# Patient Record
Sex: FEMALE | Race: WHITE | NOT HISPANIC OR LATINO | Employment: OTHER | URBAN - METROPOLITAN AREA
[De-identification: names, ages, dates, MRNs, and addresses within clinical notes are randomized per-mention and may not be internally consistent; named-entity substitution may affect disease eponyms.]

---

## 2017-02-24 ENCOUNTER — CONVERSION ENCOUNTER (OUTPATIENT)
Dept: CARDIOLOGY | Facility: CLINIC | Age: 67
End: 2017-02-24

## 2017-02-24 LAB
ALBUMIN SERPL-MCNC: 4.2 G/DL (ref 3.6–5.1)
ALBUMIN/GLOB SERPL: ABNORMAL {RATIO} (ref 1–2.5)
ALP SERPL-CCNC: 127 UNITS/L (ref 33–130)
ALT SERPL-CCNC: 18 UNITS/L (ref 6–29)
AST SERPL-CCNC: 34 UNITS/L (ref 10–35)
BASOPHILS # BLD AUTO: ABNORMAL 10*3/MM3 (ref 0–200)
BASOPHILS NFR BLD AUTO: 0.2 %
BILIRUB SERPL-MCNC: 0.5 MG/DL (ref 0.2–1.2)
BUN SERPL-MCNC: 12 MG/DL (ref 7–25)
BUN/CREAT SERPL: ABNORMAL (ref 6–22)
CALCIUM SERPL-MCNC: 9.7 MG/DL (ref 8.6–10.4)
CHLORIDE SERPL-SCNC: 103 MMOL/L (ref 98–110)
CO2 CONTENT VENOUS: 27 MMOL/L (ref 20–31)
CONV NEUTROPHILS/100 LEUKOCYTES IN BODY FLUID BY MANUAL COUNT: 83.1 %
CONV TOTAL PROTEIN: 7 G/DL (ref 6.1–8.1)
CREAT UR-MCNC: 1 MG/DL (ref 0.5–0.99)
CRP SERPL-MCNC: 1.17 MG/DL
EOSINOPHIL # BLD AUTO: 3 %
EOSINOPHIL # BLD AUTO: ABNORMAL 10*3/MM3 (ref 15–500)
ERYTHROCYTE [DISTWIDTH] IN BLOOD BY AUTOMATED COUNT: 16.2 % (ref 11–15)
GLOBULIN UR ELPH-MCNC: ABNORMAL G/DL (ref 1.9–3.7)
GLUCOSE SERPL-MCNC: 128 MG/DL (ref 65–99)
HCT VFR BLD AUTO: 45.6 % (ref 35–45)
HGB BLD-MCNC: 14.7 G/DL (ref 11.7–15.5)
LYMPHOCYTES # BLD AUTO: ABNORMAL 10*3/MM3 (ref 850–3900)
LYMPHOCYTES NFR BLD AUTO: 11 %
MCH RBC QN AUTO: 30.7 PG (ref 27–33)
MCHC RBC AUTO-ENTMCNC: ABNORMAL % (ref 32–36)
MCV RBC AUTO: 95.3 FL (ref 80–100)
MONOCYTES # BLD AUTO: ABNORMAL 10*3/MICROLITER (ref 200–950)
MONOCYTES NFR BLD AUTO: 2.7 %
NEUTROPHILS # BLD AUTO: ABNORMAL 10*3/MM3 (ref 1500–7800)
PLATELET # BLD AUTO: ABNORMAL 10*3/MM3 (ref 140–400)
PMV BLD AUTO: 8.6 FL (ref 7.5–12.5)
POTASSIUM SERPL-SCNC: 4.6 MMOL/L (ref 3.5–5.3)
RBC # BLD AUTO: ABNORMAL 10*6/MM3 (ref 3.8–5.1)
SODIUM SERPL-SCNC: 140 MMOL/L (ref 135–146)
WBC # BLD AUTO: ABNORMAL K/UL (ref 3.8–10.8)

## 2017-05-27 LAB
ALBUMIN SERPL-MCNC: 4 G/DL (ref 3.6–5.1)
ALBUMIN/GLOB SERPL: ABNORMAL {RATIO} (ref 1–2.5)
ALP SERPL-CCNC: 88 UNITS/L (ref 33–130)
ALT SERPL-CCNC: 24 UNITS/L (ref 6–29)
AST SERPL-CCNC: 31 UNITS/L (ref 10–35)
BILIRUB SERPL-MCNC: 0.5 MG/DL (ref 0.2–1.2)
BUN SERPL-MCNC: 21 MG/DL (ref 7–25)
BUN/CREAT SERPL: ABNORMAL (ref 6–22)
CALCIUM SERPL-MCNC: 9.5 MG/DL (ref 8.6–10.4)
CHLORIDE SERPL-SCNC: 101 MMOL/L (ref 98–110)
CO2 CONTENT VENOUS: 26 MMOL/L (ref 20–31)
CONV TOTAL PROTEIN: 6.4 G/DL (ref 6.1–8.1)
CREAT UR-MCNC: 0.94 MG/DL (ref 0.5–0.99)
CYCLIC CITRULLINATED PEPTIDE ANTIBODY: ABNORMAL
ERYTHROCYTE [DISTWIDTH] IN BLOOD BY AUTOMATED COUNT: 16.3 % (ref 11–15)
GLOBULIN UR ELPH-MCNC: ABNORMAL G/DL (ref 1.9–3.7)
GLUCOSE SERPL-MCNC: 189 MG/DL (ref 65–99)
HCT VFR BLD AUTO: 41.5 % (ref 35–45)
HGB BLD-MCNC: 13.7 G/DL (ref 11.7–15.5)
MCH RBC QN AUTO: 29.7 PG (ref 27–33)
MCHC RBC AUTO-ENTMCNC: ABNORMAL % (ref 32–36)
MCV RBC AUTO: 90 FL (ref 80–100)
PLATELET # BLD AUTO: ABNORMAL 10*3/MM3 (ref 140–400)
PMV BLD AUTO: 10 FL (ref 7.5–12.5)
POTASSIUM SERPL-SCNC: 4.2 MMOL/L (ref 3.5–5.3)
RBC # BLD AUTO: ABNORMAL 10*6/MM3 (ref 3.8–5.1)
SODIUM SERPL-SCNC: 141 MMOL/L (ref 135–146)
WBC # BLD AUTO: ABNORMAL K/UL (ref 3.8–10.8)

## 2017-08-02 ENCOUNTER — HOSPITAL ENCOUNTER (OUTPATIENT)
Dept: LAB | Facility: HOSPITAL | Age: 67
Discharge: HOME | End: 2017-08-02
Attending: NURSE PRACTITIONER | Admitting: NURSE PRACTITIONER

## 2017-08-02 LAB
ALBUMIN SERPL-MCNC: 3.7 G/DL (ref 3.5–4.8)
ALBUMIN/GLOB SERPL: 1.2 {RATIO} (ref 1–1.7)
ALP SERPL-CCNC: 82 IU/L (ref 32–91)
ALT SERPL-CCNC: 22 IU/L (ref 14–54)
ANION GAP SERPL CALC-SCNC: 17.9 MMOL/L (ref 10–20)
AST SERPL-CCNC: 54 IU/L (ref 15–41)
BASOPHILS # BLD AUTO: 0 10*3/UL (ref 0–0.2)
BASOPHILS NFR BLD AUTO: 1 % (ref 0–2)
BILIRUB SERPL-MCNC: 0.7 MG/DL (ref 0.3–1.2)
BUN SERPL-MCNC: 14 MG/DL (ref 8–20)
BUN/CREAT SERPL: 14 (ref 5.4–26.2)
CALCIUM SERPL-MCNC: 9.9 MG/DL (ref 8.9–10.3)
CHLORIDE SERPL-SCNC: 103 MMOL/L (ref 101–111)
CONV CO2: 24 MMOL/L (ref 22–32)
CONV TOTAL PROTEIN: 6.8 G/DL (ref 6.1–7.9)
CREAT UR-MCNC: 1 MG/DL (ref 0.4–1)
CRP SERPL-MCNC: 0.87 MG/DL (ref 0–0.7)
DIFFERENTIAL METHOD BLD: (no result)
EOSINOPHIL # BLD AUTO: 0.3 10*3/UL (ref 0–0.3)
EOSINOPHIL # BLD AUTO: 5 % (ref 0–3)
ERYTHROCYTE [DISTWIDTH] IN BLOOD BY AUTOMATED COUNT: 16.6 % (ref 11.5–14.5)
ERYTHROCYTE [SEDIMENTATION RATE] IN BLOOD BY WESTERGREN METHOD: 51 MM/HR (ref 0–30)
GLOBULIN UR ELPH-MCNC: 3.1 G/DL (ref 2.5–3.8)
GLUCOSE SERPL-MCNC: 98 MG/DL (ref 65–99)
HCT VFR BLD AUTO: 40.7 % (ref 35–49)
HGB BLD-MCNC: 13.4 G/DL (ref 12–15)
LYMPHOCYTES # BLD AUTO: 1.1 10*3/UL (ref 0.8–4.8)
LYMPHOCYTES NFR BLD AUTO: 19 % (ref 18–42)
MCH RBC QN AUTO: 31.3 PG (ref 26–32)
MCHC RBC AUTO-ENTMCNC: 32.9 G/DL (ref 32–36)
MCV RBC AUTO: 95.1 FL (ref 80–94)
MONOCYTES # BLD AUTO: 0.5 10*3/UL (ref 0.1–1.3)
MONOCYTES NFR BLD AUTO: 9 % (ref 2–11)
NEUTROPHILS # BLD AUTO: 3.7 10*3/UL (ref 2.3–8.6)
NEUTROPHILS NFR BLD AUTO: 66 % (ref 50–75)
NRBC BLD AUTO-RTO: 0 /100{WBCS}
NRBC/RBC NFR BLD MANUAL: 0 10*3/UL
PLATELET # BLD AUTO: 297 10*3/UL (ref 150–450)
PMV BLD AUTO: 8.9 FL (ref 7.4–10.4)
POTASSIUM SERPL-SCNC: 3.9 MMOL/L (ref 3.6–5.1)
RBC # BLD AUTO: 4.27 10*6/UL (ref 4–5.4)
SODIUM SERPL-SCNC: 141 MMOL/L (ref 136–144)
WBC # BLD AUTO: 5.7 10*3/UL (ref 4.5–11.5)

## 2017-11-30 ENCOUNTER — HOSPITAL ENCOUNTER (OUTPATIENT)
Dept: RHEUMATOLOGY | Facility: CLINIC | Age: 67
Discharge: HOME | End: 2017-11-30
Attending: INTERNAL MEDICINE | Admitting: INTERNAL MEDICINE

## 2018-05-02 ENCOUNTER — HOSPITAL ENCOUNTER (OUTPATIENT)
Dept: LAB | Facility: HOSPITAL | Age: 68
Discharge: HOME | End: 2018-05-02
Attending: INTERNAL MEDICINE | Admitting: INTERNAL MEDICINE

## 2018-05-02 LAB
ALBUMIN SERPL-MCNC: 3.7 G/DL (ref 3.5–4.8)
ALBUMIN/GLOB SERPL: 1.2 {RATIO} (ref 1–1.7)
ALP SERPL-CCNC: 83 IU/L (ref 32–91)
ALT SERPL-CCNC: 24 IU/L (ref 14–54)
ANION GAP SERPL CALC-SCNC: 13.6 MMOL/L (ref 10–20)
AST SERPL-CCNC: 55 IU/L (ref 15–41)
BASOPHILS # BLD AUTO: 0.1 10*3/UL (ref 0–0.2)
BASOPHILS NFR BLD AUTO: 1 % (ref 0–2)
BILIRUB SERPL-MCNC: 0.6 MG/DL (ref 0.3–1.2)
BUN SERPL-MCNC: 19 MG/DL (ref 8–20)
BUN/CREAT SERPL: 17.3 (ref 5.4–26.2)
CALCIUM SERPL-MCNC: 10 MG/DL (ref 8.9–10.3)
CHLORIDE SERPL-SCNC: 104 MMOL/L (ref 101–111)
CONV CO2: 27 MMOL/L (ref 22–32)
CONV TOTAL PROTEIN: 6.8 G/DL (ref 6.1–7.9)
CREAT UR-MCNC: 1.1 MG/DL (ref 0.4–1)
CRP SERPL-MCNC: 0.7 MG/DL (ref 0–0.7)
DIFFERENTIAL METHOD BLD: (no result)
EOSINOPHIL # BLD AUTO: 0.4 10*3/UL (ref 0–0.3)
EOSINOPHIL # BLD AUTO: 6 % (ref 0–3)
ERYTHROCYTE [DISTWIDTH] IN BLOOD BY AUTOMATED COUNT: 15.3 % (ref 11.5–14.5)
ERYTHROCYTE [SEDIMENTATION RATE] IN BLOOD BY WESTERGREN METHOD: 28 MM/HR (ref 0–30)
GLOBULIN UR ELPH-MCNC: 3.1 G/DL (ref 2.5–3.8)
GLUCOSE SERPL-MCNC: 138 MG/DL (ref 65–99)
HCT VFR BLD AUTO: 40.5 % (ref 35–49)
HGB BLD-MCNC: 13.1 G/DL (ref 12–15)
LYMPHOCYTES # BLD AUTO: 0.9 10*3/UL (ref 0.8–4.8)
LYMPHOCYTES NFR BLD AUTO: 13 % (ref 18–42)
MCH RBC QN AUTO: 31.1 PG (ref 26–32)
MCHC RBC AUTO-ENTMCNC: 32.4 G/DL (ref 32–36)
MCV RBC AUTO: 96 FL (ref 80–94)
MONOCYTES # BLD AUTO: 0.5 10*3/UL (ref 0.1–1.3)
MONOCYTES NFR BLD AUTO: 8 % (ref 2–11)
NEUTROPHILS # BLD AUTO: 4.8 10*3/UL (ref 2.3–8.6)
NEUTROPHILS NFR BLD AUTO: 72 % (ref 50–75)
NRBC BLD AUTO-RTO: 0 /100{WBCS}
NRBC/RBC NFR BLD MANUAL: 0 10*3/UL
PLATELET # BLD AUTO: 244 10*3/UL (ref 150–450)
PMV BLD AUTO: 9 FL (ref 7.4–10.4)
POTASSIUM SERPL-SCNC: 5.6 MMOL/L (ref 3.6–5.1)
RBC # BLD AUTO: 4.21 10*6/UL (ref 4–5.4)
SODIUM SERPL-SCNC: 139 MMOL/L (ref 136–144)
WBC # BLD AUTO: 6.7 10*3/UL (ref 4.5–11.5)

## 2018-08-20 ENCOUNTER — HOSPITAL ENCOUNTER (OUTPATIENT)
Dept: LAB | Facility: HOSPITAL | Age: 68
Discharge: HOME | End: 2018-08-20
Attending: INTERNAL MEDICINE | Admitting: INTERNAL MEDICINE

## 2018-08-20 LAB
ALBUMIN SERPL-MCNC: 3.8 G/DL (ref 3.5–4.8)
ALBUMIN/GLOB SERPL: 1.1 {RATIO} (ref 1–1.7)
ALP SERPL-CCNC: 95 IU/L (ref 32–91)
ALT SERPL-CCNC: 19 IU/L (ref 14–54)
ANION GAP SERPL CALC-SCNC: 15.6 MMOL/L (ref 10–20)
AST SERPL-CCNC: 36 IU/L (ref 15–41)
BASOPHILS # BLD AUTO: 0.1 10*3/UL (ref 0–0.2)
BASOPHILS NFR BLD AUTO: 1 % (ref 0–2)
BILIRUB SERPL-MCNC: 0.5 MG/DL (ref 0.3–1.2)
BUN SERPL-MCNC: 24 MG/DL (ref 8–20)
BUN/CREAT SERPL: 21.8 (ref 5.4–26.2)
CALCIUM SERPL-MCNC: 10.1 MG/DL (ref 8.9–10.3)
CCP IGG ANTIBODIES: <0.4 U/ML
CHLORIDE SERPL-SCNC: 104 MMOL/L (ref 101–111)
CONV CO2: 25 MMOL/L (ref 22–32)
CONV TOTAL PROTEIN: 7.3 G/DL (ref 6.1–7.9)
CREAT UR-MCNC: 1.1 MG/DL (ref 0.4–1)
CRP SERPL-MCNC: 0.61 MG/DL (ref 0–0.7)
DIFFERENTIAL METHOD BLD: (no result)
EOSINOPHIL # BLD AUTO: 0.4 10*3/UL (ref 0–0.3)
EOSINOPHIL # BLD AUTO: 5 % (ref 0–3)
ERYTHROCYTE [DISTWIDTH] IN BLOOD BY AUTOMATED COUNT: 15 % (ref 11.5–14.5)
GLOBULIN UR ELPH-MCNC: 3.5 G/DL (ref 2.5–3.8)
GLUCOSE SERPL-MCNC: 143 MG/DL (ref 65–99)
HCT VFR BLD AUTO: 44 % (ref 35–49)
HGB BLD-MCNC: 14.7 G/DL (ref 12–15)
IRON SATN MFR SERPL: 17 % (ref 15–50)
IRON SERPL-MCNC: 76 UG/DL (ref 28–170)
LYMPHOCYTES # BLD AUTO: 1 10*3/UL (ref 0.8–4.8)
LYMPHOCYTES NFR BLD AUTO: 13 % (ref 18–42)
MCH RBC QN AUTO: 32.1 PG (ref 26–32)
MCHC RBC AUTO-ENTMCNC: 33.5 G/DL (ref 32–36)
MCV RBC AUTO: 95.8 FL (ref 80–94)
MONOCYTES # BLD AUTO: 0.5 10*3/UL (ref 0.1–1.3)
MONOCYTES NFR BLD AUTO: 7 % (ref 2–11)
NEUTROPHILS # BLD AUTO: 5.6 10*3/UL (ref 2.3–8.6)
NEUTROPHILS NFR BLD AUTO: 74 % (ref 50–75)
NRBC BLD AUTO-RTO: 0 /100{WBCS}
NRBC/RBC NFR BLD MANUAL: 0 10*3/UL
PLATELET # BLD AUTO: 277 10*3/UL (ref 150–450)
PMV BLD AUTO: 9.1 FL (ref 7.4–10.4)
POTASSIUM SERPL-SCNC: 4.6 MMOL/L (ref 3.6–5.1)
RBC # BLD AUTO: 4.59 10*6/UL (ref 4–5.4)
SODIUM SERPL-SCNC: 140 MMOL/L (ref 136–144)
TIBC SERPL-MCNC: 449 UG/DL (ref 228–428)
WBC # BLD AUTO: 7.5 10*3/UL (ref 4.5–11.5)

## 2018-11-21 ENCOUNTER — HOSPITAL ENCOUNTER (OUTPATIENT)
Dept: LAB | Facility: HOSPITAL | Age: 68
Discharge: HOME | End: 2018-11-21
Attending: INTERNAL MEDICINE | Admitting: INTERNAL MEDICINE

## 2018-11-21 LAB
ALBUMIN SERPL-MCNC: 3.7 G/DL (ref 3.5–4.8)
ALBUMIN/GLOB SERPL: 1.2 {RATIO} (ref 1–1.7)
ALP SERPL-CCNC: 98 IU/L (ref 32–91)
ALT SERPL-CCNC: 22 IU/L (ref 14–54)
ANION GAP SERPL CALC-SCNC: 15 MMOL/L (ref 10–20)
AST SERPL-CCNC: 54 IU/L (ref 15–41)
BILIRUB SERPL-MCNC: 0.7 MG/DL (ref 0.3–1.2)
BUN SERPL-MCNC: 16 MG/DL (ref 8–20)
BUN/CREAT SERPL: 14.5 (ref 5.4–26.2)
CALCIUM SERPL-MCNC: 9.8 MG/DL (ref 8.9–10.3)
CHLORIDE SERPL-SCNC: 98 MMOL/L (ref 101–111)
CONV CO2: 29 MMOL/L (ref 22–32)
CONV TOTAL PROTEIN: 6.8 G/DL (ref 6.1–7.9)
CREAT UR-MCNC: 1.1 MG/DL (ref 0.4–1)
CRP SERPL-MCNC: 0.94 MG/DL (ref 0–0.7)
ERYTHROCYTE [DISTWIDTH] IN BLOOD BY AUTOMATED COUNT: 15.7 % (ref 11.5–14.5)
ERYTHROCYTE [SEDIMENTATION RATE] IN BLOOD BY WESTERGREN METHOD: 31 MM/HR (ref 0–30)
GLOBULIN UR ELPH-MCNC: 3.1 G/DL (ref 2.5–3.8)
GLUCOSE SERPL-MCNC: 158 MG/DL (ref 65–99)
HCT VFR BLD AUTO: 46.1 % (ref 35–49)
HGB BLD-MCNC: 15.1 G/DL (ref 12–15)
MCH RBC QN AUTO: 31.4 PG (ref 26–32)
MCHC RBC AUTO-ENTMCNC: 32.8 G/DL (ref 32–36)
MCV RBC AUTO: 95.8 FL (ref 80–94)
PLATELET # BLD AUTO: 268 10*3/UL (ref 150–450)
PMV BLD AUTO: 9 FL (ref 7.4–10.4)
POTASSIUM SERPL-SCNC: 4 MMOL/L (ref 3.6–5.1)
RBC # BLD AUTO: 4.81 10*6/UL (ref 4–5.4)
SODIUM SERPL-SCNC: 138 MMOL/L (ref 136–144)
WBC # BLD AUTO: 8.4 10*3/UL (ref 4.5–11.5)

## 2018-11-23 LAB
ANA SER QL IA: NORMAL

## 2019-02-27 ENCOUNTER — HOSPITAL ENCOUNTER (OUTPATIENT)
Dept: LAB | Facility: HOSPITAL | Age: 69
Discharge: HOME | End: 2019-02-27
Attending: NURSE PRACTITIONER | Admitting: NURSE PRACTITIONER

## 2019-02-27 LAB
ALBUMIN SERPL-MCNC: 3.7 G/DL (ref 3.5–4.8)
ALBUMIN SERPL-MCNC: 3.9 G/DL (ref 3.5–4.8)
ALBUMIN/GLOB SERPL: 1.1 {RATIO} (ref 1–1.7)
ALP SERPL-CCNC: 94 IU/L (ref 32–91)
ALPHA1 GLOB FLD ELPH-MCNC: 0.2 GM/DL (ref 0.1–0.4)
ALPHA2 GLOB SERPL ELPH-MCNC: 1.2 GM/DL (ref 0.5–1)
ALT SERPL-CCNC: 22 IU/L (ref 14–54)
ANION GAP SERPL CALC-SCNC: 17.3 MMOL/L (ref 10–20)
AST SERPL-CCNC: 49 IU/L (ref 15–41)
B-GLOBULIN SERPL ELPH-MCNC: 1.3 GM/DL (ref 0.7–1.4)
BASOPHILS # BLD AUTO: 0.1 10*3/UL (ref 0–0.2)
BASOPHILS NFR BLD AUTO: 1 % (ref 0–2)
BILIRUB SERPL-MCNC: 0.8 MG/DL (ref 0.3–1.2)
BILIRUB UR QL STRIP: NEGATIVE MG/DL
BUN SERPL-MCNC: 28 MG/DL (ref 8–20)
BUN/CREAT SERPL: 21.5 (ref 5.4–26.2)
CALCIUM SERPL-MCNC: 9.8 MG/DL (ref 8.9–10.3)
CASTS URNS QL MICRO: NORMAL /[LPF]
CHLORIDE SERPL-SCNC: 102 MMOL/L (ref 101–111)
COLOR UR: YELLOW
CONV BACTERIA IN URINE MICRO: NEGATIVE
CONV CLARITY OF URINE: CLEAR
CONV CO2: 23 MMOL/L (ref 22–32)
CONV HYALINE CASTS IN URINE MICRO: 1 /[LPF] (ref 0–5)
CONV PROTEIN IN URINE BY AUTOMATED TEST STRIP: NEGATIVE MG/DL
CONV SMALL ROUND CELLS: NORMAL /[HPF]
CONV TOTAL PROTEIN: 7 G/DL (ref 6.1–7.9)
CONV TOTAL PROTEIN: 7.3 G/DL (ref 6.1–7.9)
CONV UROBILINOGEN IN URINE BY AUTOMATED TEST STRIP: 0.2 MG/DL
CREAT UR-MCNC: 1.3 MG/DL (ref 0.4–1)
CRP SERPL-MCNC: 0.48 MG/DL (ref 0–0.7)
CULTURE INDICATED?: NORMAL
DIFFERENTIAL METHOD BLD: (no result)
EOSINOPHIL # BLD AUTO: 0.3 10*3/UL (ref 0–0.3)
EOSINOPHIL # BLD AUTO: 6 % (ref 0–3)
ERYTHROCYTE [DISTWIDTH] IN BLOOD BY AUTOMATED COUNT: 15.9 % (ref 11.5–14.5)
ERYTHROCYTE [SEDIMENTATION RATE] IN BLOOD BY WESTERGREN METHOD: 33 MM/HR (ref 0–30)
GAMMA GLOB SERPL ELPH-MCNC: 0.6 GM/DL (ref 0.6–1.6)
GLOBULIN UR ELPH-MCNC: 3.4 G/DL (ref 2.5–3.8)
GLUCOSE SERPL-MCNC: 245 MG/DL (ref 65–99)
GLUCOSE UR QL: NEGATIVE MG/DL
HCT VFR BLD AUTO: 46 % (ref 35–49)
HGB BLD-MCNC: 14.9 G/DL (ref 12–15)
HGB UR QL STRIP: NEGATIVE
INSULIN SERPL-ACNC: ABNORMAL U[IU]/ML
INSULIN SERPL-ACNC: ABNORMAL U[IU]/ML
KETONES UR QL STRIP: NEGATIVE MG/DL
LEUKOCYTE ESTERASE UR QL STRIP: NEGATIVE
LYMPHOCYTES # BLD AUTO: 0.9 10*3/UL (ref 0.8–4.8)
LYMPHOCYTES NFR BLD AUTO: 16 % (ref 18–42)
MCH RBC QN AUTO: 31.6 PG (ref 26–32)
MCHC RBC AUTO-ENTMCNC: 32.5 G/DL (ref 32–36)
MCV RBC AUTO: 97.5 FL (ref 80–94)
MONOCYTES # BLD AUTO: 0.5 10*3/UL (ref 0.1–1.3)
MONOCYTES NFR BLD AUTO: 9 % (ref 2–11)
NEUTROPHILS # BLD AUTO: 3.9 10*3/UL (ref 2.3–8.6)
NEUTROPHILS NFR BLD AUTO: 68 % (ref 50–75)
NITRITE UR QL STRIP: NEGATIVE
NRBC BLD AUTO-RTO: 0 /100{WBCS}
NRBC/RBC NFR BLD MANUAL: 0 10*3/UL
PH UR STRIP.AUTO: 6.5 [PH] (ref 4.5–8)
PLATELET # BLD AUTO: 256 10*3/UL (ref 150–450)
PMV BLD AUTO: 9.2 FL (ref 7.4–10.4)
POTASSIUM SERPL-SCNC: ABNORMAL MMOL/L
POTASSIUM SERPL-SCNC: ABNORMAL MMOL/L (ref 3.6–5.1)
RBC # BLD AUTO: 4.72 10*6/UL (ref 4–5.4)
RBC #/AREA URNS HPF: 1 /[HPF] (ref 0–3)
SODIUM SERPL-SCNC: 136 MMOL/L (ref 136–144)
SP GR UR: 1.01 (ref 1–1.03)
SPERM URNS QL MICRO: NORMAL /[HPF]
SQUAMOUS SPT QL MICRO: 1 /[HPF] (ref 0–5)
UNIDENT CRYS URNS QL MICRO: NORMAL /[HPF]
WBC # BLD AUTO: 5.7 10*3/UL (ref 4.5–11.5)
WBC #/AREA URNS HPF: 0 /[HPF] (ref 0–5)
YEAST SPEC QL WET PREP: NORMAL /[HPF]

## 2019-06-17 RX ORDER — AZATHIOPRINE 50 MG/1
TABLET ORAL
Qty: 180 TABLET | Refills: 0 | OUTPATIENT
Start: 2019-06-17

## 2019-06-19 ENCOUNTER — TELEPHONE (OUTPATIENT)
Dept: RHEUMATOLOGY | Facility: CLINIC | Age: 69
End: 2019-06-19

## 2019-06-19 DIAGNOSIS — M06.4 POLYARTHRITIS, INFLAMMATORY (HCC): Primary | ICD-10-CM

## 2019-06-19 PROBLEM — M79.643 PAIN OF HAND: Status: ACTIVE | Noted: 2017-11-30

## 2019-06-19 PROBLEM — J44.9 CHRONIC OBSTRUCTIVE PULMONARY DISEASE (HCC): Status: ACTIVE | Noted: 2017-02-23

## 2019-06-19 PROBLEM — R79.89 ELEVATED LFTS: Status: ACTIVE | Noted: 2017-08-09

## 2019-06-19 PROBLEM — M81.0 OSTEOPOROSIS: Status: ACTIVE | Noted: 2018-08-20

## 2019-06-19 PROBLEM — M06.9 RHEUMATOID ARTHRITIS (HCC): Status: ACTIVE | Noted: 2018-05-02

## 2019-06-19 PROBLEM — R00.0 SINUS TACHYCARDIA: Status: ACTIVE | Noted: 2017-05-15

## 2019-06-19 RX ORDER — AMLODIPINE BESYLATE 10 MG/1
10 TABLET ORAL DAILY
COMMUNITY
Start: 2015-11-06 | End: 2020-12-20 | Stop reason: HOSPADM

## 2019-06-19 RX ORDER — ATORVASTATIN CALCIUM 40 MG/1
40 TABLET, FILM COATED ORAL DAILY
COMMUNITY
Start: 2019-05-13 | End: 2021-07-10

## 2019-06-19 RX ORDER — ASPIRIN 81 MG/1
81 TABLET ORAL DAILY
COMMUNITY
Start: 2015-11-06

## 2019-06-19 RX ORDER — AZATHIOPRINE 50 MG/1
TABLET ORAL
COMMUNITY
Start: 2018-09-03 | End: 2019-06-19 | Stop reason: SDUPTHER

## 2019-06-19 RX ORDER — METFORMIN HYDROCHLORIDE 500 MG/1
TABLET, EXTENDED RELEASE ORAL
COMMUNITY
Start: 2015-11-06 | End: 2020-10-08

## 2019-06-19 RX ORDER — GLIPIZIDE 10 MG/1
10 TABLET ORAL DAILY
COMMUNITY
End: 2020-11-06 | Stop reason: HOSPADM

## 2019-06-19 RX ORDER — AZATHIOPRINE 50 MG/1
50 TABLET ORAL 2 TIMES DAILY
Qty: 60 TABLET | Refills: 0 | Status: SHIPPED | OUTPATIENT
Start: 2019-06-19 | End: 2019-06-26 | Stop reason: SDUPTHER

## 2019-06-19 RX ORDER — ALENDRONATE SODIUM 70 MG/1
TABLET ORAL
COMMUNITY
Start: 2017-05-26 | End: 2019-06-26 | Stop reason: SDUPTHER

## 2019-06-19 RX ORDER — TORSEMIDE 5 MG/1
TABLET ORAL
COMMUNITY
Start: 2017-02-23 | End: 2020-06-22 | Stop reason: DRUGHIGH

## 2019-06-19 RX ORDER — METOPROLOL TARTRATE 50 MG/1
50 TABLET, FILM COATED ORAL 2 TIMES DAILY
COMMUNITY
Start: 2015-11-06 | End: 2020-11-06 | Stop reason: HOSPADM

## 2019-06-19 RX ORDER — GEMFIBROZIL 600 MG/1
600 TABLET, FILM COATED ORAL 2 TIMES DAILY
COMMUNITY
Start: 2017-02-23 | End: 2020-11-06 | Stop reason: HOSPADM

## 2019-06-19 RX ORDER — AZATHIOPRINE 50 MG/1
50 TABLET ORAL 2 TIMES DAILY
Qty: 180 TABLET | Refills: 0 | OUTPATIENT
Start: 2019-06-19

## 2019-06-19 RX ORDER — ALBUTEROL SULFATE 90 UG/1
1 AEROSOL, METERED RESPIRATORY (INHALATION) EVERY 6 HOURS PRN
COMMUNITY
Start: 2016-05-16 | End: 2020-11-06 | Stop reason: HOSPADM

## 2019-06-19 RX ORDER — THEOPHYLLINE 300 MG/1
300 TABLET, EXTENDED RELEASE ORAL DAILY
COMMUNITY
Start: 2017-08-07 | End: 2020-11-06 | Stop reason: HOSPADM

## 2019-06-20 NOTE — TELEPHONE ENCOUNTER
Pt is coming in to the office this month. Please send in only enough until her apt because she is due for labs. Thank you

## 2019-06-26 ENCOUNTER — OFFICE VISIT (OUTPATIENT)
Dept: RHEUMATOLOGY | Facility: CLINIC | Age: 69
End: 2019-06-26

## 2019-06-26 ENCOUNTER — LAB (OUTPATIENT)
Dept: LAB | Facility: HOSPITAL | Age: 69
End: 2019-06-26

## 2019-06-26 VITALS
HEART RATE: 62 BPM | BODY MASS INDEX: 37.73 KG/M2 | HEIGHT: 62 IN | WEIGHT: 205 LBS | SYSTOLIC BLOOD PRESSURE: 124 MMHG | DIASTOLIC BLOOD PRESSURE: 72 MMHG

## 2019-06-26 DIAGNOSIS — M31.0 LEUKOCYTOCLASTIC VASCULITIS (HCC): ICD-10-CM

## 2019-06-26 DIAGNOSIS — M81.0 OSTEOPOROSIS, UNSPECIFIED OSTEOPOROSIS TYPE, UNSPECIFIED PATHOLOGICAL FRACTURE PRESENCE: ICD-10-CM

## 2019-06-26 DIAGNOSIS — M06.9 RHEUMATOID ARTHRITIS, INVOLVING UNSPECIFIED SITE, UNSPECIFIED RHEUMATOID FACTOR PRESENCE: Primary | ICD-10-CM

## 2019-06-26 DIAGNOSIS — N18.9 CHRONIC KIDNEY DISEASE, UNSPECIFIED CKD STAGE: ICD-10-CM

## 2019-06-26 DIAGNOSIS — M06.9 RHEUMATOID ARTHRITIS, INVOLVING UNSPECIFIED SITE, UNSPECIFIED RHEUMATOID FACTOR PRESENCE: ICD-10-CM

## 2019-06-26 DIAGNOSIS — M06.4 POLYARTHRITIS, INFLAMMATORY (HCC): ICD-10-CM

## 2019-06-26 LAB
ALBUMIN SERPL-MCNC: 3.8 G/DL (ref 3.5–4.8)
ALBUMIN/GLOB SERPL: 1.4 G/DL (ref 1–1.7)
ALP SERPL-CCNC: 111 U/L (ref 32–91)
ALT SERPL W P-5'-P-CCNC: 16 U/L (ref 14–54)
ANION GAP SERPL CALCULATED.3IONS-SCNC: 16.6 MMOL/L (ref 10–20)
AST SERPL-CCNC: 29 U/L (ref 15–41)
BASOPHILS # BLD AUTO: 0.1 10*3/MM3 (ref 0–0.2)
BASOPHILS NFR BLD AUTO: 0.9 % (ref 0–1.5)
BILIRUB SERPL-MCNC: 0.9 MG/DL (ref 0.3–1.2)
BUN BLD-MCNC: 9 MG/DL (ref 8–20)
BUN/CREAT SERPL: 10 (ref 5.4–26.2)
CALCIUM SPEC-SCNC: 9.4 MG/DL (ref 8.9–10.3)
CHLORIDE SERPL-SCNC: 102 MMOL/L (ref 101–111)
CO2 SERPL-SCNC: 28 MMOL/L (ref 22–32)
CREAT BLD-MCNC: 0.9 MG/DL (ref 0.4–1)
CRP SERPL-MCNC: 0.45 MG/DL (ref 0–0.7)
DEPRECATED RDW RBC AUTO: 55.1 FL (ref 37–54)
EOSINOPHIL # BLD AUTO: 0.4 10*3/MM3 (ref 0–0.4)
EOSINOPHIL NFR BLD AUTO: 5.3 % (ref 0.3–6.2)
ERYTHROCYTE [DISTWIDTH] IN BLOOD BY AUTOMATED COUNT: 16.2 % (ref 12.3–15.4)
ERYTHROCYTE [SEDIMENTATION RATE] IN BLOOD: 25 MM/HR (ref 0–30)
GFR SERPL CREATININE-BSD FRML MDRD: 62 ML/MIN/1.73
GLOBULIN UR ELPH-MCNC: 2.8 GM/DL (ref 2.5–3.8)
GLUCOSE BLD-MCNC: 124 MG/DL (ref 65–99)
HCT VFR BLD AUTO: 47.9 % (ref 34–46.6)
HGB BLD-MCNC: 15.6 G/DL (ref 12–15.9)
LYMPHOCYTES # BLD AUTO: 1.1 10*3/MM3 (ref 0.7–3.1)
LYMPHOCYTES NFR BLD AUTO: 16.2 % (ref 19.6–45.3)
MCH RBC QN AUTO: 31.1 PG (ref 26.6–33)
MCHC RBC AUTO-ENTMCNC: 32.6 G/DL (ref 31.5–35.7)
MCV RBC AUTO: 95.3 FL (ref 79–97)
MONOCYTES # BLD AUTO: 0.6 10*3/MM3 (ref 0.1–0.9)
MONOCYTES NFR BLD AUTO: 8.8 % (ref 5–12)
NEUTROPHILS # BLD AUTO: 4.6 10*3/MM3 (ref 1.7–7)
NEUTROPHILS NFR BLD AUTO: 68.8 % (ref 42.7–76)
PLATELET # BLD AUTO: 263 10*3/MM3 (ref 140–450)
PMV BLD AUTO: 8.8 FL (ref 6–12)
POTASSIUM BLD-SCNC: 4.6 MMOL/L (ref 3.6–5.1)
PROT SERPL-MCNC: 6.6 G/DL (ref 6.1–7.9)
RBC # BLD AUTO: 5.03 10*6/MM3 (ref 3.77–5.28)
SODIUM BLD-SCNC: 142 MMOL/L (ref 136–144)
WBC NRBC COR # BLD: 6.7 10*3/MM3 (ref 3.4–10.8)

## 2019-06-26 PROCEDURE — 80053 COMPREHEN METABOLIC PANEL: CPT | Performed by: NURSE PRACTITIONER

## 2019-06-26 PROCEDURE — 85652 RBC SED RATE AUTOMATED: CPT | Performed by: NURSE PRACTITIONER

## 2019-06-26 PROCEDURE — 36415 COLL VENOUS BLD VENIPUNCTURE: CPT

## 2019-06-26 PROCEDURE — 99214 OFFICE O/P EST MOD 30 MIN: CPT | Performed by: NURSE PRACTITIONER

## 2019-06-26 PROCEDURE — 86140 C-REACTIVE PROTEIN: CPT | Performed by: NURSE PRACTITIONER

## 2019-06-26 PROCEDURE — 82306 VITAMIN D 25 HYDROXY: CPT | Performed by: NURSE PRACTITIONER

## 2019-06-26 PROCEDURE — 85027 COMPLETE CBC AUTOMATED: CPT | Performed by: NURSE PRACTITIONER

## 2019-06-26 RX ORDER — ALENDRONATE SODIUM 70 MG/1
70 TABLET ORAL
Qty: 12 TABLET | Refills: 0 | Status: SHIPPED | OUTPATIENT
Start: 2019-06-26 | End: 2020-10-08

## 2019-06-26 RX ORDER — AZATHIOPRINE 50 MG/1
50 TABLET ORAL 2 TIMES DAILY
Qty: 60 TABLET | Refills: 2 | Status: SHIPPED | OUTPATIENT
Start: 2019-06-26 | End: 2019-07-02 | Stop reason: SDUPTHER

## 2019-06-26 NOTE — PATIENT INSTRUCTIONS
Labs today + in 6 weeks  Call pt w/ results  Continue medication: if lab ok will increase imuran to 50mg  1 tab TID  RTO 3 mos w/ Dr. Magana  Pt given order for dexa scan

## 2019-06-26 NOTE — PROGRESS NOTES
Subjective   Lesa Julio is a 68 y.o. female.     History of Present Illness    Pt is a 68 y.o. female pt with history of rheumatoid arthritis with history of leukocytoclastic vasculitits, diabetes, osteoporosis, and vitamin D deficiency. She was last seen on 19    . Labs in 2019 K+ 6.3 (nephro following), elevated glucose at 245, creatinine 1.3, AST 49 (15-41) vitamin D low at 18, normal CRP, ESR mildly elevated at 33.    . Pt is taking imuran 50m tabs once daily, fosamax 70mg/week. She takes tylenol as needed for pain.  Claims that she feels good. Denies joint stiffness, pain or swelling. Has new scabs on her legs that seem to be worsening. She has been off prednisone she claims for nearly a year.     ROS: Denies fever/chills. NO sores in the  mouh or nose. Denies dry eyes. Denies N/V/D. No CP. SOA a times. She wears oxygen 3L at HS; has sleep apnea. She has diabetes & is managed per her PCP.  Denies any inflammatory bowel disease, no scleritits. She denies any falls or fractures. The remainder of the review of systems reviewed and are (-)    Dexa scam 2017: osteoporosis  Takes calcium, nephro took her off vitamin D              The following portions of the patient's history were reviewed and updated as appropriate: allergies, current medications, past family history, past medical history, past social history, past surgical history and problem list.    Review of Systems   Constitutional: Negative for chills and fever.   HENT: Negative for mouth sores.    Eyes: Negative for pain and redness.        Denies dryness of the eyes   Respiratory: Negative for shortness of breath.    Cardiovascular: Negative for chest pain.   Musculoskeletal: Negative for joint swelling.   Skin: Positive for rash and skin lesions.         Pt has new scabs & lesions on the bilateral lower extremities.       Objective   Physical Exam   Constitutional: She is oriented to person, place, and time. She appears  well-developed and well-nourished.   HENT:   Head: Normocephalic and atraumatic.   Nose: Nose normal.   Eyes: EOM are normal. Pupils are equal, round, and reactive to light.   Neck: Normal range of motion.   Cardiovascular: Normal rate and regular rhythm.   Pulmonary/Chest: Effort normal.   Lung sounds are diminished to the anterior & posterior lung sound   Neurological: She is alert and oriented to person, place, and time.   Skin: Skin is warm. Capillary refill takes 2 to 3 seconds. Rash noted.   Pt has scattered black scabs noted to the BLEs, pt has multiple scars noted to the bilateral lower extremities.   Psychiatric: She has a normal mood and affect. Her behavior is normal.         Assessment/Plan   Lesa was seen today for rash.    Diagnoses and all orders for this visit:    Rheumatoid arthritis, involving unspecified site, unspecified rheumatoid factor presence (CMS/[facility])  Comments:  Continue imuran; no active synovitis is noted on examination  Labs today  Orders:  -     CBC With Manual Differential; Future  -     Comprehensive Metabolic Panel; Future  -     C-reactive Protein; Future  -     Sedimentation Rate; Future  -     Vitamin D 25 Hydroxy; Future  -     Cancel: CBC With Manual Differential; Future  -     Cancel: Comprehensive Metabolic Panel; Future  -     Cancel: C-reactive Protein; Future  -     Cancel: Sedimentation Rate; Future  -     Protein Elec + Interp, Serum; Future  -     Cancel: DEXA Bone Density Appendicular; Future  -     CBC With Manual Differential; Future  -     Comprehensive Metabolic Panel; Future  -     C-reactive Protein; Future  -     Sedimentation Rate; Future  -     DEXA Bone Density Appendicular; Future    Leukocytoclastic vasculitis (CMS/[facility])  Comments:  Pt is taking imuran 50mg po BID; pt has new scabs and lesions to the BLEs.  Will do labs today & if labs stable will increase imuran to 50mg 1 tab po TID  Orders:  -     CBC With Manual Differential; Future  -     Comprehensive  Metabolic Panel; Future  -     C-reactive Protein; Future  -     Sedimentation Rate; Future  -     Vitamin D 25 Hydroxy; Future  -     Cancel: CBC With Manual Differential; Future  -     Cancel: Comprehensive Metabolic Panel; Future  -     Cancel: C-reactive Protein; Future  -     Cancel: Sedimentation Rate; Future  -     Protein Elec + Interp, Serum; Future  -     CBC With Manual Differential; Future  -     Comprehensive Metabolic Panel; Future  -     C-reactive Protein; Future  -     Sedimentation Rate; Future    Osteoporosis, unspecified osteoporosis type, unspecified pathological fracture presence  Comments:  order given to pt to have dexa scan  Discussed prolia  Orders:  -     CBC With Manual Differential; Future  -     Comprehensive Metabolic Panel; Future  -     C-reactive Protein; Future  -     Sedimentation Rate; Future  -     Vitamin D 25 Hydroxy; Future  -     Cancel: DEXA Bone Density Appendicular; Future  -     alendronate (FOSAMAX) 70 MG tablet; Take 1 tablet by mouth Every 7 (Seven) Days.  -     DEXA Bone Density Appendicular; Future    Chronic kidney disease, unspecified CKD stage  Comments:  She is under the care of nephrologist.  Orders:  -     CBC With Manual Differential; Future  -     Comprehensive Metabolic Panel; Future  -     C-reactive Protein; Future  -     Sedimentation Rate; Future  -     Vitamin D 25 Hydroxy; Future    Polyarthritis, inflammatory (CMS/HCC)  -     azaTHIOprine (IMURAN) 50 MG tablet; Take 1 tablet by mouth 2 (Two) Times a Day.

## 2019-06-27 LAB — 25(OH)D3 SERPL-MCNC: 20.4 NG/ML (ref 30–100)

## 2019-06-30 DIAGNOSIS — M31.0 LEUKOCYTOCLASTIC VASCULITIS (HCC): Primary | ICD-10-CM

## 2019-07-02 DIAGNOSIS — M06.4 POLYARTHRITIS, INFLAMMATORY (HCC): ICD-10-CM

## 2019-07-02 RX ORDER — AZATHIOPRINE 50 MG/1
50 TABLET ORAL 3 TIMES DAILY
Qty: 90 TABLET | Refills: 1 | Status: SHIPPED | OUTPATIENT
Start: 2019-07-02 | End: 2019-09-15 | Stop reason: SDUPTHER

## 2019-07-10 ENCOUNTER — TELEPHONE (OUTPATIENT)
Dept: RHEUMATOLOGY | Facility: CLINIC | Age: 69
End: 2019-07-10

## 2019-09-15 DIAGNOSIS — M06.4 POLYARTHRITIS, INFLAMMATORY (HCC): ICD-10-CM

## 2019-09-17 RX ORDER — AZATHIOPRINE 50 MG/1
TABLET ORAL
Qty: 90 TABLET | Refills: 0 | Status: SHIPPED | OUTPATIENT
Start: 2019-09-17 | End: 2020-11-06 | Stop reason: HOSPADM

## 2019-11-01 DIAGNOSIS — M06.4 POLYARTHRITIS, INFLAMMATORY (HCC): ICD-10-CM

## 2019-11-01 RX ORDER — AZATHIOPRINE 50 MG/1
TABLET ORAL
Qty: 90 TABLET | Refills: 0 | OUTPATIENT
Start: 2019-11-01

## 2019-11-19 NOTE — TELEPHONE ENCOUNTER
RX last submitted at end of Feb for a 3 month supply. Pt is due to follow up next week. I added the order for the medication in case you want to go ahead and send in the refill for her.    How Severe Is Your Skin Lesion?: moderate Have Your Skin Lesions Been Treated?: not been treated Is This A New Presentation, Or A Follow-Up?: Skin Lesion

## 2020-06-22 ENCOUNTER — OFFICE VISIT (OUTPATIENT)
Dept: CARDIOLOGY | Facility: CLINIC | Age: 70
End: 2020-06-22

## 2020-06-22 VITALS
HEIGHT: 62 IN | BODY MASS INDEX: 36.25 KG/M2 | DIASTOLIC BLOOD PRESSURE: 64 MMHG | SYSTOLIC BLOOD PRESSURE: 120 MMHG | WEIGHT: 197 LBS | HEART RATE: 56 BPM

## 2020-06-22 DIAGNOSIS — R01.1 GRADE 4 OUT OF 6 INTENSITY MURMUR: ICD-10-CM

## 2020-06-22 DIAGNOSIS — R07.89 CHEST DISCOMFORT: ICD-10-CM

## 2020-06-22 DIAGNOSIS — R01.1 CARDIAC MURMUR: Primary | ICD-10-CM

## 2020-06-22 DIAGNOSIS — R06.02 SHORTNESS OF BREATH: ICD-10-CM

## 2020-06-22 DIAGNOSIS — M31.0 HYPERSENSITIVITY ANGIITIS (HCC): ICD-10-CM

## 2020-06-22 DIAGNOSIS — R00.0 SINUS TACHYCARDIA: ICD-10-CM

## 2020-06-22 DIAGNOSIS — E78.2 MIXED HYPERLIPIDEMIA: ICD-10-CM

## 2020-06-22 DIAGNOSIS — I10 ESSENTIAL HYPERTENSION: ICD-10-CM

## 2020-06-22 PROBLEM — Z00.00 WELL ADULT HEALTH CHECK: Status: ACTIVE | Noted: 2017-08-15

## 2020-06-22 PROBLEM — N25.81 SECONDARY HYPERPARATHYROIDISM OF RENAL ORIGIN (HCC): Status: ACTIVE | Noted: 2017-02-01

## 2020-06-22 PROBLEM — R80.8 OTHER PROTEINURIA: Status: ACTIVE | Noted: 2017-02-01

## 2020-06-22 PROBLEM — D12.6 TUBULAR ADENOMA OF COLON: Status: ACTIVE | Noted: 2018-10-30

## 2020-06-22 PROBLEM — IMO0002 UNCONTROLLED TYPE 2 DIABETES MELLITUS: Status: ACTIVE | Noted: 2020-06-22

## 2020-06-22 PROBLEM — R60.0 LOCALIZED EDEMA: Status: ACTIVE | Noted: 2017-02-01

## 2020-06-22 PROBLEM — J30.9 ALLERGIC RHINITIS: Status: ACTIVE | Noted: 2020-06-22

## 2020-06-22 PROBLEM — E55.9 VITAMIN D DEFICIENCY: Status: ACTIVE | Noted: 2017-08-15

## 2020-06-22 PROBLEM — N83.201 CYST OF RIGHT OVARY: Status: ACTIVE | Noted: 2017-02-15

## 2020-06-22 PROBLEM — E66.9 OBESITY: Status: ACTIVE | Noted: 2017-08-15

## 2020-06-22 PROBLEM — L28.1 PRURIGO NODULARIS: Status: ACTIVE | Noted: 2020-06-22

## 2020-06-22 PROBLEM — D75.1 SECONDARY POLYCYTHEMIA: Status: ACTIVE | Noted: 2020-06-22

## 2020-06-22 PROBLEM — N83.8 MASS OF OVARY: Status: ACTIVE | Noted: 2020-01-30

## 2020-06-22 PROBLEM — I99.8 VASCULAR INSUFFICIENCY: Status: ACTIVE | Noted: 2020-06-22

## 2020-06-22 PROBLEM — K76.0 NONALCOHOLIC FATTY LIVER DISEASE: Status: ACTIVE | Noted: 2020-06-22

## 2020-06-22 PROBLEM — F17.200 TOBACCO DEPENDENCE SYNDROME: Status: ACTIVE | Noted: 2020-06-22

## 2020-06-22 PROBLEM — R53.83 FATIGUE: Status: ACTIVE | Noted: 2020-06-22

## 2020-06-22 PROBLEM — G47.33 OBSTRUCTIVE SLEEP APNEA SYNDROME: Status: ACTIVE | Noted: 2017-04-28

## 2020-06-22 PROBLEM — E65 CENTRAL OBESITY: Status: ACTIVE | Noted: 2020-06-22

## 2020-06-22 PROCEDURE — 99214 OFFICE O/P EST MOD 30 MIN: CPT | Performed by: INTERNAL MEDICINE

## 2020-06-22 RX ORDER — OLMESARTAN MEDOXOMIL 20 MG/1
20 TABLET ORAL DAILY
COMMUNITY
Start: 2020-04-23 | End: 2020-11-06 | Stop reason: HOSPADM

## 2020-06-22 RX ORDER — ERGOCALCIFEROL 1.25 MG/1
50000 CAPSULE ORAL 2 TIMES WEEKLY
COMMUNITY
Start: 2020-05-28 | End: 2020-11-06 | Stop reason: HOSPADM

## 2020-06-22 RX ORDER — HYDRALAZINE HYDROCHLORIDE 50 MG/1
50 TABLET, FILM COATED ORAL 3 TIMES DAILY
COMMUNITY
Start: 2020-05-05 | End: 2020-11-06 | Stop reason: HOSPADM

## 2020-06-22 RX ORDER — TORSEMIDE 10 MG/1
10 TABLET ORAL DAILY
COMMUNITY
Start: 2020-04-23 | End: 2020-11-06 | Stop reason: HOSPADM

## 2020-06-22 RX ORDER — HYDROCODONE BITARTRATE AND ACETAMINOPHEN 5; 325 MG/1; MG/1
TABLET ORAL
COMMUNITY
End: 2020-10-08

## 2020-06-22 RX ORDER — ZOLEDRONIC ACID 5 MG/100ML
5 INJECTION, SOLUTION INTRAVENOUS
COMMUNITY
End: 2020-10-08

## 2020-06-22 NOTE — PROGRESS NOTES
Date of Office Visit: 2020  Encounter Provider: Dr. Marvin Gold    Place of Service: [facility] CARDIOLOGY [place]  Patient Name: Lesa Julio  :1950  Manuel Sanders MD    Chief Complaint   Patient presents with   • Heart Murmur     1 year follow up   • Hypertension   • Hyperlipidemia   • Shortness of Breath     History of Present Illness    I'm pleased to see Mrs. Julio in my office today as a follow-up    As you know, patient is 69 years old white female whose past medical history is significant for hypertension, hyperlipidemia, diabetes mellitus, leukocytoclastic vasculitis who came today for follow-up.    In , patient was diagnosed with vasculitis and was treated with prednisone and azathioprine.  Patient also underwent echocardiogram which showed EF of 60% and no significant valvular heart disease noted.  Patient also had stress test which was negative for ischemia or myocardial infarction.  Patient was started on Lopressor for control of blood pressure.  At present patient takes amlodipine, hydralazine, metoprolol and Benicar.    Since the previous visit, patient denies any episode of chest pain or tightness or heaviness.  Patient denies any symptom of angina pectoris or congestive heart failure.  Patient does have shortness of breath.  She is not very active.  Patient denies any orthopnea, PND, syncope or presyncope.  No leg edema noted.    At this stage I am overall pleased with the patient progress.  Patient is advised to monitor the blood pressure at home.  Increase exercise recommended.  I will see the patient as needed or in 1 year.  I would proceed with echocardiogram on next visit    Past Medical History:   Diagnosis Date   • Arthritis    • Diabetes mellitus, type 2 (CMS/HCC)    • Essential hypertension 2015   • Hearing loss     hearing aid right side   • Hyperlipidemia    • Hypertension    • Leukocytoclastic vasculitis (CMS/HCC)    • Obesity    • Osteoarthritis     • Peripheral arterial disease (CMS/HCC)    • Sleep apnea          Past Surgical History:   Procedure Laterality Date   • CHOLECYSTECTOMY     • LIVER BIOPSY     • TUBAL ABDOMINAL LIGATION             Current Outpatient Medications:   •  albuterol sulfate HFA (VENTOLIN HFA) 108 (90 Base) MCG/ACT inhaler, VENTOLIN  (90 Base) MCG/ACT AERS, Disp: , Rfl:   •  alendronate (FOSAMAX) 70 MG tablet, Take 1 tablet by mouth Every 7 (Seven) Days., Disp: 12 tablet, Rfl: 0  •  amLODIPine (NORVASC) 10 MG tablet, AMLODIPINE BESYLATE 10 MG TABS, Disp: , Rfl:   •  aspirin (ADULT ASPIRIN EC LOW STRENGTH) 81 MG EC tablet, ADULT ASPIRIN EC LOW STRENGTH 81 MG ORAL TABLET DELAYED RELEASE, Disp: , Rfl:   •  atorvastatin (LIPITOR) 40 MG tablet, Daily., Disp: , Rfl:   •  azaTHIOprine (IMURAN) 50 MG tablet, take 1 tablet by mouth three times a day, Disp: 90 tablet, Rfl: 0  •  Fluticasone Furoate-Vilanterol (Breo Ellipta) 100-25 MCG/INH inhaler, Inhale 1 puff Daily., Disp: , Rfl:   •  gemfibrozil (LOPID) 600 MG tablet, LOPID 600 MG TABS, Disp: , Rfl:   •  glipiZIDE (GLUCOTROL) 10 MG tablet, Daily., Disp: , Rfl:   •  hydrALAZINE (APRESOLINE) 50 MG tablet, TK 1 T PO TID, Disp: , Rfl:   •  HYDROcodone-acetaminophen (NORCO) 5-325 MG per tablet, hydrocodone 5 mg-acetaminophen 325 mg tablet  take 1 to 2 tablets by mouth every 4 hours if needed for pain DO NOT EXCEED 10 PILLS IN 24 HOURS, Disp: , Rfl:   •  metFORMIN ER (GLUCOPHAGE-XR) 500 MG 24 hr tablet, METFORMIN HCL  MG XR24H-TAB, Disp: , Rfl:   •  metoprolol tartrate (LOPRESSOR) 50 MG tablet, Every 12 (Twelve) Hours., Disp: , Rfl:   •  O2 (OXYGEN), oxygen  3 L at bedtime, Disp: , Rfl:   •  olmesartan (BENICAR) 20 MG tablet, TK 1 T PO ONCE D, Disp: , Rfl:   •  theophylline (THEODUR) 300 MG 12 hr tablet, THEOPHYLLINE  MG XR12H-TAB, Disp: , Rfl:   •  torsemide (DEMADEX) 10 MG tablet, TK 1 T PO ONCE D, Disp: , Rfl:   •  vitamin D (ERGOCALCIFEROL) 1.25 MG (96045 UT) capsule  "capsule, Take 50,000 Units by mouth 2 (Two) Times a Week., Disp: , Rfl:   •  zoledronic acid (Reclast) 5 MG/100ML solution infusion, 5 mg., Disp: , Rfl:       Social History     Socioeconomic History   • Marital status:      Spouse name: Not on file   • Number of children: Not on file   • Years of education: Not on file   • Highest education level: Not on file   Tobacco Use   • Smoking status: Current Every Day Smoker     Types: Cigarettes   • Smokeless tobacco: Never Used   Substance and Sexual Activity   • Alcohol use: No     Frequency: Never   • Drug use: No   • Sexual activity: Defer         Review of Systems   Constitution: Negative for chills and fever.   HENT: Negative for ear discharge and nosebleeds.    Eyes: Negative for discharge and redness.   Cardiovascular: Negative for chest pain, orthopnea, palpitations, paroxysmal nocturnal dyspnea and syncope.   Respiratory: Positive for shortness of breath. Negative for cough and wheezing.    Endocrine: Negative for heat intolerance.   Skin: Negative for rash.   Musculoskeletal: Positive for arthritis and joint pain. Negative for myalgias.   Gastrointestinal: Negative for abdominal pain, melena, nausea and vomiting.   Genitourinary: Negative for dysuria and hematuria.   Neurological: Negative for dizziness, light-headedness, numbness and tremors.   Psychiatric/Behavioral: Negative for depression. The patient is not nervous/anxious.        Procedures    Procedures    No orders to display           Objective:    /64   Pulse 56   Ht 157.5 cm (62.01\")   Wt 89.4 kg (197 lb)   BMI 36.02 kg/m²         Physical Exam   Constitutional: She is oriented to person, place, and time. She appears well-developed and well-nourished.   HENT:   Head: Normocephalic and atraumatic.   Eyes: No scleral icterus.   Neck: No thyromegaly present.   Cardiovascular: Normal rate, regular rhythm and normal heart sounds. Exam reveals no gallop and no friction rub.   No murmur " heard.  Pulmonary/Chest: Effort normal and breath sounds normal. No respiratory distress. She has no wheezes. She has no rales.   Abdominal: There is no tenderness.   Musculoskeletal: She exhibits no edema.   Lymphadenopathy:     She has no cervical adenopathy.   Neurological: She is alert and oriented to person, place, and time.   Skin: No rash noted. No erythema.   Psychiatric: She has a normal mood and affect.           Assessment:       Diagnosis Plan   1. Cardiac murmur     2. Essential hypertension     3. Shortness of breath     4. Sinus tachycardia     5. Grade 4 out of 6 intensity murmur     6. Mixed hyperlipidemia     7. Hypersensitivity angiitis (CMS/HCC)     8. Chest discomfort              Plan:       At this stage, I will continue current treatment.  Blood pressure monitoring is recommended.  Patient is advised to increase aerobic activity.  I would repeat echocardiogram on next visit

## 2020-10-08 ENCOUNTER — APPOINTMENT (OUTPATIENT)
Dept: CT IMAGING | Facility: HOSPITAL | Age: 70
End: 2020-10-08

## 2020-10-08 ENCOUNTER — APPOINTMENT (OUTPATIENT)
Dept: GENERAL RADIOLOGY | Facility: HOSPITAL | Age: 70
End: 2020-10-08

## 2020-10-08 ENCOUNTER — HOSPITAL ENCOUNTER (INPATIENT)
Facility: HOSPITAL | Age: 70
LOS: 29 days | Discharge: NURSING, REHAB OR LONG-TERM CARE | End: 2020-11-06
Attending: INTERNAL MEDICINE | Admitting: HOSPITALIST

## 2020-10-08 DIAGNOSIS — R50.9 FEVER, UNSPECIFIED FEVER CAUSE: ICD-10-CM

## 2020-10-08 DIAGNOSIS — N28.9 ACUTE RENAL INSUFFICIENCY: ICD-10-CM

## 2020-10-08 DIAGNOSIS — I95.9 HYPOTENSION, UNSPECIFIED HYPOTENSION TYPE: ICD-10-CM

## 2020-10-08 DIAGNOSIS — R19.7 DIARRHEA, UNSPECIFIED TYPE: ICD-10-CM

## 2020-10-08 DIAGNOSIS — R06.00 DYSPNEA, UNSPECIFIED TYPE: ICD-10-CM

## 2020-10-08 DIAGNOSIS — A41.9 SEPSIS, DUE TO UNSPECIFIED ORGANISM, UNSPECIFIED WHETHER ACUTE ORGAN DYSFUNCTION PRESENT (HCC): ICD-10-CM

## 2020-10-08 DIAGNOSIS — J18.9 PNEUMONIA OF RIGHT UPPER LOBE DUE TO INFECTIOUS ORGANISM: Primary | ICD-10-CM

## 2020-10-08 DIAGNOSIS — D64.9 ANEMIA, UNSPECIFIED TYPE: ICD-10-CM

## 2020-10-08 LAB
ALBUMIN SERPL-MCNC: 2.6 G/DL (ref 3.5–5.2)
ALBUMIN SERPL-MCNC: 2.7 G/DL (ref 3.5–5.2)
ALBUMIN/GLOB SERPL: 0.8 G/DL
ALBUMIN/GLOB SERPL: 0.8 G/DL
ALP SERPL-CCNC: 100 U/L (ref 39–117)
ALP SERPL-CCNC: 102 U/L (ref 39–117)
ALT SERPL W P-5'-P-CCNC: 7 U/L (ref 1–33)
ALT SERPL W P-5'-P-CCNC: 8 U/L (ref 1–33)
AMORPH URATE CRY URNS QL MICRO: ABNORMAL /HPF
ANION GAP SERPL CALCULATED.3IONS-SCNC: 17 MMOL/L (ref 5–15)
ANION GAP SERPL CALCULATED.3IONS-SCNC: 18 MMOL/L (ref 5–15)
ANISOCYTOSIS BLD QL: ABNORMAL
ARTERIAL PATENCY WRIST A: ABNORMAL
AST SERPL-CCNC: 10 U/L (ref 1–32)
AST SERPL-CCNC: 10 U/L (ref 1–32)
ATMOSPHERIC PRESS: ABNORMAL MM[HG]
ATMOSPHERIC PRESS: ABNORMAL MM[HG]
B PARAPERT DNA SPEC QL NAA+PROBE: NOT DETECTED
B PERT DNA SPEC QL NAA+PROBE: NOT DETECTED
BACTERIA UR QL AUTO: ABNORMAL /HPF
BASE EXCESS BLDA CALC-SCNC: -11.5 MMOL/L (ref 0–3)
BASE EXCESS BLDV CALC-SCNC: -11.3 MMOL/L
BDY SITE: ABNORMAL
BDY SITE: ABNORMAL
BILIRUB SERPL-MCNC: 0.4 MG/DL (ref 0–1.2)
BILIRUB SERPL-MCNC: 0.5 MG/DL (ref 0–1.2)
BILIRUB UR QL STRIP: NEGATIVE
BUN SERPL-MCNC: 51 MG/DL (ref 8–23)
BUN SERPL-MCNC: 52 MG/DL (ref 8–23)
BUN SERPL-MCNC: ABNORMAL MG/DL
BUN SERPL-MCNC: ABNORMAL MG/DL
BUN/CREAT SERPL: ABNORMAL
BUN/CREAT SERPL: ABNORMAL
BURR CELLS BLD QL SMEAR: ABNORMAL
C PNEUM DNA NPH QL NAA+NON-PROBE: NOT DETECTED
CA-I SERPL ISE-MCNC: 1 MMOL/L (ref 1.2–1.3)
CALCIUM SPEC-SCNC: 7.4 MG/DL (ref 8.6–10.5)
CALCIUM SPEC-SCNC: 7.9 MG/DL (ref 8.6–10.5)
CHLORIDE SERPL-SCNC: 105 MMOL/L (ref 98–107)
CHLORIDE SERPL-SCNC: 107 MMOL/L (ref 98–107)
CK SERPL-CCNC: 49 U/L (ref 20–180)
CLARITY UR: ABNORMAL
CO2 BLDA-SCNC: 16.8 MMOL/L (ref 22–29)
CO2 BLDA-SCNC: 17.1 MMOL/L (ref 22–29)
CO2 SERPL-SCNC: 15 MMOL/L (ref 22–29)
CO2 SERPL-SCNC: 16 MMOL/L (ref 22–29)
COLOR UR: YELLOW
CREAT SERPL-MCNC: 2.23 MG/DL (ref 0.57–1)
CREAT SERPL-MCNC: 2.35 MG/DL (ref 0.57–1)
CRP SERPL-MCNC: 52.05 MG/DL (ref 0–0.5)
D DIMER PPP FEU-MCNC: 1.84 MG/L (FEU) (ref 0–0.59)
D-LACTATE SERPL-SCNC: 1.2 MMOL/L (ref 0.5–2)
D-LACTATE SERPL-SCNC: 2.2 MMOL/L (ref 0.5–2)
DEPRECATED RDW RBC AUTO: 68.7 FL (ref 37–54)
DEPRECATED RDW RBC AUTO: 72.2 FL (ref 37–54)
EOSINOPHIL # BLD MANUAL: 0.12 10*3/MM3 (ref 0–0.4)
EOSINOPHIL NFR BLD MANUAL: 1 % (ref 0.3–6.2)
ERYTHROCYTE [DISTWIDTH] IN BLOOD BY AUTOMATED COUNT: 20.1 % (ref 12.3–15.4)
ERYTHROCYTE [DISTWIDTH] IN BLOOD BY AUTOMATED COUNT: 20.5 % (ref 12.3–15.4)
FLUAV H1 2009 PAND RNA NPH QL NAA+PROBE: NOT DETECTED
FLUAV H1 HA GENE NPH QL NAA+PROBE: NOT DETECTED
FLUAV H3 RNA NPH QL NAA+PROBE: NOT DETECTED
FLUAV SUBTYP SPEC NAA+PROBE: NOT DETECTED
FLUBV RNA ISLT QL NAA+PROBE: NOT DETECTED
GFR SERPL CREATININE-BSD FRML MDRD: 20 ML/MIN/1.73
GFR SERPL CREATININE-BSD FRML MDRD: 22 ML/MIN/1.73
GIANT PLATELETS: ABNORMAL
GIANT PLATELETS: ABNORMAL
GLOBULIN UR ELPH-MCNC: 3.3 GM/DL
GLOBULIN UR ELPH-MCNC: 3.4 GM/DL
GLUCOSE SERPL-MCNC: 221 MG/DL (ref 65–99)
GLUCOSE SERPL-MCNC: 223 MG/DL (ref 65–99)
GLUCOSE UR STRIP-MCNC: NEGATIVE MG/DL
GRAN CASTS URNS QL MICRO: ABNORMAL /LPF
HADV DNA SPEC NAA+PROBE: NOT DETECTED
HCO3 BLDA-SCNC: 15.6 MMOL/L (ref 21–28)
HCO3 BLDV-SCNC: 15.9 MMOL/L
HCOV 229E RNA SPEC QL NAA+PROBE: NOT DETECTED
HCOV HKU1 RNA SPEC QL NAA+PROBE: NOT DETECTED
HCOV NL63 RNA SPEC QL NAA+PROBE: NOT DETECTED
HCOV OC43 RNA SPEC QL NAA+PROBE: NOT DETECTED
HCT VFR BLD AUTO: 33.4 % (ref 34–46.6)
HCT VFR BLD AUTO: 33.9 % (ref 34–46.6)
HEMODILUTION: NO
HGB BLD-MCNC: 10.6 G/DL (ref 12–15.9)
HGB BLD-MCNC: 10.8 G/DL (ref 12–15.9)
HGB UR QL STRIP.AUTO: NEGATIVE
HMPV RNA NPH QL NAA+NON-PROBE: NOT DETECTED
HOLD SPECIMEN: NORMAL
HPIV1 RNA SPEC QL NAA+PROBE: NOT DETECTED
HPIV2 RNA SPEC QL NAA+PROBE: NOT DETECTED
HPIV3 RNA NPH QL NAA+PROBE: NOT DETECTED
HPIV4 P GENE NPH QL NAA+PROBE: NOT DETECTED
HYALINE CASTS UR QL AUTO: ABNORMAL /LPF
INHALED O2 CONCENTRATION: 28 %
INHALED O2 CONCENTRATION: 60 %
INR PPP: 1.08 (ref 0.93–1.1)
IRON 24H UR-MRATE: 19 MCG/DL (ref 37–145)
KETONES UR QL STRIP: NEGATIVE
LACTATE HOLD SPECIMEN: NORMAL
LARGE PLATELETS: ABNORMAL
LARGE PLATELETS: ABNORMAL
LEUKOCYTE ESTERASE UR QL STRIP.AUTO: NEGATIVE
LYMPHOCYTES # BLD MANUAL: 0.22 10*3/MM3 (ref 0.7–3.1)
LYMPHOCYTES # BLD MANUAL: 0.37 10*3/MM3 (ref 0.7–3.1)
LYMPHOCYTES NFR BLD MANUAL: 1 % (ref 5–12)
LYMPHOCYTES NFR BLD MANUAL: 2 % (ref 19.6–45.3)
LYMPHOCYTES NFR BLD MANUAL: 2 % (ref 5–12)
LYMPHOCYTES NFR BLD MANUAL: 3 % (ref 19.6–45.3)
M PNEUMO IGG SER IA-ACNC: NOT DETECTED
MAGNESIUM SERPL-MCNC: 1.3 MG/DL (ref 1.6–2.4)
MAGNESIUM SERPL-MCNC: 1.3 MG/DL (ref 1.6–2.4)
MCH RBC QN AUTO: 31.2 PG (ref 26.6–33)
MCH RBC QN AUTO: 31.7 PG (ref 26.6–33)
MCHC RBC AUTO-ENTMCNC: 31.3 G/DL (ref 31.5–35.7)
MCHC RBC AUTO-ENTMCNC: 32.3 G/DL (ref 31.5–35.7)
MCV RBC AUTO: 98.3 FL (ref 79–97)
MCV RBC AUTO: 99.8 FL (ref 79–97)
MODALITY: ABNORMAL
MODALITY: ABNORMAL
MONOCYTES # BLD AUTO: 0.12 10*3/MM3 (ref 0.1–0.9)
MONOCYTES # BLD AUTO: 0.22 10*3/MM3 (ref 0.1–0.9)
NEUTROPHILS # BLD AUTO: 10.66 10*3/MM3 (ref 1.7–7)
NEUTROPHILS # BLD AUTO: 11.47 10*3/MM3 (ref 1.7–7)
NEUTROPHILS NFR BLD MANUAL: 68 % (ref 42.7–76)
NEUTROPHILS NFR BLD MANUAL: 74 % (ref 42.7–76)
NEUTS BAND NFR BLD MANUAL: 20 % (ref 0–5)
NEUTS BAND NFR BLD MANUAL: 28 % (ref 0–5)
NITRITE UR QL STRIP: NEGATIVE
NT-PROBNP SERPL-MCNC: 698.8 PG/ML (ref 0–900)
NT-PROBNP SERPL-MCNC: 969.1 PG/ML (ref 0–900)
PCO2 BLDA: 38.5 MM HG (ref 35–48)
PCO2 BLDV: 39.2 MM HG (ref 42–51)
PEEP RESPIRATORY: 10 CM[H2O]
PH BLDA: 7.22 PH UNITS (ref 7.35–7.45)
PH BLDV: 7.21 PH UNITS (ref 7.32–7.43)
PH UR STRIP.AUTO: <=5 [PH] (ref 5–8)
PHOSPHATE SERPL-MCNC: 4.2 MG/DL (ref 2.5–4.5)
PLATELET # BLD AUTO: 230 10*3/MM3 (ref 140–450)
PLATELET # BLD AUTO: 287 10*3/MM3 (ref 140–450)
PMV BLD AUTO: 8 FL (ref 6–12)
PMV BLD AUTO: 8.4 FL (ref 6–12)
PO2 BLDA: 154 MM HG (ref 83–108)
PO2 BLDV: 61.2 MM HG
POIKILOCYTOSIS BLD QL SMEAR: ABNORMAL
POIKILOCYTOSIS BLD QL SMEAR: ABNORMAL
POLYCHROMASIA BLD QL SMEAR: ABNORMAL
POTASSIUM SERPL-SCNC: 4.8 MMOL/L (ref 3.5–5.2)
POTASSIUM SERPL-SCNC: 4.9 MMOL/L (ref 3.5–5.2)
PROT SERPL-MCNC: 5.9 G/DL (ref 6–8.5)
PROT SERPL-MCNC: 6.1 G/DL (ref 6–8.5)
PROT UR QL STRIP: ABNORMAL
PROTHROMBIN TIME: 11.8 SECONDS (ref 9.6–11.7)
RBC # BLD AUTO: 3.4 10*6/MM3 (ref 3.77–5.28)
RBC # BLD AUTO: 3.4 10*6/MM3 (ref 3.77–5.28)
RBC # UR: ABNORMAL /HPF
REF LAB TEST METHOD: ABNORMAL
RESPIRATORY RATE: 2035
RHINOVIRUS RNA SPEC NAA+PROBE: NOT DETECTED
RSV RNA NPH QL NAA+NON-PROBE: NOT DETECTED
SAO2 % BLDCOA: 98.9 % (ref 94–98)
SAO2 % BLDCOV: 85.9 %
SARS-COV-2 RNA NPH QL NAA+NON-PROBE: NOT DETECTED
SCAN SLIDE: NORMAL
SCAN SLIDE: NORMAL
SMALL PLATELETS BLD QL SMEAR: ADEQUATE
SODIUM SERPL-SCNC: 137 MMOL/L (ref 136–145)
SODIUM SERPL-SCNC: 141 MMOL/L (ref 136–145)
SP GR UR STRIP: 1.02 (ref 1–1.03)
SQUAMOUS #/AREA URNS HPF: ABNORMAL /HPF
THEOPHYLLINE SERPL-MCNC: 9.3 MCG/ML (ref 10–20)
TOTAL RATE: 35 BREATHS/MINUTE
TRANS CELLS #/AREA URNS HPF: ABNORMAL /HPF
TROPONIN T SERPL-MCNC: <0.01 NG/ML (ref 0–0.03)
TROPONIN T SERPL-MCNC: <0.01 NG/ML (ref 0–0.03)
TSH SERPL DL<=0.05 MIU/L-ACNC: 0.34 UIU/ML (ref 0.27–4.2)
UROBILINOGEN UR QL STRIP: ABNORMAL
VARIANT LYMPHS NFR BLD MANUAL: 1 % (ref 0–5)
VT ON VENT VENT: 500 ML
WBC # BLD AUTO: 11.1 10*3/MM3 (ref 3.4–10.8)
WBC # BLD AUTO: 12.2 10*3/MM3 (ref 3.4–10.8)
WBC MORPH BLD: NORMAL
WBC MORPH BLD: NORMAL
WBC UR QL AUTO: ABNORMAL /HPF
WHOLE BLOOD HOLD SPECIMEN: NORMAL
WHOLE BLOOD HOLD SPECIMEN: NORMAL

## 2020-10-08 PROCEDURE — 82803 BLOOD GASES ANY COMBINATION: CPT

## 2020-10-08 PROCEDURE — 82550 ASSAY OF CK (CPK): CPT | Performed by: INTERNAL MEDICINE

## 2020-10-08 PROCEDURE — C1751 CATH, INF, PER/CENT/MIDLINE: HCPCS

## 2020-10-08 PROCEDURE — 85379 FIBRIN DEGRADATION QUANT: CPT | Performed by: PHYSICIAN ASSISTANT

## 2020-10-08 PROCEDURE — 82330 ASSAY OF CALCIUM: CPT | Performed by: INTERNAL MEDICINE

## 2020-10-08 PROCEDURE — 85610 PROTHROMBIN TIME: CPT | Performed by: PHYSICIAN ASSISTANT

## 2020-10-08 PROCEDURE — 83540 ASSAY OF IRON: CPT | Performed by: INTERNAL MEDICINE

## 2020-10-08 PROCEDURE — 80198 ASSAY OF THEOPHYLLINE: CPT | Performed by: PHYSICIAN ASSISTANT

## 2020-10-08 PROCEDURE — 36600 WITHDRAWAL OF ARTERIAL BLOOD: CPT

## 2020-10-08 PROCEDURE — 86140 C-REACTIVE PROTEIN: CPT | Performed by: PHYSICIAN ASSISTANT

## 2020-10-08 PROCEDURE — 99285 EMERGENCY DEPT VISIT HI MDM: CPT

## 2020-10-08 PROCEDURE — 80053 COMPREHEN METABOLIC PANEL: CPT | Performed by: INTERNAL MEDICINE

## 2020-10-08 PROCEDURE — 05HY33Z INSERTION OF INFUSION DEVICE INTO UPPER VEIN, PERCUTANEOUS APPROACH: ICD-10-PCS | Performed by: EMERGENCY MEDICINE

## 2020-10-08 PROCEDURE — 83605 ASSAY OF LACTIC ACID: CPT

## 2020-10-08 PROCEDURE — 84484 ASSAY OF TROPONIN QUANT: CPT | Performed by: PHYSICIAN ASSISTANT

## 2020-10-08 PROCEDURE — 87040 BLOOD CULTURE FOR BACTERIA: CPT | Performed by: PHYSICIAN ASSISTANT

## 2020-10-08 PROCEDURE — 71045 X-RAY EXAM CHEST 1 VIEW: CPT

## 2020-10-08 PROCEDURE — 84443 ASSAY THYROID STIM HORMONE: CPT | Performed by: INTERNAL MEDICINE

## 2020-10-08 PROCEDURE — P9612 CATHETERIZE FOR URINE SPEC: HCPCS

## 2020-10-08 PROCEDURE — 94799 UNLISTED PULMONARY SVC/PX: CPT

## 2020-10-08 PROCEDURE — 83880 ASSAY OF NATRIURETIC PEPTIDE: CPT | Performed by: INTERNAL MEDICINE

## 2020-10-08 PROCEDURE — 93005 ELECTROCARDIOGRAM TRACING: CPT | Performed by: PHYSICIAN ASSISTANT

## 2020-10-08 PROCEDURE — 83735 ASSAY OF MAGNESIUM: CPT | Performed by: PHYSICIAN ASSISTANT

## 2020-10-08 PROCEDURE — 85007 BL SMEAR W/DIFF WBC COUNT: CPT | Performed by: INTERNAL MEDICINE

## 2020-10-08 PROCEDURE — 84484 ASSAY OF TROPONIN QUANT: CPT | Performed by: INTERNAL MEDICINE

## 2020-10-08 PROCEDURE — 87086 URINE CULTURE/COLONY COUNT: CPT | Performed by: PHYSICIAN ASSISTANT

## 2020-10-08 PROCEDURE — 84100 ASSAY OF PHOSPHORUS: CPT | Performed by: INTERNAL MEDICINE

## 2020-10-08 PROCEDURE — 25010000002 CEFEPIME IN SWFI 2 GM/10ML IV PUSH SYRINGE (SIMPLE): Performed by: PHYSICIAN ASSISTANT

## 2020-10-08 PROCEDURE — 85007 BL SMEAR W/DIFF WBC COUNT: CPT | Performed by: PHYSICIAN ASSISTANT

## 2020-10-08 PROCEDURE — 25010000002 VANCOMYCIN: Performed by: PHYSICIAN ASSISTANT

## 2020-10-08 PROCEDURE — 85025 COMPLETE CBC W/AUTO DIFF WBC: CPT | Performed by: INTERNAL MEDICINE

## 2020-10-08 PROCEDURE — 80053 COMPREHEN METABOLIC PANEL: CPT | Performed by: PHYSICIAN ASSISTANT

## 2020-10-08 PROCEDURE — 0202U NFCT DS 22 TRGT SARS-COV-2: CPT | Performed by: PHYSICIAN ASSISTANT

## 2020-10-08 PROCEDURE — 85025 COMPLETE CBC W/AUTO DIFF WBC: CPT | Performed by: PHYSICIAN ASSISTANT

## 2020-10-08 PROCEDURE — 81001 URINALYSIS AUTO W/SCOPE: CPT | Performed by: PHYSICIAN ASSISTANT

## 2020-10-08 PROCEDURE — 94660 CPAP INITIATION&MGMT: CPT

## 2020-10-08 PROCEDURE — 74176 CT ABD & PELVIS W/O CONTRAST: CPT

## 2020-10-08 PROCEDURE — 83880 ASSAY OF NATRIURETIC PEPTIDE: CPT | Performed by: PHYSICIAN ASSISTANT

## 2020-10-08 PROCEDURE — 83735 ASSAY OF MAGNESIUM: CPT | Performed by: INTERNAL MEDICINE

## 2020-10-08 RX ORDER — MAGNESIUM SULFATE HEPTAHYDRATE 40 MG/ML
4 INJECTION, SOLUTION INTRAVENOUS AS NEEDED
Status: DISCONTINUED | OUTPATIENT
Start: 2020-10-08 | End: 2020-11-06 | Stop reason: HOSPADM

## 2020-10-08 RX ORDER — PREDNISONE 20 MG/1
20 TABLET ORAL DAILY
COMMUNITY
Start: 2020-10-04 | End: 2020-11-06 | Stop reason: HOSPADM

## 2020-10-08 RX ORDER — ATORVASTATIN CALCIUM 40 MG/1
40 TABLET, FILM COATED ORAL DAILY
Status: DISCONTINUED | OUTPATIENT
Start: 2020-10-09 | End: 2020-11-06 | Stop reason: HOSPADM

## 2020-10-08 RX ORDER — NICOTINE POLACRILEX 4 MG
15 LOZENGE BUCCAL
Status: DISCONTINUED | OUTPATIENT
Start: 2020-10-08 | End: 2020-10-22

## 2020-10-08 RX ORDER — SODIUM CHLORIDE 0.9 % (FLUSH) 0.9 %
10 SYRINGE (ML) INJECTION EVERY 12 HOURS SCHEDULED
Status: DISCONTINUED | OUTPATIENT
Start: 2020-10-08 | End: 2020-11-06 | Stop reason: HOSPADM

## 2020-10-08 RX ORDER — SODIUM CHLORIDE 0.9 % (FLUSH) 0.9 %
10 SYRINGE (ML) INJECTION AS NEEDED
Status: DISCONTINUED | OUTPATIENT
Start: 2020-10-08 | End: 2020-11-06 | Stop reason: HOSPADM

## 2020-10-08 RX ORDER — INSULIN LISPRO 100 [IU]/ML
0-7 INJECTION, SOLUTION INTRAVENOUS; SUBCUTANEOUS AS NEEDED
Status: DISCONTINUED | OUTPATIENT
Start: 2020-10-08 | End: 2020-10-10

## 2020-10-08 RX ORDER — MAGNESIUM SULFATE HEPTAHYDRATE 40 MG/ML
2 INJECTION, SOLUTION INTRAVENOUS AS NEEDED
Status: DISCONTINUED | OUTPATIENT
Start: 2020-10-08 | End: 2020-11-06 | Stop reason: HOSPADM

## 2020-10-08 RX ORDER — INSULIN LISPRO 100 [IU]/ML
0-7 INJECTION, SOLUTION INTRAVENOUS; SUBCUTANEOUS EVERY 6 HOURS SCHEDULED
Status: DISCONTINUED | OUTPATIENT
Start: 2020-10-09 | End: 2020-10-10

## 2020-10-08 RX ORDER — LEVOTHYROXINE SODIUM 20 UG/ML
150 INJECTION, SOLUTION INTRAVENOUS
Status: DISCONTINUED | OUTPATIENT
Start: 2020-10-09 | End: 2020-10-09

## 2020-10-08 RX ORDER — ASPIRIN 81 MG/1
81 TABLET ORAL DAILY
Status: DISCONTINUED | OUTPATIENT
Start: 2020-10-09 | End: 2020-10-22

## 2020-10-08 RX ORDER — NOREPINEPHRINE BIT/0.9 % NACL 8 MG/250ML
.02-.3 INFUSION BOTTLE (ML) INTRAVENOUS
Status: DISCONTINUED | OUTPATIENT
Start: 2020-10-08 | End: 2020-10-10

## 2020-10-08 RX ORDER — CALCIUM GLUCONATE 20 MG/ML
2 INJECTION, SOLUTION INTRAVENOUS AS NEEDED
Status: DISCONTINUED | OUTPATIENT
Start: 2020-10-08 | End: 2020-11-06 | Stop reason: HOSPADM

## 2020-10-08 RX ORDER — ACETAMINOPHEN 500 MG
1000 TABLET ORAL ONCE
Status: COMPLETED | OUTPATIENT
Start: 2020-10-08 | End: 2020-10-08

## 2020-10-08 RX ORDER — CALCIUM GLUCONATE 20 MG/ML
1 INJECTION, SOLUTION INTRAVENOUS AS NEEDED
Status: DISCONTINUED | OUTPATIENT
Start: 2020-10-08 | End: 2020-11-06 | Stop reason: HOSPADM

## 2020-10-08 RX ORDER — DEXTROSE MONOHYDRATE 25 G/50ML
25 INJECTION, SOLUTION INTRAVENOUS
Status: DISCONTINUED | OUTPATIENT
Start: 2020-10-08 | End: 2020-10-17

## 2020-10-08 RX ADMIN — VANCOMYCIN HYDROCHLORIDE 1250 MG: 10 INJECTION, POWDER, LYOPHILIZED, FOR SOLUTION INTRAVENOUS at 15:08

## 2020-10-08 RX ADMIN — CEFEPIME HYDROCHLORIDE 2 G: 2 INJECTION, POWDER, FOR SOLUTION INTRAVENOUS at 15:06

## 2020-10-08 RX ADMIN — SODIUM BICARBONATE 50 MEQ: 84 INJECTION, SOLUTION INTRAVENOUS at 18:13

## 2020-10-08 RX ADMIN — SODIUM CHLORIDE 0.02 UNITS/MIN: 9 INJECTION, SOLUTION INTRAVENOUS at 18:34

## 2020-10-08 RX ADMIN — Medication 0.25 MCG/KG/MIN: at 23:13

## 2020-10-08 RX ADMIN — SODIUM BICARBONATE 50 MEQ: 84 INJECTION, SOLUTION INTRAVENOUS at 18:12

## 2020-10-08 RX ADMIN — SODIUM CHLORIDE 1365 ML: 0.9 INJECTION, SOLUTION INTRAVENOUS at 15:04

## 2020-10-08 RX ADMIN — ACETAMINOPHEN 1000 MG: 500 TABLET, FILM COATED ORAL at 15:02

## 2020-10-08 RX ADMIN — Medication 0.05 MCG/KG/MIN: at 16:11

## 2020-10-09 LAB
ALBUMIN SERPL-MCNC: 2.5 G/DL (ref 3.5–5.2)
ALBUMIN/GLOB SERPL: 0.7 G/DL
ALP SERPL-CCNC: 104 U/L (ref 39–117)
ALT SERPL W P-5'-P-CCNC: 8 U/L (ref 1–33)
ANION GAP SERPL CALCULATED.3IONS-SCNC: 15 MMOL/L (ref 5–15)
ANION GAP SERPL CALCULATED.3IONS-SCNC: 17 MMOL/L (ref 5–15)
ARTERIAL PATENCY WRIST A: NEGATIVE
AST SERPL-CCNC: 11 U/L (ref 1–32)
ATMOSPHERIC PRESS: ABNORMAL MM[HG]
BACTERIA SPEC AEROBE CULT: NO GROWTH
BACTERIA UR QL AUTO: ABNORMAL /HPF
BASE EXCESS BLDA CALC-SCNC: -4.4 MMOL/L (ref 0–3)
BDY SITE: ABNORMAL
BILIRUB SERPL-MCNC: 0.4 MG/DL (ref 0–1.2)
BILIRUB UR QL STRIP: NEGATIVE
BUN SERPL-MCNC: 52 MG/DL (ref 8–23)
BUN SERPL-MCNC: 56 MG/DL (ref 8–23)
BUN SERPL-MCNC: ABNORMAL MG/DL
BUN SERPL-MCNC: ABNORMAL MG/DL
BUN/CREAT SERPL: ABNORMAL
BUN/CREAT SERPL: ABNORMAL
C DIFF GDH STL QL: NEGATIVE
C3 SERPL-MCNC: 190 MG/DL (ref 82–167)
C4 SERPL-MCNC: 33 MG/DL (ref 14–44)
CALCIUM SPEC-SCNC: 7.1 MG/DL (ref 8.6–10.5)
CALCIUM SPEC-SCNC: 7.2 MG/DL (ref 8.6–10.5)
CHLORIDE SERPL-SCNC: 102 MMOL/L (ref 98–107)
CHLORIDE SERPL-SCNC: 112 MMOL/L (ref 98–107)
CLARITY UR: CLEAR
CO2 BLDA-SCNC: 21.1 MMOL/L (ref 22–29)
CO2 SERPL-SCNC: 17 MMOL/L (ref 22–29)
CO2 SERPL-SCNC: 22 MMOL/L (ref 22–29)
COLOR UR: YELLOW
CREAT SERPL-MCNC: 2.23 MG/DL (ref 0.57–1)
CREAT SERPL-MCNC: 2.33 MG/DL (ref 0.57–1)
GFR SERPL CREATININE-BSD FRML MDRD: 21 ML/MIN/1.73
GFR SERPL CREATININE-BSD FRML MDRD: 22 ML/MIN/1.73
GLOBULIN UR ELPH-MCNC: 3.4 GM/DL
GLUCOSE BLDC GLUCOMTR-MCNC: 225 MG/DL (ref 70–105)
GLUCOSE BLDC GLUCOMTR-MCNC: 256 MG/DL (ref 70–105)
GLUCOSE BLDC GLUCOMTR-MCNC: 283 MG/DL (ref 70–105)
GLUCOSE BLDC GLUCOMTR-MCNC: 286 MG/DL (ref 70–105)
GLUCOSE BLDC GLUCOMTR-MCNC: 378 MG/DL (ref 70–105)
GLUCOSE SERPL-MCNC: 212 MG/DL (ref 65–99)
GLUCOSE SERPL-MCNC: 339 MG/DL (ref 65–99)
GLUCOSE UR STRIP-MCNC: ABNORMAL MG/DL
HCO3 BLDA-SCNC: 20 MMOL/L (ref 21–28)
HEMOCCULT STL QL IA: NEGATIVE
HEMODILUTION: NO
HGB UR QL STRIP.AUTO: ABNORMAL
HYALINE CASTS UR QL AUTO: ABNORMAL /LPF
INHALED O2 CONCENTRATION: 40 %
KETONES UR QL STRIP: NEGATIVE
LDH SERPL-CCNC: 377 U/L (ref 135–214)
LEUKOCYTE ESTERASE UR QL STRIP.AUTO: NEGATIVE
MAGNESIUM SERPL-MCNC: 1.2 MG/DL (ref 1.6–2.4)
MAGNESIUM SERPL-MCNC: 2.7 MG/DL (ref 1.6–2.4)
MODALITY: ABNORMAL
MRSA DNA SPEC QL NAA+PROBE: NORMAL
NITRITE UR QL STRIP: NEGATIVE
NT-PROBNP SERPL-MCNC: 2447 PG/ML (ref 0–900)
OSMOLALITY UR: 342 MOSM/KG (ref 300–800)
PCO2 BLDA: 33.6 MM HG (ref 35–48)
PH BLDA: 7.38 PH UNITS (ref 7.35–7.45)
PH UR STRIP.AUTO: 5.5 [PH] (ref 5–8)
PHOSPHATE SERPL-MCNC: 3.6 MG/DL (ref 2.5–4.5)
PO2 BLDA: 54.5 MM HG (ref 83–108)
POTASSIUM SERPL-SCNC: 3.9 MMOL/L (ref 3.5–5.2)
POTASSIUM SERPL-SCNC: 4.7 MMOL/L (ref 3.5–5.2)
PROT SERPL-MCNC: 5.9 G/DL (ref 6–8.5)
PROT UR QL STRIP: ABNORMAL
PROT UR-MCNC: 60.2 MG/DL
RBC # UR: ABNORMAL /HPF
REF LAB TEST METHOD: ABNORMAL
RESPIRATORY RATE: 24
SAO2 % BLDCOA: 87.8 % (ref 94–98)
SODIUM SERPL-SCNC: 141 MMOL/L (ref 136–145)
SODIUM SERPL-SCNC: 144 MMOL/L (ref 136–145)
SODIUM UR-SCNC: 68 MMOL/L
SP GR UR STRIP: 1.02 (ref 1–1.03)
SQUAMOUS #/AREA URNS HPF: ABNORMAL /HPF
UROBILINOGEN UR QL STRIP: ABNORMAL
VT ON VENT VENT: 550 ML
WBC UR QL AUTO: ABNORMAL /HPF

## 2020-10-09 PROCEDURE — 81001 URINALYSIS AUTO W/SCOPE: CPT | Performed by: INTERNAL MEDICINE

## 2020-10-09 PROCEDURE — 83520 IMMUNOASSAY QUANT NOS NONAB: CPT | Performed by: INTERNAL MEDICINE

## 2020-10-09 PROCEDURE — 25010000002 PIPERACILLIN SOD-TAZOBACTAM PER 1 G: Performed by: INTERNAL MEDICINE

## 2020-10-09 PROCEDURE — 86160 COMPLEMENT ANTIGEN: CPT | Performed by: INTERNAL MEDICINE

## 2020-10-09 PROCEDURE — 80053 COMPREHEN METABOLIC PANEL: CPT | Performed by: INTERNAL MEDICINE

## 2020-10-09 PROCEDURE — 94660 CPAP INITIATION&MGMT: CPT

## 2020-10-09 PROCEDURE — 84520 ASSAY OF UREA NITROGEN: CPT | Performed by: INTERNAL MEDICINE

## 2020-10-09 PROCEDURE — 25010000002 MAGNESIUM SULFATE 2 GM/50ML SOLUTION: Performed by: INTERNAL MEDICINE

## 2020-10-09 PROCEDURE — 94799 UNLISTED PULMONARY SVC/PX: CPT

## 2020-10-09 PROCEDURE — 82274 ASSAY TEST FOR BLOOD FECAL: CPT | Performed by: INTERNAL MEDICINE

## 2020-10-09 PROCEDURE — 82803 BLOOD GASES ANY COMBINATION: CPT

## 2020-10-09 PROCEDURE — 87641 MR-STAPH DNA AMP PROBE: CPT | Performed by: INTERNAL MEDICINE

## 2020-10-09 PROCEDURE — 83935 ASSAY OF URINE OSMOLALITY: CPT | Performed by: INTERNAL MEDICINE

## 2020-10-09 PROCEDURE — 36600 WITHDRAWAL OF ARTERIAL BLOOD: CPT

## 2020-10-09 PROCEDURE — 82570 ASSAY OF URINE CREATININE: CPT | Performed by: INTERNAL MEDICINE

## 2020-10-09 PROCEDURE — 86256 FLUORESCENT ANTIBODY TITER: CPT | Performed by: INTERNAL MEDICINE

## 2020-10-09 PROCEDURE — 25010000002 HYDROCORTISONE SODIUM SUCCINATE 100 MG RECONSTITUTED SOLUTION: Performed by: INTERNAL MEDICINE

## 2020-10-09 PROCEDURE — 83735 ASSAY OF MAGNESIUM: CPT | Performed by: INTERNAL MEDICINE

## 2020-10-09 PROCEDURE — 84300 ASSAY OF URINE SODIUM: CPT | Performed by: INTERNAL MEDICINE

## 2020-10-09 PROCEDURE — 87324 CLOSTRIDIUM AG IA: CPT | Performed by: INTERNAL MEDICINE

## 2020-10-09 PROCEDURE — 84156 ASSAY OF PROTEIN URINE: CPT | Performed by: INTERNAL MEDICINE

## 2020-10-09 PROCEDURE — 84100 ASSAY OF PHOSPHORUS: CPT | Performed by: INTERNAL MEDICINE

## 2020-10-09 PROCEDURE — 83615 LACTATE (LD) (LDH) ENZYME: CPT | Performed by: INTERNAL MEDICINE

## 2020-10-09 PROCEDURE — 25010000002 AZITHROMYCIN PER 500 MG: Performed by: INTERNAL MEDICINE

## 2020-10-09 PROCEDURE — 87449 NOS EACH ORGANISM AG IA: CPT | Performed by: INTERNAL MEDICINE

## 2020-10-09 PROCEDURE — 82962 GLUCOSE BLOOD TEST: CPT

## 2020-10-09 PROCEDURE — 63710000001 INSULIN LISPRO (HUMAN) PER 5 UNITS: Performed by: INTERNAL MEDICINE

## 2020-10-09 PROCEDURE — 83880 ASSAY OF NATRIURETIC PEPTIDE: CPT | Performed by: INTERNAL MEDICINE

## 2020-10-09 RX ADMIN — HYDROCORTISONE SODIUM SUCCINATE 100 MG: 100 INJECTION, POWDER, FOR SOLUTION INTRAMUSCULAR; INTRAVENOUS at 00:08

## 2020-10-09 RX ADMIN — SODIUM BICARBONATE 150 MEQ: 84 INJECTION, SOLUTION INTRAVENOUS at 00:08

## 2020-10-09 RX ADMIN — ASPIRIN 81 MG: 81 TABLET, COATED ORAL at 08:20

## 2020-10-09 RX ADMIN — INSULIN LISPRO 6 UNITS: 100 INJECTION, SOLUTION INTRAVENOUS; SUBCUTANEOUS at 18:19

## 2020-10-09 RX ADMIN — Medication 0.1 MCG/KG/MIN: at 21:30

## 2020-10-09 RX ADMIN — SODIUM CHLORIDE 0.02 UNITS/MIN: 9 INJECTION, SOLUTION INTRAVENOUS at 05:59

## 2020-10-09 RX ADMIN — SODIUM CHLORIDE 500 MG: 9 INJECTION, SOLUTION INTRAVENOUS at 00:50

## 2020-10-09 RX ADMIN — INSULIN LISPRO 4 UNITS: 100 INJECTION, SOLUTION INTRAVENOUS; SUBCUTANEOUS at 06:08

## 2020-10-09 RX ADMIN — Medication 10 ML: at 20:40

## 2020-10-09 RX ADMIN — ATORVASTATIN CALCIUM 40 MG: 40 TABLET, FILM COATED ORAL at 08:20

## 2020-10-09 RX ADMIN — Medication 10 ML: at 09:12

## 2020-10-09 RX ADMIN — HYDROCORTISONE SODIUM SUCCINATE 100 MG: 100 INJECTION, POWDER, FOR SOLUTION INTRAMUSCULAR; INTRAVENOUS at 08:16

## 2020-10-09 RX ADMIN — INSULIN LISPRO 4 UNITS: 100 INJECTION, SOLUTION INTRAVENOUS; SUBCUTANEOUS at 12:35

## 2020-10-09 RX ADMIN — MAGNESIUM SULFATE 2 G: 2 INJECTION INTRAVENOUS at 11:11

## 2020-10-09 RX ADMIN — SODIUM BICARBONATE 150 MEQ: 84 INJECTION, SOLUTION INTRAVENOUS at 11:11

## 2020-10-09 RX ADMIN — LEVOTHYROXINE SODIUM 150 MCG: 20 INJECTION, SOLUTION INTRAVENOUS at 12:41

## 2020-10-09 RX ADMIN — PIPERACILLIN AND TAZOBACTAM 3.38 G: 3; .375 INJECTION, POWDER, LYOPHILIZED, FOR SOLUTION INTRAVENOUS at 08:16

## 2020-10-09 RX ADMIN — INSULIN LISPRO 3 UNITS: 100 INJECTION, SOLUTION INTRAVENOUS; SUBCUTANEOUS at 00:49

## 2020-10-09 RX ADMIN — MAGNESIUM SULFATE 2 G: 2 INJECTION INTRAVENOUS at 05:06

## 2020-10-09 RX ADMIN — PIPERACILLIN AND TAZOBACTAM 3.38 G: 3; .375 INJECTION, POWDER, LYOPHILIZED, FOR SOLUTION INTRAVENOUS at 22:42

## 2020-10-09 RX ADMIN — PIPERACILLIN AND TAZOBACTAM 3.38 G: 3; .375 INJECTION, POWDER, LYOPHILIZED, FOR SOLUTION INTRAVENOUS at 00:08

## 2020-10-09 RX ADMIN — MAGNESIUM SULFATE 2 G: 2 INJECTION INTRAVENOUS at 08:21

## 2020-10-09 RX ADMIN — HYDROCORTISONE SODIUM SUCCINATE 100 MG: 100 INJECTION, POWDER, FOR SOLUTION INTRAMUSCULAR; INTRAVENOUS at 15:34

## 2020-10-09 RX ADMIN — SODIUM BICARBONATE 150 MEQ: 84 INJECTION, SOLUTION INTRAVENOUS at 10:32

## 2020-10-09 RX ADMIN — Medication 0.15 MCG/KG/MIN: at 07:13

## 2020-10-09 RX ADMIN — PIPERACILLIN AND TAZOBACTAM 3.38 G: 3; .375 INJECTION, POWDER, LYOPHILIZED, FOR SOLUTION INTRAVENOUS at 15:34

## 2020-10-10 ENCOUNTER — APPOINTMENT (OUTPATIENT)
Dept: GENERAL RADIOLOGY | Facility: HOSPITAL | Age: 70
End: 2020-10-10

## 2020-10-10 LAB
ALBUMIN SERPL-MCNC: 2.3 G/DL (ref 3.5–5.2)
ALBUMIN/GLOB SERPL: 0.7 G/DL
ALP SERPL-CCNC: 177 U/L (ref 39–117)
ALT SERPL W P-5'-P-CCNC: 8 U/L (ref 1–33)
ANION GAP SERPL CALCULATED.3IONS-SCNC: 16 MMOL/L (ref 5–15)
ARTERIAL PATENCY WRIST A: POSITIVE
AST SERPL-CCNC: 12 U/L (ref 1–32)
ATMOSPHERIC PRESS: ABNORMAL MM[HG]
BASE EXCESS BLDA CALC-SCNC: 0.4 MMOL/L (ref 0–3)
BDY SITE: ABNORMAL
BILIRUB SERPL-MCNC: 0.3 MG/DL (ref 0–1.2)
BUN SERPL-MCNC: 53 MG/DL (ref 8–23)
BUN SERPL-MCNC: ABNORMAL MG/DL
BUN/CREAT SERPL: ABNORMAL
CALCIUM SPEC-SCNC: 6.9 MG/DL (ref 8.6–10.5)
CHLORIDE SERPL-SCNC: 100 MMOL/L (ref 98–107)
CO2 BLDA-SCNC: 27.9 MMOL/L (ref 22–29)
CO2 SERPL-SCNC: 24 MMOL/L (ref 22–29)
CREAT SERPL-MCNC: 2.25 MG/DL (ref 0.57–1)
CREAT UR-MCNC: 40.3 MG/DL
GFR SERPL CREATININE-BSD FRML MDRD: 21 ML/MIN/1.73
GLOBULIN UR ELPH-MCNC: 3.5 GM/DL
GLUCOSE BLDC GLUCOMTR-MCNC: 181 MG/DL (ref 70–105)
GLUCOSE BLDC GLUCOMTR-MCNC: 229 MG/DL (ref 70–105)
GLUCOSE BLDC GLUCOMTR-MCNC: 238 MG/DL (ref 70–105)
GLUCOSE BLDC GLUCOMTR-MCNC: 249 MG/DL (ref 70–105)
GLUCOSE BLDC GLUCOMTR-MCNC: 263 MG/DL (ref 70–105)
GLUCOSE BLDC GLUCOMTR-MCNC: 271 MG/DL (ref 70–105)
GLUCOSE BLDC GLUCOMTR-MCNC: 481 MG/DL (ref 70–105)
GLUCOSE BLDC GLUCOMTR-MCNC: 493 MG/DL (ref 70–105)
GLUCOSE BLDC GLUCOMTR-MCNC: >500 MG/DL (ref 70–105)
GLUCOSE SERPL-MCNC: 526 MG/DL (ref 65–99)
HCO3 BLDA-SCNC: 26.4 MMOL/L (ref 21–28)
HEMODILUTION: NO
INHALED O2 CONCENTRATION: 40 %
MAGNESIUM SERPL-MCNC: 2.6 MG/DL (ref 1.6–2.4)
MODALITY: ABNORMAL
PCO2 BLDA: 48.3 MM HG (ref 35–48)
PH BLDA: 7.35 PH UNITS (ref 7.35–7.45)
PHOSPHATE SERPL-MCNC: 2.8 MG/DL (ref 2.5–4.5)
PO2 BLDA: 86.3 MM HG (ref 83–108)
POTASSIUM SERPL-SCNC: 3.3 MMOL/L (ref 3.5–5.2)
POTASSIUM SERPL-SCNC: 3.3 MMOL/L (ref 3.5–5.2)
PROT SERPL-MCNC: 5.8 G/DL (ref 6–8.5)
RESPIRATORY RATE: 22
SAO2 % BLDCOA: 95.9 % (ref 94–98)
SODIUM SERPL-SCNC: 140 MMOL/L (ref 136–145)
VENTILATOR MODE: ABNORMAL
VT ON VENT VENT: 550 ML

## 2020-10-10 PROCEDURE — 86038 ANTINUCLEAR ANTIBODIES: CPT | Performed by: INTERNAL MEDICINE

## 2020-10-10 PROCEDURE — 25010000003 POTASSIUM CHLORIDE 10 MEQ/100ML SOLUTION: Performed by: INTERNAL MEDICINE

## 2020-10-10 PROCEDURE — 83735 ASSAY OF MAGNESIUM: CPT | Performed by: INTERNAL MEDICINE

## 2020-10-10 PROCEDURE — 93005 ELECTROCARDIOGRAM TRACING: CPT | Performed by: INTERNAL MEDICINE

## 2020-10-10 PROCEDURE — 25010000002 HYDROCORTISONE SODIUM SUCCINATE 100 MG RECONSTITUTED SOLUTION: Performed by: INTERNAL MEDICINE

## 2020-10-10 PROCEDURE — 63710000001 INSULIN LISPRO (HUMAN) PER 5 UNITS: Performed by: INTERNAL MEDICINE

## 2020-10-10 PROCEDURE — 25010000002 CALCIUM GLUCONATE-NACL 1-0.675 GM/50ML-% SOLUTION: Performed by: INTERNAL MEDICINE

## 2020-10-10 PROCEDURE — 25010000002 PIPERACILLIN SOD-TAZOBACTAM PER 1 G: Performed by: INTERNAL MEDICINE

## 2020-10-10 PROCEDURE — 94799 UNLISTED PULMONARY SVC/PX: CPT

## 2020-10-10 PROCEDURE — 99222 1ST HOSP IP/OBS MODERATE 55: CPT | Performed by: INTERNAL MEDICINE

## 2020-10-10 PROCEDURE — 84100 ASSAY OF PHOSPHORUS: CPT | Performed by: INTERNAL MEDICINE

## 2020-10-10 PROCEDURE — 25010000002 AZITHROMYCIN PER 500 MG: Performed by: INTERNAL MEDICINE

## 2020-10-10 PROCEDURE — 25010000003 AMPICILLIN-SULBACTAM PER 1.5 G: Performed by: INTERNAL MEDICINE

## 2020-10-10 PROCEDURE — 93010 ELECTROCARDIOGRAM REPORT: CPT | Performed by: INTERNAL MEDICINE

## 2020-10-10 PROCEDURE — 82962 GLUCOSE BLOOD TEST: CPT

## 2020-10-10 PROCEDURE — 94660 CPAP INITIATION&MGMT: CPT

## 2020-10-10 PROCEDURE — 80053 COMPREHEN METABOLIC PANEL: CPT | Performed by: INTERNAL MEDICINE

## 2020-10-10 PROCEDURE — 25010000002 ENOXAPARIN PER 10 MG: Performed by: INTERNAL MEDICINE

## 2020-10-10 PROCEDURE — 82803 BLOOD GASES ANY COMBINATION: CPT

## 2020-10-10 PROCEDURE — 71045 X-RAY EXAM CHEST 1 VIEW: CPT

## 2020-10-10 PROCEDURE — 84132 ASSAY OF SERUM POTASSIUM: CPT | Performed by: INTERNAL MEDICINE

## 2020-10-10 PROCEDURE — 84520 ASSAY OF UREA NITROGEN: CPT | Performed by: INTERNAL MEDICINE

## 2020-10-10 PROCEDURE — 36600 WITHDRAWAL OF ARTERIAL BLOOD: CPT

## 2020-10-10 RX ORDER — CALCIUM GLUCONATE 20 MG/ML
1 INJECTION, SOLUTION INTRAVENOUS ONCE
Status: COMPLETED | OUTPATIENT
Start: 2020-10-10 | End: 2020-10-10

## 2020-10-10 RX ORDER — HYDROCODONE BITARTRATE AND ACETAMINOPHEN 5; 325 MG/1; MG/1
1 TABLET ORAL EVERY 6 HOURS PRN
Status: DISPENSED | OUTPATIENT
Start: 2020-10-10 | End: 2020-10-17

## 2020-10-10 RX ORDER — POTASSIUM CHLORIDE 7.45 MG/ML
10 INJECTION INTRAVENOUS
Status: DISCONTINUED | OUTPATIENT
Start: 2020-10-10 | End: 2020-11-06 | Stop reason: HOSPADM

## 2020-10-10 RX ORDER — INSULIN LISPRO 100 [IU]/ML
14 INJECTION, SOLUTION INTRAVENOUS; SUBCUTANEOUS ONCE
Status: COMPLETED | OUTPATIENT
Start: 2020-10-10 | End: 2020-10-10

## 2020-10-10 RX ORDER — METOPROLOL TARTRATE 5 MG/5ML
5 INJECTION INTRAVENOUS ONCE
Status: COMPLETED | OUTPATIENT
Start: 2020-10-10 | End: 2020-10-10

## 2020-10-10 RX ORDER — INSULIN LISPRO 100 [IU]/ML
0-24 INJECTION, SOLUTION INTRAVENOUS; SUBCUTANEOUS AS NEEDED
Status: DISCONTINUED | OUTPATIENT
Start: 2020-10-10 | End: 2020-10-14

## 2020-10-10 RX ORDER — DILTIAZEM HYDROCHLORIDE 5 MG/ML
10 INJECTION INTRAVENOUS ONCE
Status: COMPLETED | OUTPATIENT
Start: 2020-10-10 | End: 2020-10-10

## 2020-10-10 RX ORDER — GABAPENTIN 100 MG/1
100 CAPSULE ORAL EVERY 8 HOURS SCHEDULED
Status: DISCONTINUED | OUTPATIENT
Start: 2020-10-11 | End: 2020-11-06 | Stop reason: HOSPADM

## 2020-10-10 RX ORDER — INSULIN LISPRO 100 [IU]/ML
0-14 INJECTION, SOLUTION INTRAVENOUS; SUBCUTANEOUS AS NEEDED
Status: DISCONTINUED | OUTPATIENT
Start: 2020-10-10 | End: 2020-10-10

## 2020-10-10 RX ORDER — INSULIN LISPRO 100 [IU]/ML
0-14 INJECTION, SOLUTION INTRAVENOUS; SUBCUTANEOUS
Status: DISCONTINUED | OUTPATIENT
Start: 2020-10-10 | End: 2020-10-10

## 2020-10-10 RX ORDER — INSULIN LISPRO 100 [IU]/ML
0-14 INJECTION, SOLUTION INTRAVENOUS; SUBCUTANEOUS EVERY 6 HOURS SCHEDULED
Status: DISCONTINUED | OUTPATIENT
Start: 2020-10-10 | End: 2020-10-10

## 2020-10-10 RX ORDER — INSULIN LISPRO 100 [IU]/ML
0-24 INJECTION, SOLUTION INTRAVENOUS; SUBCUTANEOUS
Status: DISCONTINUED | OUTPATIENT
Start: 2020-10-10 | End: 2020-10-14

## 2020-10-10 RX ADMIN — INSULIN LISPRO 5 UNITS: 100 INJECTION, SOLUTION INTRAVENOUS; SUBCUTANEOUS at 11:47

## 2020-10-10 RX ADMIN — CALCIUM GLUCONATE 1 G: 20 INJECTION, SOLUTION INTRAVENOUS at 14:10

## 2020-10-10 RX ADMIN — HYDROCORTISONE SODIUM SUCCINATE 50 MG: 100 INJECTION, POWDER, FOR SOLUTION INTRAMUSCULAR; INTRAVENOUS at 20:08

## 2020-10-10 RX ADMIN — POTASSIUM CHLORIDE 10 MEQ: 7.46 INJECTION, SOLUTION INTRAVENOUS at 22:12

## 2020-10-10 RX ADMIN — SODIUM CHLORIDE 500 MG: 9 INJECTION, SOLUTION INTRAVENOUS at 00:04

## 2020-10-10 RX ADMIN — POTASSIUM CHLORIDE 10 MEQ: 7.46 INJECTION, SOLUTION INTRAVENOUS at 08:54

## 2020-10-10 RX ADMIN — HYDROCORTISONE SODIUM SUCCINATE 100 MG: 100 INJECTION, POWDER, FOR SOLUTION INTRAMUSCULAR; INTRAVENOUS at 00:03

## 2020-10-10 RX ADMIN — INSULIN LISPRO 14 UNITS: 100 INJECTION, SOLUTION INTRAVENOUS; SUBCUTANEOUS at 04:44

## 2020-10-10 RX ADMIN — POTASSIUM CHLORIDE 10 MEQ: 7.46 INJECTION, SOLUTION INTRAVENOUS at 10:34

## 2020-10-10 RX ADMIN — ENOXAPARIN SODIUM 90 MG: 100 INJECTION SUBCUTANEOUS at 18:36

## 2020-10-10 RX ADMIN — INSULIN LISPRO 5 UNITS: 100 INJECTION, SOLUTION INTRAVENOUS; SUBCUTANEOUS at 08:54

## 2020-10-10 RX ADMIN — INSULIN LISPRO 8 UNITS: 100 INJECTION, SOLUTION INTRAVENOUS; SUBCUTANEOUS at 20:07

## 2020-10-10 RX ADMIN — AMPICILLIN SODIUM AND SULBACTAM SODIUM 1.5 G: 1; .5 INJECTION, POWDER, FOR SOLUTION INTRAMUSCULAR; INTRAVENOUS at 18:36

## 2020-10-10 RX ADMIN — POTASSIUM CHLORIDE 10 MEQ: 7.46 INJECTION, SOLUTION INTRAVENOUS at 23:41

## 2020-10-10 RX ADMIN — ATORVASTATIN CALCIUM 40 MG: 40 TABLET, FILM COATED ORAL at 08:54

## 2020-10-10 RX ADMIN — Medication 10 ML: at 20:09

## 2020-10-10 RX ADMIN — INSULIN LISPRO 7 UNITS: 100 INJECTION, SOLUTION INTRAVENOUS; SUBCUTANEOUS at 00:42

## 2020-10-10 RX ADMIN — ASPIRIN 81 MG: 81 TABLET, COATED ORAL at 08:54

## 2020-10-10 RX ADMIN — SODIUM BICARBONATE 150 MEQ: 84 INJECTION, SOLUTION INTRAVENOUS at 04:15

## 2020-10-10 RX ADMIN — HYDROCORTISONE SODIUM SUCCINATE 100 MG: 100 INJECTION, POWDER, FOR SOLUTION INTRAMUSCULAR; INTRAVENOUS at 08:54

## 2020-10-10 RX ADMIN — SODIUM CHLORIDE 0.02 UNITS/MIN: 9 INJECTION, SOLUTION INTRAVENOUS at 02:07

## 2020-10-10 RX ADMIN — Medication 10 ML: at 08:57

## 2020-10-10 RX ADMIN — INSULIN LISPRO 7 UNITS: 100 INJECTION, SOLUTION INTRAVENOUS; SUBCUTANEOUS at 02:47

## 2020-10-10 RX ADMIN — INSULIN LISPRO 12 UNITS: 100 INJECTION, SOLUTION INTRAVENOUS; SUBCUTANEOUS at 18:35

## 2020-10-10 RX ADMIN — SODIUM CHLORIDE 10 MG/HR: 900 INJECTION, SOLUTION INTRAVENOUS at 14:38

## 2020-10-10 RX ADMIN — POTASSIUM CHLORIDE 10 MEQ: 7.46 INJECTION, SOLUTION INTRAVENOUS at 04:44

## 2020-10-10 RX ADMIN — DILTIAZEM HYDROCHLORIDE 10 MG: 5 INJECTION INTRAVENOUS at 14:03

## 2020-10-10 RX ADMIN — POTASSIUM CHLORIDE 10 MEQ: 7.46 INJECTION, SOLUTION INTRAVENOUS at 11:43

## 2020-10-10 RX ADMIN — METOPROLOL TARTRATE 5 MG: 1 INJECTION, SOLUTION INTRAVENOUS at 12:10

## 2020-10-10 RX ADMIN — POTASSIUM CHLORIDE 10 MEQ: 7.46 INJECTION, SOLUTION INTRAVENOUS at 14:11

## 2020-10-10 RX ADMIN — INSULIN LISPRO 4 UNITS: 100 INJECTION, SOLUTION INTRAVENOUS; SUBCUTANEOUS at 14:18

## 2020-10-10 RX ADMIN — PIPERACILLIN AND TAZOBACTAM 3.38 G: 3; .375 INJECTION, POWDER, LYOPHILIZED, FOR SOLUTION INTRAVENOUS at 08:53

## 2020-10-11 ENCOUNTER — APPOINTMENT (OUTPATIENT)
Dept: GENERAL RADIOLOGY | Facility: HOSPITAL | Age: 70
End: 2020-10-11

## 2020-10-11 ENCOUNTER — APPOINTMENT (OUTPATIENT)
Dept: CARDIOLOGY | Facility: HOSPITAL | Age: 70
End: 2020-10-11

## 2020-10-11 LAB
AMMONIA BLD-SCNC: 33 UMOL/L (ref 11–51)
ANION GAP SERPL CALCULATED.3IONS-SCNC: 13 MMOL/L (ref 5–15)
ARTERIAL PATENCY WRIST A: POSITIVE
ATMOSPHERIC PRESS: ABNORMAL MM[HG]
BASE EXCESS BLDA CALC-SCNC: 2.7 MMOL/L (ref 0–3)
BDY SITE: ABNORMAL
BH CV ECHO MEAS - ACS: 1.6 CM
BH CV ECHO MEAS - AO MAX PG (FULL): 8.4 MMHG
BH CV ECHO MEAS - AO MAX PG: 14.4 MMHG
BH CV ECHO MEAS - AO MEAN PG (FULL): 5.5 MMHG
BH CV ECHO MEAS - AO MEAN PG: 7.9 MMHG
BH CV ECHO MEAS - AO ROOT AREA (BSA CORRECTED): 1.4
BH CV ECHO MEAS - AO ROOT AREA: 5.1 CM^2
BH CV ECHO MEAS - AO ROOT DIAM: 2.6 CM
BH CV ECHO MEAS - AO V2 MAX: 189.7 CM/SEC
BH CV ECHO MEAS - AO V2 MEAN: 133.6 CM/SEC
BH CV ECHO MEAS - AO V2 VTI: 38.6 CM
BH CV ECHO MEAS - AORTIC HR: 81.7 BPM
BH CV ECHO MEAS - AORTIC R-R: 0.73 SEC
BH CV ECHO MEAS - AVA(I,A): 1.3 CM^2
BH CV ECHO MEAS - AVA(I,D): 1.3 CM^2
BH CV ECHO MEAS - AVA(V,A): 1.4 CM^2
BH CV ECHO MEAS - AVA(V,D): 1.4 CM^2
BH CV ECHO MEAS - BSA(HAYCOCK): 2 M^2
BH CV ECHO MEAS - BSA: 1.8 M^2
BH CV ECHO MEAS - BZI_BMI: 37.9 KILOGRAMS/M^2
BH CV ECHO MEAS - BZI_METRIC_HEIGHT: 152.4 CM
BH CV ECHO MEAS - BZI_METRIC_WEIGHT: 88 KG
BH CV ECHO MEAS - CI(AO): 8.8 L/MIN/M^2
BH CV ECHO MEAS - CI(LVOT): 2.3 L/MIN/M^2
BH CV ECHO MEAS - CO(AO): 16.2 L/MIN
BH CV ECHO MEAS - CO(LVOT): 4.2 L/MIN
BH CV ECHO MEAS - EDV(CUBED): 111.4 ML
BH CV ECHO MEAS - EDV(MOD-SP4): 123 ML
BH CV ECHO MEAS - EDV(TEICH): 108.1 ML
BH CV ECHO MEAS - EF(CUBED): 67.7 %
BH CV ECHO MEAS - EF(MOD-BP): 65 %
BH CV ECHO MEAS - EF(MOD-SP4): 64.6 %
BH CV ECHO MEAS - EF(TEICH): 59.1 %
BH CV ECHO MEAS - ESV(CUBED): 36 ML
BH CV ECHO MEAS - ESV(MOD-SP4): 43.5 ML
BH CV ECHO MEAS - ESV(TEICH): 44.2 ML
BH CV ECHO MEAS - FS: 31.4 %
BH CV ECHO MEAS - IVS/LVPW: 0.98
BH CV ECHO MEAS - IVSD: 1.1 CM
BH CV ECHO MEAS - LA DIMENSION(2D): 3.4 CM
BH CV ECHO MEAS - LV DIASTOLIC VOL/BSA (35-75): 66.7 ML/M^2
BH CV ECHO MEAS - LV MASS(C)D: 185.6 GRAMS
BH CV ECHO MEAS - LV MASS(C)DI: 100.7 GRAMS/M^2
BH CV ECHO MEAS - LV MAX PG: 6 MMHG
BH CV ECHO MEAS - LV MEAN PG: 2.4 MMHG
BH CV ECHO MEAS - LV SYSTOLIC VOL/BSA (12-30): 23.6 ML/M^2
BH CV ECHO MEAS - LV V1 MAX: 122.2 CM/SEC
BH CV ECHO MEAS - LV V1 MEAN: 69.1 CM/SEC
BH CV ECHO MEAS - LV V1 VTI: 23.7 CM
BH CV ECHO MEAS - LVIDD: 4.8 CM
BH CV ECHO MEAS - LVIDS: 3.3 CM
BH CV ECHO MEAS - LVOT AREA: 2.2 CM^2
BH CV ECHO MEAS - LVOT DIAM: 1.7 CM
BH CV ECHO MEAS - LVPWD: 1.1 CM
BH CV ECHO MEAS - MV A MAX VEL: 136.8 CM/SEC
BH CV ECHO MEAS - MV DEC SLOPE: 529.4 CM/SEC^2
BH CV ECHO MEAS - MV DEC TIME: 0.26 SEC
BH CV ECHO MEAS - MV E MAX VEL: 137.5 CM/SEC
BH CV ECHO MEAS - MV E/A: 1
BH CV ECHO MEAS - MV MAX PG: 6.3 MMHG
BH CV ECHO MEAS - MV MEAN PG: 3.6 MMHG
BH CV ECHO MEAS - MV V2 MAX: 125.7 CM/SEC
BH CV ECHO MEAS - MV V2 MEAN: 90.4 CM/SEC
BH CV ECHO MEAS - MV V2 VTI: 25.8 CM
BH CV ECHO MEAS - MVA(VTI): 2 CM^2
BH CV ECHO MEAS - PA MAX PG (FULL): 9.7 MMHG
BH CV ECHO MEAS - PA MAX PG: 20.5 MMHG
BH CV ECHO MEAS - PA V2 MAX: 226.2 CM/SEC
BH CV ECHO MEAS - RV MAX PG: 10.8 MMHG
BH CV ECHO MEAS - RV MEAN PG: 5.7 MMHG
BH CV ECHO MEAS - RV V1 MAX: 164.1 CM/SEC
BH CV ECHO MEAS - RV V1 MEAN: 109 CM/SEC
BH CV ECHO MEAS - RV V1 VTI: 27.8 CM
BH CV ECHO MEAS - SI(AO): 107.9 ML/M^2
BH CV ECHO MEAS - SI(CUBED): 40.9 ML/M^2
BH CV ECHO MEAS - SI(LVOT): 28.1 ML/M^2
BH CV ECHO MEAS - SI(MOD-SP4): 43.1 ML/M^2
BH CV ECHO MEAS - SI(TEICH): 34.7 ML/M^2
BH CV ECHO MEAS - SV(AO): 198.8 ML
BH CV ECHO MEAS - SV(CUBED): 75.4 ML
BH CV ECHO MEAS - SV(LVOT): 51.7 ML
BH CV ECHO MEAS - SV(MOD-SP4): 79.4 ML
BH CV ECHO MEAS - SV(TEICH): 63.9 ML
BUN SERPL-MCNC: 25 MG/DL (ref 8–23)
BUN SERPL-MCNC: ABNORMAL MG/DL
BUN/CREAT SERPL: ABNORMAL
CALCIUM SPEC-SCNC: 7.3 MG/DL (ref 8.6–10.5)
CHLORIDE SERPL-SCNC: 104 MMOL/L (ref 98–107)
CO2 BLDA-SCNC: 31.6 MMOL/L (ref 22–29)
CO2 SERPL-SCNC: 26 MMOL/L (ref 22–29)
CREAT SERPL-MCNC: 2.47 MG/DL (ref 0.57–1)
GASTROCULT GAST QL: POSITIVE
GFR SERPL CREATININE-BSD FRML MDRD: 19 ML/MIN/1.73
GLUCOSE BLDC GLUCOMTR-MCNC: 107 MG/DL (ref 70–105)
GLUCOSE BLDC GLUCOMTR-MCNC: 123 MG/DL (ref 70–105)
GLUCOSE BLDC GLUCOMTR-MCNC: 132 MG/DL (ref 70–105)
GLUCOSE BLDC GLUCOMTR-MCNC: 166 MG/DL (ref 70–105)
GLUCOSE SERPL-MCNC: 118 MG/DL (ref 65–99)
HCO3 BLDA-SCNC: 29.8 MMOL/L (ref 21–28)
HEMODILUTION: NO
INHALED O2 CONCENTRATION: 60 %
L PNEUMO1 AG UR QL IA: NEGATIVE
LV EF 2D ECHO EST: 65 %
MAGNESIUM SERPL-MCNC: 2 MG/DL (ref 1.6–2.4)
MODALITY: ABNORMAL
PCO2 BLDA: 58.3 MM HG (ref 35–48)
PEEP RESPIRATORY: 5 CM[H2O]
PH BLDA: 7.32 PH UNITS (ref 7.35–7.45)
PHOSPHATE SERPL-MCNC: 3 MG/DL (ref 2.5–4.5)
PO2 BLDA: 85.8 MM HG (ref 83–108)
POTASSIUM SERPL-SCNC: 3.8 MMOL/L (ref 3.5–5.2)
PSV: 15 CMH2O
RESPIRATORY RATE: 22
S PNEUM AG SPEC QL LA: NEGATIVE
SAO2 % BLDCOA: 95.3 % (ref 94–98)
SODIUM SERPL-SCNC: 143 MMOL/L (ref 136–145)
VT ON VENT VENT: 550 ML

## 2020-10-11 PROCEDURE — 86317 IMMUNOASSAY INFECTIOUS AGENT: CPT | Performed by: INTERNAL MEDICINE

## 2020-10-11 PROCEDURE — 82962 GLUCOSE BLOOD TEST: CPT

## 2020-10-11 PROCEDURE — 74018 RADEX ABDOMEN 1 VIEW: CPT

## 2020-10-11 PROCEDURE — 94660 CPAP INITIATION&MGMT: CPT

## 2020-10-11 PROCEDURE — 93306 TTE W/DOPPLER COMPLETE: CPT

## 2020-10-11 PROCEDURE — 87899 AGENT NOS ASSAY W/OPTIC: CPT | Performed by: INTERNAL MEDICINE

## 2020-10-11 PROCEDURE — 25010000002 ENOXAPARIN PER 10 MG: Performed by: INTERNAL MEDICINE

## 2020-10-11 PROCEDURE — 80048 BASIC METABOLIC PNL TOTAL CA: CPT | Performed by: INTERNAL MEDICINE

## 2020-10-11 PROCEDURE — 82803 BLOOD GASES ANY COMBINATION: CPT

## 2020-10-11 PROCEDURE — 84100 ASSAY OF PHOSPHORUS: CPT | Performed by: INTERNAL MEDICINE

## 2020-10-11 PROCEDURE — 71045 X-RAY EXAM CHEST 1 VIEW: CPT

## 2020-10-11 PROCEDURE — 94799 UNLISTED PULMONARY SVC/PX: CPT

## 2020-10-11 PROCEDURE — 93306 TTE W/DOPPLER COMPLETE: CPT | Performed by: INTERNAL MEDICINE

## 2020-10-11 PROCEDURE — 36600 WITHDRAWAL OF ARTERIAL BLOOD: CPT

## 2020-10-11 PROCEDURE — 82140 ASSAY OF AMMONIA: CPT | Performed by: INTERNAL MEDICINE

## 2020-10-11 PROCEDURE — 99232 SBSQ HOSP IP/OBS MODERATE 35: CPT | Performed by: INTERNAL MEDICINE

## 2020-10-11 PROCEDURE — 25010000002 HYDROCORTISONE SODIUM SUCCINATE 100 MG RECONSTITUTED SOLUTION: Performed by: INTERNAL MEDICINE

## 2020-10-11 PROCEDURE — 25010000002 FUROSEMIDE PER 20 MG: Performed by: INTERNAL MEDICINE

## 2020-10-11 PROCEDURE — 83036 HEMOGLOBIN GLYCOSYLATED A1C: CPT | Performed by: INTERNAL MEDICINE

## 2020-10-11 PROCEDURE — 63710000001 INSULIN LISPRO (HUMAN) PER 5 UNITS: Performed by: INTERNAL MEDICINE

## 2020-10-11 PROCEDURE — 83735 ASSAY OF MAGNESIUM: CPT | Performed by: INTERNAL MEDICINE

## 2020-10-11 PROCEDURE — 25010000003 POTASSIUM CHLORIDE 10 MEQ/100ML SOLUTION: Performed by: INTERNAL MEDICINE

## 2020-10-11 PROCEDURE — 82271 OCCULT BLOOD OTHER SOURCES: CPT | Performed by: INTERNAL MEDICINE

## 2020-10-11 PROCEDURE — 25010000002 AZITHROMYCIN PER 500 MG: Performed by: INTERNAL MEDICINE

## 2020-10-11 PROCEDURE — 25010000003 AMPICILLIN-SULBACTAM PER 1.5 G: Performed by: INTERNAL MEDICINE

## 2020-10-11 RX ORDER — FUROSEMIDE 10 MG/ML
20 INJECTION INTRAMUSCULAR; INTRAVENOUS EVERY 12 HOURS
Status: DISCONTINUED | OUTPATIENT
Start: 2020-10-11 | End: 2020-10-11

## 2020-10-11 RX ORDER — FUROSEMIDE 10 MG/ML
40 INJECTION INTRAMUSCULAR; INTRAVENOUS EVERY 12 HOURS
Status: DISCONTINUED | OUTPATIENT
Start: 2020-10-11 | End: 2020-10-12

## 2020-10-11 RX ADMIN — GABAPENTIN 100 MG: 100 CAPSULE ORAL at 06:28

## 2020-10-11 RX ADMIN — SODIUM CHLORIDE 10 MG/HR: 900 INJECTION, SOLUTION INTRAVENOUS at 00:10

## 2020-10-11 RX ADMIN — HYDROCORTISONE SODIUM SUCCINATE 50 MG: 100 INJECTION, POWDER, FOR SOLUTION INTRAMUSCULAR; INTRAVENOUS at 20:50

## 2020-10-11 RX ADMIN — ENOXAPARIN SODIUM 90 MG: 100 INJECTION SUBCUTANEOUS at 16:44

## 2020-10-11 RX ADMIN — POTASSIUM CHLORIDE 10 MEQ: 7.46 INJECTION, SOLUTION INTRAVENOUS at 02:48

## 2020-10-11 RX ADMIN — ASPIRIN 81 MG: 81 TABLET, COATED ORAL at 08:45

## 2020-10-11 RX ADMIN — HYDROCODONE BITARTRATE AND ACETAMINOPHEN 1 TABLET: 5; 325 TABLET ORAL at 06:28

## 2020-10-11 RX ADMIN — Medication 10 ML: at 08:48

## 2020-10-11 RX ADMIN — SODIUM CHLORIDE 10 MG/HR: 900 INJECTION, SOLUTION INTRAVENOUS at 20:45

## 2020-10-11 RX ADMIN — GABAPENTIN 100 MG: 100 CAPSULE ORAL at 16:44

## 2020-10-11 RX ADMIN — INSULIN LISPRO 4 UNITS: 100 INJECTION, SOLUTION INTRAVENOUS; SUBCUTANEOUS at 18:16

## 2020-10-11 RX ADMIN — SODIUM CHLORIDE 10 MG/HR: 900 INJECTION, SOLUTION INTRAVENOUS at 12:20

## 2020-10-11 RX ADMIN — AMPICILLIN SODIUM AND SULBACTAM SODIUM 1.5 G: 1; .5 INJECTION, POWDER, FOR SOLUTION INTRAMUSCULAR; INTRAVENOUS at 16:49

## 2020-10-11 RX ADMIN — POTASSIUM CHLORIDE 10 MEQ: 7.46 INJECTION, SOLUTION INTRAVENOUS at 05:02

## 2020-10-11 RX ADMIN — SODIUM CHLORIDE 500 MG: 9 INJECTION, SOLUTION INTRAVENOUS at 00:14

## 2020-10-11 RX ADMIN — GABAPENTIN 100 MG: 100 CAPSULE ORAL at 00:15

## 2020-10-11 RX ADMIN — FUROSEMIDE 40 MG: 10 INJECTION, SOLUTION INTRAMUSCULAR; INTRAVENOUS at 18:16

## 2020-10-11 RX ADMIN — Medication 10 ML: at 20:50

## 2020-10-11 RX ADMIN — AMPICILLIN SODIUM AND SULBACTAM SODIUM 1.5 G: 1; .5 INJECTION, POWDER, FOR SOLUTION INTRAMUSCULAR; INTRAVENOUS at 05:00

## 2020-10-11 RX ADMIN — ATORVASTATIN CALCIUM 40 MG: 40 TABLET, FILM COATED ORAL at 08:45

## 2020-10-11 RX ADMIN — HYDROCORTISONE SODIUM SUCCINATE 50 MG: 100 INJECTION, POWDER, FOR SOLUTION INTRAMUSCULAR; INTRAVENOUS at 08:45

## 2020-10-11 RX ADMIN — HYDROCODONE BITARTRATE AND ACETAMINOPHEN 1 TABLET: 5; 325 TABLET ORAL at 00:14

## 2020-10-11 RX ADMIN — FUROSEMIDE 20 MG: 20 INJECTION, SOLUTION INTRAMUSCULAR; INTRAVENOUS at 12:20

## 2020-10-12 ENCOUNTER — APPOINTMENT (OUTPATIENT)
Dept: GENERAL RADIOLOGY | Facility: HOSPITAL | Age: 70
End: 2020-10-12

## 2020-10-12 ENCOUNTER — APPOINTMENT (OUTPATIENT)
Dept: CT IMAGING | Facility: HOSPITAL | Age: 70
End: 2020-10-12

## 2020-10-12 ENCOUNTER — INPATIENT HOSPITAL (OUTPATIENT)
Dept: URBAN - METROPOLITAN AREA HOSPITAL 84 | Facility: HOSPITAL | Age: 70
End: 2020-10-12

## 2020-10-12 DIAGNOSIS — N17.9 ACUTE KIDNEY FAILURE, UNSPECIFIED: ICD-10-CM

## 2020-10-12 DIAGNOSIS — K59.00 CONSTIPATION, UNSPECIFIED: ICD-10-CM

## 2020-10-12 DIAGNOSIS — R14.0 ABDOMINAL DISTENSION (GASEOUS): ICD-10-CM

## 2020-10-12 DIAGNOSIS — E11.9 TYPE 2 DIABETES MELLITUS WITHOUT COMPLICATIONS: ICD-10-CM

## 2020-10-12 DIAGNOSIS — J18.9 PNEUMONIA, UNSPECIFIED ORGANISM: ICD-10-CM

## 2020-10-12 DIAGNOSIS — D64.89 OTHER SPECIFIED ANEMIAS: ICD-10-CM

## 2020-10-12 PROBLEM — I48.0 PAROXYSMAL ATRIAL FIBRILLATION (HCC): Status: ACTIVE | Noted: 2020-10-12

## 2020-10-12 LAB
ANA TITR SER IF: NEGATIVE {TITER}
ANION GAP SERPL CALCULATED.3IONS-SCNC: 14 MMOL/L (ref 5–15)
ANION GAP SERPL CALCULATED.3IONS-SCNC: 17 MMOL/L (ref 5–15)
ANISOCYTOSIS BLD QL: ABNORMAL
BUN SERPL-MCNC: 51 MG/DL (ref 8–23)
BUN SERPL-MCNC: 57 MG/DL (ref 8–23)
BUN SERPL-MCNC: ABNORMAL MG/DL
BUN SERPL-MCNC: ABNORMAL MG/DL
BUN/CREAT SERPL: ABNORMAL
BUN/CREAT SERPL: ABNORMAL
C-ANCA TITR SER IF: NORMAL TITER
CALCIUM SPEC-SCNC: 8.9 MG/DL (ref 8.6–10.5)
CALCIUM SPEC-SCNC: 9.3 MG/DL (ref 8.6–10.5)
CHLORIDE SERPL-SCNC: 103 MMOL/L (ref 98–107)
CHLORIDE SERPL-SCNC: 104 MMOL/L (ref 98–107)
CO2 SERPL-SCNC: 28 MMOL/L (ref 22–29)
CO2 SERPL-SCNC: 33 MMOL/L (ref 22–29)
CREAT SERPL-MCNC: 2.94 MG/DL (ref 0.57–1)
CREAT SERPL-MCNC: 3.47 MG/DL (ref 0.57–1)
DEPRECATED RDW RBC AUTO: 65.6 FL (ref 37–54)
ERYTHROCYTE [DISTWIDTH] IN BLOOD BY AUTOMATED COUNT: 20 % (ref 12.3–15.4)
GFR SERPL CREATININE-BSD FRML MDRD: 13 ML/MIN/1.73
GFR SERPL CREATININE-BSD FRML MDRD: 16 ML/MIN/1.73
GFR SERPL CREATININE-BSD FRML MDRD: ABNORMAL ML/MIN/{1.73_M2}
GLUCOSE BLDC GLUCOMTR-MCNC: 163 MG/DL (ref 70–105)
GLUCOSE BLDC GLUCOMTR-MCNC: 173 MG/DL (ref 70–105)
GLUCOSE BLDC GLUCOMTR-MCNC: 207 MG/DL (ref 70–105)
GLUCOSE BLDC GLUCOMTR-MCNC: 223 MG/DL (ref 70–105)
GLUCOSE SERPL-MCNC: 148 MG/DL (ref 65–99)
GLUCOSE SERPL-MCNC: 245 MG/DL (ref 65–99)
HBA1C MFR BLD: 9.6 % (ref 3.5–5.6)
HCT VFR BLD AUTO: 28 % (ref 34–46.6)
HGB BLD-MCNC: 9.1 G/DL (ref 12–15.9)
LABORATORY COMMENT REPORT: NORMAL
LYMPHOCYTES # BLD MANUAL: 0.3 10*3/MM3 (ref 0.7–3.1)
LYMPHOCYTES NFR BLD MANUAL: 1 % (ref 5–12)
LYMPHOCYTES NFR BLD MANUAL: 4 % (ref 19.6–45.3)
MAGNESIUM SERPL-MCNC: 1.9 MG/DL (ref 1.6–2.4)
MCH RBC QN AUTO: 30.8 PG (ref 26.6–33)
MCHC RBC AUTO-ENTMCNC: 32.3 G/DL (ref 31.5–35.7)
MCV RBC AUTO: 95.1 FL (ref 79–97)
METAMYELOCYTES NFR BLD MANUAL: 1 % (ref 0–0)
MONOCYTES # BLD AUTO: 0.07 10*3/MM3 (ref 0.1–0.9)
MYELOPEROXIDASE AB SER IA-ACNC: <9 U/ML (ref 0–9)
NEUTROPHILS # BLD AUTO: 6.96 10*3/MM3 (ref 1.7–7)
NEUTROPHILS NFR BLD MANUAL: 84 % (ref 42.7–76)
NEUTS BAND NFR BLD MANUAL: 10 % (ref 0–5)
P-ANCA ATYPICAL TITR SER IF: NORMAL TITER
P-ANCA TITR SER IF: NORMAL TITER
PHOSPHATE SERPL-MCNC: 5.3 MG/DL (ref 2.5–4.5)
PLAT MORPH BLD: NORMAL
PLATELET # BLD AUTO: 295 10*3/MM3 (ref 140–450)
PMV BLD AUTO: 8 FL (ref 6–12)
POTASSIUM SERPL-SCNC: 3.2 MMOL/L (ref 3.5–5.2)
POTASSIUM SERPL-SCNC: 3.7 MMOL/L (ref 3.5–5.2)
PROTEINASE3 AB SER IA-ACNC: <3.5 U/ML (ref 0–3.5)
RBC # BLD AUTO: 2.94 10*6/MM3 (ref 3.77–5.28)
SCAN SLIDE: NORMAL
SODIUM SERPL-SCNC: 148 MMOL/L (ref 136–145)
SODIUM SERPL-SCNC: 151 MMOL/L (ref 136–145)
WBC # BLD AUTO: 7.4 10*3/MM3 (ref 3.4–10.8)
WBC MORPH BLD: NORMAL

## 2020-10-12 PROCEDURE — 99223 1ST HOSP IP/OBS HIGH 75: CPT | Performed by: NURSE PRACTITIONER

## 2020-10-12 PROCEDURE — 82962 GLUCOSE BLOOD TEST: CPT

## 2020-10-12 PROCEDURE — 25010000002 ENOXAPARIN PER 10 MG: Performed by: INTERNAL MEDICINE

## 2020-10-12 PROCEDURE — 25010000002 FUROSEMIDE PER 20 MG: Performed by: INTERNAL MEDICINE

## 2020-10-12 PROCEDURE — 74176 CT ABD & PELVIS W/O CONTRAST: CPT

## 2020-10-12 PROCEDURE — 25010000003 AMPICILLIN-SULBACTAM PER 1.5 G: Performed by: INTERNAL MEDICINE

## 2020-10-12 PROCEDURE — 99232 SBSQ HOSP IP/OBS MODERATE 35: CPT | Performed by: INTERNAL MEDICINE

## 2020-10-12 PROCEDURE — 80048 BASIC METABOLIC PNL TOTAL CA: CPT | Performed by: INTERNAL MEDICINE

## 2020-10-12 PROCEDURE — 84100 ASSAY OF PHOSPHORUS: CPT | Performed by: INTERNAL MEDICINE

## 2020-10-12 PROCEDURE — 94660 CPAP INITIATION&MGMT: CPT

## 2020-10-12 PROCEDURE — 71045 X-RAY EXAM CHEST 1 VIEW: CPT

## 2020-10-12 PROCEDURE — 83735 ASSAY OF MAGNESIUM: CPT | Performed by: INTERNAL MEDICINE

## 2020-10-12 PROCEDURE — 63710000001 INSULIN LISPRO (HUMAN) PER 5 UNITS: Performed by: INTERNAL MEDICINE

## 2020-10-12 PROCEDURE — 25010000002 ACETAZOLAMIDE PER 500 MG: Performed by: INTERNAL MEDICINE

## 2020-10-12 PROCEDURE — 25010000002 HYDROCORTISONE SODIUM SUCCINATE 100 MG RECONSTITUTED SOLUTION: Performed by: INTERNAL MEDICINE

## 2020-10-12 PROCEDURE — 85007 BL SMEAR W/DIFF WBC COUNT: CPT | Performed by: INTERNAL MEDICINE

## 2020-10-12 PROCEDURE — 25010000002 CHLOROTHIAZIDE PER 500 MG: Performed by: INTERNAL MEDICINE

## 2020-10-12 PROCEDURE — 85025 COMPLETE CBC W/AUTO DIFF WBC: CPT | Performed by: INTERNAL MEDICINE

## 2020-10-12 PROCEDURE — 94799 UNLISTED PULMONARY SVC/PX: CPT

## 2020-10-12 RX ORDER — PANTOPRAZOLE SODIUM 40 MG/10ML
40 INJECTION, POWDER, LYOPHILIZED, FOR SOLUTION INTRAVENOUS
Status: DISCONTINUED | OUTPATIENT
Start: 2020-10-12 | End: 2020-10-23

## 2020-10-12 RX ORDER — LANCETS
1 EACH MISCELLANEOUS DAILY
Qty: 102 EACH | Refills: 2 | Status: CANCELLED | OUTPATIENT
Start: 2020-10-12

## 2020-10-12 RX ORDER — DEXTROSE AND SODIUM CHLORIDE 5; .9 G/100ML; G/100ML
50 INJECTION, SOLUTION INTRAVENOUS CONTINUOUS
Status: DISCONTINUED | OUTPATIENT
Start: 2020-10-12 | End: 2020-10-12

## 2020-10-12 RX ORDER — BLOOD SUGAR DIAGNOSTIC
1 STRIP MISCELLANEOUS DAILY
Qty: 50 EACH | Refills: 2 | Status: CANCELLED | OUTPATIENT
Start: 2020-10-12

## 2020-10-12 RX ORDER — DILTIAZEM HYDROCHLORIDE 60 MG/1
60 TABLET, FILM COATED ORAL EVERY 8 HOURS SCHEDULED
Status: DISCONTINUED | OUTPATIENT
Start: 2020-10-12 | End: 2020-10-12

## 2020-10-12 RX ORDER — DEXTROSE MONOHYDRATE 50 MG/ML
50 INJECTION, SOLUTION INTRAVENOUS CONTINUOUS
Status: DISCONTINUED | OUTPATIENT
Start: 2020-10-12 | End: 2020-10-14

## 2020-10-12 RX ADMIN — ENOXAPARIN SODIUM 30 MG: 30 INJECTION SUBCUTANEOUS at 16:55

## 2020-10-12 RX ADMIN — HYDROCORTISONE SODIUM SUCCINATE 50 MG: 100 INJECTION, POWDER, FOR SOLUTION INTRAMUSCULAR; INTRAVENOUS at 09:37

## 2020-10-12 RX ADMIN — INSULIN LISPRO 4 UNITS: 100 INJECTION, SOLUTION INTRAVENOUS; SUBCUTANEOUS at 13:16

## 2020-10-12 RX ADMIN — INSULIN LISPRO 8 UNITS: 100 INJECTION, SOLUTION INTRAVENOUS; SUBCUTANEOUS at 21:29

## 2020-10-12 RX ADMIN — AMPICILLIN SODIUM AND SULBACTAM SODIUM 1.5 G: 1; .5 INJECTION, POWDER, FOR SOLUTION INTRAMUSCULAR; INTRAVENOUS at 17:12

## 2020-10-12 RX ADMIN — Medication 10 ML: at 09:38

## 2020-10-12 RX ADMIN — INSULIN LISPRO 8 UNITS: 100 INJECTION, SOLUTION INTRAVENOUS; SUBCUTANEOUS at 17:32

## 2020-10-12 RX ADMIN — SODIUM CHLORIDE 10 MG/HR: 900 INJECTION, SOLUTION INTRAVENOUS at 14:18

## 2020-10-12 RX ADMIN — Medication 10 ML: at 23:06

## 2020-10-12 RX ADMIN — DEXTROSE MONOHYDRATE 50 ML/HR: 5 INJECTION, SOLUTION INTRAVENOUS at 16:56

## 2020-10-12 RX ADMIN — DEXTROSE MONOHYDRATE AND SODIUM CHLORIDE 50 ML/HR: 5; .9 INJECTION, SOLUTION INTRAVENOUS at 10:04

## 2020-10-12 RX ADMIN — SODIUM CHLORIDE 15 MG/HR: 900 INJECTION, SOLUTION INTRAVENOUS at 20:51

## 2020-10-12 RX ADMIN — INSULIN LISPRO 4 UNITS: 100 INJECTION, SOLUTION INTRAVENOUS; SUBCUTANEOUS at 09:37

## 2020-10-12 RX ADMIN — HYDROCORTISONE SODIUM SUCCINATE 50 MG: 100 INJECTION, POWDER, FOR SOLUTION INTRAMUSCULAR; INTRAVENOUS at 21:30

## 2020-10-12 RX ADMIN — SODIUM CHLORIDE 10 MG/HR: 900 INJECTION, SOLUTION INTRAVENOUS at 07:24

## 2020-10-12 RX ADMIN — AMPICILLIN SODIUM AND SULBACTAM SODIUM 1.5 G: 1; .5 INJECTION, POWDER, FOR SOLUTION INTRAMUSCULAR; INTRAVENOUS at 04:15

## 2020-10-12 RX ADMIN — PANTOPRAZOLE SODIUM 40 MG: 40 INJECTION, POWDER, FOR SOLUTION INTRAVENOUS at 16:55

## 2020-10-12 RX ADMIN — ACETAZOLAMIDE 250 MG: 500 INJECTION, POWDER, LYOPHILIZED, FOR SOLUTION INTRAVENOUS at 16:55

## 2020-10-12 RX ADMIN — FUROSEMIDE 40 MG: 10 INJECTION, SOLUTION INTRAMUSCULAR; INTRAVENOUS at 06:15

## 2020-10-12 RX ADMIN — CHLOROTHIAZIDE SODIUM 250 MG: 500 INJECTION, POWDER, LYOPHILIZED, FOR SOLUTION INTRAVENOUS at 10:04

## 2020-10-13 ENCOUNTER — ANESTHESIA (OUTPATIENT)
Dept: GASTROENTEROLOGY | Facility: HOSPITAL | Age: 70
End: 2020-10-13

## 2020-10-13 ENCOUNTER — INPATIENT HOSPITAL (OUTPATIENT)
Dept: URBAN - METROPOLITAN AREA HOSPITAL 84 | Facility: HOSPITAL | Age: 70
End: 2020-10-13

## 2020-10-13 ENCOUNTER — ANESTHESIA EVENT (OUTPATIENT)
Dept: GASTROENTEROLOGY | Facility: HOSPITAL | Age: 70
End: 2020-10-13

## 2020-10-13 ENCOUNTER — APPOINTMENT (OUTPATIENT)
Dept: GENERAL RADIOLOGY | Facility: HOSPITAL | Age: 70
End: 2020-10-13

## 2020-10-13 VITALS — DIASTOLIC BLOOD PRESSURE: 36 MMHG | OXYGEN SATURATION: 99 % | SYSTOLIC BLOOD PRESSURE: 90 MMHG

## 2020-10-13 VITALS
DIASTOLIC BLOOD PRESSURE: 38 MMHG | RESPIRATION RATE: 22 BRPM | OXYGEN SATURATION: 100 % | SYSTOLIC BLOOD PRESSURE: 146 MMHG

## 2020-10-13 DIAGNOSIS — D64.89 OTHER SPECIFIED ANEMIAS: ICD-10-CM

## 2020-10-13 DIAGNOSIS — K92.0 HEMATEMESIS: ICD-10-CM

## 2020-10-13 DIAGNOSIS — T85.598A OTHER MECHANICAL COMPLICATION OF OTHER GASTROINTESTINAL PROS: ICD-10-CM

## 2020-10-13 PROBLEM — D64.9 ANEMIA: Status: ACTIVE | Noted: 2020-10-08

## 2020-10-13 LAB
ALBUMIN SERPL-MCNC: 2.4 G/DL (ref 3.5–5.2)
ALBUMIN/GLOB SERPL: 0.7 G/DL
ALP SERPL-CCNC: 138 U/L (ref 39–117)
ALT SERPL W P-5'-P-CCNC: 9 U/L (ref 1–33)
ANION GAP SERPL CALCULATED.3IONS-SCNC: 14 MMOL/L (ref 5–15)
AST SERPL-CCNC: 14 U/L (ref 1–32)
B PARAPERT DNA SPEC QL NAA+PROBE: NOT DETECTED
B PERT DNA SPEC QL NAA+PROBE: NOT DETECTED
BACTERIA SPEC AEROBE CULT: NORMAL
BACTERIA SPEC AEROBE CULT: NORMAL
BILIRUB SERPL-MCNC: 0.3 MG/DL (ref 0–1.2)
BUN SERPL-MCNC: 58 MG/DL (ref 8–23)
BUN SERPL-MCNC: ABNORMAL MG/DL
BUN/CREAT SERPL: ABNORMAL
C PNEUM DNA NPH QL NAA+NON-PROBE: NOT DETECTED
CALCIUM SPEC-SCNC: 9.7 MG/DL (ref 8.6–10.5)
CHLORIDE SERPL-SCNC: 102 MMOL/L (ref 98–107)
CO2 SERPL-SCNC: 35 MMOL/L (ref 22–29)
CREAT SERPL-MCNC: 3.25 MG/DL (ref 0.57–1)
DEPRECATED RDW RBC AUTO: 66.9 FL (ref 37–54)
EOSINOPHIL # BLD MANUAL: 0.07 10*3/MM3 (ref 0–0.4)
EOSINOPHIL NFR BLD MANUAL: 1 % (ref 0.3–6.2)
ERYTHROCYTE [DISTWIDTH] IN BLOOD BY AUTOMATED COUNT: 20.2 % (ref 12.3–15.4)
FLUAV H1 2009 PAND RNA NPH QL NAA+PROBE: NOT DETECTED
FLUAV H1 HA GENE NPH QL NAA+PROBE: NOT DETECTED
FLUAV H3 RNA NPH QL NAA+PROBE: NOT DETECTED
FLUAV SUBTYP SPEC NAA+PROBE: NOT DETECTED
FLUBV RNA ISLT QL NAA+PROBE: NOT DETECTED
GFR SERPL CREATININE-BSD FRML MDRD: 14 ML/MIN/1.73
GFR SERPL CREATININE-BSD FRML MDRD: ABNORMAL ML/MIN/{1.73_M2}
GLOBULIN UR ELPH-MCNC: 3.3 GM/DL
GLUCOSE BLDC GLUCOMTR-MCNC: 222 MG/DL (ref 70–105)
GLUCOSE BLDC GLUCOMTR-MCNC: 223 MG/DL (ref 70–105)
GLUCOSE BLDC GLUCOMTR-MCNC: 249 MG/DL (ref 70–105)
GLUCOSE BLDC GLUCOMTR-MCNC: 270 MG/DL (ref 70–105)
GLUCOSE SERPL-MCNC: 258 MG/DL (ref 65–99)
HADV DNA SPEC NAA+PROBE: NOT DETECTED
HCOV 229E RNA SPEC QL NAA+PROBE: NOT DETECTED
HCOV HKU1 RNA SPEC QL NAA+PROBE: NOT DETECTED
HCOV NL63 RNA SPEC QL NAA+PROBE: NOT DETECTED
HCOV OC43 RNA SPEC QL NAA+PROBE: NOT DETECTED
HCT VFR BLD AUTO: 24.5 % (ref 34–46.6)
HGB BLD-MCNC: 8.1 G/DL (ref 12–15.9)
HMPV RNA NPH QL NAA+NON-PROBE: NOT DETECTED
HPIV1 RNA SPEC QL NAA+PROBE: NOT DETECTED
HPIV2 RNA SPEC QL NAA+PROBE: NOT DETECTED
HPIV3 RNA NPH QL NAA+PROBE: NOT DETECTED
HPIV4 P GENE NPH QL NAA+PROBE: NOT DETECTED
INR PPP: 1.19 (ref 0.93–1.1)
LYMPHOCYTES # BLD MANUAL: 0.07 10*3/MM3 (ref 0.7–3.1)
LYMPHOCYTES NFR BLD MANUAL: 1 % (ref 19.6–45.3)
LYMPHOCYTES NFR BLD MANUAL: 1 % (ref 5–12)
M PNEUMO IGG SER IA-ACNC: NOT DETECTED
MAGNESIUM SERPL-MCNC: 1.7 MG/DL (ref 1.6–2.4)
MAGNESIUM SERPL-MCNC: 2 MG/DL (ref 1.6–2.4)
MCH RBC QN AUTO: 31.3 PG (ref 26.6–33)
MCHC RBC AUTO-ENTMCNC: 33 G/DL (ref 31.5–35.7)
MCV RBC AUTO: 95 FL (ref 79–97)
METAMYELOCYTES NFR BLD MANUAL: 2 % (ref 0–0)
MONOCYTES # BLD AUTO: 0.07 10*3/MM3 (ref 0.1–0.9)
MYELOCYTES NFR BLD MANUAL: 1 % (ref 0–0)
NEUTROPHILS # BLD AUTO: 6.11 10*3/MM3 (ref 1.7–7)
NEUTROPHILS NFR BLD MANUAL: 84 % (ref 42.7–76)
NEUTS BAND NFR BLD MANUAL: 10 % (ref 0–5)
PHOSPHATE SERPL-MCNC: 5.2 MG/DL (ref 2.5–4.5)
PLAT MORPH BLD: NORMAL
PLATELET # BLD AUTO: 255 10*3/MM3 (ref 140–450)
PMV BLD AUTO: 8.4 FL (ref 6–12)
POTASSIUM SERPL-SCNC: 2.8 MMOL/L (ref 3.5–5.2)
POTASSIUM SERPL-SCNC: 3.1 MMOL/L (ref 3.5–5.2)
PROT SERPL-MCNC: 5.7 G/DL (ref 6–8.5)
PROTHROMBIN TIME: 13 SECONDS (ref 9.6–11.7)
RBC # BLD AUTO: 2.57 10*6/MM3 (ref 3.77–5.28)
RBC MORPH BLD: NORMAL
RHINOVIRUS RNA SPEC NAA+PROBE: DETECTED
RSV RNA NPH QL NAA+NON-PROBE: NOT DETECTED
SCAN SLIDE: NORMAL
SODIUM SERPL-SCNC: 151 MMOL/L (ref 136–145)
WBC # BLD AUTO: 6.5 10*3/MM3 (ref 3.4–10.8)
WBC MORPH BLD: NORMAL

## 2020-10-13 PROCEDURE — 94799 UNLISTED PULMONARY SVC/PX: CPT

## 2020-10-13 PROCEDURE — 0DJ08ZZ INSPECTION OF UPPER INTESTINAL TRACT, VIA NATURAL OR ARTIFICIAL OPENING ENDOSCOPIC: ICD-10-PCS | Performed by: INTERNAL MEDICINE

## 2020-10-13 PROCEDURE — 87496 CYTOMEG DNA AMP PROBE: CPT | Performed by: INTERNAL MEDICINE

## 2020-10-13 PROCEDURE — 0BH17EZ INSERTION OF ENDOTRACHEAL AIRWAY INTO TRACHEA, VIA NATURAL OR ARTIFICIAL OPENING: ICD-10-PCS | Performed by: INTERNAL MEDICINE

## 2020-10-13 PROCEDURE — 43235 EGD DIAGNOSTIC BRUSH WASH: CPT | Performed by: INTERNAL MEDICINE

## 2020-10-13 PROCEDURE — 87798 DETECT AGENT NOS DNA AMP: CPT | Performed by: INTERNAL MEDICINE

## 2020-10-13 PROCEDURE — 87070 CULTURE OTHR SPECIMN AEROBIC: CPT | Performed by: INTERNAL MEDICINE

## 2020-10-13 PROCEDURE — 63710000001 INSULIN LISPRO (HUMAN) PER 5 UNITS: Performed by: INTERNAL MEDICINE

## 2020-10-13 PROCEDURE — 85007 BL SMEAR W/DIFF WBC COUNT: CPT | Performed by: INTERNAL MEDICINE

## 2020-10-13 PROCEDURE — 94002 VENT MGMT INPAT INIT DAY: CPT

## 2020-10-13 PROCEDURE — 99232 SBSQ HOSP IP/OBS MODERATE 35: CPT | Performed by: INTERNAL MEDICINE

## 2020-10-13 PROCEDURE — 0B9F8ZX DRAINAGE OF RIGHT LOWER LUNG LOBE, VIA NATURAL OR ARTIFICIAL OPENING ENDOSCOPIC, DIAGNOSTIC: ICD-10-PCS | Performed by: INTERNAL MEDICINE

## 2020-10-13 PROCEDURE — 88108 CYTOPATH CONCENTRATE TECH: CPT | Performed by: INTERNAL MEDICINE

## 2020-10-13 PROCEDURE — 85025 COMPLETE CBC W/AUTO DIFF WBC: CPT | Performed by: INTERNAL MEDICINE

## 2020-10-13 PROCEDURE — 25010000003 POTASSIUM CHLORIDE 10 MEQ/100ML SOLUTION: Performed by: INTERNAL MEDICINE

## 2020-10-13 PROCEDURE — 84100 ASSAY OF PHOSPHORUS: CPT | Performed by: INTERNAL MEDICINE

## 2020-10-13 PROCEDURE — 0100U HC BIOFIRE FILMARRAY RESP PANEL 2: CPT | Performed by: INTERNAL MEDICINE

## 2020-10-13 PROCEDURE — 85610 PROTHROMBIN TIME: CPT | Performed by: NURSE PRACTITIONER

## 2020-10-13 PROCEDURE — 94660 CPAP INITIATION&MGMT: CPT

## 2020-10-13 PROCEDURE — 74018 RADEX ABDOMEN 1 VIEW: CPT

## 2020-10-13 PROCEDURE — 82962 GLUCOSE BLOOD TEST: CPT

## 2020-10-13 PROCEDURE — 0B9M7ZX DRAINAGE OF BILATERAL LUNGS, VIA NATURAL OR ARTIFICIAL OPENING, DIAGNOSTIC: ICD-10-PCS | Performed by: INTERNAL MEDICINE

## 2020-10-13 PROCEDURE — 99232 SBSQ HOSP IP/OBS MODERATE 35: CPT | Mod: 25 | Performed by: NURSE PRACTITIONER

## 2020-10-13 PROCEDURE — 25010000002 PROPOFOL 500 MG/50ML EMULSION: Performed by: NURSE ANESTHETIST, CERTIFIED REGISTERED

## 2020-10-13 PROCEDURE — 25010000003 MAGNESIUM SULFATE 4 GM/100ML SOLUTION: Performed by: INTERNAL MEDICINE

## 2020-10-13 PROCEDURE — 25010000002 HYDROCORTISONE SODIUM SUCCINATE 100 MG RECONSTITUTED SOLUTION: Performed by: INTERNAL MEDICINE

## 2020-10-13 PROCEDURE — 25010000002 PROPOFOL 10 MG/ML EMULSION: Performed by: INTERNAL MEDICINE

## 2020-10-13 PROCEDURE — 25010000003 AMPICILLIN-SULBACTAM PER 1.5 G: Performed by: INTERNAL MEDICINE

## 2020-10-13 PROCEDURE — 5A1955Z RESPIRATORY VENTILATION, GREATER THAN 96 CONSECUTIVE HOURS: ICD-10-PCS | Performed by: INTERNAL MEDICINE

## 2020-10-13 PROCEDURE — 87205 SMEAR GRAM STAIN: CPT | Performed by: INTERNAL MEDICINE

## 2020-10-13 PROCEDURE — 83735 ASSAY OF MAGNESIUM: CPT | Performed by: INTERNAL MEDICINE

## 2020-10-13 PROCEDURE — 25010000002 PROPOFOL 10 MG/ML EMULSION: Performed by: NURSE ANESTHETIST, CERTIFIED REGISTERED

## 2020-10-13 PROCEDURE — 80053 COMPREHEN METABOLIC PANEL: CPT | Performed by: NURSE PRACTITIONER

## 2020-10-13 PROCEDURE — 84132 ASSAY OF SERUM POTASSIUM: CPT | Performed by: INTERNAL MEDICINE

## 2020-10-13 PROCEDURE — 87116 MYCOBACTERIA CULTURE: CPT | Performed by: INTERNAL MEDICINE

## 2020-10-13 PROCEDURE — 87206 SMEAR FLUORESCENT/ACID STAI: CPT | Performed by: INTERNAL MEDICINE

## 2020-10-13 PROCEDURE — 87102 FUNGUS ISOLATION CULTURE: CPT | Performed by: INTERNAL MEDICINE

## 2020-10-13 RX ORDER — LIDOCAINE HYDROCHLORIDE 20 MG/ML
JELLY TOPICAL
Status: DISPENSED
Start: 2020-10-13 | End: 2020-10-13

## 2020-10-13 RX ORDER — SODIUM CHLORIDE 9 MG/ML
INJECTION, SOLUTION INTRAVENOUS CONTINUOUS PRN
Status: DISCONTINUED | OUTPATIENT
Start: 2020-10-13 | End: 2020-10-13 | Stop reason: SURG

## 2020-10-13 RX ORDER — PROPOFOL 10 MG/ML
INJECTION, EMULSION INTRAVENOUS AS NEEDED
Status: DISCONTINUED | OUTPATIENT
Start: 2020-10-13 | End: 2020-10-13 | Stop reason: SURG

## 2020-10-13 RX ORDER — LIDOCAINE HYDROCHLORIDE 10 MG/ML
INJECTION, SOLUTION EPIDURAL; INFILTRATION; INTRACAUDAL; PERINEURAL
Status: DISCONTINUED
Start: 2020-10-13 | End: 2020-10-13 | Stop reason: WASHOUT

## 2020-10-13 RX ORDER — NOREPINEPHRINE BIT/0.9 % NACL 8 MG/250ML
.02-.3 INFUSION BOTTLE (ML) INTRAVENOUS
Status: DISCONTINUED | OUTPATIENT
Start: 2020-10-13 | End: 2020-10-24

## 2020-10-13 RX ORDER — LIDOCAINE HYDROCHLORIDE 10 MG/ML
INJECTION, SOLUTION EPIDURAL; INFILTRATION; INTRACAUDAL; PERINEURAL AS NEEDED
Status: DISCONTINUED | OUTPATIENT
Start: 2020-10-13 | End: 2020-10-13 | Stop reason: SURG

## 2020-10-13 RX ORDER — SODIUM CHLORIDE 0.9 % (FLUSH) 0.9 %
10 SYRINGE (ML) INJECTION AS NEEDED
Status: DISCONTINUED | OUTPATIENT
Start: 2020-10-13 | End: 2020-10-20

## 2020-10-13 RX ORDER — LIDOCAINE 50 MG/G
OINTMENT TOPICAL
Status: DISCONTINUED
Start: 2020-10-13 | End: 2020-10-13 | Stop reason: WASHOUT

## 2020-10-13 RX ORDER — EPINEPHRINE 0.1 MG/ML
SYRINGE (ML) INJECTION
Status: DISCONTINUED
Start: 2020-10-13 | End: 2020-10-13 | Stop reason: WASHOUT

## 2020-10-13 RX ORDER — BISACODYL 10 MG
10 SUPPOSITORY, RECTAL RECTAL ONCE
Status: DISCONTINUED | OUTPATIENT
Start: 2020-10-13 | End: 2020-11-06 | Stop reason: HOSPADM

## 2020-10-13 RX ORDER — LIDOCAINE HYDROCHLORIDE 20 MG/ML
INJECTION, SOLUTION INFILTRATION; PERINEURAL AS NEEDED
Status: DISCONTINUED | OUTPATIENT
Start: 2020-10-13 | End: 2020-11-06 | Stop reason: HOSPADM

## 2020-10-13 RX ORDER — PROPOFOL 10 MG/ML
VIAL (ML) INTRAVENOUS AS NEEDED
Status: DISCONTINUED | OUTPATIENT
Start: 2020-10-13 | End: 2020-10-13 | Stop reason: SURG

## 2020-10-13 RX ORDER — LIDOCAINE HYDROCHLORIDE 20 MG/ML
INJECTION, SOLUTION INTRAVENOUS
Status: DISPENSED
Start: 2020-10-13 | End: 2020-10-13

## 2020-10-13 RX ORDER — LIDOCAINE 50 MG/G
OINTMENT TOPICAL AS NEEDED
Status: DISCONTINUED | OUTPATIENT
Start: 2020-10-13 | End: 2020-11-06 | Stop reason: HOSPADM

## 2020-10-13 RX ADMIN — SODIUM CHLORIDE: 0.9 INJECTION, SOLUTION INTRAVENOUS at 08:47

## 2020-10-13 RX ADMIN — AMPICILLIN SODIUM AND SULBACTAM SODIUM 1.5 G: 1; .5 INJECTION, POWDER, FOR SOLUTION INTRAMUSCULAR; INTRAVENOUS at 15:53

## 2020-10-13 RX ADMIN — INSULIN LISPRO 8 UNITS: 100 INJECTION, SOLUTION INTRAVENOUS; SUBCUTANEOUS at 20:26

## 2020-10-13 RX ADMIN — Medication 10 ML: at 20:27

## 2020-10-13 RX ADMIN — POTASSIUM CHLORIDE 10 MEQ: 7.46 INJECTION, SOLUTION INTRAVENOUS at 08:15

## 2020-10-13 RX ADMIN — INSULIN LISPRO 8 UNITS: 100 INJECTION, SOLUTION INTRAVENOUS; SUBCUTANEOUS at 08:15

## 2020-10-13 RX ADMIN — POTASSIUM CHLORIDE 10 MEQ: 7.46 INJECTION, SOLUTION INTRAVENOUS at 21:41

## 2020-10-13 RX ADMIN — DEXTROSE MONOHYDRATE 50 ML/HR: 5 INJECTION, SOLUTION INTRAVENOUS at 12:31

## 2020-10-13 RX ADMIN — PROPOFOL 30 MCG/KG/MIN: 10 INJECTION, EMULSION INTRAVENOUS at 21:57

## 2020-10-13 RX ADMIN — INSULIN LISPRO 8 UNITS: 100 INJECTION, SOLUTION INTRAVENOUS; SUBCUTANEOUS at 17:58

## 2020-10-13 RX ADMIN — POTASSIUM CHLORIDE 10 MEQ: 7.46 INJECTION, SOLUTION INTRAVENOUS at 22:43

## 2020-10-13 RX ADMIN — PANTOPRAZOLE SODIUM 40 MG: 40 INJECTION, POWDER, FOR SOLUTION INTRAVENOUS at 06:32

## 2020-10-13 RX ADMIN — PROPOFOL 150 MG: 10 INJECTION, EMULSION INTRAVENOUS at 11:23

## 2020-10-13 RX ADMIN — HYDROCORTISONE SODIUM SUCCINATE 50 MG: 100 INJECTION, POWDER, FOR SOLUTION INTRAMUSCULAR; INTRAVENOUS at 08:15

## 2020-10-13 RX ADMIN — HYDROCORTISONE SODIUM SUCCINATE 50 MG: 100 INJECTION, POWDER, FOR SOLUTION INTRAMUSCULAR; INTRAVENOUS at 20:26

## 2020-10-13 RX ADMIN — PROPOFOL 30 MG: 10 INJECTION, EMULSION INTRAVENOUS at 08:56

## 2020-10-13 RX ADMIN — PROPOFOL 30 MCG/KG/MIN: 10 INJECTION, EMULSION INTRAVENOUS at 14:25

## 2020-10-13 RX ADMIN — SODIUM CHLORIDE 15 MG/HR: 900 INJECTION, SOLUTION INTRAVENOUS at 02:35

## 2020-10-13 RX ADMIN — SODIUM CHLORIDE 15 MG/HR: 900 INJECTION, SOLUTION INTRAVENOUS at 08:15

## 2020-10-13 RX ADMIN — PROPOFOL 5 MCG/KG/MIN: 10 INJECTION, EMULSION INTRAVENOUS at 09:18

## 2020-10-13 RX ADMIN — INSULIN LISPRO 12 UNITS: 100 INJECTION, SOLUTION INTRAVENOUS; SUBCUTANEOUS at 12:28

## 2020-10-13 RX ADMIN — POTASSIUM CHLORIDE 10 MEQ: 7.46 INJECTION, SOLUTION INTRAVENOUS at 06:58

## 2020-10-13 RX ADMIN — POTASSIUM CHLORIDE 10 MEQ: 7.46 INJECTION, SOLUTION INTRAVENOUS at 11:00

## 2020-10-13 RX ADMIN — LIDOCAINE HYDROCHLORIDE 40 MG: 10 INJECTION, SOLUTION EPIDURAL; INFILTRATION; INTRACAUDAL; PERINEURAL at 08:50

## 2020-10-13 RX ADMIN — PROPOFOL 30 MG: 10 INJECTION, EMULSION INTRAVENOUS at 08:52

## 2020-10-13 RX ADMIN — AMPICILLIN SODIUM AND SULBACTAM SODIUM 1.5 G: 1; .5 INJECTION, POWDER, FOR SOLUTION INTRAMUSCULAR; INTRAVENOUS at 04:16

## 2020-10-13 RX ADMIN — Medication 10 ML: at 08:22

## 2020-10-13 RX ADMIN — PROPOFOL 150 MG: 10 INJECTION, EMULSION INTRAVENOUS at 11:24

## 2020-10-13 RX ADMIN — PROPOFOL 50 MG: 10 INJECTION, EMULSION INTRAVENOUS at 10:28

## 2020-10-13 RX ADMIN — MAGNESIUM SULFATE HEPTAHYDRATE 4 G: 40 INJECTION, SOLUTION INTRAVENOUS at 21:46

## 2020-10-13 RX ADMIN — Medication 0.1 MCG/KG/MIN: at 09:42

## 2020-10-13 RX ADMIN — PANTOPRAZOLE SODIUM 40 MG: 40 INJECTION, POWDER, FOR SOLUTION INTRAVENOUS at 17:58

## 2020-10-13 RX ADMIN — POTASSIUM CHLORIDE 10 MEQ: 7.46 INJECTION, SOLUTION INTRAVENOUS at 09:18

## 2020-10-13 RX ADMIN — PROPOFOL 30 MG: 10 INJECTION, EMULSION INTRAVENOUS at 08:58

## 2020-10-13 RX ADMIN — PROPOFOL 30 MG: 10 INJECTION, EMULSION INTRAVENOUS at 08:54

## 2020-10-13 RX ADMIN — POTASSIUM CHLORIDE 10 MEQ: 7.46 INJECTION, SOLUTION INTRAVENOUS at 12:28

## 2020-10-13 RX ADMIN — PROPOFOL 100 MG: 10 INJECTION, EMULSION INTRAVENOUS at 08:50

## 2020-10-13 RX ADMIN — PROPOFOL 30 MG: 10 INJECTION, EMULSION INTRAVENOUS at 09:00

## 2020-10-13 RX ADMIN — POTASSIUM CHLORIDE 10 MEQ: 7.46 INJECTION, SOLUTION INTRAVENOUS at 13:49

## 2020-10-13 RX ADMIN — SODIUM CHLORIDE: 0.9 INJECTION, SOLUTION INTRAVENOUS at 11:04

## 2020-10-13 NOTE — ANESTHESIA POSTPROCEDURE EVALUATION
Patient: Lesa Julio    Procedure Summary     Date: 10/13/20 Room / Location: [facility] ENDO VIRTUAL RM / [facility] ENDOSCOPY    Anesthesia Start: 0847 Anesthesia Stop: 0908    Procedure: BRONCHOSCOPY AT BEDSIDE with right lung washing and elective intubation (Bilateral Bronchus) Diagnosis:       Pneumonia of right upper lobe due to infectious organism      (Pneumonia of right upper lobe due to infectious organism [J18.9])    Surgeon: Cristhian Hodge MD Provider: Jeanmarie Christian MD    Anesthesia Type: MAC ASA Status: 3          Anesthesia Type: MAC    Vitals  Vitals Value Taken Time   BP 74/20 10/13/20 0939   Temp     Pulse 70 10/13/20 0942   Resp     SpO2 95 % 10/13/20 0942   Vitals shown include unvalidated device data.        Post Anesthesia Care and Evaluation    Patient location during evaluation: ICU  Patient participation: complete - patient cannot participate  Level of consciousness: obtunded/minimal responses  Pain scale: See nurse's notes for pain score.  Pain management: adequate  Airway patency: patent  Anesthetic complications: No anesthetic complications  PONV Status: none  Cardiovascular status: acceptable  Respiratory status: acceptable  Hydration status: acceptable    Comments: Patient seen and examined postoperatively; vital signs stable; SpO2 greater than or equal to 90%; cardiopulmonary status stable; nausea/vomiting adequately controlled; pain adequately controlled; no apparent anesthesia complications; patient discharged from anesthesia care when discharge criteria were met

## 2020-10-13 NOTE — ANESTHESIA POSTPROCEDURE EVALUATION
Patient: Lesa Julio    Procedure Summary     Date: 10/13/20 Room / Location: [facility] ENDO VIRTUAL  / [facility] ENDOSCOPY    Anesthesia Start:  Anesthesia Stop:     Procedure: ESOPHAGOGASTRODUODENOSCOPY AT BEDSIDE (N/A ) Diagnosis:       Anemia, unspecified type      (Anemia, unspecified type [D64.9])    Surgeon: Omi Rivera MD Provider:     Anesthesia Type: MAC ASA Status: 4          Anesthesia Type: MAC    Vitals  Vitals Value Taken Time   /44 10/13/20 1101   Temp     Pulse 84 10/13/20 1102   Resp     SpO2 95 % 10/13/20 1102   Vitals shown include unvalidated device data.        Post Anesthesia Care and Evaluation    Patient location during evaluation: ICU  Patient participation: complete - patient cannot participate  Level of consciousness: obtunded/minimal responses  Pain scale: See nurse's notes for pain score.  Pain management: adequate  Airway patency: patent  Anesthetic complications: No anesthetic complications  PONV Status: none  Cardiovascular status: acceptable  Respiratory status: acceptable  Hydration status: acceptable    Comments: Patient seen and examined postoperatively; vital signs stable; SpO2 greater than or equal to 90%; cardiopulmonary status stable; nausea/vomiting adequately controlled; pain adequately controlled; no apparent anesthesia complications; patient discharged from anesthesia care when discharge criteria were met

## 2020-10-13 NOTE — ANESTHESIA PREPROCEDURE EVALUATION
Anesthesia Evaluation     Patient summary reviewed   NPO Solid Status: > 8 hours  NPO Liquid Status: > 8 hours           Airway   Mallampati: II  TM distance: >3 FB  Neck ROM: full  No difficulty expected  Dental - normal exam     Pulmonary - normal exam   (+) pneumonia , COPD, shortness of breath, sleep apnea,   Cardiovascular - normal exam    ECG reviewed    (+) hypertension, valvular problems/murmurs, dysrhythmias Atrial Fib, hyperlipidemia,       Neuro/Psych  GI/Hepatic/Renal/Endo    (+) obesity,   liver disease, renal disease, diabetes mellitus,     Musculoskeletal     Abdominal  - normal exam    Bowel sounds: normal.   Substance History      OB/GYN          Other   arthritis,          Phys Exam Other: Earlier intubated for CoPD, ARF                Anesthesia Plan    ASA 4     MAC     intravenous induction     Anesthetic plan, all risks, benefits, and alternatives have been provided, discussed and informed consent has been obtained with: patient.

## 2020-10-13 NOTE — ANESTHESIA POSTPROCEDURE EVALUATION
Patient: Lesa Julio    Procedure Summary     Date: 10/13/20 Room / Location: [facility] ENDO VIRTUAL  / [facility] ENDOSCOPY    Anesthesia Start: 1107 Anesthesia Stop: 1129    Procedure: ESOPHAGOGASTRODUODENOSCOPY AT BEDSIDE (N/A ) Diagnosis:       Anemia, unspecified type      (Anemia, unspecified type [D64.9])    Surgeon: Omi Rivera MD Provider: Jeanmarie Christian MD    Anesthesia Type: MAC ASA Status: 4          Anesthesia Type: MAC    Vitals  Vitals Value Taken Time   /34 10/13/20 1240   Temp     Pulse 58 10/13/20 1242   Resp 22 10/13/20 1129   SpO2 96 % 10/13/20 1242   Vitals shown include unvalidated device data.        Post Anesthesia Care and Evaluation    Patient location during evaluation: ICU  Patient participation: complete - patient cannot participate  Level of consciousness: obtunded/minimal responses  Pain scale: See nurse's notes for pain score.  Pain management: adequate  Airway patency: patent  Anesthetic complications: No anesthetic complications  PONV Status: none  Cardiovascular status: acceptable  Respiratory status: acceptable  Hydration status: acceptable    Comments: Patient seen and examined postoperatively; vital signs stable; SpO2 greater than or equal to 90%; cardiopulmonary status stable; nausea/vomiting adequately controlled; pain adequately controlled; no apparent anesthesia complications; patient discharged from anesthesia care when discharge criteria were met

## 2020-10-13 NOTE — ANESTHESIA PREPROCEDURE EVALUATION
Anesthesia Evaluation     Patient summary reviewed   NPO Solid Status: > 8 hours  NPO Liquid Status: > 8 hours           Airway   Mallampati: II  TM distance: >3 FB  Neck ROM: full  No difficulty expected  Dental - normal exam     Pulmonary - normal exam   (+) pneumonia , COPD, shortness of breath, sleep apnea,   Cardiovascular - normal exam  Exercise tolerance: poor (<4 METS)    ECG reviewed    (+) hypertension, valvular problems/murmurs, dysrhythmias Atrial Fib, hyperlipidemia,       Neuro/Psych  GI/Hepatic/Renal/Endo    (+) obesity,   liver disease, renal disease, diabetes mellitus,     Musculoskeletal     Abdominal  - normal exam    Bowel sounds: normal.   Substance History      OB/GYN          Other   arthritis,      ROS/Med Hx Other: Cardiac murmur    Chronic renal insufficiency, stage III (moderate)    Diabetes mellitus, type 2 (CMS/HCC)    Essential hypertension    Shortness of breath    Hearing loss    Pneumonia of right upper lobe due to infectious organism    Paroxysmal atrial fibrillation (CMS/HCC)                Anesthesia Plan    ASA 3     MAC     intravenous induction     Anesthetic plan, all risks, benefits, and alternatives have been provided, discussed and informed consent has been obtained with: patient.

## 2020-10-14 ENCOUNTER — INPATIENT HOSPITAL (OUTPATIENT)
Dept: URBAN - METROPOLITAN AREA HOSPITAL 84 | Facility: HOSPITAL | Age: 70
End: 2020-10-14

## 2020-10-14 ENCOUNTER — APPOINTMENT (OUTPATIENT)
Dept: GENERAL RADIOLOGY | Facility: HOSPITAL | Age: 70
End: 2020-10-14

## 2020-10-14 DIAGNOSIS — R14.0 ABDOMINAL DISTENSION (GASEOUS): ICD-10-CM

## 2020-10-14 DIAGNOSIS — R19.5 OTHER FECAL ABNORMALITIES: ICD-10-CM

## 2020-10-14 DIAGNOSIS — D64.89 OTHER SPECIFIED ANEMIAS: ICD-10-CM

## 2020-10-14 DIAGNOSIS — N17.9 ACUTE KIDNEY FAILURE, UNSPECIFIED: ICD-10-CM

## 2020-10-14 DIAGNOSIS — K59.00 CONSTIPATION, UNSPECIFIED: ICD-10-CM

## 2020-10-14 DIAGNOSIS — E11.9 TYPE 2 DIABETES MELLITUS WITHOUT COMPLICATIONS: ICD-10-CM

## 2020-10-14 LAB
ABO GROUP BLD: NORMAL
ANION GAP SERPL CALCULATED.3IONS-SCNC: 14 MMOL/L (ref 5–15)
ANION GAP SERPL CALCULATED.3IONS-SCNC: 14 MMOL/L (ref 5–15)
ARTERIAL PATENCY WRIST A: POSITIVE
ATMOSPHERIC PRESS: ABNORMAL MM[HG]
BASE EXCESS BLDA CALC-SCNC: 8.6 MMOL/L (ref 0–3)
BDY SITE: ABNORMAL
BLD GP AB SCN SERPL QL: NEGATIVE
BUN SERPL-MCNC: 55 MG/DL (ref 8–23)
BUN SERPL-MCNC: 56 MG/DL (ref 8–23)
BUN SERPL-MCNC: ABNORMAL MG/DL
BUN SERPL-MCNC: ABNORMAL MG/DL
BUN/CREAT SERPL: ABNORMAL
BUN/CREAT SERPL: ABNORMAL
CALCIUM SPEC-SCNC: 9.4 MG/DL (ref 8.6–10.5)
CALCIUM SPEC-SCNC: 9.4 MG/DL (ref 8.6–10.5)
CHLORIDE SERPL-SCNC: 100 MMOL/L (ref 98–107)
CHLORIDE SERPL-SCNC: 101 MMOL/L (ref 98–107)
CO2 BLDA-SCNC: 33.5 MMOL/L (ref 22–29)
CO2 SERPL-SCNC: 30 MMOL/L (ref 22–29)
CO2 SERPL-SCNC: 30 MMOL/L (ref 22–29)
CREAT SERPL-MCNC: 3.36 MG/DL (ref 0.57–1)
CREAT SERPL-MCNC: 3.7 MG/DL (ref 0.57–1)
DEPRECATED RDW RBC AUTO: 65.2 FL (ref 37–54)
EOSINOPHIL # BLD MANUAL: 0.06 10*3/MM3 (ref 0–0.4)
EOSINOPHIL NFR BLD MANUAL: 1 % (ref 0.3–6.2)
ERYTHROCYTE [DISTWIDTH] IN BLOOD BY AUTOMATED COUNT: 20.2 % (ref 12.3–15.4)
GFR SERPL CREATININE-BSD FRML MDRD: 12 ML/MIN/1.73
GFR SERPL CREATININE-BSD FRML MDRD: 13 ML/MIN/1.73
GFR SERPL CREATININE-BSD FRML MDRD: ABNORMAL ML/MIN/{1.73_M2}
GFR SERPL CREATININE-BSD FRML MDRD: ABNORMAL ML/MIN/{1.73_M2}
GLUCOSE BLDC GLUCOMTR-MCNC: 212 MG/DL (ref 70–105)
GLUCOSE BLDC GLUCOMTR-MCNC: 236 MG/DL (ref 70–105)
GLUCOSE BLDC GLUCOMTR-MCNC: 242 MG/DL (ref 70–105)
GLUCOSE BLDC GLUCOMTR-MCNC: 257 MG/DL (ref 70–105)
GLUCOSE SERPL-MCNC: 253 MG/DL (ref 65–99)
GLUCOSE SERPL-MCNC: 271 MG/DL (ref 65–99)
HCO3 BLDA-SCNC: 32.3 MMOL/L (ref 21–28)
HCT VFR BLD AUTO: 20.2 % (ref 34–46.6)
HEMODILUTION: NO
HGB BLD-MCNC: 6.8 G/DL (ref 12–15.9)
INHALED O2 CONCENTRATION: 60 %
LAB AP CASE REPORT: NORMAL
LARGE PLATELETS: ABNORMAL
LYMPHOCYTES # BLD MANUAL: 0.66 10*3/MM3 (ref 0.7–3.1)
LYMPHOCYTES NFR BLD MANUAL: 12 % (ref 19.6–45.3)
LYMPHOCYTES NFR BLD MANUAL: 2 % (ref 5–12)
MAGNESIUM SERPL-MCNC: 2.5 MG/DL (ref 1.6–2.4)
MAGNESIUM SERPL-MCNC: 2.6 MG/DL (ref 1.6–2.4)
MCH RBC QN AUTO: 31.1 PG (ref 26.6–33)
MCHC RBC AUTO-ENTMCNC: 33.5 G/DL (ref 31.5–35.7)
MCV RBC AUTO: 92.9 FL (ref 79–97)
MODALITY: ABNORMAL
MONOCYTES # BLD AUTO: 0.11 10*3/MM3 (ref 0.1–0.9)
NEUTROPHILS # BLD AUTO: 4.68 10*3/MM3 (ref 1.7–7)
NEUTROPHILS NFR BLD MANUAL: 76 % (ref 42.7–76)
NEUTS BAND NFR BLD MANUAL: 9 % (ref 0–5)
PATH REPORT.FINAL DX SPEC: NORMAL
PATH REPORT.GROSS SPEC: NORMAL
PCO2 BLDA: 40.2 MM HG (ref 35–48)
PEEP RESPIRATORY: 5 CM[H2O]
PH BLDA: 7.51 PH UNITS (ref 7.35–7.45)
PHOSPHATE SERPL-MCNC: 3 MG/DL (ref 2.5–4.5)
PHOSPHATE SERPL-MCNC: 3.1 MG/DL (ref 2.5–4.5)
PLATELET # BLD AUTO: 247 10*3/MM3 (ref 140–450)
PMV BLD AUTO: 8.9 FL (ref 6–12)
PO2 BLDA: 71.2 MM HG (ref 83–108)
POTASSIUM SERPL-SCNC: 3.3 MMOL/L (ref 3.5–5.2)
POTASSIUM SERPL-SCNC: 3.3 MMOL/L (ref 3.5–5.2)
POTASSIUM SERPL-SCNC: 3.5 MMOL/L (ref 3.5–5.2)
POTASSIUM SERPL-SCNC: 3.5 MMOL/L (ref 3.5–5.2)
RBC # BLD AUTO: 2.17 10*6/MM3 (ref 3.77–5.28)
RBC MORPH BLD: NORMAL
RESPIRATORY RATE: 22
RH BLD: POSITIVE
SAO2 % BLDCOA: 95.6 % (ref 94–98)
SCAN SLIDE: NORMAL
SODIUM SERPL-SCNC: 144 MMOL/L (ref 136–145)
SODIUM SERPL-SCNC: 145 MMOL/L (ref 136–145)
T&S EXPIRATION DATE: NORMAL
TRIGL SERPL-MCNC: 562 MG/DL (ref 0–150)
VENTILATOR MODE: ABNORMAL
VT ON VENT VENT: 550 ML
WBC # BLD AUTO: 5.5 10*3/MM3 (ref 3.4–10.8)
WBC MORPH BLD: NORMAL

## 2020-10-14 PROCEDURE — 85025 COMPLETE CBC W/AUTO DIFF WBC: CPT | Performed by: INTERNAL MEDICINE

## 2020-10-14 PROCEDURE — 86901 BLOOD TYPING SEROLOGIC RH(D): CPT | Performed by: INTERNAL MEDICINE

## 2020-10-14 PROCEDURE — 84478 ASSAY OF TRIGLYCERIDES: CPT | Performed by: INTERNAL MEDICINE

## 2020-10-14 PROCEDURE — 84100 ASSAY OF PHOSPHORUS: CPT | Performed by: INTERNAL MEDICINE

## 2020-10-14 PROCEDURE — 84132 ASSAY OF SERUM POTASSIUM: CPT | Performed by: INTERNAL MEDICINE

## 2020-10-14 PROCEDURE — 25010000003 AMPICILLIN-SULBACTAM PER 1.5 G: Performed by: INTERNAL MEDICINE

## 2020-10-14 PROCEDURE — 86901 BLOOD TYPING SEROLOGIC RH(D): CPT

## 2020-10-14 PROCEDURE — 86900 BLOOD TYPING SEROLOGIC ABO: CPT | Performed by: INTERNAL MEDICINE

## 2020-10-14 PROCEDURE — 86923 COMPATIBILITY TEST ELECTRIC: CPT

## 2020-10-14 PROCEDURE — 25010000002 MORPHINE PER 10 MG: Performed by: INTERNAL MEDICINE

## 2020-10-14 PROCEDURE — 25010000002 CHLOROTHIAZIDE PER 500 MG: Performed by: INTERNAL MEDICINE

## 2020-10-14 PROCEDURE — 85007 BL SMEAR W/DIFF WBC COUNT: CPT | Performed by: INTERNAL MEDICINE

## 2020-10-14 PROCEDURE — 80048 BASIC METABOLIC PNL TOTAL CA: CPT | Performed by: INTERNAL MEDICINE

## 2020-10-14 PROCEDURE — 82962 GLUCOSE BLOOD TEST: CPT

## 2020-10-14 PROCEDURE — 36430 TRANSFUSION BLD/BLD COMPNT: CPT

## 2020-10-14 PROCEDURE — 63710000001 INSULIN LISPRO (HUMAN) PER 5 UNITS: Performed by: INTERNAL MEDICINE

## 2020-10-14 PROCEDURE — 83735 ASSAY OF MAGNESIUM: CPT | Performed by: INTERNAL MEDICINE

## 2020-10-14 PROCEDURE — 36600 WITHDRAWAL OF ARTERIAL BLOOD: CPT

## 2020-10-14 PROCEDURE — 86900 BLOOD TYPING SEROLOGIC ABO: CPT

## 2020-10-14 PROCEDURE — 99233 SBSQ HOSP IP/OBS HIGH 50: CPT | Performed by: NURSE PRACTITIONER

## 2020-10-14 PROCEDURE — 25010000002 HYDROCORTISONE SODIUM SUCCINATE 100 MG RECONSTITUTED SOLUTION: Performed by: INTERNAL MEDICINE

## 2020-10-14 PROCEDURE — 71045 X-RAY EXAM CHEST 1 VIEW: CPT

## 2020-10-14 PROCEDURE — 82803 BLOOD GASES ANY COMBINATION: CPT

## 2020-10-14 PROCEDURE — 25010000002 PROPOFOL 10 MG/ML EMULSION: Performed by: INTERNAL MEDICINE

## 2020-10-14 PROCEDURE — 94799 UNLISTED PULMONARY SVC/PX: CPT

## 2020-10-14 PROCEDURE — 94003 VENT MGMT INPAT SUBQ DAY: CPT

## 2020-10-14 PROCEDURE — P9016 RBC LEUKOCYTES REDUCED: HCPCS

## 2020-10-14 PROCEDURE — 25010000003 POTASSIUM CHLORIDE 10 MEQ/100ML SOLUTION: Performed by: INTERNAL MEDICINE

## 2020-10-14 PROCEDURE — 99232 SBSQ HOSP IP/OBS MODERATE 35: CPT | Performed by: INTERNAL MEDICINE

## 2020-10-14 PROCEDURE — 86850 RBC ANTIBODY SCREEN: CPT | Performed by: INTERNAL MEDICINE

## 2020-10-14 RX ORDER — LORAZEPAM 2 MG/ML
1 INJECTION INTRAMUSCULAR EVERY 4 HOURS PRN
Status: DISCONTINUED | OUTPATIENT
Start: 2020-10-14 | End: 2020-10-20

## 2020-10-14 RX ORDER — INSULIN LISPRO 100 [IU]/ML
0-24 INJECTION, SOLUTION INTRAVENOUS; SUBCUTANEOUS EVERY 6 HOURS SCHEDULED
Status: DISCONTINUED | OUTPATIENT
Start: 2020-10-15 | End: 2020-10-17

## 2020-10-14 RX ORDER — INSULIN LISPRO 100 [IU]/ML
0-24 INJECTION, SOLUTION INTRAVENOUS; SUBCUTANEOUS AS NEEDED
Status: DISCONTINUED | OUTPATIENT
Start: 2020-10-14 | End: 2020-10-17

## 2020-10-14 RX ORDER — MORPHINE SULFATE 4 MG/ML
2 INJECTION, SOLUTION INTRAMUSCULAR; INTRAVENOUS EVERY 4 HOURS PRN
Status: DISPENSED | OUTPATIENT
Start: 2020-10-14 | End: 2020-10-21

## 2020-10-14 RX ADMIN — INSULIN LISPRO 12 UNITS: 100 INJECTION, SOLUTION INTRAVENOUS; SUBCUTANEOUS at 18:15

## 2020-10-14 RX ADMIN — MORPHINE SULFATE 2 MG: 4 INJECTION INTRAVENOUS at 09:15

## 2020-10-14 RX ADMIN — INSULIN LISPRO 8 UNITS: 100 INJECTION, SOLUTION INTRAVENOUS; SUBCUTANEOUS at 09:19

## 2020-10-14 RX ADMIN — PANTOPRAZOLE SODIUM 40 MG: 40 INJECTION, POWDER, FOR SOLUTION INTRAVENOUS at 06:37

## 2020-10-14 RX ADMIN — AMPICILLIN SODIUM AND SULBACTAM SODIUM 1.5 G: 1; .5 INJECTION, POWDER, FOR SOLUTION INTRAMUSCULAR; INTRAVENOUS at 04:11

## 2020-10-14 RX ADMIN — INSULIN LISPRO 8 UNITS: 100 INJECTION, SOLUTION INTRAVENOUS; SUBCUTANEOUS at 11:47

## 2020-10-14 RX ADMIN — GABAPENTIN 100 MG: 100 CAPSULE ORAL at 21:35

## 2020-10-14 RX ADMIN — Medication 10 ML: at 23:13

## 2020-10-14 RX ADMIN — POTASSIUM CHLORIDE 10 MEQ: 7.46 INJECTION, SOLUTION INTRAVENOUS at 14:07

## 2020-10-14 RX ADMIN — SODIUM CHLORIDE 250 MG: 9 INJECTION, SOLUTION INTRAVENOUS at 17:36

## 2020-10-14 RX ADMIN — POTASSIUM CHLORIDE 10 MEQ: 7.46 INJECTION, SOLUTION INTRAVENOUS at 03:09

## 2020-10-14 RX ADMIN — HYDROCODONE BITARTRATE AND ACETAMINOPHEN 1 TABLET: 5; 325 TABLET ORAL at 21:35

## 2020-10-14 RX ADMIN — HYDROCORTISONE SODIUM SUCCINATE 50 MG: 100 INJECTION, POWDER, FOR SOLUTION INTRAMUSCULAR; INTRAVENOUS at 09:20

## 2020-10-14 RX ADMIN — HYDROCORTISONE SODIUM SUCCINATE 50 MG: 100 INJECTION, POWDER, FOR SOLUTION INTRAMUSCULAR; INTRAVENOUS at 21:35

## 2020-10-14 RX ADMIN — PROPOFOL 20 MCG/KG/MIN: 10 INJECTION, EMULSION INTRAVENOUS at 04:17

## 2020-10-14 RX ADMIN — POTASSIUM CHLORIDE 10 MEQ: 7.46 INJECTION, SOLUTION INTRAVENOUS at 11:43

## 2020-10-14 RX ADMIN — PANTOPRAZOLE SODIUM 40 MG: 40 INJECTION, POWDER, FOR SOLUTION INTRAVENOUS at 18:15

## 2020-10-14 RX ADMIN — POTASSIUM CHLORIDE 10 MEQ: 7.46 INJECTION, SOLUTION INTRAVENOUS at 02:07

## 2020-10-14 RX ADMIN — Medication 10 ML: at 09:16

## 2020-10-14 RX ADMIN — AMPICILLIN SODIUM AND SULBACTAM SODIUM 1.5 G: 1; .5 INJECTION, POWDER, FOR SOLUTION INTRAMUSCULAR; INTRAVENOUS at 15:55

## 2020-10-14 RX ADMIN — POTASSIUM CHLORIDE 10 MEQ: 7.46 INJECTION, SOLUTION INTRAVENOUS at 13:08

## 2020-10-14 RX ADMIN — POTASSIUM CHLORIDE 10 MEQ: 7.46 INJECTION, SOLUTION INTRAVENOUS at 10:31

## 2020-10-14 RX ADMIN — POTASSIUM CHLORIDE 10 MEQ: 7.46 INJECTION, SOLUTION INTRAVENOUS at 01:08

## 2020-10-14 RX ADMIN — GABAPENTIN 100 MG: 100 CAPSULE ORAL at 13:38

## 2020-10-14 RX ADMIN — POTASSIUM CHLORIDE 10 MEQ: 7.46 INJECTION, SOLUTION INTRAVENOUS at 00:00

## 2020-10-15 ENCOUNTER — APPOINTMENT (OUTPATIENT)
Dept: GENERAL RADIOLOGY | Facility: HOSPITAL | Age: 70
End: 2020-10-15

## 2020-10-15 LAB
ALBUMIN SERPL-MCNC: 2.3 G/DL (ref 3.5–5.2)
ALBUMIN/GLOB SERPL: 0.8 G/DL
ALP SERPL-CCNC: 130 U/L (ref 39–117)
ALT SERPL W P-5'-P-CCNC: 8 U/L (ref 1–33)
ANION GAP SERPL CALCULATED.3IONS-SCNC: 11 MMOL/L (ref 5–15)
AST SERPL-CCNC: 17 U/L (ref 1–32)
BACTERIA SPEC RESP CULT: ABNORMAL
BACTERIA SPEC RESP CULT: ABNORMAL
BASOPHILS # BLD AUTO: 0.1 10*3/MM3 (ref 0–0.2)
BASOPHILS NFR BLD AUTO: 2.1 % (ref 0–1.5)
BH BB BLOOD EXPIRATION DATE: NORMAL
BH BB BLOOD TYPE BARCODE: 9500
BH BB DISPENSE STATUS: NORMAL
BH BB PRODUCT CODE: NORMAL
BH BB UNIT NUMBER: NORMAL
BILIRUB SERPL-MCNC: 0.3 MG/DL (ref 0–1.2)
BUN SERPL-MCNC: 56 MG/DL (ref 8–23)
BUN SERPL-MCNC: ABNORMAL MG/DL
BUN/CREAT SERPL: ABNORMAL
CALCIUM SPEC-SCNC: 9.4 MG/DL (ref 8.6–10.5)
CHLORIDE SERPL-SCNC: 101 MMOL/L (ref 98–107)
CO2 SERPL-SCNC: 31 MMOL/L (ref 22–29)
CREAT SERPL-MCNC: 3.32 MG/DL (ref 0.57–1)
CROSSMATCH INTERPRETATION: NORMAL
DEPRECATED RDW RBC AUTO: 64.3 FL (ref 37–54)
EOSINOPHIL # BLD AUTO: 0.1 10*3/MM3 (ref 0–0.4)
EOSINOPHIL NFR BLD AUTO: 0.9 % (ref 0.3–6.2)
ERYTHROCYTE [DISTWIDTH] IN BLOOD BY AUTOMATED COUNT: 19.6 % (ref 12.3–15.4)
GFR SERPL CREATININE-BSD FRML MDRD: 14 ML/MIN/1.73
GFR SERPL CREATININE-BSD FRML MDRD: ABNORMAL ML/MIN/{1.73_M2}
GLOBULIN UR ELPH-MCNC: 2.9 GM/DL
GLUCOSE BLDC GLUCOMTR-MCNC: 202 MG/DL (ref 70–105)
GLUCOSE BLDC GLUCOMTR-MCNC: 232 MG/DL (ref 70–105)
GLUCOSE BLDC GLUCOMTR-MCNC: 270 MG/DL (ref 70–105)
GLUCOSE BLDC GLUCOMTR-MCNC: 315 MG/DL (ref 70–105)
GLUCOSE SERPL-MCNC: 262 MG/DL (ref 65–99)
GRAM STN SPEC: ABNORMAL
GRAM STN SPEC: ABNORMAL
HCT VFR BLD AUTO: 25.3 % (ref 34–46.6)
HGB BLD-MCNC: 8.4 G/DL (ref 12–15.9)
LYMPHOCYTES # BLD AUTO: 0.2 10*3/MM3 (ref 0.7–3.1)
LYMPHOCYTES NFR BLD AUTO: 4.3 % (ref 19.6–45.3)
MAGNESIUM SERPL-MCNC: 2.1 MG/DL (ref 1.6–2.4)
MCH RBC QN AUTO: 30.7 PG (ref 26.6–33)
MCHC RBC AUTO-ENTMCNC: 33 G/DL (ref 31.5–35.7)
MCV RBC AUTO: 93.1 FL (ref 79–97)
MONOCYTES # BLD AUTO: 0.1 10*3/MM3 (ref 0.1–0.9)
MONOCYTES NFR BLD AUTO: 2.3 % (ref 5–12)
NEUTROPHILS NFR BLD AUTO: 4.9 10*3/MM3 (ref 1.7–7)
NEUTROPHILS NFR BLD AUTO: 90.4 % (ref 42.7–76)
NRBC BLD AUTO-RTO: 0.5 /100 WBC (ref 0–0.2)
PHOSPHATE SERPL-MCNC: 4.4 MG/DL (ref 2.5–4.5)
PLATELET # BLD AUTO: 220 10*3/MM3 (ref 140–450)
PMV BLD AUTO: 8.9 FL (ref 6–12)
POTASSIUM SERPL-SCNC: 3.7 MMOL/L (ref 3.5–5.2)
PROT SERPL-MCNC: 5.2 G/DL (ref 6–8.5)
RBC # BLD AUTO: 2.72 10*6/MM3 (ref 3.77–5.28)
SODIUM SERPL-SCNC: 143 MMOL/L (ref 136–145)
UNIT  ABO: NORMAL
UNIT  RH: NORMAL
WBC # BLD AUTO: 5.4 10*3/MM3 (ref 3.4–10.8)

## 2020-10-15 PROCEDURE — 83735 ASSAY OF MAGNESIUM: CPT | Performed by: INTERNAL MEDICINE

## 2020-10-15 PROCEDURE — 63710000001 INSULIN LISPRO (HUMAN) PER 5 UNITS: Performed by: INTERNAL MEDICINE

## 2020-10-15 PROCEDURE — 85025 COMPLETE CBC W/AUTO DIFF WBC: CPT | Performed by: NURSE PRACTITIONER

## 2020-10-15 PROCEDURE — 71045 X-RAY EXAM CHEST 1 VIEW: CPT

## 2020-10-15 PROCEDURE — 25010000002 MORPHINE PER 10 MG: Performed by: INTERNAL MEDICINE

## 2020-10-15 PROCEDURE — 94799 UNLISTED PULMONARY SVC/PX: CPT

## 2020-10-15 PROCEDURE — 25010000002 HYDROCORTISONE SODIUM SUCCINATE 100 MG RECONSTITUTED SOLUTION: Performed by: INTERNAL MEDICINE

## 2020-10-15 PROCEDURE — 25010000003 AMPICILLIN-SULBACTAM PER 1.5 G: Performed by: INTERNAL MEDICINE

## 2020-10-15 PROCEDURE — 94003 VENT MGMT INPAT SUBQ DAY: CPT

## 2020-10-15 PROCEDURE — 80053 COMPREHEN METABOLIC PANEL: CPT | Performed by: NURSE PRACTITIONER

## 2020-10-15 PROCEDURE — 82962 GLUCOSE BLOOD TEST: CPT

## 2020-10-15 PROCEDURE — 99232 SBSQ HOSP IP/OBS MODERATE 35: CPT | Performed by: NURSE PRACTITIONER

## 2020-10-15 PROCEDURE — 84100 ASSAY OF PHOSPHORUS: CPT | Performed by: INTERNAL MEDICINE

## 2020-10-15 PROCEDURE — 99231 SBSQ HOSP IP/OBS SF/LOW 25: CPT | Performed by: INTERNAL MEDICINE

## 2020-10-15 RX ORDER — FLUCONAZOLE 100 MG/1
100 TABLET ORAL EVERY 24 HOURS
Status: DISPENSED | OUTPATIENT
Start: 2020-10-15 | End: 2020-10-25

## 2020-10-15 RX ORDER — AMLODIPINE BESYLATE 5 MG/1
10 TABLET ORAL
Status: DISCONTINUED | OUTPATIENT
Start: 2020-10-15 | End: 2020-10-19

## 2020-10-15 RX ORDER — HYDRALAZINE HYDROCHLORIDE 25 MG/1
50 TABLET, FILM COATED ORAL EVERY 8 HOURS SCHEDULED
Status: DISCONTINUED | OUTPATIENT
Start: 2020-10-15 | End: 2020-10-19

## 2020-10-15 RX ADMIN — ATORVASTATIN CALCIUM 40 MG: 40 TABLET, FILM COATED ORAL at 21:30

## 2020-10-15 RX ADMIN — HYDRALAZINE HYDROCHLORIDE 50 MG: 25 TABLET, FILM COATED ORAL at 16:32

## 2020-10-15 RX ADMIN — GABAPENTIN 100 MG: 100 CAPSULE ORAL at 21:30

## 2020-10-15 RX ADMIN — MORPHINE SULFATE 2 MG: 4 INJECTION INTRAVENOUS at 19:58

## 2020-10-15 RX ADMIN — GABAPENTIN 100 MG: 100 CAPSULE ORAL at 13:10

## 2020-10-15 RX ADMIN — MORPHINE SULFATE 2 MG: 4 INJECTION INTRAVENOUS at 14:19

## 2020-10-15 RX ADMIN — INSULIN LISPRO 8 UNITS: 100 INJECTION, SOLUTION INTRAVENOUS; SUBCUTANEOUS at 13:11

## 2020-10-15 RX ADMIN — HYDROCORTISONE SODIUM SUCCINATE 50 MG: 100 INJECTION, POWDER, FOR SOLUTION INTRAMUSCULAR; INTRAVENOUS at 21:30

## 2020-10-15 RX ADMIN — HYDRALAZINE HYDROCHLORIDE 50 MG: 25 TABLET, FILM COATED ORAL at 21:30

## 2020-10-15 RX ADMIN — PANTOPRAZOLE SODIUM 40 MG: 40 INJECTION, POWDER, FOR SOLUTION INTRAVENOUS at 06:26

## 2020-10-15 RX ADMIN — HYDROCORTISONE SODIUM SUCCINATE 50 MG: 100 INJECTION, POWDER, FOR SOLUTION INTRAMUSCULAR; INTRAVENOUS at 08:59

## 2020-10-15 RX ADMIN — AMPICILLIN SODIUM AND SULBACTAM SODIUM 1.5 G: 1; .5 INJECTION, POWDER, FOR SOLUTION INTRAMUSCULAR; INTRAVENOUS at 16:32

## 2020-10-15 RX ADMIN — INSULIN LISPRO 8 UNITS: 100 INJECTION, SOLUTION INTRAVENOUS; SUBCUTANEOUS at 00:31

## 2020-10-15 RX ADMIN — AMLODIPINE BESYLATE 10 MG: 5 TABLET ORAL at 11:52

## 2020-10-15 RX ADMIN — GABAPENTIN 100 MG: 100 CAPSULE ORAL at 06:23

## 2020-10-15 RX ADMIN — INSULIN LISPRO 12 UNITS: 100 INJECTION, SOLUTION INTRAVENOUS; SUBCUTANEOUS at 06:26

## 2020-10-15 RX ADMIN — ASPIRIN 81 MG: 81 TABLET, COATED ORAL at 08:59

## 2020-10-15 RX ADMIN — PANTOPRAZOLE SODIUM 40 MG: 40 INJECTION, POWDER, FOR SOLUTION INTRAVENOUS at 17:46

## 2020-10-15 RX ADMIN — INSULIN LISPRO 16 UNITS: 100 INJECTION, SOLUTION INTRAVENOUS; SUBCUTANEOUS at 17:46

## 2020-10-15 RX ADMIN — Medication 10 ML: at 08:59

## 2020-10-15 RX ADMIN — HYDROCODONE BITARTRATE AND ACETAMINOPHEN 1 TABLET: 5; 325 TABLET ORAL at 03:25

## 2020-10-15 RX ADMIN — AMPICILLIN SODIUM AND SULBACTAM SODIUM 1.5 G: 1; .5 INJECTION, POWDER, FOR SOLUTION INTRAMUSCULAR; INTRAVENOUS at 03:27

## 2020-10-15 RX ADMIN — FLUCONAZOLE 100 MG: 100 TABLET ORAL at 16:32

## 2020-10-16 ENCOUNTER — INPATIENT HOSPITAL (OUTPATIENT)
Dept: URBAN - METROPOLITAN AREA HOSPITAL 84 | Facility: HOSPITAL | Age: 70
End: 2020-10-16

## 2020-10-16 ENCOUNTER — APPOINTMENT (OUTPATIENT)
Dept: GENERAL RADIOLOGY | Facility: HOSPITAL | Age: 70
End: 2020-10-16

## 2020-10-16 DIAGNOSIS — D64.89 OTHER SPECIFIED ANEMIAS: ICD-10-CM

## 2020-10-16 DIAGNOSIS — E11.9 TYPE 2 DIABETES MELLITUS WITHOUT COMPLICATIONS: ICD-10-CM

## 2020-10-16 DIAGNOSIS — K59.00 CONSTIPATION, UNSPECIFIED: ICD-10-CM

## 2020-10-16 DIAGNOSIS — R14.0 ABDOMINAL DISTENSION (GASEOUS): ICD-10-CM

## 2020-10-16 DIAGNOSIS — K92.0 HEMATEMESIS: ICD-10-CM

## 2020-10-16 DIAGNOSIS — N17.9 ACUTE KIDNEY FAILURE, UNSPECIFIED: ICD-10-CM

## 2020-10-16 LAB
ANION GAP SERPL CALCULATED.3IONS-SCNC: 12 MMOL/L (ref 5–15)
BUN SERPL-MCNC: 64 MG/DL (ref 8–23)
BUN SERPL-MCNC: ABNORMAL MG/DL
BUN/CREAT SERPL: ABNORMAL
CALCIUM SPEC-SCNC: 9.5 MG/DL (ref 8.6–10.5)
CHLORIDE SERPL-SCNC: 102 MMOL/L (ref 98–107)
CO2 SERPL-SCNC: 32 MMOL/L (ref 22–29)
CREAT SERPL-MCNC: 3.19 MG/DL (ref 0.57–1)
GFR SERPL CREATININE-BSD FRML MDRD: 14 ML/MIN/1.73
GFR SERPL CREATININE-BSD FRML MDRD: ABNORMAL ML/MIN/{1.73_M2}
GLUCOSE BLDC GLUCOMTR-MCNC: 256 MG/DL (ref 70–105)
GLUCOSE BLDC GLUCOMTR-MCNC: 289 MG/DL (ref 70–105)
GLUCOSE BLDC GLUCOMTR-MCNC: 318 MG/DL (ref 70–105)
GLUCOSE BLDC GLUCOMTR-MCNC: 354 MG/DL (ref 70–105)
GLUCOSE BLDC GLUCOMTR-MCNC: 357 MG/DL (ref 70–105)
GLUCOSE SERPL-MCNC: 357 MG/DL (ref 65–99)
MAGNESIUM SERPL-MCNC: 2.1 MG/DL (ref 1.6–2.4)
P JIROVECII DNA # SPEC NAA+PROBE: ABNORMAL {COPIES}/ML
PHOSPHATE SERPL-MCNC: 4.3 MG/DL (ref 2.5–4.5)
POTASSIUM SERPL-SCNC: 3.4 MMOL/L (ref 3.5–5.2)
SODIUM SERPL-SCNC: 146 MMOL/L (ref 136–145)
SPECIMEN SOURCE: ABNORMAL

## 2020-10-16 PROCEDURE — 94799 UNLISTED PULMONARY SVC/PX: CPT

## 2020-10-16 PROCEDURE — 63710000001 INSULIN LISPRO (HUMAN) PER 5 UNITS: Performed by: INTERNAL MEDICINE

## 2020-10-16 PROCEDURE — 25010000002 HYDROCORTISONE SODIUM SUCCINATE 100 MG RECONSTITUTED SOLUTION: Performed by: INTERNAL MEDICINE

## 2020-10-16 PROCEDURE — 99232 SBSQ HOSP IP/OBS MODERATE 35: CPT | Performed by: NURSE PRACTITIONER

## 2020-10-16 PROCEDURE — 94003 VENT MGMT INPAT SUBQ DAY: CPT

## 2020-10-16 PROCEDURE — 71045 X-RAY EXAM CHEST 1 VIEW: CPT

## 2020-10-16 PROCEDURE — 83735 ASSAY OF MAGNESIUM: CPT | Performed by: INTERNAL MEDICINE

## 2020-10-16 PROCEDURE — 82962 GLUCOSE BLOOD TEST: CPT

## 2020-10-16 PROCEDURE — 84100 ASSAY OF PHOSPHORUS: CPT | Performed by: INTERNAL MEDICINE

## 2020-10-16 PROCEDURE — 25010000003 POTASSIUM CHLORIDE 10 MEQ/100ML SOLUTION: Performed by: INTERNAL MEDICINE

## 2020-10-16 PROCEDURE — 99231 SBSQ HOSP IP/OBS SF/LOW 25: CPT | Performed by: INTERNAL MEDICINE

## 2020-10-16 PROCEDURE — 80048 BASIC METABOLIC PNL TOTAL CA: CPT | Performed by: INTERNAL MEDICINE

## 2020-10-16 PROCEDURE — 25010000003 AMPICILLIN-SULBACTAM PER 1.5 G: Performed by: INTERNAL MEDICINE

## 2020-10-16 RX ORDER — DEXMEDETOMIDINE HYDROCHLORIDE 4 UG/ML
.2-1.5 INJECTION, SOLUTION INTRAVENOUS
Status: DISCONTINUED | OUTPATIENT
Start: 2020-10-16 | End: 2020-10-24

## 2020-10-16 RX ADMIN — INSULIN LISPRO 12 UNITS: 100 INJECTION, SOLUTION INTRAVENOUS; SUBCUTANEOUS at 12:30

## 2020-10-16 RX ADMIN — POTASSIUM CHLORIDE 10 MEQ: 7.46 INJECTION, SOLUTION INTRAVENOUS at 04:30

## 2020-10-16 RX ADMIN — AMPICILLIN SODIUM AND SULBACTAM SODIUM 1.5 G: 1; .5 INJECTION, POWDER, FOR SOLUTION INTRAMUSCULAR; INTRAVENOUS at 03:24

## 2020-10-16 RX ADMIN — INSULIN LISPRO 20 UNITS: 100 INJECTION, SOLUTION INTRAVENOUS; SUBCUTANEOUS at 17:02

## 2020-10-16 RX ADMIN — SODIUM CHLORIDE 0.2 MCG/KG/HR: 9 INJECTION, SOLUTION INTRAVENOUS at 09:02

## 2020-10-16 RX ADMIN — HYDRALAZINE HYDROCHLORIDE 50 MG: 25 TABLET, FILM COATED ORAL at 13:35

## 2020-10-16 RX ADMIN — GABAPENTIN 100 MG: 100 CAPSULE ORAL at 13:35

## 2020-10-16 RX ADMIN — AMPICILLIN SODIUM AND SULBACTAM SODIUM 1.5 G: 1; .5 INJECTION, POWDER, FOR SOLUTION INTRAMUSCULAR; INTRAVENOUS at 16:57

## 2020-10-16 RX ADMIN — AMLODIPINE BESYLATE 10 MG: 5 TABLET ORAL at 09:02

## 2020-10-16 RX ADMIN — GABAPENTIN 100 MG: 100 CAPSULE ORAL at 06:35

## 2020-10-16 RX ADMIN — PANTOPRAZOLE SODIUM 40 MG: 40 INJECTION, POWDER, FOR SOLUTION INTRAVENOUS at 17:03

## 2020-10-16 RX ADMIN — INSULIN LISPRO 20 UNITS: 100 INJECTION, SOLUTION INTRAVENOUS; SUBCUTANEOUS at 06:35

## 2020-10-16 RX ADMIN — Medication 10 ML: at 20:58

## 2020-10-16 RX ADMIN — Medication 10 ML: at 00:05

## 2020-10-16 RX ADMIN — ATORVASTATIN CALCIUM 40 MG: 40 TABLET, FILM COATED ORAL at 20:58

## 2020-10-16 RX ADMIN — HYDROCORTISONE SODIUM SUCCINATE 50 MG: 100 INJECTION, POWDER, FOR SOLUTION INTRAMUSCULAR; INTRAVENOUS at 21:06

## 2020-10-16 RX ADMIN — GABAPENTIN 100 MG: 100 CAPSULE ORAL at 21:06

## 2020-10-16 RX ADMIN — HYDRALAZINE HYDROCHLORIDE 50 MG: 25 TABLET, FILM COATED ORAL at 21:06

## 2020-10-16 RX ADMIN — SODIUM CHLORIDE 250 ML: 9 INJECTION, SOLUTION INTRAVENOUS at 06:36

## 2020-10-16 RX ADMIN — HYDROCORTISONE SODIUM SUCCINATE 50 MG: 100 INJECTION, POWDER, FOR SOLUTION INTRAMUSCULAR; INTRAVENOUS at 09:02

## 2020-10-16 RX ADMIN — FLUCONAZOLE 100 MG: 100 TABLET ORAL at 16:57

## 2020-10-16 RX ADMIN — POTASSIUM CHLORIDE 10 MEQ: 7.46 INJECTION, SOLUTION INTRAVENOUS at 06:35

## 2020-10-16 RX ADMIN — Medication 10 ML: at 09:03

## 2020-10-16 RX ADMIN — INSULIN LISPRO 12 UNITS: 100 INJECTION, SOLUTION INTRAVENOUS; SUBCUTANEOUS at 00:12

## 2020-10-16 RX ADMIN — ASPIRIN 81 MG: 81 TABLET, COATED ORAL at 09:02

## 2020-10-16 RX ADMIN — PANTOPRAZOLE SODIUM 40 MG: 40 INJECTION, POWDER, FOR SOLUTION INTRAVENOUS at 06:35

## 2020-10-17 ENCOUNTER — APPOINTMENT (OUTPATIENT)
Dept: GENERAL RADIOLOGY | Facility: HOSPITAL | Age: 70
End: 2020-10-17

## 2020-10-17 LAB
ANION GAP SERPL CALCULATED.3IONS-SCNC: 12 MMOL/L (ref 5–15)
BUN SERPL-MCNC: 67 MG/DL (ref 8–23)
BUN/CREAT SERPL: 25.3 (ref 7–25)
CALCIUM SPEC-SCNC: 9.4 MG/DL (ref 8.6–10.5)
CHLORIDE SERPL-SCNC: 107 MMOL/L (ref 98–107)
CMV DNA SPEC QL NAA+PROBE: NEGATIVE
CO2 SERPL-SCNC: 32 MMOL/L (ref 22–29)
CREAT SERPL-MCNC: 2.65 MG/DL (ref 0.57–1)
GFR SERPL CREATININE-BSD FRML MDRD: 18 ML/MIN/1.73
GLUCOSE BLDC GLUCOMTR-MCNC: 255 MG/DL (ref 70–105)
GLUCOSE BLDC GLUCOMTR-MCNC: 314 MG/DL (ref 70–105)
GLUCOSE BLDC GLUCOMTR-MCNC: 339 MG/DL (ref 70–105)
GLUCOSE BLDC GLUCOMTR-MCNC: 347 MG/DL (ref 70–105)
GLUCOSE BLDC GLUCOMTR-MCNC: 352 MG/DL (ref 70–105)
GLUCOSE BLDC GLUCOMTR-MCNC: 374 MG/DL (ref 70–105)
GLUCOSE BLDC GLUCOMTR-MCNC: 402 MG/DL (ref 70–105)
GLUCOSE BLDC GLUCOMTR-MCNC: 407 MG/DL (ref 70–105)
GLUCOSE BLDC GLUCOMTR-MCNC: 460 MG/DL (ref 70–105)
GLUCOSE SERPL-MCNC: 380 MG/DL (ref 65–99)
MAGNESIUM SERPL-MCNC: 1.8 MG/DL (ref 1.6–2.4)
PHOSPHATE SERPL-MCNC: 3.5 MG/DL (ref 2.5–4.5)
POTASSIUM SERPL-SCNC: 3.6 MMOL/L (ref 3.5–5.2)
POTASSIUM SERPL-SCNC: 3.6 MMOL/L (ref 3.5–5.2)
SODIUM SERPL-SCNC: 151 MMOL/L (ref 136–145)
SPECIMEN SOURCE: NORMAL

## 2020-10-17 PROCEDURE — 25010000002 METHYLPREDNISOLONE PER 40 MG: Performed by: INTERNAL MEDICINE

## 2020-10-17 PROCEDURE — 94003 VENT MGMT INPAT SUBQ DAY: CPT

## 2020-10-17 PROCEDURE — 25010000002 HYDROCORTISONE SODIUM SUCCINATE 100 MG RECONSTITUTED SOLUTION: Performed by: INTERNAL MEDICINE

## 2020-10-17 PROCEDURE — 25010000003 MAGNESIUM SULFATE 4 GM/100ML SOLUTION: Performed by: INTERNAL MEDICINE

## 2020-10-17 PROCEDURE — 84100 ASSAY OF PHOSPHORUS: CPT | Performed by: INTERNAL MEDICINE

## 2020-10-17 PROCEDURE — 83735 ASSAY OF MAGNESIUM: CPT | Performed by: INTERNAL MEDICINE

## 2020-10-17 PROCEDURE — 63710000001 INSULIN REGULAR HUMAN PER 5 UNITS: Performed by: INTERNAL MEDICINE

## 2020-10-17 PROCEDURE — 25010000003 POTASSIUM CHLORIDE 10 MEQ/100ML SOLUTION: Performed by: INTERNAL MEDICINE

## 2020-10-17 PROCEDURE — 63710000001 INSULIN GLARGINE PER 5 UNITS: Performed by: INTERNAL MEDICINE

## 2020-10-17 PROCEDURE — 71045 X-RAY EXAM CHEST 1 VIEW: CPT

## 2020-10-17 PROCEDURE — 82962 GLUCOSE BLOOD TEST: CPT

## 2020-10-17 PROCEDURE — 99233 SBSQ HOSP IP/OBS HIGH 50: CPT | Performed by: INTERNAL MEDICINE

## 2020-10-17 PROCEDURE — 94002 VENT MGMT INPAT INIT DAY: CPT

## 2020-10-17 PROCEDURE — 80048 BASIC METABOLIC PNL TOTAL CA: CPT | Performed by: INTERNAL MEDICINE

## 2020-10-17 PROCEDURE — 63710000001 INSULIN LISPRO (HUMAN) PER 5 UNITS: Performed by: INTERNAL MEDICINE

## 2020-10-17 PROCEDURE — 94799 UNLISTED PULMONARY SVC/PX: CPT

## 2020-10-17 PROCEDURE — 84132 ASSAY OF SERUM POTASSIUM: CPT | Performed by: INTERNAL MEDICINE

## 2020-10-17 PROCEDURE — 25010000003 AMPICILLIN-SULBACTAM PER 1.5 G: Performed by: INTERNAL MEDICINE

## 2020-10-17 RX ORDER — INSULIN GLARGINE 100 [IU]/ML
10 INJECTION, SOLUTION SUBCUTANEOUS NIGHTLY
Status: DISCONTINUED | OUTPATIENT
Start: 2020-10-18 | End: 2020-10-17

## 2020-10-17 RX ORDER — DEXTROSE MONOHYDRATE 25 G/50ML
25-50 INJECTION, SOLUTION INTRAVENOUS
Status: DISCONTINUED | OUTPATIENT
Start: 2020-10-17 | End: 2020-10-22

## 2020-10-17 RX ORDER — INSULIN GLARGINE 100 [IU]/ML
10 INJECTION, SOLUTION SUBCUTANEOUS ONCE
Status: COMPLETED | OUTPATIENT
Start: 2020-10-17 | End: 2020-10-17

## 2020-10-17 RX ORDER — METHYLPREDNISOLONE SODIUM SUCCINATE 40 MG/ML
20 INJECTION, POWDER, LYOPHILIZED, FOR SOLUTION INTRAMUSCULAR; INTRAVENOUS EVERY 12 HOURS
Status: DISCONTINUED | OUTPATIENT
Start: 2020-10-17 | End: 2020-10-23

## 2020-10-17 RX ADMIN — SULFAMETHOXAZOLE AND TRIMETHOPRIM 206.4 MG OF TRIMETHOPRIM: 80; 16 INJECTION, SOLUTION, CONCENTRATE INTRAVENOUS at 11:08

## 2020-10-17 RX ADMIN — GABAPENTIN 100 MG: 100 CAPSULE ORAL at 05:24

## 2020-10-17 RX ADMIN — INSULIN GLARGINE 10 UNITS: 100 INJECTION, SOLUTION SUBCUTANEOUS at 13:43

## 2020-10-17 RX ADMIN — PANTOPRAZOLE SODIUM 40 MG: 40 INJECTION, POWDER, FOR SOLUTION INTRAVENOUS at 08:43

## 2020-10-17 RX ADMIN — HYDROCORTISONE SODIUM SUCCINATE 50 MG: 100 INJECTION, POWDER, FOR SOLUTION INTRAMUSCULAR; INTRAVENOUS at 08:44

## 2020-10-17 RX ADMIN — METHYLPREDNISOLONE SODIUM SUCCINATE 20 MG: 40 INJECTION, POWDER, FOR SOLUTION INTRAMUSCULAR; INTRAVENOUS at 13:30

## 2020-10-17 RX ADMIN — AMPICILLIN SODIUM AND SULBACTAM SODIUM 1.5 G: 1; .5 INJECTION, POWDER, FOR SOLUTION INTRAMUSCULAR; INTRAVENOUS at 03:29

## 2020-10-17 RX ADMIN — POTASSIUM CHLORIDE 10 MEQ: 7.46 INJECTION, SOLUTION INTRAVENOUS at 15:04

## 2020-10-17 RX ADMIN — Medication 10 ML: at 22:43

## 2020-10-17 RX ADMIN — AMLODIPINE BESYLATE 10 MG: 5 TABLET ORAL at 08:42

## 2020-10-17 RX ADMIN — INSULIN LISPRO 24 UNITS: 100 INJECTION, SOLUTION INTRAVENOUS; SUBCUTANEOUS at 15:55

## 2020-10-17 RX ADMIN — PANTOPRAZOLE SODIUM 40 MG: 40 INJECTION, POWDER, FOR SOLUTION INTRAVENOUS at 17:45

## 2020-10-17 RX ADMIN — MAGNESIUM SULFATE HEPTAHYDRATE 4 G: 40 INJECTION, SOLUTION INTRAVENOUS at 04:09

## 2020-10-17 RX ADMIN — POTASSIUM CHLORIDE 10 MEQ: 7.46 INJECTION, SOLUTION INTRAVENOUS at 13:55

## 2020-10-17 RX ADMIN — SODIUM CHLORIDE 0.2 MCG/KG/HR: 9 INJECTION, SOLUTION INTRAVENOUS at 00:43

## 2020-10-17 RX ADMIN — INSULIN LISPRO 16 UNITS: 100 INJECTION, SOLUTION INTRAVENOUS; SUBCUTANEOUS at 00:43

## 2020-10-17 RX ADMIN — Medication 10 ML: at 11:09

## 2020-10-17 RX ADMIN — HYDRALAZINE HYDROCHLORIDE 50 MG: 25 TABLET, FILM COATED ORAL at 13:33

## 2020-10-17 RX ADMIN — HYDRALAZINE HYDROCHLORIDE 50 MG: 25 TABLET, FILM COATED ORAL at 21:06

## 2020-10-17 RX ADMIN — HYDRALAZINE HYDROCHLORIDE 50 MG: 25 TABLET, FILM COATED ORAL at 05:24

## 2020-10-17 RX ADMIN — SULFAMETHOXAZOLE AND TRIMETHOPRIM 206.4 MG OF TRIMETHOPRIM: 80; 16 INJECTION, SOLUTION, CONCENTRATE INTRAVENOUS at 17:47

## 2020-10-17 RX ADMIN — SODIUM CHLORIDE 4 UNITS/HR: 9 INJECTION, SOLUTION INTRAVENOUS at 18:37

## 2020-10-17 RX ADMIN — INSULIN LISPRO 24 UNITS: 100 INJECTION, SOLUTION INTRAVENOUS; SUBCUTANEOUS at 13:29

## 2020-10-17 RX ADMIN — GABAPENTIN 100 MG: 100 CAPSULE ORAL at 13:34

## 2020-10-17 RX ADMIN — INSULIN LISPRO 20 UNITS: 100 INJECTION, SOLUTION INTRAVENOUS; SUBCUTANEOUS at 05:24

## 2020-10-17 RX ADMIN — ASPIRIN 81 MG: 81 TABLET, COATED ORAL at 08:43

## 2020-10-17 RX ADMIN — ATORVASTATIN CALCIUM 40 MG: 40 TABLET, FILM COATED ORAL at 21:06

## 2020-10-17 RX ADMIN — GABAPENTIN 100 MG: 100 CAPSULE ORAL at 21:06

## 2020-10-17 RX ADMIN — FLUCONAZOLE 100 MG: 100 TABLET ORAL at 16:36

## 2020-10-17 RX ADMIN — SODIUM CHLORIDE 19 UNITS/HR: 9 INJECTION, SOLUTION INTRAVENOUS at 23:25

## 2020-10-18 ENCOUNTER — APPOINTMENT (OUTPATIENT)
Dept: GENERAL RADIOLOGY | Facility: HOSPITAL | Age: 70
End: 2020-10-18

## 2020-10-18 LAB
ANION GAP SERPL CALCULATED.3IONS-SCNC: 10 MMOL/L (ref 5–15)
BUN SERPL-MCNC: 64 MG/DL (ref 8–23)
BUN/CREAT SERPL: 26.9 (ref 7–25)
CALCIUM SPEC-SCNC: 9.1 MG/DL (ref 8.6–10.5)
CHLORIDE SERPL-SCNC: 105 MMOL/L (ref 98–107)
CO2 SERPL-SCNC: 32 MMOL/L (ref 22–29)
CREAT SERPL-MCNC: 2.38 MG/DL (ref 0.57–1)
GFR SERPL CREATININE-BSD FRML MDRD: 20 ML/MIN/1.73
GLUCOSE BLDC GLUCOMTR-MCNC: 138 MG/DL (ref 70–105)
GLUCOSE BLDC GLUCOMTR-MCNC: 141 MG/DL (ref 70–105)
GLUCOSE BLDC GLUCOMTR-MCNC: 144 MG/DL (ref 70–105)
GLUCOSE BLDC GLUCOMTR-MCNC: 152 MG/DL (ref 70–105)
GLUCOSE BLDC GLUCOMTR-MCNC: 154 MG/DL (ref 70–105)
GLUCOSE BLDC GLUCOMTR-MCNC: 155 MG/DL (ref 70–105)
GLUCOSE BLDC GLUCOMTR-MCNC: 162 MG/DL (ref 70–105)
GLUCOSE BLDC GLUCOMTR-MCNC: 162 MG/DL (ref 70–105)
GLUCOSE BLDC GLUCOMTR-MCNC: 182 MG/DL (ref 70–105)
GLUCOSE BLDC GLUCOMTR-MCNC: 187 MG/DL (ref 70–105)
GLUCOSE BLDC GLUCOMTR-MCNC: 188 MG/DL (ref 70–105)
GLUCOSE BLDC GLUCOMTR-MCNC: 189 MG/DL (ref 70–105)
GLUCOSE BLDC GLUCOMTR-MCNC: 213 MG/DL (ref 70–105)
GLUCOSE BLDC GLUCOMTR-MCNC: 218 MG/DL (ref 70–105)
GLUCOSE BLDC GLUCOMTR-MCNC: 91 MG/DL (ref 70–105)
GLUCOSE SERPL-MCNC: 169 MG/DL (ref 65–99)
MAGNESIUM SERPL-MCNC: 2.3 MG/DL (ref 1.6–2.4)
PHOSPHATE SERPL-MCNC: 2.6 MG/DL (ref 2.5–4.5)
POTASSIUM SERPL-SCNC: 4.3 MMOL/L (ref 3.5–5.2)
SODIUM SERPL-SCNC: 147 MMOL/L (ref 136–145)

## 2020-10-18 PROCEDURE — 83735 ASSAY OF MAGNESIUM: CPT | Performed by: INTERNAL MEDICINE

## 2020-10-18 PROCEDURE — 25010000002 METHYLPREDNISOLONE PER 40 MG: Performed by: INTERNAL MEDICINE

## 2020-10-18 PROCEDURE — 94002 VENT MGMT INPAT INIT DAY: CPT

## 2020-10-18 PROCEDURE — 94003 VENT MGMT INPAT SUBQ DAY: CPT

## 2020-10-18 PROCEDURE — 71045 X-RAY EXAM CHEST 1 VIEW: CPT

## 2020-10-18 PROCEDURE — 80048 BASIC METABOLIC PNL TOTAL CA: CPT | Performed by: INTERNAL MEDICINE

## 2020-10-18 PROCEDURE — 82962 GLUCOSE BLOOD TEST: CPT

## 2020-10-18 PROCEDURE — 94799 UNLISTED PULMONARY SVC/PX: CPT

## 2020-10-18 PROCEDURE — 25010000003 POTASSIUM CHLORIDE 10 MEQ/100ML SOLUTION: Performed by: INTERNAL MEDICINE

## 2020-10-18 PROCEDURE — 84100 ASSAY OF PHOSPHORUS: CPT | Performed by: INTERNAL MEDICINE

## 2020-10-18 PROCEDURE — 99233 SBSQ HOSP IP/OBS HIGH 50: CPT | Performed by: INTERNAL MEDICINE

## 2020-10-18 PROCEDURE — 63710000001 INSULIN REGULAR HUMAN PER 5 UNITS: Performed by: INTERNAL MEDICINE

## 2020-10-18 RX ADMIN — PANTOPRAZOLE SODIUM 40 MG: 40 INJECTION, POWDER, FOR SOLUTION INTRAVENOUS at 16:34

## 2020-10-18 RX ADMIN — METHYLPREDNISOLONE SODIUM SUCCINATE 20 MG: 40 INJECTION, POWDER, FOR SOLUTION INTRAMUSCULAR; INTRAVENOUS at 12:15

## 2020-10-18 RX ADMIN — SULFAMETHOXAZOLE AND TRIMETHOPRIM 206.4 MG OF TRIMETHOPRIM: 80; 16 INJECTION, SOLUTION, CONCENTRATE INTRAVENOUS at 18:46

## 2020-10-18 RX ADMIN — SODIUM CHLORIDE 19 UNITS/HR: 9 INJECTION, SOLUTION INTRAVENOUS at 04:43

## 2020-10-18 RX ADMIN — POTASSIUM CHLORIDE 10 MEQ: 7.46 INJECTION, SOLUTION INTRAVENOUS at 00:10

## 2020-10-18 RX ADMIN — Medication 10 ML: at 09:38

## 2020-10-18 RX ADMIN — GABAPENTIN 100 MG: 100 CAPSULE ORAL at 06:36

## 2020-10-18 RX ADMIN — GABAPENTIN 100 MG: 100 CAPSULE ORAL at 21:01

## 2020-10-18 RX ADMIN — SODIUM CHLORIDE 18 UNITS/HR: 9 INJECTION, SOLUTION INTRAVENOUS at 13:48

## 2020-10-18 RX ADMIN — HYDRALAZINE HYDROCHLORIDE 50 MG: 25 TABLET, FILM COATED ORAL at 06:36

## 2020-10-18 RX ADMIN — SULFAMETHOXAZOLE AND TRIMETHOPRIM 206.4 MG OF TRIMETHOPRIM: 80; 16 INJECTION, SOLUTION, CONCENTRATE INTRAVENOUS at 09:38

## 2020-10-18 RX ADMIN — SULFAMETHOXAZOLE AND TRIMETHOPRIM 206.4 MG OF TRIMETHOPRIM: 80; 16 INJECTION, SOLUTION, CONCENTRATE INTRAVENOUS at 02:53

## 2020-10-18 RX ADMIN — GABAPENTIN 100 MG: 100 CAPSULE ORAL at 13:48

## 2020-10-18 RX ADMIN — METHYLPREDNISOLONE SODIUM SUCCINATE 20 MG: 40 INJECTION, POWDER, FOR SOLUTION INTRAMUSCULAR; INTRAVENOUS at 00:09

## 2020-10-18 RX ADMIN — FLUCONAZOLE 100 MG: 100 TABLET ORAL at 16:34

## 2020-10-18 RX ADMIN — PANTOPRAZOLE SODIUM 40 MG: 40 INJECTION, POWDER, FOR SOLUTION INTRAVENOUS at 06:36

## 2020-10-18 RX ADMIN — ATORVASTATIN CALCIUM 40 MG: 40 TABLET, FILM COATED ORAL at 21:01

## 2020-10-18 RX ADMIN — AMLODIPINE BESYLATE 10 MG: 5 TABLET ORAL at 09:38

## 2020-10-18 RX ADMIN — POTASSIUM CHLORIDE 10 MEQ: 7.46 INJECTION, SOLUTION INTRAVENOUS at 01:01

## 2020-10-18 RX ADMIN — SODIUM CHLORIDE 0.2 MCG/KG/HR: 9 INJECTION, SOLUTION INTRAVENOUS at 02:16

## 2020-10-18 RX ADMIN — Medication 10 ML: at 20:06

## 2020-10-18 RX ADMIN — ASPIRIN 81 MG: 81 TABLET, COATED ORAL at 09:38

## 2020-10-18 RX ADMIN — SODIUM CHLORIDE 0.2 MCG/KG/HR: 9 INJECTION, SOLUTION INTRAVENOUS at 23:03

## 2020-10-18 RX ADMIN — METHYLPREDNISOLONE SODIUM SUCCINATE 20 MG: 40 INJECTION, POWDER, FOR SOLUTION INTRAMUSCULAR; INTRAVENOUS at 23:03

## 2020-10-18 RX ADMIN — SODIUM CHLORIDE 18 UNITS/HR: 9 INJECTION, SOLUTION INTRAVENOUS at 20:06

## 2020-10-19 ENCOUNTER — APPOINTMENT (OUTPATIENT)
Dept: GENERAL RADIOLOGY | Facility: HOSPITAL | Age: 70
End: 2020-10-19

## 2020-10-19 LAB
ANION GAP SERPL CALCULATED.3IONS-SCNC: 11 MMOL/L (ref 5–15)
B PARAPERT DNA SPEC QL NAA+PROBE: NOT DETECTED
B PERT DNA SPEC QL NAA+PROBE: NOT DETECTED
BUN SERPL-MCNC: 66 MG/DL (ref 8–23)
BUN/CREAT SERPL: 29.3 (ref 7–25)
C PNEUM DNA NPH QL NAA+NON-PROBE: NOT DETECTED
CALCIUM SPEC-SCNC: 9.6 MG/DL (ref 8.6–10.5)
CHLORIDE SERPL-SCNC: 100 MMOL/L (ref 98–107)
CO2 SERPL-SCNC: 29 MMOL/L (ref 22–29)
CREAT SERPL-MCNC: 2.25 MG/DL (ref 0.57–1)
FLUAV H1 2009 PAND RNA NPH QL NAA+PROBE: NOT DETECTED
FLUAV H1 HA GENE NPH QL NAA+PROBE: NOT DETECTED
FLUAV H3 RNA NPH QL NAA+PROBE: NOT DETECTED
FLUAV SUBTYP SPEC NAA+PROBE: NOT DETECTED
FLUBV RNA ISLT QL NAA+PROBE: NOT DETECTED
GFR SERPL CREATININE-BSD FRML MDRD: 21 ML/MIN/1.73
GLUCOSE BLDC GLUCOMTR-MCNC: 102 MG/DL (ref 70–105)
GLUCOSE BLDC GLUCOMTR-MCNC: 107 MG/DL (ref 70–105)
GLUCOSE BLDC GLUCOMTR-MCNC: 113 MG/DL (ref 70–105)
GLUCOSE BLDC GLUCOMTR-MCNC: 123 MG/DL (ref 70–105)
GLUCOSE BLDC GLUCOMTR-MCNC: 125 MG/DL (ref 70–105)
GLUCOSE BLDC GLUCOMTR-MCNC: 129 MG/DL (ref 70–105)
GLUCOSE BLDC GLUCOMTR-MCNC: 131 MG/DL (ref 70–105)
GLUCOSE BLDC GLUCOMTR-MCNC: 132 MG/DL (ref 70–105)
GLUCOSE BLDC GLUCOMTR-MCNC: 149 MG/DL (ref 70–105)
GLUCOSE BLDC GLUCOMTR-MCNC: 149 MG/DL (ref 70–105)
GLUCOSE BLDC GLUCOMTR-MCNC: 156 MG/DL (ref 70–105)
GLUCOSE BLDC GLUCOMTR-MCNC: 160 MG/DL (ref 70–105)
GLUCOSE BLDC GLUCOMTR-MCNC: 161 MG/DL (ref 70–105)
GLUCOSE BLDC GLUCOMTR-MCNC: 168 MG/DL (ref 70–105)
GLUCOSE BLDC GLUCOMTR-MCNC: 179 MG/DL (ref 70–105)
GLUCOSE BLDC GLUCOMTR-MCNC: 185 MG/DL (ref 70–105)
GLUCOSE BLDC GLUCOMTR-MCNC: 195 MG/DL (ref 70–105)
GLUCOSE BLDC GLUCOMTR-MCNC: 211 MG/DL (ref 70–105)
GLUCOSE SERPL-MCNC: 151 MG/DL (ref 65–99)
HADV DNA SPEC NAA+PROBE: NOT DETECTED
HCOV 229E RNA SPEC QL NAA+PROBE: NOT DETECTED
HCOV HKU1 RNA SPEC QL NAA+PROBE: NOT DETECTED
HCOV NL63 RNA SPEC QL NAA+PROBE: NOT DETECTED
HCOV OC43 RNA SPEC QL NAA+PROBE: NOT DETECTED
HMPV RNA NPH QL NAA+NON-PROBE: NOT DETECTED
HPIV1 RNA SPEC QL NAA+PROBE: NOT DETECTED
HPIV2 RNA SPEC QL NAA+PROBE: NOT DETECTED
HPIV3 RNA NPH QL NAA+PROBE: NOT DETECTED
HPIV4 P GENE NPH QL NAA+PROBE: NOT DETECTED
M PNEUMO IGG SER IA-ACNC: NOT DETECTED
MAGNESIUM SERPL-MCNC: 2 MG/DL (ref 1.6–2.4)
PHOSPHATE SERPL-MCNC: 3.5 MG/DL (ref 2.5–4.5)
POTASSIUM SERPL-SCNC: 4.3 MMOL/L (ref 3.5–5.2)
RHINOVIRUS RNA SPEC NAA+PROBE: NOT DETECTED
RSV RNA NPH QL NAA+NON-PROBE: NOT DETECTED
S PN DA SERO 19F IGG SER IA-MCNC: 1.3 UG/ML
S PNEUM DA 14 IGG SER IA-MCNC: 2.2 UG/ML
S PNEUM DA 18C IGG SER IA-MCNC: 0.2 UG/ML
S PNEUM DA 23F IGG SER IA-MCNC: 0.1 UG/ML
S PNEUM DA 4 IGG SER IA-MCNC: 0.2 UG/ML
S PNEUM DA 6B IGG SER IA-MCNC: 0.9 UG/ML
S PNEUM DA 9V IGG SER IA-MCNC: 0.8 UG/ML
SARS-COV-2 RNA NPH QL NAA+NON-PROBE: NOT DETECTED
SODIUM SERPL-SCNC: 140 MMOL/L (ref 136–145)

## 2020-10-19 PROCEDURE — 99231 SBSQ HOSP IP/OBS SF/LOW 25: CPT | Performed by: INTERNAL MEDICINE

## 2020-10-19 PROCEDURE — 0202U NFCT DS 22 TRGT SARS-COV-2: CPT | Performed by: INTERNAL MEDICINE

## 2020-10-19 PROCEDURE — 63710000001 INSULIN REGULAR HUMAN PER 5 UNITS: Performed by: INTERNAL MEDICINE

## 2020-10-19 PROCEDURE — 71045 X-RAY EXAM CHEST 1 VIEW: CPT

## 2020-10-19 PROCEDURE — 94799 UNLISTED PULMONARY SVC/PX: CPT

## 2020-10-19 PROCEDURE — 80048 BASIC METABOLIC PNL TOTAL CA: CPT | Performed by: INTERNAL MEDICINE

## 2020-10-19 PROCEDURE — 82962 GLUCOSE BLOOD TEST: CPT

## 2020-10-19 PROCEDURE — 84100 ASSAY OF PHOSPHORUS: CPT | Performed by: INTERNAL MEDICINE

## 2020-10-19 PROCEDURE — 94003 VENT MGMT INPAT SUBQ DAY: CPT

## 2020-10-19 PROCEDURE — 83735 ASSAY OF MAGNESIUM: CPT | Performed by: INTERNAL MEDICINE

## 2020-10-19 PROCEDURE — 25010000002 METHYLPREDNISOLONE PER 40 MG: Performed by: INTERNAL MEDICINE

## 2020-10-19 RX ORDER — AMLODIPINE BESYLATE 5 MG/1
5 TABLET ORAL
Status: DISCONTINUED | OUTPATIENT
Start: 2020-10-20 | End: 2020-11-03

## 2020-10-19 RX ORDER — METOLAZONE 5 MG/1
5 TABLET ORAL ONCE
Status: COMPLETED | OUTPATIENT
Start: 2020-10-19 | End: 2020-10-19

## 2020-10-19 RX ADMIN — METHYLPREDNISOLONE SODIUM SUCCINATE 20 MG: 40 INJECTION, POWDER, FOR SOLUTION INTRAMUSCULAR; INTRAVENOUS at 23:08

## 2020-10-19 RX ADMIN — SULFAMETHOXAZOLE AND TRIMETHOPRIM 206.4 MG OF TRIMETHOPRIM: 80; 16 INJECTION, SOLUTION, CONCENTRATE INTRAVENOUS at 17:54

## 2020-10-19 RX ADMIN — PANTOPRAZOLE SODIUM 40 MG: 40 INJECTION, POWDER, FOR SOLUTION INTRAVENOUS at 16:47

## 2020-10-19 RX ADMIN — FLUCONAZOLE 100 MG: 100 TABLET ORAL at 16:47

## 2020-10-19 RX ADMIN — GABAPENTIN 100 MG: 100 CAPSULE ORAL at 14:09

## 2020-10-19 RX ADMIN — PANTOPRAZOLE SODIUM 40 MG: 40 INJECTION, POWDER, FOR SOLUTION INTRAVENOUS at 09:10

## 2020-10-19 RX ADMIN — ATORVASTATIN CALCIUM 40 MG: 40 TABLET, FILM COATED ORAL at 20:09

## 2020-10-19 RX ADMIN — AMLODIPINE BESYLATE 5 MG: 5 TABLET ORAL at 09:10

## 2020-10-19 RX ADMIN — Medication 10 ML: at 09:17

## 2020-10-19 RX ADMIN — SODIUM CHLORIDE 12 UNITS/HR: 9 INJECTION, SOLUTION INTRAVENOUS at 18:49

## 2020-10-19 RX ADMIN — METOLAZONE 5 MG: 5 TABLET ORAL at 12:00

## 2020-10-19 RX ADMIN — SODIUM CHLORIDE 15 UNITS/HR: 9 INJECTION, SOLUTION INTRAVENOUS at 07:28

## 2020-10-19 RX ADMIN — GABAPENTIN 100 MG: 100 CAPSULE ORAL at 21:02

## 2020-10-19 RX ADMIN — GABAPENTIN 100 MG: 100 CAPSULE ORAL at 05:06

## 2020-10-19 RX ADMIN — METHYLPREDNISOLONE SODIUM SUCCINATE 20 MG: 40 INJECTION, POWDER, FOR SOLUTION INTRAMUSCULAR; INTRAVENOUS at 11:56

## 2020-10-19 RX ADMIN — SULFAMETHOXAZOLE AND TRIMETHOPRIM 206.4 MG OF TRIMETHOPRIM: 80; 16 INJECTION, SOLUTION, CONCENTRATE INTRAVENOUS at 09:10

## 2020-10-19 RX ADMIN — SULFAMETHOXAZOLE AND TRIMETHOPRIM 206.4 MG OF TRIMETHOPRIM: 80; 16 INJECTION, SOLUTION, CONCENTRATE INTRAVENOUS at 01:51

## 2020-10-19 RX ADMIN — Medication 10 ML: at 20:09

## 2020-10-19 RX ADMIN — SODIUM CHLORIDE 20 UNITS/HR: 9 INJECTION, SOLUTION INTRAVENOUS at 01:49

## 2020-10-19 RX ADMIN — ASPIRIN 81 MG: 81 TABLET, COATED ORAL at 09:10

## 2020-10-20 ENCOUNTER — APPOINTMENT (OUTPATIENT)
Dept: CT IMAGING | Facility: HOSPITAL | Age: 70
End: 2020-10-20

## 2020-10-20 ENCOUNTER — ANESTHESIA EVENT (OUTPATIENT)
Dept: GASTROENTEROLOGY | Facility: HOSPITAL | Age: 70
End: 2020-10-20

## 2020-10-20 ENCOUNTER — APPOINTMENT (OUTPATIENT)
Dept: GENERAL RADIOLOGY | Facility: HOSPITAL | Age: 70
End: 2020-10-20

## 2020-10-20 ENCOUNTER — ANESTHESIA (OUTPATIENT)
Dept: GASTROENTEROLOGY | Facility: HOSPITAL | Age: 70
End: 2020-10-20

## 2020-10-20 VITALS
OXYGEN SATURATION: 98 % | RESPIRATION RATE: 20 BRPM | DIASTOLIC BLOOD PRESSURE: 55 MMHG | SYSTOLIC BLOOD PRESSURE: 117 MMHG

## 2020-10-20 LAB
ANION GAP SERPL CALCULATED.3IONS-SCNC: 11 MMOL/L (ref 5–15)
B PARAPERT DNA SPEC QL NAA+PROBE: NOT DETECTED
B PERT DNA SPEC QL NAA+PROBE: NOT DETECTED
BUN SERPL-MCNC: 68 MG/DL (ref 8–23)
BUN/CREAT SERPL: 30.8 (ref 7–25)
C PNEUM DNA NPH QL NAA+NON-PROBE: NOT DETECTED
CALCIUM SPEC-SCNC: 8.8 MG/DL (ref 8.6–10.5)
CHLORIDE SERPL-SCNC: 98 MMOL/L (ref 98–107)
CO2 SERPL-SCNC: 29 MMOL/L (ref 22–29)
CREAT SERPL-MCNC: 2.21 MG/DL (ref 0.57–1)
DEPRECATED RDW RBC AUTO: 62.6 FL (ref 37–54)
ERYTHROCYTE [DISTWIDTH] IN BLOOD BY AUTOMATED COUNT: 19.1 % (ref 12.3–15.4)
FLUAV H1 2009 PAND RNA NPH QL NAA+PROBE: NOT DETECTED
FLUAV H1 HA GENE NPH QL NAA+PROBE: NOT DETECTED
FLUAV H3 RNA NPH QL NAA+PROBE: NOT DETECTED
FLUAV SUBTYP SPEC NAA+PROBE: NOT DETECTED
FLUBV RNA ISLT QL NAA+PROBE: NOT DETECTED
GFR SERPL CREATININE-BSD FRML MDRD: 22 ML/MIN/1.73
GLUCOSE BLDC GLUCOMTR-MCNC: 102 MG/DL (ref 70–105)
GLUCOSE BLDC GLUCOMTR-MCNC: 103 MG/DL (ref 70–105)
GLUCOSE BLDC GLUCOMTR-MCNC: 114 MG/DL (ref 70–105)
GLUCOSE BLDC GLUCOMTR-MCNC: 118 MG/DL (ref 70–105)
GLUCOSE BLDC GLUCOMTR-MCNC: 119 MG/DL (ref 70–105)
GLUCOSE BLDC GLUCOMTR-MCNC: 121 MG/DL (ref 70–105)
GLUCOSE BLDC GLUCOMTR-MCNC: 139 MG/DL (ref 70–105)
GLUCOSE BLDC GLUCOMTR-MCNC: 142 MG/DL (ref 70–105)
GLUCOSE BLDC GLUCOMTR-MCNC: 152 MG/DL (ref 70–105)
GLUCOSE BLDC GLUCOMTR-MCNC: 156 MG/DL (ref 70–105)
GLUCOSE BLDC GLUCOMTR-MCNC: 162 MG/DL (ref 70–105)
GLUCOSE BLDC GLUCOMTR-MCNC: 167 MG/DL (ref 70–105)
GLUCOSE BLDC GLUCOMTR-MCNC: 169 MG/DL (ref 70–105)
GLUCOSE BLDC GLUCOMTR-MCNC: 231 MG/DL (ref 70–105)
GLUCOSE BLDC GLUCOMTR-MCNC: 232 MG/DL (ref 70–105)
GLUCOSE BLDC GLUCOMTR-MCNC: 258 MG/DL (ref 70–105)
GLUCOSE BLDC GLUCOMTR-MCNC: 277 MG/DL (ref 70–105)
GLUCOSE BLDC GLUCOMTR-MCNC: 99 MG/DL (ref 70–105)
GLUCOSE SERPL-MCNC: 95 MG/DL (ref 65–99)
HADV DNA SPEC NAA+PROBE: NOT DETECTED
HCOV 229E RNA SPEC QL NAA+PROBE: NOT DETECTED
HCOV HKU1 RNA SPEC QL NAA+PROBE: NOT DETECTED
HCOV NL63 RNA SPEC QL NAA+PROBE: NOT DETECTED
HCOV OC43 RNA SPEC QL NAA+PROBE: NOT DETECTED
HCT VFR BLD AUTO: 25.4 % (ref 34–46.6)
HGB BLD-MCNC: 8.3 G/DL (ref 12–15.9)
HMPV RNA NPH QL NAA+NON-PROBE: NOT DETECTED
HPIV1 RNA SPEC QL NAA+PROBE: NOT DETECTED
HPIV2 RNA SPEC QL NAA+PROBE: NOT DETECTED
HPIV3 RNA NPH QL NAA+PROBE: NOT DETECTED
HPIV4 P GENE NPH QL NAA+PROBE: NOT DETECTED
M PNEUMO IGG SER IA-ACNC: NOT DETECTED
MAGNESIUM SERPL-MCNC: 1.8 MG/DL (ref 1.6–2.4)
MCH RBC QN AUTO: 30.7 PG (ref 26.6–33)
MCHC RBC AUTO-ENTMCNC: 32.8 G/DL (ref 31.5–35.7)
MCV RBC AUTO: 93.7 FL (ref 79–97)
PHOSPHATE SERPL-MCNC: 4.9 MG/DL (ref 2.5–4.5)
PLATELET # BLD AUTO: 268 10*3/MM3 (ref 140–450)
PMV BLD AUTO: 9.6 FL (ref 6–12)
POTASSIUM SERPL-SCNC: 5.1 MMOL/L (ref 3.5–5.2)
RBC # BLD AUTO: 2.72 10*6/MM3 (ref 3.77–5.28)
RHINOVIRUS RNA SPEC NAA+PROBE: DETECTED
RSV RNA NPH QL NAA+NON-PROBE: NOT DETECTED
SODIUM SERPL-SCNC: 138 MMOL/L (ref 136–145)
WBC # BLD AUTO: 7.8 10*3/MM3 (ref 3.4–10.8)

## 2020-10-20 PROCEDURE — 94799 UNLISTED PULMONARY SVC/PX: CPT

## 2020-10-20 PROCEDURE — 87116 MYCOBACTERIA CULTURE: CPT | Performed by: INTERNAL MEDICINE

## 2020-10-20 PROCEDURE — 0B9F8ZX DRAINAGE OF RIGHT LOWER LUNG LOBE, VIA NATURAL OR ARTIFICIAL OPENING ENDOSCOPIC, DIAGNOSTIC: ICD-10-PCS | Performed by: INTERNAL MEDICINE

## 2020-10-20 PROCEDURE — 25010000002 METHYLPREDNISOLONE PER 40 MG: Performed by: INTERNAL MEDICINE

## 2020-10-20 PROCEDURE — 71045 X-RAY EXAM CHEST 1 VIEW: CPT

## 2020-10-20 PROCEDURE — 87798 DETECT AGENT NOS DNA AMP: CPT | Performed by: INTERNAL MEDICINE

## 2020-10-20 PROCEDURE — 94760 N-INVAS EAR/PLS OXIMETRY 1: CPT

## 2020-10-20 PROCEDURE — 87102 FUNGUS ISOLATION CULTURE: CPT | Performed by: INTERNAL MEDICINE

## 2020-10-20 PROCEDURE — 94003 VENT MGMT INPAT SUBQ DAY: CPT

## 2020-10-20 PROCEDURE — 25010000002 PROPOFOL 10 MG/ML EMULSION: Performed by: ANESTHESIOLOGY

## 2020-10-20 PROCEDURE — 80048 BASIC METABOLIC PNL TOTAL CA: CPT | Performed by: INTERNAL MEDICINE

## 2020-10-20 PROCEDURE — 63710000001 INSULIN REGULAR HUMAN PER 5 UNITS: Performed by: INTERNAL MEDICINE

## 2020-10-20 PROCEDURE — 87206 SMEAR FLUORESCENT/ACID STAI: CPT | Performed by: INTERNAL MEDICINE

## 2020-10-20 PROCEDURE — 85027 COMPLETE CBC AUTOMATED: CPT | Performed by: INTERNAL MEDICINE

## 2020-10-20 PROCEDURE — 0100U HC BIOFIRE FILMARRAY RESP PANEL 2: CPT | Performed by: INTERNAL MEDICINE

## 2020-10-20 PROCEDURE — 0BH18EZ INSERTION OF ENDOTRACHEAL AIRWAY INTO TRACHEA, VIA NATURAL OR ARTIFICIAL OPENING ENDOSCOPIC: ICD-10-PCS | Performed by: INTERNAL MEDICINE

## 2020-10-20 PROCEDURE — 84100 ASSAY OF PHOSPHORUS: CPT | Performed by: INTERNAL MEDICINE

## 2020-10-20 PROCEDURE — 87071 CULTURE AEROBIC QUANT OTHER: CPT | Performed by: INTERNAL MEDICINE

## 2020-10-20 PROCEDURE — 87205 SMEAR GRAM STAIN: CPT | Performed by: INTERNAL MEDICINE

## 2020-10-20 PROCEDURE — 87496 CYTOMEG DNA AMP PROBE: CPT | Performed by: INTERNAL MEDICINE

## 2020-10-20 PROCEDURE — 82962 GLUCOSE BLOOD TEST: CPT

## 2020-10-20 PROCEDURE — 88108 CYTOPATH CONCENTRATE TECH: CPT | Performed by: INTERNAL MEDICINE

## 2020-10-20 PROCEDURE — 71250 CT THORAX DX C-: CPT

## 2020-10-20 PROCEDURE — 99231 SBSQ HOSP IP/OBS SF/LOW 25: CPT | Performed by: INTERNAL MEDICINE

## 2020-10-20 PROCEDURE — 83735 ASSAY OF MAGNESIUM: CPT | Performed by: INTERNAL MEDICINE

## 2020-10-20 RX ORDER — EPINEPHRINE 0.1 MG/ML
SYRINGE (ML) INJECTION
Status: DISCONTINUED
Start: 2020-10-20 | End: 2020-10-20 | Stop reason: WASHOUT

## 2020-10-20 RX ORDER — PROPOFOL 10 MG/ML
VIAL (ML) INTRAVENOUS AS NEEDED
Status: DISCONTINUED | OUTPATIENT
Start: 2020-10-20 | End: 2020-10-20 | Stop reason: SURG

## 2020-10-20 RX ORDER — LIDOCAINE HYDROCHLORIDE 20 MG/ML
INJECTION, SOLUTION INTRAVENOUS
Status: DISCONTINUED
Start: 2020-10-20 | End: 2020-10-20 | Stop reason: WASHOUT

## 2020-10-20 RX ORDER — LIDOCAINE 50 MG/G
OINTMENT TOPICAL
Status: DISPENSED
Start: 2020-10-20 | End: 2020-10-20

## 2020-10-20 RX ORDER — BUMETANIDE 0.25 MG/ML
1 INJECTION INTRAMUSCULAR; INTRAVENOUS ONCE
Status: COMPLETED | OUTPATIENT
Start: 2020-10-20 | End: 2020-10-20

## 2020-10-20 RX ADMIN — PROPOFOL 120 MG: 10 INJECTION, EMULSION INTRAVENOUS at 08:06

## 2020-10-20 RX ADMIN — GABAPENTIN 100 MG: 100 CAPSULE ORAL at 21:08

## 2020-10-20 RX ADMIN — Medication 10 ML: at 21:08

## 2020-10-20 RX ADMIN — SODIUM CHLORIDE 16 UNITS/HR: 9 INJECTION, SOLUTION INTRAVENOUS at 01:15

## 2020-10-20 RX ADMIN — PANTOPRAZOLE SODIUM 40 MG: 40 INJECTION, POWDER, FOR SOLUTION INTRAVENOUS at 17:52

## 2020-10-20 RX ADMIN — FLUCONAZOLE 100 MG: 100 TABLET ORAL at 17:52

## 2020-10-20 RX ADMIN — GABAPENTIN 100 MG: 100 CAPSULE ORAL at 05:24

## 2020-10-20 RX ADMIN — AMLODIPINE BESYLATE 5 MG: 5 TABLET ORAL at 10:16

## 2020-10-20 RX ADMIN — Medication 10 ML: at 10:17

## 2020-10-20 RX ADMIN — METHYLPREDNISOLONE SODIUM SUCCINATE 20 MG: 40 INJECTION, POWDER, FOR SOLUTION INTRAMUSCULAR; INTRAVENOUS at 11:14

## 2020-10-20 RX ADMIN — LIDOCAINE HYDROCHLORIDE 100 MG: 20 INJECTION, SOLUTION INFILTRATION; PERINEURAL at 08:07

## 2020-10-20 RX ADMIN — SULFAMETHOXAZOLE AND TRIMETHOPRIM 206.4 MG OF TRIMETHOPRIM: 80; 16 INJECTION, SOLUTION, CONCENTRATE INTRAVENOUS at 10:17

## 2020-10-20 RX ADMIN — SODIUM CHLORIDE 6 UNITS/HR: 9 INJECTION, SOLUTION INTRAVENOUS at 18:52

## 2020-10-20 RX ADMIN — SULFAMETHOXAZOLE AND TRIMETHOPRIM 206.4 MG OF TRIMETHOPRIM: 80; 16 INJECTION, SOLUTION, CONCENTRATE INTRAVENOUS at 01:24

## 2020-10-20 RX ADMIN — PANTOPRAZOLE SODIUM 40 MG: 40 INJECTION, POWDER, FOR SOLUTION INTRAVENOUS at 10:15

## 2020-10-20 RX ADMIN — ASPIRIN 81 MG: 81 TABLET, COATED ORAL at 10:16

## 2020-10-20 RX ADMIN — METHYLPREDNISOLONE SODIUM SUCCINATE 20 MG: 40 INJECTION, POWDER, FOR SOLUTION INTRAMUSCULAR; INTRAVENOUS at 23:18

## 2020-10-20 RX ADMIN — ATORVASTATIN CALCIUM 40 MG: 40 TABLET, FILM COATED ORAL at 21:08

## 2020-10-20 RX ADMIN — BUMETANIDE 1 MG: 0.25 INJECTION, SOLUTION INTRAMUSCULAR; INTRAVENOUS at 11:14

## 2020-10-20 RX ADMIN — SULFAMETHOXAZOLE AND TRIMETHOPRIM 206.4 MG OF TRIMETHOPRIM: 80; 16 INJECTION, SOLUTION, CONCENTRATE INTRAVENOUS at 17:52

## 2020-10-20 RX ADMIN — GABAPENTIN 100 MG: 100 CAPSULE ORAL at 10:16

## 2020-10-20 NOTE — ANESTHESIA PREPROCEDURE EVALUATION
Anesthesia Evaluation     Patient summary reviewed   NPO Solid Status: > 8 hours  NPO Liquid Status: > 8 hours           Airway   Mallampati: II  TM distance: >3 FB  Neck ROM: full  No difficulty expected  Dental - normal exam     Pulmonary    (+) pneumonia improving , COPD, shortness of breath, sleep apnea, decreased breath sounds,   Cardiovascular - normal exam    ECG reviewed    (+) hypertension well controlled, valvular problems/murmurs, dysrhythmias Atrial Fib, hyperlipidemia,       Neuro/Psych  GI/Hepatic/Renal/Endo    (+) obesity,   liver disease, renal disease CRI, diabetes mellitus well controlled,     Musculoskeletal     Abdominal  - normal exam    Bowel sounds: normal.   Substance History      OB/GYN          Other   arthritis, blood dyscrasia anemia,         Phys Exam Other: Intubated for COPD, Pneumonia                Anesthesia Plan    ASA 4     MAC     intravenous induction     Anesthetic plan, all risks, benefits, and alternatives have been provided, discussed and informed consent has been obtained with: patient.

## 2020-10-20 NOTE — ANESTHESIA POSTPROCEDURE EVALUATION
Patient: Lesa Julio    Procedure Summary     Date: 10/20/20 Room / Location: [facility] ENDO VIRTUAL RM / [facility] ENDOSCOPY    Anesthesia Start: 0805 Anesthesia Stop: 0816    Procedure: BRONCHOSCOPY WITH BRONCHOALVEOLAR LAVAGE  AT BEDSIDE (Bilateral Bronchus) Diagnosis:       Pneumonia of right upper lobe due to infectious organism      (Pneumonia of right upper lobe due to infectious organism [J18.9])    Surgeon: Cristhian Hodge MD Provider: Bi Paulson MD    Anesthesia Type: MAC ASA Status: 4          Anesthesia Type: MAC    Vitals  Vitals Value Taken Time   /53 10/20/20 0816   Temp     Pulse 85 10/20/20 0816   Resp 20 10/20/20 0816   SpO2 97 % 10/20/20 0816   Vitals shown include unvalidated device data.        Post Anesthesia Care and Evaluation    Patient location during evaluation: ICU  Patient participation: complete - patient cannot participate  Level of consciousness: obtunded/minimal responses  Pain scale: See nurse's notes for pain score.  Pain management: adequate  Airway patency: patent  Anesthetic complications: No anesthetic complications  PONV Status: none  Cardiovascular status: acceptable  Respiratory status: acceptable  Hydration status: acceptable    Comments: Patient seen and examined postoperatively; vital signs stable; SpO2 greater than or equal to 90%; cardiopulmonary status stable; nausea/vomiting adequately controlled; pain adequately controlled; no apparent anesthesia complications; patient discharged from anesthesia care when discharge criteria were met

## 2020-10-21 ENCOUNTER — APPOINTMENT (OUTPATIENT)
Dept: GENERAL RADIOLOGY | Facility: HOSPITAL | Age: 70
End: 2020-10-21

## 2020-10-21 LAB
ALBUMIN SERPL-MCNC: 2.6 G/DL (ref 3.5–5.2)
ALBUMIN/GLOB SERPL: 1.1 G/DL
ALP SERPL-CCNC: 197 U/L (ref 39–117)
ALT SERPL W P-5'-P-CCNC: 29 U/L (ref 1–33)
ANION GAP SERPL CALCULATED.3IONS-SCNC: 11 MMOL/L (ref 5–15)
ANION GAP SERPL CALCULATED.3IONS-SCNC: 9 MMOL/L (ref 5–15)
AST SERPL-CCNC: 35 U/L (ref 1–32)
BACTERIA UR QL AUTO: ABNORMAL /HPF
BILIRUB SERPL-MCNC: 0.2 MG/DL (ref 0–1.2)
BILIRUB UR QL STRIP: NEGATIVE
BUN SERPL-MCNC: 69 MG/DL (ref 8–23)
BUN SERPL-MCNC: 69 MG/DL (ref 8–23)
BUN/CREAT SERPL: 19.3 (ref 7–25)
BUN/CREAT SERPL: 34.3 (ref 7–25)
CALCIUM SPEC-SCNC: 8.7 MG/DL (ref 8.6–10.5)
CALCIUM SPEC-SCNC: 8.8 MG/DL (ref 8.6–10.5)
CHLORIDE SERPL-SCNC: 94 MMOL/L (ref 98–107)
CHLORIDE SERPL-SCNC: 98 MMOL/L (ref 98–107)
CLARITY UR: CLEAR
CO2 SERPL-SCNC: 27 MMOL/L (ref 22–29)
CO2 SERPL-SCNC: 30 MMOL/L (ref 22–29)
COLOR UR: YELLOW
CREAT SERPL-MCNC: 2.01 MG/DL (ref 0.57–1)
CREAT SERPL-MCNC: 3.57 MG/DL (ref 0.57–1)
CREAT UR-MCNC: 16.1 MG/DL
EOSINOPHIL SPEC QL MICRO: 0 % EOS/100 CELLS (ref 0–0)
GFR SERPL CREATININE-BSD FRML MDRD: 13 ML/MIN/1.73
GFR SERPL CREATININE-BSD FRML MDRD: 24 ML/MIN/1.73
GFR SERPL CREATININE-BSD FRML MDRD: ABNORMAL ML/MIN/{1.73_M2}
GLOBULIN UR ELPH-MCNC: 2.3 GM/DL
GLUCOSE BLDC GLUCOMTR-MCNC: 112 MG/DL (ref 70–105)
GLUCOSE BLDC GLUCOMTR-MCNC: 125 MG/DL (ref 70–105)
GLUCOSE BLDC GLUCOMTR-MCNC: 129 MG/DL (ref 70–105)
GLUCOSE BLDC GLUCOMTR-MCNC: 130 MG/DL (ref 70–105)
GLUCOSE BLDC GLUCOMTR-MCNC: 130 MG/DL (ref 70–105)
GLUCOSE BLDC GLUCOMTR-MCNC: 134 MG/DL (ref 70–105)
GLUCOSE BLDC GLUCOMTR-MCNC: 144 MG/DL (ref 70–105)
GLUCOSE BLDC GLUCOMTR-MCNC: 146 MG/DL (ref 70–105)
GLUCOSE BLDC GLUCOMTR-MCNC: 153 MG/DL (ref 70–105)
GLUCOSE BLDC GLUCOMTR-MCNC: 155 MG/DL (ref 70–105)
GLUCOSE BLDC GLUCOMTR-MCNC: 160 MG/DL (ref 70–105)
GLUCOSE BLDC GLUCOMTR-MCNC: 171 MG/DL (ref 70–105)
GLUCOSE BLDC GLUCOMTR-MCNC: 172 MG/DL (ref 70–105)
GLUCOSE BLDC GLUCOMTR-MCNC: 186 MG/DL (ref 70–105)
GLUCOSE BLDC GLUCOMTR-MCNC: 195 MG/DL (ref 70–105)
GLUCOSE BLDC GLUCOMTR-MCNC: 202 MG/DL (ref 70–105)
GLUCOSE BLDC GLUCOMTR-MCNC: 206 MG/DL (ref 70–105)
GLUCOSE BLDC GLUCOMTR-MCNC: 209 MG/DL (ref 70–105)
GLUCOSE BLDC GLUCOMTR-MCNC: 238 MG/DL (ref 70–105)
GLUCOSE BLDC GLUCOMTR-MCNC: 55 MG/DL (ref 70–105)
GLUCOSE BLDC GLUCOMTR-MCNC: 83 MG/DL (ref 70–105)
GLUCOSE SERPL-MCNC: 130 MG/DL (ref 65–99)
GLUCOSE SERPL-MCNC: 139 MG/DL (ref 65–99)
GLUCOSE UR STRIP-MCNC: NEGATIVE MG/DL
HGB UR QL STRIP.AUTO: ABNORMAL
HYALINE CASTS UR QL AUTO: ABNORMAL /LPF
KETONES UR QL STRIP: NEGATIVE
LAB AP CASE REPORT: NORMAL
LEUKOCYTE ESTERASE UR QL STRIP.AUTO: NEGATIVE
MAGNESIUM SERPL-MCNC: 1.7 MG/DL (ref 1.6–2.4)
MAGNESIUM SERPL-MCNC: 1.9 MG/DL (ref 1.6–2.4)
NITRITE UR QL STRIP: NEGATIVE
PATH REPORT.FINAL DX SPEC: NORMAL
PATH REPORT.GROSS SPEC: NORMAL
PH UR STRIP.AUTO: 6 [PH] (ref 5–8)
PHOSPHATE SERPL-MCNC: 4.7 MG/DL (ref 2.5–4.5)
PHOSPHATE SERPL-MCNC: 5.1 MG/DL (ref 2.5–4.5)
POTASSIUM SERPL-SCNC: 4.7 MMOL/L (ref 3.5–5.2)
POTASSIUM SERPL-SCNC: 5.2 MMOL/L (ref 3.5–5.2)
POTASSIUM SERPL-SCNC: 5.8 MMOL/L (ref 3.5–5.2)
PROT SERPL-MCNC: 4.9 G/DL (ref 6–8.5)
PROT UR QL STRIP: NEGATIVE
PROT UR-MCNC: <4 MG/DL
RBC # UR: ABNORMAL /HPF
REF LAB TEST METHOD: ABNORMAL
SODIUM SERPL-SCNC: 133 MMOL/L (ref 136–145)
SODIUM SERPL-SCNC: 136 MMOL/L (ref 136–145)
SODIUM UR-SCNC: 60 MMOL/L
SP GR UR STRIP: 1.01 (ref 1–1.03)
SQUAMOUS #/AREA URNS HPF: ABNORMAL /HPF
UROBILINOGEN UR QL STRIP: ABNORMAL
WBC UR QL AUTO: ABNORMAL /HPF

## 2020-10-21 PROCEDURE — 71045 X-RAY EXAM CHEST 1 VIEW: CPT

## 2020-10-21 PROCEDURE — 94003 VENT MGMT INPAT SUBQ DAY: CPT

## 2020-10-21 PROCEDURE — 83735 ASSAY OF MAGNESIUM: CPT | Performed by: INTERNAL MEDICINE

## 2020-10-21 PROCEDURE — 84300 ASSAY OF URINE SODIUM: CPT | Performed by: INTERNAL MEDICINE

## 2020-10-21 PROCEDURE — 82570 ASSAY OF URINE CREATININE: CPT | Performed by: INTERNAL MEDICINE

## 2020-10-21 PROCEDURE — 84156 ASSAY OF PROTEIN URINE: CPT | Performed by: INTERNAL MEDICINE

## 2020-10-21 PROCEDURE — 87205 SMEAR GRAM STAIN: CPT | Performed by: INTERNAL MEDICINE

## 2020-10-21 PROCEDURE — 63710000001 INSULIN REGULAR HUMAN PER 5 UNITS: Performed by: INTERNAL MEDICINE

## 2020-10-21 PROCEDURE — 84132 ASSAY OF SERUM POTASSIUM: CPT | Performed by: INTERNAL MEDICINE

## 2020-10-21 PROCEDURE — 81001 URINALYSIS AUTO W/SCOPE: CPT | Performed by: INTERNAL MEDICINE

## 2020-10-21 PROCEDURE — 80053 COMPREHEN METABOLIC PANEL: CPT | Performed by: INTERNAL MEDICINE

## 2020-10-21 PROCEDURE — 25010000002 METHYLPREDNISOLONE PER 40 MG: Performed by: INTERNAL MEDICINE

## 2020-10-21 PROCEDURE — 99231 SBSQ HOSP IP/OBS SF/LOW 25: CPT | Performed by: INTERNAL MEDICINE

## 2020-10-21 PROCEDURE — 92610 EVALUATE SWALLOWING FUNCTION: CPT

## 2020-10-21 PROCEDURE — 94799 UNLISTED PULMONARY SVC/PX: CPT

## 2020-10-21 PROCEDURE — 82962 GLUCOSE BLOOD TEST: CPT

## 2020-10-21 PROCEDURE — 84100 ASSAY OF PHOSPHORUS: CPT | Performed by: INTERNAL MEDICINE

## 2020-10-21 RX ORDER — FLUDROCORTISONE ACETATE 0.1 MG/1
100 TABLET ORAL ONCE
Status: COMPLETED | OUTPATIENT
Start: 2020-10-21 | End: 2020-10-21

## 2020-10-21 RX ORDER — SODIUM POLYSTYRENE SULFONATE 15 G/60ML
15 SUSPENSION ORAL; RECTAL ONCE
Status: COMPLETED | OUTPATIENT
Start: 2020-10-21 | End: 2020-10-21

## 2020-10-21 RX ORDER — FLUDROCORTISONE ACETATE 0.1 MG/1
100 TABLET ORAL DAILY
Status: DISCONTINUED | OUTPATIENT
Start: 2020-10-21 | End: 2020-10-21

## 2020-10-21 RX ADMIN — AMLODIPINE BESYLATE 5 MG: 5 TABLET ORAL at 08:02

## 2020-10-21 RX ADMIN — SODIUM CHLORIDE 16 UNITS/HR: 9 INJECTION, SOLUTION INTRAVENOUS at 01:36

## 2020-10-21 RX ADMIN — FLUDROCORTISONE ACETATE 100 MCG: 0.1 TABLET ORAL at 11:32

## 2020-10-21 RX ADMIN — Medication 10 ML: at 21:06

## 2020-10-21 RX ADMIN — FLUCONAZOLE 100 MG: 100 TABLET ORAL at 15:20

## 2020-10-21 RX ADMIN — Medication 10 ML: at 08:00

## 2020-10-21 RX ADMIN — DEXTROSE MONOHYDRATE 25 ML: 25 INJECTION, SOLUTION INTRAVENOUS at 10:07

## 2020-10-21 RX ADMIN — SULFAMETHOXAZOLE AND TRIMETHOPRIM 206.4 MG OF TRIMETHOPRIM: 80; 16 INJECTION, SOLUTION, CONCENTRATE INTRAVENOUS at 09:58

## 2020-10-21 RX ADMIN — GABAPENTIN 100 MG: 100 CAPSULE ORAL at 14:20

## 2020-10-21 RX ADMIN — PANTOPRAZOLE SODIUM 40 MG: 40 INJECTION, POWDER, FOR SOLUTION INTRAVENOUS at 08:00

## 2020-10-21 RX ADMIN — PANTOPRAZOLE SODIUM 40 MG: 40 INJECTION, POWDER, FOR SOLUTION INTRAVENOUS at 17:05

## 2020-10-21 RX ADMIN — ATORVASTATIN CALCIUM 40 MG: 40 TABLET, FILM COATED ORAL at 21:06

## 2020-10-21 RX ADMIN — SULFAMETHOXAZOLE AND TRIMETHOPRIM 206.4 MG OF TRIMETHOPRIM: 80; 16 INJECTION, SOLUTION, CONCENTRATE INTRAVENOUS at 17:05

## 2020-10-21 RX ADMIN — GABAPENTIN 100 MG: 100 CAPSULE ORAL at 21:06

## 2020-10-21 RX ADMIN — METHYLPREDNISOLONE SODIUM SUCCINATE 20 MG: 40 INJECTION, POWDER, FOR SOLUTION INTRAMUSCULAR; INTRAVENOUS at 11:33

## 2020-10-21 RX ADMIN — GABAPENTIN 100 MG: 100 CAPSULE ORAL at 05:03

## 2020-10-21 RX ADMIN — SULFAMETHOXAZOLE AND TRIMETHOPRIM 206.4 MG OF TRIMETHOPRIM: 80; 16 INJECTION, SOLUTION, CONCENTRATE INTRAVENOUS at 01:33

## 2020-10-21 RX ADMIN — SODIUM POLYSTYRENE SULFONATE 15 G: 15 SUSPENSION ORAL; RECTAL at 11:33

## 2020-10-21 RX ADMIN — SODIUM CHLORIDE 9 UNITS/HR: 9 INJECTION, SOLUTION INTRAVENOUS at 09:58

## 2020-10-22 ENCOUNTER — APPOINTMENT (OUTPATIENT)
Dept: GENERAL RADIOLOGY | Facility: HOSPITAL | Age: 70
End: 2020-10-22

## 2020-10-22 LAB
ALBUMIN SERPL-MCNC: 2.5 G/DL (ref 3.5–5.2)
ALBUMIN/GLOB SERPL: 1.1 G/DL
ALP SERPL-CCNC: 245 U/L (ref 39–117)
ALT SERPL W P-5'-P-CCNC: 44 U/L (ref 1–33)
ANION GAP SERPL CALCULATED.3IONS-SCNC: 10 MMOL/L (ref 5–15)
ANISOCYTOSIS BLD QL: ABNORMAL
AST SERPL-CCNC: 51 U/L (ref 1–32)
BACTERIA SPEC AEROBE CULT: ABNORMAL
BACTERIA SPEC AEROBE CULT: ABNORMAL
BILIRUB SERPL-MCNC: 0.2 MG/DL (ref 0–1.2)
BUN SERPL-MCNC: 61 MG/DL (ref 8–23)
BUN/CREAT SERPL: 35.1 (ref 7–25)
CALCIUM SPEC-SCNC: 8.8 MG/DL (ref 8.6–10.5)
CHLORIDE SERPL-SCNC: 97 MMOL/L (ref 98–107)
CO2 SERPL-SCNC: 31 MMOL/L (ref 22–29)
CREAT SERPL-MCNC: 1.74 MG/DL (ref 0.57–1)
DEPRECATED RDW RBC AUTO: 62.6 FL (ref 37–54)
ERYTHROCYTE [DISTWIDTH] IN BLOOD BY AUTOMATED COUNT: 19.1 % (ref 12.3–15.4)
GFR SERPL CREATININE-BSD FRML MDRD: 29 ML/MIN/1.73
GLOBULIN UR ELPH-MCNC: 2.2 GM/DL
GLUCOSE BLDC GLUCOMTR-MCNC: 110 MG/DL (ref 70–105)
GLUCOSE BLDC GLUCOMTR-MCNC: 120 MG/DL (ref 70–105)
GLUCOSE BLDC GLUCOMTR-MCNC: 128 MG/DL (ref 70–105)
GLUCOSE BLDC GLUCOMTR-MCNC: 135 MG/DL (ref 70–105)
GLUCOSE BLDC GLUCOMTR-MCNC: 155 MG/DL (ref 70–105)
GLUCOSE BLDC GLUCOMTR-MCNC: 165 MG/DL (ref 70–105)
GLUCOSE BLDC GLUCOMTR-MCNC: 172 MG/DL (ref 70–105)
GLUCOSE BLDC GLUCOMTR-MCNC: 201 MG/DL (ref 70–105)
GLUCOSE BLDC GLUCOMTR-MCNC: 234 MG/DL (ref 70–105)
GLUCOSE BLDC GLUCOMTR-MCNC: 442 MG/DL (ref 70–105)
GLUCOSE BLDC GLUCOMTR-MCNC: 88 MG/DL (ref 70–105)
GLUCOSE SERPL-MCNC: 184 MG/DL (ref 65–99)
GRAM STN SPEC: ABNORMAL
GRAM STN SPEC: ABNORMAL
HCT VFR BLD AUTO: 24.4 % (ref 34–46.6)
HGB BLD-MCNC: 8 G/DL (ref 12–15.9)
LYMPHOCYTES # BLD MANUAL: 0.32 10*3/MM3 (ref 0.7–3.1)
LYMPHOCYTES NFR BLD MANUAL: 4 % (ref 19.6–45.3)
MAGNESIUM SERPL-MCNC: 1.6 MG/DL (ref 1.6–2.4)
MCH RBC QN AUTO: 31 PG (ref 26.6–33)
MCHC RBC AUTO-ENTMCNC: 32.8 G/DL (ref 31.5–35.7)
MCV RBC AUTO: 94.6 FL (ref 79–97)
METAMYELOCYTES NFR BLD MANUAL: 7 % (ref 0–0)
NEUTROPHILS # BLD AUTO: 7.12 10*3/MM3 (ref 1.7–7)
NEUTROPHILS NFR BLD MANUAL: 78 % (ref 42.7–76)
NEUTS BAND NFR BLD MANUAL: 11 % (ref 0–5)
NRBC SPEC MANUAL: 3 /100 WBC (ref 0–0.2)
PHOSPHATE SERPL-MCNC: 4.7 MG/DL (ref 2.5–4.5)
PLAT MORPH BLD: NORMAL
PLATELET # BLD AUTO: 273 10*3/MM3 (ref 140–450)
PMV BLD AUTO: 9.2 FL (ref 6–12)
POTASSIUM SERPL-SCNC: 4.3 MMOL/L (ref 3.5–5.2)
PROT SERPL-MCNC: 4.7 G/DL (ref 6–8.5)
RBC # BLD AUTO: 2.58 10*6/MM3 (ref 3.77–5.28)
SCAN SLIDE: NORMAL
SODIUM SERPL-SCNC: 138 MMOL/L (ref 136–145)
WBC # BLD AUTO: 8 10*3/MM3 (ref 3.4–10.8)
WBC MORPH BLD: NORMAL

## 2020-10-22 PROCEDURE — 97163 PT EVAL HIGH COMPLEX 45 MIN: CPT

## 2020-10-22 PROCEDURE — 25010000002 METHYLPREDNISOLONE PER 40 MG: Performed by: INTERNAL MEDICINE

## 2020-10-22 PROCEDURE — 85007 BL SMEAR W/DIFF WBC COUNT: CPT | Performed by: INTERNAL MEDICINE

## 2020-10-22 PROCEDURE — 97535 SELF CARE MNGMENT TRAINING: CPT

## 2020-10-22 PROCEDURE — 80053 COMPREHEN METABOLIC PANEL: CPT | Performed by: INTERNAL MEDICINE

## 2020-10-22 PROCEDURE — 63710000001 INSULIN LISPRO (HUMAN) PER 5 UNITS: Performed by: INTERNAL MEDICINE

## 2020-10-22 PROCEDURE — 63710000001 INSULIN GLARGINE PER 5 UNITS: Performed by: INTERNAL MEDICINE

## 2020-10-22 PROCEDURE — 71045 X-RAY EXAM CHEST 1 VIEW: CPT

## 2020-10-22 PROCEDURE — 92526 ORAL FUNCTION THERAPY: CPT

## 2020-10-22 PROCEDURE — 97165 OT EVAL LOW COMPLEX 30 MIN: CPT

## 2020-10-22 PROCEDURE — 94799 UNLISTED PULMONARY SVC/PX: CPT

## 2020-10-22 PROCEDURE — 84100 ASSAY OF PHOSPHORUS: CPT | Performed by: INTERNAL MEDICINE

## 2020-10-22 PROCEDURE — 94640 AIRWAY INHALATION TREATMENT: CPT

## 2020-10-22 PROCEDURE — 63710000001 INSULIN REGULAR HUMAN PER 5 UNITS: Performed by: INTERNAL MEDICINE

## 2020-10-22 PROCEDURE — 82962 GLUCOSE BLOOD TEST: CPT

## 2020-10-22 PROCEDURE — 83735 ASSAY OF MAGNESIUM: CPT | Performed by: INTERNAL MEDICINE

## 2020-10-22 PROCEDURE — 85025 COMPLETE CBC W/AUTO DIFF WBC: CPT | Performed by: INTERNAL MEDICINE

## 2020-10-22 PROCEDURE — 99231 SBSQ HOSP IP/OBS SF/LOW 25: CPT | Performed by: INTERNAL MEDICINE

## 2020-10-22 PROCEDURE — 97530 THERAPEUTIC ACTIVITIES: CPT

## 2020-10-22 RX ORDER — INSULIN LISPRO 100 [IU]/ML
10 INJECTION, SOLUTION INTRAVENOUS; SUBCUTANEOUS
Status: DISCONTINUED | OUTPATIENT
Start: 2020-10-22 | End: 2020-10-29

## 2020-10-22 RX ORDER — INSULIN LISPRO 100 [IU]/ML
0-14 INJECTION, SOLUTION INTRAVENOUS; SUBCUTANEOUS
Status: DISCONTINUED | OUTPATIENT
Start: 2020-10-22 | End: 2020-10-30

## 2020-10-22 RX ORDER — INSULIN LISPRO 100 [IU]/ML
0-14 INJECTION, SOLUTION INTRAVENOUS; SUBCUTANEOUS AS NEEDED
Status: DISCONTINUED | OUTPATIENT
Start: 2020-10-22 | End: 2020-10-30

## 2020-10-22 RX ORDER — NICOTINE POLACRILEX 4 MG
15 LOZENGE BUCCAL
Status: DISCONTINUED | OUTPATIENT
Start: 2020-10-22 | End: 2020-11-06 | Stop reason: HOSPADM

## 2020-10-22 RX ORDER — BUDESONIDE 0.5 MG/2ML
0.5 INHALANT ORAL
Status: DISCONTINUED | OUTPATIENT
Start: 2020-10-22 | End: 2020-11-06 | Stop reason: HOSPADM

## 2020-10-22 RX ORDER — INSULIN GLARGINE 100 [IU]/ML
15 INJECTION, SOLUTION SUBCUTANEOUS EVERY 12 HOURS SCHEDULED
Status: DISCONTINUED | OUTPATIENT
Start: 2020-10-22 | End: 2020-11-01

## 2020-10-22 RX ORDER — SULFAMETHOXAZOLE AND TRIMETHOPRIM 800; 160 MG/1; MG/1
2 TABLET ORAL EVERY 12 HOURS SCHEDULED
Status: DISCONTINUED | OUTPATIENT
Start: 2020-10-22 | End: 2020-10-30

## 2020-10-22 RX ORDER — DEXTROSE MONOHYDRATE 25 G/50ML
25 INJECTION, SOLUTION INTRAVENOUS
Status: DISCONTINUED | OUTPATIENT
Start: 2020-10-22 | End: 2020-11-06 | Stop reason: HOSPADM

## 2020-10-22 RX ORDER — ASPIRIN 81 MG/1
81 TABLET, CHEWABLE ORAL DAILY
Status: DISCONTINUED | OUTPATIENT
Start: 2020-10-23 | End: 2020-11-06 | Stop reason: HOSPADM

## 2020-10-22 RX ADMIN — GABAPENTIN 100 MG: 100 CAPSULE ORAL at 05:19

## 2020-10-22 RX ADMIN — INSULIN GLARGINE 15 UNITS: 100 INJECTION, SOLUTION SUBCUTANEOUS at 21:13

## 2020-10-22 RX ADMIN — SULFAMETHOXAZOLE AND TRIMETHOPRIM 206.4 MG OF TRIMETHOPRIM: 80; 16 INJECTION, SOLUTION, CONCENTRATE INTRAVENOUS at 02:26

## 2020-10-22 RX ADMIN — INSULIN LISPRO 14 UNITS: 100 INJECTION, SOLUTION INTRAVENOUS; SUBCUTANEOUS at 21:13

## 2020-10-22 RX ADMIN — METHYLPREDNISOLONE SODIUM SUCCINATE 20 MG: 40 INJECTION, POWDER, FOR SOLUTION INTRAMUSCULAR; INTRAVENOUS at 12:40

## 2020-10-22 RX ADMIN — BUDESONIDE INHALATION 0.5 MG: 0.5 SUSPENSION RESPIRATORY (INHALATION) at 20:34

## 2020-10-22 RX ADMIN — PANTOPRAZOLE SODIUM 40 MG: 40 INJECTION, POWDER, FOR SOLUTION INTRAVENOUS at 17:03

## 2020-10-22 RX ADMIN — ATORVASTATIN CALCIUM 40 MG: 40 TABLET, FILM COATED ORAL at 21:13

## 2020-10-22 RX ADMIN — AMLODIPINE BESYLATE 5 MG: 5 TABLET ORAL at 08:02

## 2020-10-22 RX ADMIN — SULFAMETHOXAZOLE AND TRIMETHOPRIM 320 MG: 800; 160 TABLET ORAL at 21:13

## 2020-10-22 RX ADMIN — SULFAMETHOXAZOLE AND TRIMETHOPRIM 206.4 MG OF TRIMETHOPRIM: 80; 16 INJECTION, SOLUTION, CONCENTRATE INTRAVENOUS at 09:29

## 2020-10-22 RX ADMIN — Medication 10 ML: at 22:04

## 2020-10-22 RX ADMIN — PANTOPRAZOLE SODIUM 40 MG: 40 INJECTION, POWDER, FOR SOLUTION INTRAVENOUS at 08:02

## 2020-10-22 RX ADMIN — INSULIN LISPRO 10 UNITS: 100 INJECTION, SOLUTION INTRAVENOUS; SUBCUTANEOUS at 12:41

## 2020-10-22 RX ADMIN — SODIUM CHLORIDE 13 UNITS/HR: 9 INJECTION, SOLUTION INTRAVENOUS at 02:23

## 2020-10-22 RX ADMIN — Medication 10 ML: at 08:02

## 2020-10-22 RX ADMIN — GABAPENTIN 100 MG: 100 CAPSULE ORAL at 14:16

## 2020-10-22 RX ADMIN — INSULIN LISPRO 10 UNITS: 100 INJECTION, SOLUTION INTRAVENOUS; SUBCUTANEOUS at 17:04

## 2020-10-22 RX ADMIN — ASPIRIN 81 MG: 81 TABLET, COATED ORAL at 08:02

## 2020-10-22 RX ADMIN — INSULIN GLARGINE 15 UNITS: 100 INJECTION, SOLUTION SUBCUTANEOUS at 11:03

## 2020-10-22 RX ADMIN — METHYLPREDNISOLONE SODIUM SUCCINATE 20 MG: 40 INJECTION, POWDER, FOR SOLUTION INTRAMUSCULAR; INTRAVENOUS at 00:02

## 2020-10-22 RX ADMIN — FLUCONAZOLE 100 MG: 100 TABLET ORAL at 16:03

## 2020-10-22 RX ADMIN — INSULIN LISPRO 5 UNITS: 100 INJECTION, SOLUTION INTRAVENOUS; SUBCUTANEOUS at 12:40

## 2020-10-22 RX ADMIN — GABAPENTIN 100 MG: 100 CAPSULE ORAL at 21:13

## 2020-10-23 ENCOUNTER — APPOINTMENT (OUTPATIENT)
Dept: GENERAL RADIOLOGY | Facility: HOSPITAL | Age: 70
End: 2020-10-23

## 2020-10-23 LAB
ANION GAP SERPL CALCULATED.3IONS-SCNC: 10 MMOL/L (ref 5–15)
ANISOCYTOSIS BLD QL: ABNORMAL
BUN SERPL-MCNC: 63 MG/DL (ref 8–23)
BUN/CREAT SERPL: 36.2 (ref 7–25)
CALCIUM SPEC-SCNC: 9.6 MG/DL (ref 8.6–10.5)
CHLORIDE SERPL-SCNC: 99 MMOL/L (ref 98–107)
CMV DNA SPEC QL NAA+PROBE: NEGATIVE
CO2 SERPL-SCNC: 30 MMOL/L (ref 22–29)
CREAT SERPL-MCNC: 1.74 MG/DL (ref 0.57–1)
DACRYOCYTES BLD QL SMEAR: ABNORMAL
DEPRECATED RDW RBC AUTO: 67.4 FL (ref 37–54)
ERYTHROCYTE [DISTWIDTH] IN BLOOD BY AUTOMATED COUNT: 19.6 % (ref 12.3–15.4)
GFR SERPL CREATININE-BSD FRML MDRD: 29 ML/MIN/1.73
GLUCOSE BLDC GLUCOMTR-MCNC: 156 MG/DL (ref 70–105)
GLUCOSE BLDC GLUCOMTR-MCNC: 166 MG/DL (ref 70–105)
GLUCOSE BLDC GLUCOMTR-MCNC: 171 MG/DL (ref 70–105)
GLUCOSE BLDC GLUCOMTR-MCNC: 245 MG/DL (ref 70–105)
GLUCOSE SERPL-MCNC: 113 MG/DL (ref 65–99)
HCT VFR BLD AUTO: 30.7 % (ref 34–46.6)
HGB BLD-MCNC: 9.6 G/DL (ref 12–15.9)
LYMPHOCYTES # BLD MANUAL: 0.16 10*3/MM3 (ref 0.7–3.1)
LYMPHOCYTES NFR BLD MANUAL: 2 % (ref 19.6–45.3)
LYMPHOCYTES NFR BLD MANUAL: 3 % (ref 5–12)
MACROCYTES BLD QL SMEAR: ABNORMAL
MAGNESIUM SERPL-MCNC: 1.8 MG/DL (ref 1.6–2.4)
MCH RBC QN AUTO: 30.9 PG (ref 26.6–33)
MCHC RBC AUTO-ENTMCNC: 31.2 G/DL (ref 31.5–35.7)
MCV RBC AUTO: 98.9 FL (ref 79–97)
METAMYELOCYTES NFR BLD MANUAL: 1 % (ref 0–0)
MONOCYTES # BLD AUTO: 0.24 10*3/MM3 (ref 0.1–0.9)
NEUTROPHILS # BLD AUTO: 7.43 10*3/MM3 (ref 1.7–7)
NEUTROPHILS NFR BLD MANUAL: 93 % (ref 42.7–76)
NEUTS BAND NFR BLD MANUAL: 1 % (ref 0–5)
PHOSPHATE SERPL-MCNC: 4.6 MG/DL (ref 2.5–4.5)
PLAT MORPH BLD: NORMAL
PLATELET # BLD AUTO: 274 10*3/MM3 (ref 140–450)
PMV BLD AUTO: 9.6 FL (ref 6–12)
POTASSIUM SERPL-SCNC: 5.6 MMOL/L (ref 3.5–5.2)
RBC # BLD AUTO: 3.1 10*6/MM3 (ref 3.77–5.28)
SCAN SLIDE: NORMAL
SODIUM SERPL-SCNC: 139 MMOL/L (ref 136–145)
SPECIMEN SOURCE: NORMAL
WBC # BLD AUTO: 7.9 10*3/MM3 (ref 3.4–10.8)
WBC MORPH BLD: NORMAL

## 2020-10-23 PROCEDURE — 99232 SBSQ HOSP IP/OBS MODERATE 35: CPT | Performed by: HOSPITALIST

## 2020-10-23 PROCEDURE — 97530 THERAPEUTIC ACTIVITIES: CPT

## 2020-10-23 PROCEDURE — 25010000003 MAGNESIUM SULFATE 4 GM/100ML SOLUTION: Performed by: INTERNAL MEDICINE

## 2020-10-23 PROCEDURE — 99231 SBSQ HOSP IP/OBS SF/LOW 25: CPT | Performed by: INTERNAL MEDICINE

## 2020-10-23 PROCEDURE — 82962 GLUCOSE BLOOD TEST: CPT

## 2020-10-23 PROCEDURE — 85025 COMPLETE CBC W/AUTO DIFF WBC: CPT | Performed by: INTERNAL MEDICINE

## 2020-10-23 PROCEDURE — 83735 ASSAY OF MAGNESIUM: CPT | Performed by: INTERNAL MEDICINE

## 2020-10-23 PROCEDURE — 94799 UNLISTED PULMONARY SVC/PX: CPT

## 2020-10-23 PROCEDURE — 71045 X-RAY EXAM CHEST 1 VIEW: CPT

## 2020-10-23 PROCEDURE — 97112 NEUROMUSCULAR REEDUCATION: CPT

## 2020-10-23 PROCEDURE — 85007 BL SMEAR W/DIFF WBC COUNT: CPT | Performed by: INTERNAL MEDICINE

## 2020-10-23 PROCEDURE — 92526 ORAL FUNCTION THERAPY: CPT

## 2020-10-23 PROCEDURE — 63710000001 INSULIN GLARGINE PER 5 UNITS: Performed by: INTERNAL MEDICINE

## 2020-10-23 PROCEDURE — 80048 BASIC METABOLIC PNL TOTAL CA: CPT | Performed by: INTERNAL MEDICINE

## 2020-10-23 PROCEDURE — 84100 ASSAY OF PHOSPHORUS: CPT | Performed by: INTERNAL MEDICINE

## 2020-10-23 PROCEDURE — 25010000002 METHYLPREDNISOLONE PER 40 MG: Performed by: INTERNAL MEDICINE

## 2020-10-23 PROCEDURE — 63710000001 INSULIN LISPRO (HUMAN) PER 5 UNITS: Performed by: INTERNAL MEDICINE

## 2020-10-23 PROCEDURE — 63710000001 PREDNISONE PER 1 MG: Performed by: INTERNAL MEDICINE

## 2020-10-23 RX ORDER — FLUDROCORTISONE ACETATE 0.1 MG/1
100 TABLET ORAL ONCE
Status: COMPLETED | OUTPATIENT
Start: 2020-10-23 | End: 2020-10-23

## 2020-10-23 RX ORDER — PREDNISONE 20 MG/1
20 TABLET ORAL
Status: DISCONTINUED | OUTPATIENT
Start: 2020-10-23 | End: 2020-10-29

## 2020-10-23 RX ORDER — PANTOPRAZOLE SODIUM 40 MG/1
40 TABLET, DELAYED RELEASE ORAL
Status: DISCONTINUED | OUTPATIENT
Start: 2020-10-23 | End: 2020-10-27

## 2020-10-23 RX ORDER — SODIUM POLYSTYRENE SULFONATE 15 G/60ML
15 SUSPENSION ORAL; RECTAL ONCE
Status: COMPLETED | OUTPATIENT
Start: 2020-10-23 | End: 2020-10-23

## 2020-10-23 RX ORDER — DIPHENHYDRAMINE HYDROCHLORIDE AND LIDOCAINE HYDROCHLORIDE AND ALUMINUM HYDROXIDE AND MAGNESIUM HYDRO
5 KIT EVERY 6 HOURS
Status: DISCONTINUED | OUTPATIENT
Start: 2020-10-23 | End: 2020-11-06 | Stop reason: HOSPADM

## 2020-10-23 RX ADMIN — GABAPENTIN 100 MG: 100 CAPSULE ORAL at 06:12

## 2020-10-23 RX ADMIN — GABAPENTIN 100 MG: 100 CAPSULE ORAL at 23:04

## 2020-10-23 RX ADMIN — INSULIN LISPRO 10 UNITS: 100 INJECTION, SOLUTION INTRAVENOUS; SUBCUTANEOUS at 08:40

## 2020-10-23 RX ADMIN — INSULIN LISPRO 3 UNITS: 100 INJECTION, SOLUTION INTRAVENOUS; SUBCUTANEOUS at 08:40

## 2020-10-23 RX ADMIN — INSULIN GLARGINE 15 UNITS: 100 INJECTION, SOLUTION SUBCUTANEOUS at 08:40

## 2020-10-23 RX ADMIN — AMLODIPINE BESYLATE 5 MG: 5 TABLET ORAL at 08:40

## 2020-10-23 RX ADMIN — PANTOPRAZOLE SODIUM 40 MG: 40 INJECTION, POWDER, FOR SOLUTION INTRAVENOUS at 06:12

## 2020-10-23 RX ADMIN — ASPIRIN 81 MG CHEWABLE TABLET 81 MG: 81 TABLET CHEWABLE at 08:40

## 2020-10-23 RX ADMIN — INSULIN LISPRO 3 UNITS: 100 INJECTION, SOLUTION INTRAVENOUS; SUBCUTANEOUS at 12:32

## 2020-10-23 RX ADMIN — ATORVASTATIN CALCIUM 40 MG: 40 TABLET, FILM COATED ORAL at 19:54

## 2020-10-23 RX ADMIN — INSULIN LISPRO 3 UNITS: 100 INJECTION, SOLUTION INTRAVENOUS; SUBCUTANEOUS at 17:07

## 2020-10-23 RX ADMIN — INSULIN LISPRO 5 UNITS: 100 INJECTION, SOLUTION INTRAVENOUS; SUBCUTANEOUS at 20:07

## 2020-10-23 RX ADMIN — DIPHENHYDRAMINE HYDROCHLORIDE AND LIDOCAINE HYDROCHLORIDE AND ALUMINUM HYDROXIDE AND MAGNESIUM HYDRO 5 ML: KIT at 23:04

## 2020-10-23 RX ADMIN — SULFAMETHOXAZOLE AND TRIMETHOPRIM 320 MG: 800; 160 TABLET ORAL at 19:55

## 2020-10-23 RX ADMIN — FLUDROCORTISONE ACETATE 100 MCG: 0.1 TABLET ORAL at 12:31

## 2020-10-23 RX ADMIN — DIPHENHYDRAMINE HYDROCHLORIDE AND LIDOCAINE HYDROCHLORIDE AND ALUMINUM HYDROXIDE AND MAGNESIUM HYDRO 5 ML: KIT at 12:31

## 2020-10-23 RX ADMIN — METHYLPREDNISOLONE SODIUM SUCCINATE 20 MG: 40 INJECTION, POWDER, FOR SOLUTION INTRAMUSCULAR; INTRAVENOUS at 00:08

## 2020-10-23 RX ADMIN — INSULIN LISPRO 10 UNITS: 100 INJECTION, SOLUTION INTRAVENOUS; SUBCUTANEOUS at 17:07

## 2020-10-23 RX ADMIN — SODIUM POLYSTYRENE SULFONATE 15 G: 15 SUSPENSION ORAL; RECTAL at 16:10

## 2020-10-23 RX ADMIN — Medication 10 ML: at 08:41

## 2020-10-23 RX ADMIN — DIPHENHYDRAMINE HYDROCHLORIDE AND LIDOCAINE HYDROCHLORIDE AND ALUMINUM HYDROXIDE AND MAGNESIUM HYDRO 5 ML: KIT at 17:06

## 2020-10-23 RX ADMIN — BUDESONIDE INHALATION 0.5 MG: 0.5 SUSPENSION RESPIRATORY (INHALATION) at 08:30

## 2020-10-23 RX ADMIN — INSULIN LISPRO 10 UNITS: 100 INJECTION, SOLUTION INTRAVENOUS; SUBCUTANEOUS at 12:31

## 2020-10-23 RX ADMIN — INSULIN GLARGINE 15 UNITS: 100 INJECTION, SOLUTION SUBCUTANEOUS at 19:53

## 2020-10-23 RX ADMIN — PREDNISONE 20 MG: 20 TABLET ORAL at 12:31

## 2020-10-23 RX ADMIN — MAGNESIUM SULFATE HEPTAHYDRATE 4 G: 40 INJECTION, SOLUTION INTRAVENOUS at 04:55

## 2020-10-23 RX ADMIN — SULFAMETHOXAZOLE AND TRIMETHOPRIM 320 MG: 800; 160 TABLET ORAL at 08:38

## 2020-10-23 RX ADMIN — Medication 10 ML: at 19:53

## 2020-10-23 RX ADMIN — BUDESONIDE INHALATION 0.5 MG: 0.5 SUSPENSION RESPIRATORY (INHALATION) at 20:40

## 2020-10-24 ENCOUNTER — APPOINTMENT (OUTPATIENT)
Dept: GENERAL RADIOLOGY | Facility: HOSPITAL | Age: 70
End: 2020-10-24

## 2020-10-24 LAB
ANION GAP SERPL CALCULATED.3IONS-SCNC: 9 MMOL/L (ref 5–15)
ANISOCYTOSIS BLD QL: ABNORMAL
ARTERIAL PATENCY WRIST A: POSITIVE
ARTERIAL PATENCY WRIST A: POSITIVE
ATMOSPHERIC PRESS: ABNORMAL MM[HG]
ATMOSPHERIC PRESS: ABNORMAL MM[HG]
BASE EXCESS BLDA CALC-SCNC: 7.9 MMOL/L (ref 0–3)
BASE EXCESS BLDA CALC-SCNC: 9.2 MMOL/L (ref 0–3)
BDY SITE: ABNORMAL
BDY SITE: ABNORMAL
BUN SERPL-MCNC: 55 MG/DL (ref 8–23)
BUN/CREAT SERPL: 35 (ref 7–25)
CA-I BLDA-SCNC: 1.26 MMOL/L (ref 1.15–1.33)
CALCIUM SPEC-SCNC: 9.1 MG/DL (ref 8.6–10.5)
CHLORIDE SERPL-SCNC: 96 MMOL/L (ref 98–107)
CO2 BLDA-SCNC: 35.4 MMOL/L (ref 22–29)
CO2 BLDA-SCNC: 37.2 MMOL/L (ref 22–29)
CO2 SERPL-SCNC: 33 MMOL/L (ref 22–29)
CREAT SERPL-MCNC: 1.57 MG/DL (ref 0.57–1)
D-LACTATE SERPL-SCNC: 1.1 MMOL/L (ref 0.5–2)
DEPRECATED RDW RBC AUTO: 64.3 FL (ref 37–54)
ERYTHROCYTE [DISTWIDTH] IN BLOOD BY AUTOMATED COUNT: 19.6 % (ref 12.3–15.4)
GFR SERPL CREATININE-BSD FRML MDRD: 32 ML/MIN/1.73
GLUCOSE BLDC GLUCOMTR-MCNC: 149 MG/DL (ref 70–105)
GLUCOSE BLDC GLUCOMTR-MCNC: 213 MG/DL (ref 70–105)
GLUCOSE BLDC GLUCOMTR-MCNC: 73 MG/DL (ref 70–105)
GLUCOSE BLDC GLUCOMTR-MCNC: 74 MG/DL (ref 70–105)
GLUCOSE BLDC GLUCOMTR-MCNC: 77 MG/DL (ref 74–100)
GLUCOSE BLDC GLUCOMTR-MCNC: 77 MG/DL (ref 74–100)
GLUCOSE BLDC GLUCOMTR-MCNC: 93 MG/DL (ref 70–105)
GLUCOSE SERPL-MCNC: 83 MG/DL (ref 65–99)
HCO3 BLDA-SCNC: 33.7 MMOL/L (ref 21–28)
HCO3 BLDA-SCNC: 35.5 MMOL/L (ref 21–28)
HCT VFR BLD AUTO: 30 % (ref 34–46.6)
HCT VFR BLDA CALC: 29 % (ref 38–51)
HEMODILUTION: NO
HEMODILUTION: NO
HGB BLD-MCNC: 9.8 G/DL (ref 12–15.9)
HGB BLDA-MCNC: 9.8 G/DL (ref 12–17)
INHALED O2 CONCENTRATION: 95 %
INHALED O2 CONCENTRATION: <21 %
LYMPHOCYTES # BLD MANUAL: 1.22 10*3/MM3 (ref 0.7–3.1)
LYMPHOCYTES NFR BLD MANUAL: 1 % (ref 5–12)
LYMPHOCYTES NFR BLD MANUAL: 17 % (ref 19.6–45.3)
MAGNESIUM SERPL-MCNC: 2 MG/DL (ref 1.6–2.4)
MCH RBC QN AUTO: 30.6 PG (ref 26.6–33)
MCHC RBC AUTO-ENTMCNC: 32.6 G/DL (ref 31.5–35.7)
MCV RBC AUTO: 94 FL (ref 79–97)
MODALITY: ABNORMAL
MODALITY: ABNORMAL
MONOCYTES # BLD AUTO: 0.07 10*3/MM3 (ref 0.1–0.9)
NEUTROPHILS # BLD AUTO: 5.9 10*3/MM3 (ref 1.7–7)
NEUTROPHILS NFR BLD MANUAL: 81 % (ref 42.7–76)
NEUTS BAND NFR BLD MANUAL: 1 % (ref 0–5)
PCO2 BLDA: 53.2 MM HG (ref 35–48)
PCO2 BLDA: 57.5 MM HG (ref 35–48)
PEEP RESPIRATORY: 8 CM[H2O]
PH BLDA: 7.4 PH UNITS (ref 7.35–7.45)
PH BLDA: 7.41 PH UNITS (ref 7.35–7.45)
PHOSPHATE SERPL-MCNC: 3.8 MG/DL (ref 2.5–4.5)
PLAT MORPH BLD: NORMAL
PLATELET # BLD AUTO: 347 10*3/MM3 (ref 140–450)
PMV BLD AUTO: 9.3 FL (ref 6–12)
PO2 BLDA: 104.7 MM HG (ref 83–108)
PO2 BLDA: 49.6 MM HG (ref 83–108)
POTASSIUM BLDA-SCNC: 4.9 MMOL/L (ref 3.5–4.5)
POTASSIUM SERPL-SCNC: 4.7 MMOL/L (ref 3.5–5.2)
PSV: 20 CMH2O
RBC # BLD AUTO: 3.19 10*6/MM3 (ref 3.77–5.28)
RESPIRATORY RATE: 16
SAO2 % BLDCOA: 84.1 % (ref 94–98)
SAO2 % BLDCOA: 97.8 % (ref 94–98)
SCAN SLIDE: NORMAL
SODIUM BLD-SCNC: 141 MMOL/L (ref 138–146)
SODIUM SERPL-SCNC: 138 MMOL/L (ref 136–145)
VENTILATOR MODE: ABNORMAL
WBC # BLD AUTO: 7.2 10*3/MM3 (ref 3.4–10.8)
WBC MORPH BLD: NORMAL

## 2020-10-24 PROCEDURE — 82803 BLOOD GASES ANY COMBINATION: CPT

## 2020-10-24 PROCEDURE — 63710000001 INSULIN GLARGINE PER 5 UNITS: Performed by: INTERNAL MEDICINE

## 2020-10-24 PROCEDURE — 85025 COMPLETE CBC W/AUTO DIFF WBC: CPT | Performed by: INTERNAL MEDICINE

## 2020-10-24 PROCEDURE — 94799 UNLISTED PULMONARY SVC/PX: CPT

## 2020-10-24 PROCEDURE — 94660 CPAP INITIATION&MGMT: CPT

## 2020-10-24 PROCEDURE — 83605 ASSAY OF LACTIC ACID: CPT

## 2020-10-24 PROCEDURE — 85007 BL SMEAR W/DIFF WBC COUNT: CPT | Performed by: INTERNAL MEDICINE

## 2020-10-24 PROCEDURE — 80051 ELECTROLYTE PANEL: CPT

## 2020-10-24 PROCEDURE — 36600 WITHDRAWAL OF ARTERIAL BLOOD: CPT

## 2020-10-24 PROCEDURE — 63710000001 PREDNISONE PER 1 MG: Performed by: INTERNAL MEDICINE

## 2020-10-24 PROCEDURE — 71045 X-RAY EXAM CHEST 1 VIEW: CPT

## 2020-10-24 PROCEDURE — 80048 BASIC METABOLIC PNL TOTAL CA: CPT | Performed by: INTERNAL MEDICINE

## 2020-10-24 PROCEDURE — 63710000001 INSULIN LISPRO (HUMAN) PER 5 UNITS: Performed by: INTERNAL MEDICINE

## 2020-10-24 PROCEDURE — 82330 ASSAY OF CALCIUM: CPT

## 2020-10-24 PROCEDURE — 85018 HEMOGLOBIN: CPT

## 2020-10-24 PROCEDURE — 82962 GLUCOSE BLOOD TEST: CPT

## 2020-10-24 PROCEDURE — 84100 ASSAY OF PHOSPHORUS: CPT | Performed by: INTERNAL MEDICINE

## 2020-10-24 PROCEDURE — 83735 ASSAY OF MAGNESIUM: CPT | Performed by: INTERNAL MEDICINE

## 2020-10-24 PROCEDURE — 99232 SBSQ HOSP IP/OBS MODERATE 35: CPT | Performed by: HOSPITALIST

## 2020-10-24 RX ORDER — BUMETANIDE 1 MG/1
1 TABLET ORAL DAILY
Status: DISCONTINUED | OUTPATIENT
Start: 2020-10-24 | End: 2020-11-06 | Stop reason: HOSPADM

## 2020-10-24 RX ORDER — ACETAMINOPHEN 325 MG/1
650 TABLET ORAL EVERY 6 HOURS PRN
Status: DISCONTINUED | OUTPATIENT
Start: 2020-10-24 | End: 2020-11-06 | Stop reason: HOSPADM

## 2020-10-24 RX ORDER — GUAIFENESIN AND DEXTROMETHORPHAN HYDROBROMIDE 600; 30 MG/1; MG/1
2 TABLET, EXTENDED RELEASE ORAL 2 TIMES DAILY PRN
Status: DISCONTINUED | OUTPATIENT
Start: 2020-10-24 | End: 2020-11-06 | Stop reason: HOSPADM

## 2020-10-24 RX ORDER — IPRATROPIUM BROMIDE AND ALBUTEROL SULFATE 2.5; .5 MG/3ML; MG/3ML
3 SOLUTION RESPIRATORY (INHALATION) ONCE
Status: COMPLETED | OUTPATIENT
Start: 2020-10-24 | End: 2020-10-24

## 2020-10-24 RX ADMIN — ACETAMINOPHEN 650 MG: 325 TABLET, FILM COATED ORAL at 15:49

## 2020-10-24 RX ADMIN — INSULIN GLARGINE 15 UNITS: 100 INJECTION, SOLUTION SUBCUTANEOUS at 21:23

## 2020-10-24 RX ADMIN — BUDESONIDE INHALATION 0.5 MG: 0.5 SUSPENSION RESPIRATORY (INHALATION) at 11:22

## 2020-10-24 RX ADMIN — DIPHENHYDRAMINE HYDROCHLORIDE AND LIDOCAINE HYDROCHLORIDE AND ALUMINUM HYDROXIDE AND MAGNESIUM HYDRO 5 ML: KIT at 12:52

## 2020-10-24 RX ADMIN — BUMETANIDE 1 MG: 1 TABLET ORAL at 12:52

## 2020-10-24 RX ADMIN — GABAPENTIN 100 MG: 100 CAPSULE ORAL at 15:05

## 2020-10-24 RX ADMIN — INSULIN LISPRO 10 UNITS: 100 INJECTION, SOLUTION INTRAVENOUS; SUBCUTANEOUS at 17:59

## 2020-10-24 RX ADMIN — SULFAMETHOXAZOLE AND TRIMETHOPRIM 320 MG: 800; 160 TABLET ORAL at 21:16

## 2020-10-24 RX ADMIN — IPRATROPIUM BROMIDE AND ALBUTEROL SULFATE 3 ML: .5; 3 SOLUTION RESPIRATORY (INHALATION) at 04:32

## 2020-10-24 RX ADMIN — GABAPENTIN 100 MG: 100 CAPSULE ORAL at 05:05

## 2020-10-24 RX ADMIN — FLUCONAZOLE 100 MG: 100 TABLET ORAL at 15:05

## 2020-10-24 RX ADMIN — PREDNISONE 20 MG: 20 TABLET ORAL at 09:38

## 2020-10-24 RX ADMIN — INSULIN GLARGINE 15 UNITS: 100 INJECTION, SOLUTION SUBCUTANEOUS at 09:39

## 2020-10-24 RX ADMIN — DIPHENHYDRAMINE HYDROCHLORIDE AND LIDOCAINE HYDROCHLORIDE AND ALUMINUM HYDROXIDE AND MAGNESIUM HYDRO 5 ML: KIT at 15:50

## 2020-10-24 RX ADMIN — BUDESONIDE INHALATION 0.5 MG: 0.5 SUSPENSION RESPIRATORY (INHALATION) at 19:36

## 2020-10-24 RX ADMIN — GABAPENTIN 100 MG: 100 CAPSULE ORAL at 21:13

## 2020-10-24 RX ADMIN — SULFAMETHOXAZOLE AND TRIMETHOPRIM 320 MG: 800; 160 TABLET ORAL at 12:52

## 2020-10-24 RX ADMIN — AMLODIPINE BESYLATE 5 MG: 5 TABLET ORAL at 09:38

## 2020-10-24 RX ADMIN — DIPHENHYDRAMINE HYDROCHLORIDE AND LIDOCAINE HYDROCHLORIDE AND ALUMINUM HYDROXIDE AND MAGNESIUM HYDRO 5 ML: KIT at 23:38

## 2020-10-24 RX ADMIN — PANTOPRAZOLE SODIUM 40 MG: 40 TABLET, DELAYED RELEASE ORAL at 09:38

## 2020-10-24 RX ADMIN — DIPHENHYDRAMINE HYDROCHLORIDE AND LIDOCAINE HYDROCHLORIDE AND ALUMINUM HYDROXIDE AND MAGNESIUM HYDRO 5 ML: KIT at 05:05

## 2020-10-24 RX ADMIN — ATORVASTATIN CALCIUM 40 MG: 40 TABLET, FILM COATED ORAL at 21:12

## 2020-10-24 RX ADMIN — INSULIN LISPRO 5 UNITS: 100 INJECTION, SOLUTION INTRAVENOUS; SUBCUTANEOUS at 17:59

## 2020-10-24 RX ADMIN — ASPIRIN 81 MG CHEWABLE TABLET 81 MG: 81 TABLET CHEWABLE at 09:38

## 2020-10-24 RX ADMIN — Medication 10 ML: at 09:39

## 2020-10-24 RX ADMIN — Medication 10 ML: at 21:15

## 2020-10-24 RX ADMIN — GUAIFENESIN AND DEXTROMETHORPHAN HYDROBROMIDE 2 TABLET: 600; 30 TABLET, EXTENDED RELEASE ORAL at 05:05

## 2020-10-24 RX ADMIN — PANTOPRAZOLE SODIUM 40 MG: 40 TABLET, DELAYED RELEASE ORAL at 15:50

## 2020-10-25 ENCOUNTER — APPOINTMENT (OUTPATIENT)
Dept: GENERAL RADIOLOGY | Facility: HOSPITAL | Age: 70
End: 2020-10-25

## 2020-10-25 ENCOUNTER — APPOINTMENT (OUTPATIENT)
Dept: CT IMAGING | Facility: HOSPITAL | Age: 70
End: 2020-10-25

## 2020-10-25 LAB
ANION GAP SERPL CALCULATED.3IONS-SCNC: 8 MMOL/L (ref 5–15)
ANISOCYTOSIS BLD QL: ABNORMAL
BUN SERPL-MCNC: 51 MG/DL (ref 8–23)
BUN/CREAT SERPL: 29.5 (ref 7–25)
CALCIUM SPEC-SCNC: 9.6 MG/DL (ref 8.6–10.5)
CHLORIDE SERPL-SCNC: 99 MMOL/L (ref 98–107)
CO2 SERPL-SCNC: 34 MMOL/L (ref 22–29)
CREAT SERPL-MCNC: 1.73 MG/DL (ref 0.57–1)
DEPRECATED RDW RBC AUTO: 64.8 FL (ref 37–54)
EOSINOPHIL # BLD MANUAL: 0.11 10*3/MM3 (ref 0–0.4)
EOSINOPHIL NFR BLD MANUAL: 2 % (ref 0.3–6.2)
ERYTHROCYTE [DISTWIDTH] IN BLOOD BY AUTOMATED COUNT: 19.3 % (ref 12.3–15.4)
GFR SERPL CREATININE-BSD FRML MDRD: 29 ML/MIN/1.73
GLUCOSE BLDC GLUCOMTR-MCNC: 199 MG/DL (ref 70–105)
GLUCOSE BLDC GLUCOMTR-MCNC: 232 MG/DL (ref 70–105)
GLUCOSE BLDC GLUCOMTR-MCNC: 237 MG/DL (ref 70–105)
GLUCOSE BLDC GLUCOMTR-MCNC: 81 MG/DL (ref 70–105)
GLUCOSE SERPL-MCNC: 75 MG/DL (ref 65–99)
HCT VFR BLD AUTO: 27.9 % (ref 34–46.6)
HGB BLD-MCNC: 9.1 G/DL (ref 12–15.9)
LYMPHOCYTES # BLD MANUAL: 0.59 10*3/MM3 (ref 0.7–3.1)
LYMPHOCYTES NFR BLD MANUAL: 11 % (ref 19.6–45.3)
LYMPHOCYTES NFR BLD MANUAL: 2 % (ref 5–12)
MAGNESIUM SERPL-MCNC: 1.7 MG/DL (ref 1.6–2.4)
MCH RBC QN AUTO: 30.8 PG (ref 26.6–33)
MCHC RBC AUTO-ENTMCNC: 32.5 G/DL (ref 31.5–35.7)
MCV RBC AUTO: 94.7 FL (ref 79–97)
MONOCYTES # BLD AUTO: 0.11 10*3/MM3 (ref 0.1–0.9)
NEUTROPHILS # BLD AUTO: 4.59 10*3/MM3 (ref 1.7–7)
NEUTROPHILS NFR BLD MANUAL: 85 % (ref 42.7–76)
NRBC SPEC MANUAL: 2 /100 WBC (ref 0–0.2)
PHOSPHATE SERPL-MCNC: 4.4 MG/DL (ref 2.5–4.5)
PLAT MORPH BLD: NORMAL
PLATELET # BLD AUTO: 282 10*3/MM3 (ref 140–450)
PMV BLD AUTO: 8.5 FL (ref 6–12)
POTASSIUM SERPL-SCNC: 5.1 MMOL/L (ref 3.5–5.2)
RBC # BLD AUTO: 2.95 10*6/MM3 (ref 3.77–5.28)
SCAN SLIDE: NORMAL
SODIUM SERPL-SCNC: 141 MMOL/L (ref 136–145)
WBC # BLD AUTO: 5.4 10*3/MM3 (ref 3.4–10.8)
WBC MORPH BLD: NORMAL

## 2020-10-25 PROCEDURE — 63710000001 INSULIN GLARGINE PER 5 UNITS: Performed by: INTERNAL MEDICINE

## 2020-10-25 PROCEDURE — 83735 ASSAY OF MAGNESIUM: CPT | Performed by: INTERNAL MEDICINE

## 2020-10-25 PROCEDURE — 94799 UNLISTED PULMONARY SVC/PX: CPT

## 2020-10-25 PROCEDURE — 84100 ASSAY OF PHOSPHORUS: CPT | Performed by: INTERNAL MEDICINE

## 2020-10-25 PROCEDURE — 71250 CT THORAX DX C-: CPT

## 2020-10-25 PROCEDURE — 63710000001 INSULIN LISPRO (HUMAN) PER 5 UNITS: Performed by: INTERNAL MEDICINE

## 2020-10-25 PROCEDURE — 63710000001 PREDNISONE PER 1 MG: Performed by: INTERNAL MEDICINE

## 2020-10-25 PROCEDURE — 99232 SBSQ HOSP IP/OBS MODERATE 35: CPT | Performed by: HOSPITALIST

## 2020-10-25 PROCEDURE — 80048 BASIC METABOLIC PNL TOTAL CA: CPT | Performed by: INTERNAL MEDICINE

## 2020-10-25 PROCEDURE — 82962 GLUCOSE BLOOD TEST: CPT

## 2020-10-25 PROCEDURE — 71045 X-RAY EXAM CHEST 1 VIEW: CPT

## 2020-10-25 PROCEDURE — 94760 N-INVAS EAR/PLS OXIMETRY 1: CPT

## 2020-10-25 PROCEDURE — 85007 BL SMEAR W/DIFF WBC COUNT: CPT | Performed by: INTERNAL MEDICINE

## 2020-10-25 PROCEDURE — 25010000003 MAGNESIUM SULFATE 4 GM/100ML SOLUTION: Performed by: INTERNAL MEDICINE

## 2020-10-25 PROCEDURE — 85025 COMPLETE CBC W/AUTO DIFF WBC: CPT | Performed by: INTERNAL MEDICINE

## 2020-10-25 RX ORDER — FLUDROCORTISONE ACETATE 0.1 MG/1
100 TABLET ORAL ONCE
Status: COMPLETED | OUTPATIENT
Start: 2020-10-25 | End: 2020-10-25

## 2020-10-25 RX ADMIN — ATORVASTATIN CALCIUM 40 MG: 40 TABLET, FILM COATED ORAL at 21:20

## 2020-10-25 RX ADMIN — DIPHENHYDRAMINE HYDROCHLORIDE AND LIDOCAINE HYDROCHLORIDE AND ALUMINUM HYDROXIDE AND MAGNESIUM HYDRO 5 ML: KIT at 17:53

## 2020-10-25 RX ADMIN — INSULIN LISPRO 10 UNITS: 100 INJECTION, SOLUTION INTRAVENOUS; SUBCUTANEOUS at 12:44

## 2020-10-25 RX ADMIN — Medication 10 ML: at 21:20

## 2020-10-25 RX ADMIN — ASPIRIN 81 MG CHEWABLE TABLET 81 MG: 81 TABLET CHEWABLE at 09:46

## 2020-10-25 RX ADMIN — GABAPENTIN 100 MG: 100 CAPSULE ORAL at 21:20

## 2020-10-25 RX ADMIN — BUDESONIDE INHALATION 0.5 MG: 0.5 SUSPENSION RESPIRATORY (INHALATION) at 08:03

## 2020-10-25 RX ADMIN — SULFAMETHOXAZOLE AND TRIMETHOPRIM 320 MG: 800; 160 TABLET ORAL at 21:20

## 2020-10-25 RX ADMIN — DIPHENHYDRAMINE HYDROCHLORIDE AND LIDOCAINE HYDROCHLORIDE AND ALUMINUM HYDROXIDE AND MAGNESIUM HYDRO 5 ML: KIT at 09:48

## 2020-10-25 RX ADMIN — INSULIN GLARGINE 15 UNITS: 100 INJECTION, SOLUTION SUBCUTANEOUS at 09:47

## 2020-10-25 RX ADMIN — FLUDROCORTISONE ACETATE 100 MCG: 0.1 TABLET ORAL at 11:51

## 2020-10-25 RX ADMIN — PREDNISONE 20 MG: 20 TABLET ORAL at 09:46

## 2020-10-25 RX ADMIN — INSULIN GLARGINE 15 UNITS: 100 INJECTION, SOLUTION SUBCUTANEOUS at 21:29

## 2020-10-25 RX ADMIN — PANTOPRAZOLE SODIUM 40 MG: 40 TABLET, DELAYED RELEASE ORAL at 17:53

## 2020-10-25 RX ADMIN — MAGNESIUM SULFATE HEPTAHYDRATE 4 G: 40 INJECTION, SOLUTION INTRAVENOUS at 11:51

## 2020-10-25 RX ADMIN — INSULIN LISPRO 3 UNITS: 100 INJECTION, SOLUTION INTRAVENOUS; SUBCUTANEOUS at 17:54

## 2020-10-25 RX ADMIN — GABAPENTIN 100 MG: 100 CAPSULE ORAL at 05:27

## 2020-10-25 RX ADMIN — DIPHENHYDRAMINE HYDROCHLORIDE AND LIDOCAINE HYDROCHLORIDE AND ALUMINUM HYDROXIDE AND MAGNESIUM HYDRO 5 ML: KIT at 21:21

## 2020-10-25 RX ADMIN — AMLODIPINE BESYLATE 5 MG: 5 TABLET ORAL at 09:46

## 2020-10-25 RX ADMIN — INSULIN LISPRO 5 UNITS: 100 INJECTION, SOLUTION INTRAVENOUS; SUBCUTANEOUS at 12:42

## 2020-10-25 RX ADMIN — GABAPENTIN 100 MG: 100 CAPSULE ORAL at 15:11

## 2020-10-25 RX ADMIN — DIPHENHYDRAMINE HYDROCHLORIDE AND LIDOCAINE HYDROCHLORIDE AND ALUMINUM HYDROXIDE AND MAGNESIUM HYDRO 5 ML: KIT at 05:26

## 2020-10-25 RX ADMIN — PANTOPRAZOLE SODIUM 40 MG: 40 TABLET, DELAYED RELEASE ORAL at 09:46

## 2020-10-25 RX ADMIN — SULFAMETHOXAZOLE AND TRIMETHOPRIM 320 MG: 800; 160 TABLET ORAL at 09:46

## 2020-10-25 RX ADMIN — INSULIN LISPRO 10 UNITS: 100 INJECTION, SOLUTION INTRAVENOUS; SUBCUTANEOUS at 17:53

## 2020-10-25 RX ADMIN — Medication 10 ML: at 09:49

## 2020-10-25 RX ADMIN — BUDESONIDE INHALATION 0.5 MG: 0.5 SUSPENSION RESPIRATORY (INHALATION) at 18:54

## 2020-10-25 RX ADMIN — BUMETANIDE 1 MG: 1 TABLET ORAL at 09:46

## 2020-10-25 RX ADMIN — INSULIN LISPRO 5 UNITS: 100 INJECTION, SOLUTION INTRAVENOUS; SUBCUTANEOUS at 21:20

## 2020-10-26 ENCOUNTER — APPOINTMENT (OUTPATIENT)
Dept: GENERAL RADIOLOGY | Facility: HOSPITAL | Age: 70
End: 2020-10-26

## 2020-10-26 LAB
ANION GAP SERPL CALCULATED.3IONS-SCNC: 10 MMOL/L (ref 5–15)
ANISOCYTOSIS BLD QL: ABNORMAL
BUN SERPL-MCNC: 42 MG/DL (ref 8–23)
BUN/CREAT SERPL: 27.5 (ref 7–25)
CALCIUM SPEC-SCNC: 9.7 MG/DL (ref 8.6–10.5)
CHLORIDE SERPL-SCNC: 97 MMOL/L (ref 98–107)
CO2 SERPL-SCNC: 32 MMOL/L (ref 22–29)
CREAT SERPL-MCNC: 1.53 MG/DL (ref 0.57–1)
DEPRECATED RDW RBC AUTO: 64.8 FL (ref 37–54)
EOSINOPHIL # BLD MANUAL: 0.16 10*3/MM3 (ref 0–0.4)
EOSINOPHIL NFR BLD MANUAL: 3 % (ref 0.3–6.2)
ERYTHROCYTE [DISTWIDTH] IN BLOOD BY AUTOMATED COUNT: 19.6 % (ref 12.3–15.4)
GFR SERPL CREATININE-BSD FRML MDRD: 33 ML/MIN/1.73
GLUCOSE BLDC GLUCOMTR-MCNC: 113 MG/DL (ref 70–105)
GLUCOSE BLDC GLUCOMTR-MCNC: 143 MG/DL (ref 70–105)
GLUCOSE BLDC GLUCOMTR-MCNC: 280 MG/DL (ref 70–105)
GLUCOSE BLDC GLUCOMTR-MCNC: 85 MG/DL (ref 70–105)
GLUCOSE BLDC GLUCOMTR-MCNC: 92 MG/DL (ref 70–105)
GLUCOSE SERPL-MCNC: 58 MG/DL (ref 65–99)
HCT VFR BLD AUTO: 27.9 % (ref 34–46.6)
HGB BLD-MCNC: 9.2 G/DL (ref 12–15.9)
LYMPHOCYTES # BLD MANUAL: 0.81 10*3/MM3 (ref 0.7–3.1)
LYMPHOCYTES NFR BLD MANUAL: 15 % (ref 19.6–45.3)
LYMPHOCYTES NFR BLD MANUAL: 2 % (ref 5–12)
MAGNESIUM SERPL-MCNC: 2.3 MG/DL (ref 1.6–2.4)
MCH RBC QN AUTO: 31.1 PG (ref 26.6–33)
MCHC RBC AUTO-ENTMCNC: 33 G/DL (ref 31.5–35.7)
MCV RBC AUTO: 94.2 FL (ref 79–97)
MONOCYTES # BLD AUTO: 0.11 10*3/MM3 (ref 0.1–0.9)
NEUTROPHILS # BLD AUTO: 4.32 10*3/MM3 (ref 1.7–7)
NEUTROPHILS NFR BLD MANUAL: 65 % (ref 42.7–76)
NEUTS BAND NFR BLD MANUAL: 15 % (ref 0–5)
P JIROVECII DNA # SPEC NAA+PROBE: ABNORMAL {COPIES}/ML
PHOSPHATE SERPL-MCNC: 4 MG/DL (ref 2.5–4.5)
PLAT MORPH BLD: NORMAL
PLATELET # BLD AUTO: 260 10*3/MM3 (ref 140–450)
PMV BLD AUTO: 8.5 FL (ref 6–12)
POTASSIUM SERPL-SCNC: 4.3 MMOL/L (ref 3.5–5.2)
RBC # BLD AUTO: 2.97 10*6/MM3 (ref 3.77–5.28)
SCAN SLIDE: NORMAL
SODIUM SERPL-SCNC: 139 MMOL/L (ref 136–145)
SPECIMEN SOURCE: ABNORMAL
WBC # BLD AUTO: 5.4 10*3/MM3 (ref 3.4–10.8)
WBC MORPH BLD: NORMAL

## 2020-10-26 PROCEDURE — 85025 COMPLETE CBC W/AUTO DIFF WBC: CPT | Performed by: INTERNAL MEDICINE

## 2020-10-26 PROCEDURE — 80048 BASIC METABOLIC PNL TOTAL CA: CPT | Performed by: INTERNAL MEDICINE

## 2020-10-26 PROCEDURE — 94660 CPAP INITIATION&MGMT: CPT

## 2020-10-26 PROCEDURE — 82962 GLUCOSE BLOOD TEST: CPT

## 2020-10-26 PROCEDURE — 94799 UNLISTED PULMONARY SVC/PX: CPT

## 2020-10-26 PROCEDURE — 63710000001 PREDNISONE PER 1 MG: Performed by: INTERNAL MEDICINE

## 2020-10-26 PROCEDURE — 71045 X-RAY EXAM CHEST 1 VIEW: CPT

## 2020-10-26 PROCEDURE — 63710000001 INSULIN LISPRO (HUMAN) PER 5 UNITS: Performed by: INTERNAL MEDICINE

## 2020-10-26 PROCEDURE — 85007 BL SMEAR W/DIFF WBC COUNT: CPT | Performed by: INTERNAL MEDICINE

## 2020-10-26 PROCEDURE — 84100 ASSAY OF PHOSPHORUS: CPT | Performed by: INTERNAL MEDICINE

## 2020-10-26 PROCEDURE — 97110 THERAPEUTIC EXERCISES: CPT

## 2020-10-26 PROCEDURE — 99232 SBSQ HOSP IP/OBS MODERATE 35: CPT | Performed by: HOSPITALIST

## 2020-10-26 PROCEDURE — 97530 THERAPEUTIC ACTIVITIES: CPT

## 2020-10-26 PROCEDURE — 63710000001 INSULIN GLARGINE PER 5 UNITS: Performed by: INTERNAL MEDICINE

## 2020-10-26 PROCEDURE — 83735 ASSAY OF MAGNESIUM: CPT | Performed by: INTERNAL MEDICINE

## 2020-10-26 RX ADMIN — SULFAMETHOXAZOLE AND TRIMETHOPRIM 320 MG: 800; 160 TABLET ORAL at 21:50

## 2020-10-26 RX ADMIN — GABAPENTIN 100 MG: 100 CAPSULE ORAL at 17:05

## 2020-10-26 RX ADMIN — PANTOPRAZOLE SODIUM 40 MG: 40 TABLET, DELAYED RELEASE ORAL at 08:36

## 2020-10-26 RX ADMIN — PANTOPRAZOLE SODIUM 40 MG: 40 TABLET, DELAYED RELEASE ORAL at 17:06

## 2020-10-26 RX ADMIN — ATORVASTATIN CALCIUM 40 MG: 40 TABLET, FILM COATED ORAL at 21:46

## 2020-10-26 RX ADMIN — DIPHENHYDRAMINE HYDROCHLORIDE AND LIDOCAINE HYDROCHLORIDE AND ALUMINUM HYDROXIDE AND MAGNESIUM HYDRO 5 ML: KIT at 17:06

## 2020-10-26 RX ADMIN — BUDESONIDE INHALATION 0.5 MG: 0.5 SUSPENSION RESPIRATORY (INHALATION) at 18:48

## 2020-10-26 RX ADMIN — GABAPENTIN 100 MG: 100 CAPSULE ORAL at 21:46

## 2020-10-26 RX ADMIN — BUMETANIDE 1 MG: 1 TABLET ORAL at 08:38

## 2020-10-26 RX ADMIN — GABAPENTIN 100 MG: 100 CAPSULE ORAL at 05:06

## 2020-10-26 RX ADMIN — INSULIN GLARGINE 15 UNITS: 100 INJECTION, SOLUTION SUBCUTANEOUS at 10:28

## 2020-10-26 RX ADMIN — BUDESONIDE INHALATION 0.5 MG: 0.5 SUSPENSION RESPIRATORY (INHALATION) at 08:24

## 2020-10-26 RX ADMIN — DIPHENHYDRAMINE HYDROCHLORIDE AND LIDOCAINE HYDROCHLORIDE AND ALUMINUM HYDROXIDE AND MAGNESIUM HYDRO 5 ML: KIT at 05:06

## 2020-10-26 RX ADMIN — INSULIN LISPRO 8 UNITS: 100 INJECTION, SOLUTION INTRAVENOUS; SUBCUTANEOUS at 17:57

## 2020-10-26 RX ADMIN — Medication 10 ML: at 08:36

## 2020-10-26 RX ADMIN — AMLODIPINE BESYLATE 5 MG: 5 TABLET ORAL at 08:39

## 2020-10-26 RX ADMIN — INSULIN LISPRO 10 UNITS: 100 INJECTION, SOLUTION INTRAVENOUS; SUBCUTANEOUS at 17:57

## 2020-10-26 RX ADMIN — PREDNISONE 20 MG: 20 TABLET ORAL at 08:37

## 2020-10-26 RX ADMIN — ASPIRIN 81 MG CHEWABLE TABLET 81 MG: 81 TABLET CHEWABLE at 08:36

## 2020-10-26 RX ADMIN — Medication 10 ML: at 21:46

## 2020-10-26 RX ADMIN — SULFAMETHOXAZOLE AND TRIMETHOPRIM 320 MG: 800; 160 TABLET ORAL at 08:37

## 2020-10-26 RX ADMIN — DIPHENHYDRAMINE HYDROCHLORIDE AND LIDOCAINE HYDROCHLORIDE AND ALUMINUM HYDROXIDE AND MAGNESIUM HYDRO 5 ML: KIT at 12:04

## 2020-10-27 ENCOUNTER — ANESTHESIA (OUTPATIENT)
Dept: GASTROENTEROLOGY | Facility: HOSPITAL | Age: 70
End: 2020-10-27

## 2020-10-27 ENCOUNTER — APPOINTMENT (OUTPATIENT)
Dept: GENERAL RADIOLOGY | Facility: HOSPITAL | Age: 70
End: 2020-10-27

## 2020-10-27 ENCOUNTER — ANESTHESIA EVENT (OUTPATIENT)
Dept: GASTROENTEROLOGY | Facility: HOSPITAL | Age: 70
End: 2020-10-27

## 2020-10-27 VITALS
OXYGEN SATURATION: 94 % | RESPIRATION RATE: 15 BRPM | DIASTOLIC BLOOD PRESSURE: 41 MMHG | SYSTOLIC BLOOD PRESSURE: 121 MMHG | HEART RATE: 100 BPM

## 2020-10-27 LAB
ANION GAP SERPL CALCULATED.3IONS-SCNC: 7 MMOL/L (ref 5–15)
ANISOCYTOSIS BLD QL: ABNORMAL
ARTERIAL PATENCY WRIST A: ABNORMAL
ATMOSPHERIC PRESS: ABNORMAL MM[HG]
B PARAPERT DNA SPEC QL NAA+PROBE: NOT DETECTED
B PERT DNA SPEC QL NAA+PROBE: NOT DETECTED
BASE EXCESS BLDA CALC-SCNC: 9.2 MMOL/L (ref 0–3)
BDY SITE: ABNORMAL
BUN SERPL-MCNC: 45 MG/DL (ref 8–23)
BUN/CREAT SERPL: 23.3 (ref 7–25)
C PNEUM DNA NPH QL NAA+NON-PROBE: NOT DETECTED
CA-I BLDA-SCNC: 1.2 MMOL/L (ref 1.15–1.33)
CALCIUM SPEC-SCNC: 10.2 MG/DL (ref 8.6–10.5)
CHLORIDE SERPL-SCNC: 96 MMOL/L (ref 98–107)
CO2 BLDA-SCNC: 36.2 MMOL/L (ref 22–29)
CO2 SERPL-SCNC: 38 MMOL/L (ref 22–29)
CREAT SERPL-MCNC: 1.93 MG/DL (ref 0.57–1)
CRP SERPL-MCNC: 4.62 MG/DL (ref 0–0.5)
DEPRECATED RDW RBC AUTO: 61.7 FL (ref 37–54)
EOSINOPHIL # BLD MANUAL: 0.08 10*3/MM3 (ref 0–0.4)
EOSINOPHIL NFR BLD MANUAL: 1 % (ref 0.3–6.2)
ERYTHROCYTE [DISTWIDTH] IN BLOOD BY AUTOMATED COUNT: 18.8 % (ref 12.3–15.4)
FLUAV H1 2009 PAND RNA NPH QL NAA+PROBE: NOT DETECTED
FLUAV H1 HA GENE NPH QL NAA+PROBE: NOT DETECTED
FLUAV H3 RNA NPH QL NAA+PROBE: NOT DETECTED
FLUAV SUBTYP SPEC NAA+PROBE: NOT DETECTED
FLUBV RNA ISLT QL NAA+PROBE: NOT DETECTED
GFR SERPL CREATININE-BSD FRML MDRD: 26 ML/MIN/1.73
GLUCOSE BLDC GLUCOMTR-MCNC: 118 MG/DL (ref 70–105)
GLUCOSE BLDC GLUCOMTR-MCNC: 121 MG/DL (ref 74–100)
GLUCOSE BLDC GLUCOMTR-MCNC: 121 MG/DL (ref 74–100)
GLUCOSE BLDC GLUCOMTR-MCNC: 126 MG/DL (ref 70–105)
GLUCOSE BLDC GLUCOMTR-MCNC: 75 MG/DL (ref 70–105)
GLUCOSE SERPL-MCNC: 69 MG/DL (ref 65–99)
HADV DNA SPEC NAA+PROBE: NOT DETECTED
HCO3 BLDA-SCNC: 34.6 MMOL/L (ref 21–28)
HCOV 229E RNA SPEC QL NAA+PROBE: NOT DETECTED
HCOV HKU1 RNA SPEC QL NAA+PROBE: NOT DETECTED
HCOV NL63 RNA SPEC QL NAA+PROBE: NOT DETECTED
HCOV OC43 RNA SPEC QL NAA+PROBE: NOT DETECTED
HCT VFR BLD AUTO: 29.5 % (ref 34–46.6)
HCT VFR BLDA CALC: 28 % (ref 38–51)
HEMODILUTION: NO
HGB BLD-MCNC: 9.7 G/DL (ref 12–15.9)
HGB BLDA-MCNC: 9.5 G/DL (ref 12–17)
HMPV RNA NPH QL NAA+NON-PROBE: NOT DETECTED
HPIV1 RNA SPEC QL NAA+PROBE: NOT DETECTED
HPIV2 RNA SPEC QL NAA+PROBE: NOT DETECTED
HPIV3 RNA NPH QL NAA+PROBE: NOT DETECTED
HPIV4 P GENE NPH QL NAA+PROBE: NOT DETECTED
INHALED O2 CONCENTRATION: 100 %
LYMPHOCYTES # BLD MANUAL: 0.39 10*3/MM3 (ref 0.7–3.1)
LYMPHOCYTES NFR BLD MANUAL: 4 % (ref 5–12)
LYMPHOCYTES NFR BLD MANUAL: 5 % (ref 19.6–45.3)
M PNEUMO IGG SER IA-ACNC: NOT DETECTED
MAGNESIUM SERPL-MCNC: 2 MG/DL (ref 1.6–2.4)
MCH RBC QN AUTO: 30.9 PG (ref 26.6–33)
MCHC RBC AUTO-ENTMCNC: 32.9 G/DL (ref 31.5–35.7)
MCV RBC AUTO: 93.7 FL (ref 79–97)
MODALITY: ABNORMAL
MONOCYTES # BLD AUTO: 0.31 10*3/MM3 (ref 0.1–0.9)
MYELOCYTES NFR BLD MANUAL: 2 % (ref 0–0)
NEUTROPHILS # BLD AUTO: 6.86 10*3/MM3 (ref 1.7–7)
NEUTROPHILS NFR BLD MANUAL: 69 % (ref 42.7–76)
NEUTS BAND NFR BLD MANUAL: 19 % (ref 0–5)
NRBC SPEC MANUAL: 1 /100 WBC (ref 0–0.2)
PCO2 BLDA: 51.5 MM HG (ref 35–48)
PEEP RESPIRATORY: 5 CM[H2O]
PH BLDA: 7.44 PH UNITS (ref 7.35–7.45)
PHOSPHATE SERPL-MCNC: 5 MG/DL (ref 2.5–4.5)
PLATELET # BLD AUTO: 266 10*3/MM3 (ref 140–450)
PMV BLD AUTO: 8.2 FL (ref 6–12)
PO2 BLDA: 100.5 MM HG (ref 83–108)
POTASSIUM BLDA-SCNC: 4.7 MMOL/L (ref 3.5–4.5)
POTASSIUM SERPL-SCNC: 4.9 MMOL/L (ref 3.5–5.2)
POTASSIUM SERPL-SCNC: 5.4 MMOL/L (ref 3.5–5.2)
PROCALCITONIN SERPL-MCNC: 0.33 NG/ML (ref 0–0.25)
RBC # BLD AUTO: 3.15 10*6/MM3 (ref 3.77–5.28)
RESPIRATORY RATE: 22
RHINOVIRUS RNA SPEC NAA+PROBE: DETECTED
RSV RNA NPH QL NAA+NON-PROBE: NOT DETECTED
SAO2 % BLDCOA: 97.8 % (ref 94–98)
SARS-COV-2 RNA PNL SPEC NAA+PROBE: NOT DETECTED
SCAN SLIDE: NORMAL
SMALL PLATELETS BLD QL SMEAR: ADEQUATE
SODIUM BLD-SCNC: 138 MMOL/L (ref 138–146)
SODIUM SERPL-SCNC: 141 MMOL/L (ref 136–145)
VENTILATOR MODE: ABNORMAL
VT ON VENT VENT: 450 ML
WBC # BLD AUTO: 7.8 10*3/MM3 (ref 3.4–10.8)
WBC MORPH BLD: NORMAL

## 2020-10-27 PROCEDURE — 82962 GLUCOSE BLOOD TEST: CPT

## 2020-10-27 PROCEDURE — 94799 UNLISTED PULMONARY SVC/PX: CPT

## 2020-10-27 PROCEDURE — 80051 ELECTROLYTE PANEL: CPT

## 2020-10-27 PROCEDURE — 84145 PROCALCITONIN (PCT): CPT | Performed by: HOSPITALIST

## 2020-10-27 PROCEDURE — 87206 SMEAR FLUORESCENT/ACID STAI: CPT | Performed by: INTERNAL MEDICINE

## 2020-10-27 PROCEDURE — 0100U HC BIOFIRE FILMARRAY RESP PANEL 2: CPT | Performed by: INTERNAL MEDICINE

## 2020-10-27 PROCEDURE — 86140 C-REACTIVE PROTEIN: CPT | Performed by: HOSPITALIST

## 2020-10-27 PROCEDURE — 71045 X-RAY EXAM CHEST 1 VIEW: CPT

## 2020-10-27 PROCEDURE — 84132 ASSAY OF SERUM POTASSIUM: CPT | Performed by: ANESTHESIOLOGIST ASSISTANT

## 2020-10-27 PROCEDURE — 94660 CPAP INITIATION&MGMT: CPT

## 2020-10-27 PROCEDURE — 25010000002 PHENYLEPHRINE 10 MG/ML SOLUTION: Performed by: INTERNAL MEDICINE

## 2020-10-27 PROCEDURE — 87798 DETECT AGENT NOS DNA AMP: CPT | Performed by: HOSPITALIST

## 2020-10-27 PROCEDURE — C1751 CATH, INF, PER/CENT/MIDLINE: HCPCS

## 2020-10-27 PROCEDURE — 83735 ASSAY OF MAGNESIUM: CPT | Performed by: INTERNAL MEDICINE

## 2020-10-27 PROCEDURE — 63710000001 INSULIN GLARGINE PER 5 UNITS: Performed by: INTERNAL MEDICINE

## 2020-10-27 PROCEDURE — 85007 BL SMEAR W/DIFF WBC COUNT: CPT | Performed by: INTERNAL MEDICINE

## 2020-10-27 PROCEDURE — 87116 MYCOBACTERIA CULTURE: CPT | Performed by: INTERNAL MEDICINE

## 2020-10-27 PROCEDURE — 87496 CYTOMEG DNA AMP PROBE: CPT | Performed by: INTERNAL MEDICINE

## 2020-10-27 PROCEDURE — 25010000002 PHENYLEPHRINE 10 MG/ML SOLUTION: Performed by: ANESTHESIOLOGIST ASSISTANT

## 2020-10-27 PROCEDURE — 05HM33Z INSERTION OF INFUSION DEVICE INTO RIGHT INTERNAL JUGULAR VEIN, PERCUTANEOUS APPROACH: ICD-10-PCS | Performed by: INTERNAL MEDICINE

## 2020-10-27 PROCEDURE — 84100 ASSAY OF PHOSPHORUS: CPT | Performed by: INTERNAL MEDICINE

## 2020-10-27 PROCEDURE — 87635 SARS-COV-2 COVID-19 AMP PRB: CPT | Performed by: HOSPITALIST

## 2020-10-27 PROCEDURE — 88108 CYTOPATH CONCENTRATE TECH: CPT | Performed by: INTERNAL MEDICINE

## 2020-10-27 PROCEDURE — 87102 FUNGUS ISOLATION CULTURE: CPT | Performed by: INTERNAL MEDICINE

## 2020-10-27 PROCEDURE — 94002 VENT MGMT INPAT INIT DAY: CPT

## 2020-10-27 PROCEDURE — C1887 CATHETER, GUIDING: HCPCS

## 2020-10-27 PROCEDURE — 82803 BLOOD GASES ANY COMBINATION: CPT

## 2020-10-27 PROCEDURE — 80048 BASIC METABOLIC PNL TOTAL CA: CPT | Performed by: INTERNAL MEDICINE

## 2020-10-27 PROCEDURE — 85018 HEMOGLOBIN: CPT

## 2020-10-27 PROCEDURE — 94640 AIRWAY INHALATION TREATMENT: CPT

## 2020-10-27 PROCEDURE — 25010000002 PROPOFOL 10 MG/ML EMULSION: Performed by: ANESTHESIOLOGIST ASSISTANT

## 2020-10-27 PROCEDURE — 85025 COMPLETE CBC W/AUTO DIFF WBC: CPT | Performed by: INTERNAL MEDICINE

## 2020-10-27 PROCEDURE — 82330 ASSAY OF CALCIUM: CPT

## 2020-10-27 PROCEDURE — 87205 SMEAR GRAM STAIN: CPT | Performed by: INTERNAL MEDICINE

## 2020-10-27 PROCEDURE — 63710000001 PREDNISONE PER 1 MG: Performed by: INTERNAL MEDICINE

## 2020-10-27 PROCEDURE — 87070 CULTURE OTHR SPECIMN AEROBIC: CPT | Performed by: INTERNAL MEDICINE

## 2020-10-27 RX ORDER — PHENYLEPHRINE HYDROCHLORIDE 10 MG/ML
INJECTION INTRAVENOUS AS NEEDED
Status: DISCONTINUED | OUTPATIENT
Start: 2020-10-27 | End: 2020-10-27 | Stop reason: SURG

## 2020-10-27 RX ORDER — SODIUM CHLORIDE 9 MG/ML
INJECTION, SOLUTION INTRAVENOUS CONTINUOUS PRN
Status: DISCONTINUED | OUTPATIENT
Start: 2020-10-27 | End: 2020-10-27 | Stop reason: SURG

## 2020-10-27 RX ORDER — ROCURONIUM BROMIDE 10 MG/ML
INJECTION, SOLUTION INTRAVENOUS AS NEEDED
Status: DISCONTINUED | OUTPATIENT
Start: 2020-10-27 | End: 2020-10-27 | Stop reason: SURG

## 2020-10-27 RX ORDER — LORAZEPAM 2 MG/ML
1 INJECTION INTRAMUSCULAR EVERY 4 HOURS PRN
Status: DISCONTINUED | OUTPATIENT
Start: 2020-10-27 | End: 2020-11-03

## 2020-10-27 RX ORDER — LIDOCAINE HYDROCHLORIDE 10 MG/ML
INJECTION, SOLUTION EPIDURAL; INFILTRATION; INTRACAUDAL; PERINEURAL AS NEEDED
Status: DISCONTINUED | OUTPATIENT
Start: 2020-10-27 | End: 2020-10-27 | Stop reason: SURG

## 2020-10-27 RX ORDER — PANTOPRAZOLE SODIUM 40 MG/10ML
40 INJECTION, POWDER, LYOPHILIZED, FOR SOLUTION INTRAVENOUS EVERY 12 HOURS SCHEDULED
Status: DISCONTINUED | OUTPATIENT
Start: 2020-10-27 | End: 2020-11-03

## 2020-10-27 RX ORDER — BUMETANIDE 0.25 MG/ML
1 INJECTION INTRAMUSCULAR; INTRAVENOUS EVERY 12 HOURS
Status: CANCELLED | OUTPATIENT
Start: 2020-10-27

## 2020-10-27 RX ORDER — DEXMEDETOMIDINE HYDROCHLORIDE 4 UG/ML
.2-1.5 INJECTION, SOLUTION INTRAVENOUS
Status: DISCONTINUED | OUTPATIENT
Start: 2020-10-27 | End: 2020-10-31

## 2020-10-27 RX ORDER — PROPOFOL 10 MG/ML
VIAL (ML) INTRAVENOUS AS NEEDED
Status: DISCONTINUED | OUTPATIENT
Start: 2020-10-27 | End: 2020-10-27 | Stop reason: SURG

## 2020-10-27 RX ORDER — PHENYLEPHRINE HCL IN 0.9% NACL 0.5 MG/5ML
.5-3 SYRINGE (ML) INTRAVENOUS
Status: DISCONTINUED | OUTPATIENT
Start: 2020-10-27 | End: 2020-10-31

## 2020-10-27 RX ORDER — LIDOCAINE HYDROCHLORIDE 20 MG/ML
INJECTION, SOLUTION INTRAVENOUS
Status: DISPENSED
Start: 2020-10-27 | End: 2020-10-27

## 2020-10-27 RX ADMIN — PREDNISONE 20 MG: 20 TABLET ORAL at 08:10

## 2020-10-27 RX ADMIN — BUMETANIDE 1 MG: 1 TABLET ORAL at 08:11

## 2020-10-27 RX ADMIN — PANTOPRAZOLE SODIUM 40 MG: 40 TABLET, DELAYED RELEASE ORAL at 08:10

## 2020-10-27 RX ADMIN — PHENYLEPHRINE HYDROCHLORIDE 0.5 MCG/KG/MIN: 10 INJECTION INTRAVENOUS at 11:40

## 2020-10-27 RX ADMIN — INSULIN GLARGINE 15 UNITS: 100 INJECTION, SOLUTION SUBCUTANEOUS at 08:09

## 2020-10-27 RX ADMIN — PROPOFOL 60 MG: 10 INJECTION, EMULSION INTRAVENOUS at 09:29

## 2020-10-27 RX ADMIN — SODIUM CHLORIDE: 0.9 INJECTION, SOLUTION INTRAVENOUS at 09:21

## 2020-10-27 RX ADMIN — SULFAMETHOXAZOLE AND TRIMETHOPRIM 320 MG: 800; 160 TABLET ORAL at 08:10

## 2020-10-27 RX ADMIN — BUDESONIDE INHALATION 0.5 MG: 0.5 SUSPENSION RESPIRATORY (INHALATION) at 19:00

## 2020-10-27 RX ADMIN — SODIUM CHLORIDE 500 ML: 9 INJECTION, SOLUTION INTRAVENOUS at 11:40

## 2020-10-27 RX ADMIN — DEXTROSE MONOHYDRATE 25 G: 25 INJECTION, SOLUTION INTRAVENOUS at 04:50

## 2020-10-27 RX ADMIN — LIDOCAINE HYDROCHLORIDE 50 MG: 10 INJECTION, SOLUTION EPIDURAL; INFILTRATION; INTRACAUDAL; PERINEURAL at 09:29

## 2020-10-27 RX ADMIN — SODIUM CHLORIDE 0.2 MCG/KG/HR: 9 INJECTION, SOLUTION INTRAVENOUS at 10:06

## 2020-10-27 RX ADMIN — AMLODIPINE BESYLATE 5 MG: 5 TABLET ORAL at 08:11

## 2020-10-27 RX ADMIN — INSULIN GLARGINE 15 UNITS: 100 INJECTION, SOLUTION SUBCUTANEOUS at 21:32

## 2020-10-27 RX ADMIN — Medication 10 ML: at 21:32

## 2020-10-27 RX ADMIN — DIPHENHYDRAMINE HYDROCHLORIDE AND LIDOCAINE HYDROCHLORIDE AND ALUMINUM HYDROXIDE AND MAGNESIUM HYDRO 5 ML: KIT at 04:50

## 2020-10-27 RX ADMIN — ROCURONIUM BROMIDE 20 MG: 10 INJECTION, SOLUTION INTRAVENOUS at 09:35

## 2020-10-27 RX ADMIN — PHENYLEPHRINE HYDROCHLORIDE 100 MCG: 10 INJECTION INTRAVENOUS at 09:56

## 2020-10-27 RX ADMIN — BUDESONIDE INHALATION 0.5 MG: 0.5 SUSPENSION RESPIRATORY (INHALATION) at 07:36

## 2020-10-27 RX ADMIN — Medication 10 ML: at 08:10

## 2020-10-27 RX ADMIN — PANTOPRAZOLE SODIUM 40 MG: 40 INJECTION, POWDER, FOR SOLUTION INTRAVENOUS at 21:32

## 2020-10-27 NOTE — ANESTHESIA PROCEDURE NOTES
Arterial Line      Patient reassessed immediately prior to procedure    Patient location during procedure: OR  Start time: 10/27/2020 10:02 AM  Stop Time:10/27/2020 10:04 AM       Line placed for hemodynamic monitoring and ABGs/Labs/ISTAT.  Performed By   Anesthesiologist: Jorje Luevano MD  CRNA: Dee Minaya AA  Preanesthetic Checklist  Completed: patient identified, site marked, surgical consent, pre-op evaluation, timeout performed, IV checked, risks and benefits discussed and monitors and equipment checked  Arterial Line Prep   Sterile Tech: mask and gloves  Prep: ChloraPrep  Patient monitoring: blood pressure monitoring, continuous pulse oximetry and EKG  Arterial Line Procedure   Laterality:right  Location:  radial artery  Catheter size: 20 G   Number of attempts: 1  Successful placement: yes  Post Assessment   Dressing Type: secured with tape (tegaderm and tape used to secure).   Complications no  Circ/Move/Sens Assessment: unchanged.   Patient Tolerance: patient tolerated the procedure well with no apparent complications

## 2020-10-27 NOTE — ANESTHESIA PREPROCEDURE EVALUATION
Anesthesia Evaluation     Patient summary reviewed   no history of anesthetic complications:  NPO Solid Status: > 8 hours  NPO Liquid Status: > 8 hours           Airway   Mallampati: II  TM distance: >3 FB  Neck ROM: full  No difficulty expected  Dental - normal exam     Pulmonary    (+) pneumonia improving , COPD, shortness of breath, sleep apnea, decreased breath sounds,   Cardiovascular - normal exam    ECG reviewed    (+) hypertension well controlled, valvular problems/murmurs, dysrhythmias Atrial Fib, hyperlipidemia,       Neuro/Psych  GI/Hepatic/Renal/Endo    (+) obesity,   liver disease fatty liver disease, renal disease CRI, diabetes mellitus well controlled,     Musculoskeletal     Abdominal  - normal exam    Bowel sounds: normal.   Substance History - negative use     OB/GYN negative ob/gyn ROS         Other   arthritis, blood dyscrasia anemia,         Phys Exam Other: Intubated for COPD, Pneumonia                  Anesthesia Plan    ASA 4     MAC     intravenous induction     Anesthetic plan, all risks, benefits, and alternatives have been provided, discussed and informed consent has been obtained with: patient.    Plan discussed with CAA.

## 2020-10-27 NOTE — ANESTHESIA POSTPROCEDURE EVALUATION
Patient: Lesa Julio    Procedure Summary     Date: 10/27/20 Room / Location: [facility] ENDOSCOPY 4 / [facility] ENDOSCOPY    Anesthesia Start: 0921 Anesthesia Stop: 1006    Procedure: BRONCHOSCOPY with bronchial washing and intubation (N/A Bronchus) Diagnosis:       Pneumonia of right upper lobe due to infectious organism      (Pneumonia of right upper lobe due to infectious organism [J18.9])    Surgeon: Cristhian Hodge MD Provider: Jorje Luevano MD    Anesthesia Type: MAC ASA Status: 4          Anesthesia Type: MAC    Vitals  Vitals Value Taken Time   BP 85/34 10/27/20 0956   Temp 99 °F (37.2 °C) 10/27/20 1114   Pulse 73 10/27/20 1130   Resp 22 10/27/20 1037   SpO2 94 % 10/27/20 1130           Post Anesthesia Care and Evaluation    Patient location during evaluation: ICU  Patient participation: complete - patient cannot participate  Level of consciousness: obtunded/minimal responses  Pain scale: See nurse's notes for pain score.  Pain management: adequate  Airway patency: patent  Anesthetic complications: No anesthetic complications  PONV Status: none  Cardiovascular status: acceptable  Respiratory status: acceptable  Hydration status: acceptable    Comments: Patient seen and examined postoperatively; vital signs stable; SpO2 greater than or equal to 90%; cardiopulmonary status stable; nausea/vomiting adequately controlled; pain adequately controlled; no apparent anesthesia complications; patient discharged from anesthesia care when discharge criteria were met

## 2020-10-28 ENCOUNTER — APPOINTMENT (OUTPATIENT)
Dept: GENERAL RADIOLOGY | Facility: HOSPITAL | Age: 70
End: 2020-10-28

## 2020-10-28 LAB
ANION GAP SERPL CALCULATED.3IONS-SCNC: 8 MMOL/L (ref 5–15)
ARTERIAL PATENCY WRIST A: ABNORMAL
ATMOSPHERIC PRESS: ABNORMAL MM[HG]
BASE EXCESS BLDA CALC-SCNC: 10.3 MMOL/L (ref 0–3)
BASOPHILS # BLD AUTO: 0 10*3/MM3 (ref 0–0.2)
BASOPHILS NFR BLD AUTO: 0.9 % (ref 0–1.5)
BDY SITE: ABNORMAL
BUN SERPL-MCNC: 34 MG/DL (ref 8–23)
BUN/CREAT SERPL: 22.4 (ref 7–25)
CALCIUM SPEC-SCNC: 9.7 MG/DL (ref 8.6–10.5)
CHLORIDE SERPL-SCNC: 100 MMOL/L (ref 98–107)
CO2 BLDA-SCNC: 35.5 MMOL/L (ref 22–29)
CO2 SERPL-SCNC: 33 MMOL/L (ref 22–29)
CREAT SERPL-MCNC: 1.52 MG/DL (ref 0.57–1)
DEPRECATED RDW RBC AUTO: 61.3 FL (ref 37–54)
EOSINOPHIL # BLD AUTO: 0.1 10*3/MM3 (ref 0–0.4)
EOSINOPHIL NFR BLD AUTO: 2 % (ref 0.3–6.2)
ERYTHROCYTE [DISTWIDTH] IN BLOOD BY AUTOMATED COUNT: 18.7 % (ref 12.3–15.4)
GFR SERPL CREATININE-BSD FRML MDRD: 34 ML/MIN/1.73
GLUCOSE BLDC GLUCOMTR-MCNC: 110 MG/DL (ref 70–105)
GLUCOSE BLDC GLUCOMTR-MCNC: 139 MG/DL (ref 70–105)
GLUCOSE BLDC GLUCOMTR-MCNC: 150 MG/DL (ref 70–105)
GLUCOSE BLDC GLUCOMTR-MCNC: 157 MG/DL (ref 70–105)
GLUCOSE BLDC GLUCOMTR-MCNC: 186 MG/DL (ref 70–105)
GLUCOSE BLDC GLUCOMTR-MCNC: 36 MG/DL (ref 70–105)
GLUCOSE BLDC GLUCOMTR-MCNC: 76 MG/DL (ref 70–105)
GLUCOSE SERPL-MCNC: 62 MG/DL (ref 65–99)
HCO3 BLDA-SCNC: 34.2 MMOL/L (ref 21–28)
HCT VFR BLD AUTO: 26 % (ref 34–46.6)
HEMODILUTION: NO
HGB BLD-MCNC: 8.7 G/DL (ref 12–15.9)
INHALED O2 CONCENTRATION: 70 %
LAB AP CASE REPORT: NORMAL
LAB AP DIAGNOSIS COMMENT: NORMAL
LYMPHOCYTES # BLD AUTO: 0.4 10*3/MM3 (ref 0.7–3.1)
LYMPHOCYTES NFR BLD AUTO: 9 % (ref 19.6–45.3)
MAGNESIUM SERPL-MCNC: 1.8 MG/DL (ref 1.6–2.4)
MCH RBC QN AUTO: 31 PG (ref 26.6–33)
MCHC RBC AUTO-ENTMCNC: 33.3 G/DL (ref 31.5–35.7)
MCV RBC AUTO: 93 FL (ref 79–97)
MODALITY: ABNORMAL
MONOCYTES # BLD AUTO: 0.2 10*3/MM3 (ref 0.1–0.9)
MONOCYTES NFR BLD AUTO: 3.3 % (ref 5–12)
NEUTROPHILS NFR BLD AUTO: 4.1 10*3/MM3 (ref 1.7–7)
NEUTROPHILS NFR BLD AUTO: 84.8 % (ref 42.7–76)
NRBC BLD AUTO-RTO: 0.1 /100 WBC (ref 0–0.2)
PATH REPORT.FINAL DX SPEC: NORMAL
PATH REPORT.GROSS SPEC: NORMAL
PCO2 BLDA: 42.3 MM HG (ref 35–48)
PEEP RESPIRATORY: 5 CM[H2O]
PH BLDA: 7.52 PH UNITS (ref 7.35–7.45)
PHOSPHATE SERPL-MCNC: 3.2 MG/DL (ref 2.5–4.5)
PLATELET # BLD AUTO: 218 10*3/MM3 (ref 140–450)
PMV BLD AUTO: 7.8 FL (ref 6–12)
PO2 BLDA: 58.8 MM HG (ref 83–108)
POTASSIUM SERPL-SCNC: 4 MMOL/L (ref 3.5–5.2)
RBC # BLD AUTO: 2.8 10*6/MM3 (ref 3.77–5.28)
RESPIRATORY RATE: 22
SAO2 % BLDCOA: 92.4 % (ref 94–98)
SODIUM SERPL-SCNC: 141 MMOL/L (ref 136–145)
VENTILATOR MODE: ABNORMAL
VT ON VENT VENT: 450 ML
WBC # BLD AUTO: 4.9 10*3/MM3 (ref 3.4–10.8)

## 2020-10-28 PROCEDURE — 83735 ASSAY OF MAGNESIUM: CPT | Performed by: INTERNAL MEDICINE

## 2020-10-28 PROCEDURE — 84100 ASSAY OF PHOSPHORUS: CPT | Performed by: INTERNAL MEDICINE

## 2020-10-28 PROCEDURE — 82962 GLUCOSE BLOOD TEST: CPT

## 2020-10-28 PROCEDURE — 63710000001 INSULIN GLARGINE PER 5 UNITS: Performed by: INTERNAL MEDICINE

## 2020-10-28 PROCEDURE — 82803 BLOOD GASES ANY COMBINATION: CPT

## 2020-10-28 PROCEDURE — 74018 RADEX ABDOMEN 1 VIEW: CPT

## 2020-10-28 PROCEDURE — 94799 UNLISTED PULMONARY SVC/PX: CPT

## 2020-10-28 PROCEDURE — 63710000001 PREDNISONE PER 1 MG: Performed by: INTERNAL MEDICINE

## 2020-10-28 PROCEDURE — 80048 BASIC METABOLIC PNL TOTAL CA: CPT | Performed by: INTERNAL MEDICINE

## 2020-10-28 PROCEDURE — 71045 X-RAY EXAM CHEST 1 VIEW: CPT

## 2020-10-28 PROCEDURE — 94003 VENT MGMT INPAT SUBQ DAY: CPT

## 2020-10-28 PROCEDURE — 63710000001 INSULIN LISPRO (HUMAN) PER 5 UNITS: Performed by: INTERNAL MEDICINE

## 2020-10-28 PROCEDURE — 85025 COMPLETE CBC W/AUTO DIFF WBC: CPT | Performed by: INTERNAL MEDICINE

## 2020-10-28 RX ADMIN — INSULIN GLARGINE 15 UNITS: 100 INJECTION, SOLUTION SUBCUTANEOUS at 21:46

## 2020-10-28 RX ADMIN — PANTOPRAZOLE SODIUM 40 MG: 40 INJECTION, POWDER, FOR SOLUTION INTRAVENOUS at 21:47

## 2020-10-28 RX ADMIN — DIPHENHYDRAMINE HYDROCHLORIDE AND LIDOCAINE HYDROCHLORIDE AND ALUMINUM HYDROXIDE AND MAGNESIUM HYDRO 5 ML: KIT at 04:27

## 2020-10-28 RX ADMIN — PANTOPRAZOLE SODIUM 40 MG: 40 INJECTION, POWDER, FOR SOLUTION INTRAVENOUS at 12:05

## 2020-10-28 RX ADMIN — DIPHENHYDRAMINE HYDROCHLORIDE AND LIDOCAINE HYDROCHLORIDE AND ALUMINUM HYDROXIDE AND MAGNESIUM HYDRO 5 ML: KIT at 18:47

## 2020-10-28 RX ADMIN — GABAPENTIN 100 MG: 100 CAPSULE ORAL at 14:37

## 2020-10-28 RX ADMIN — BUMETANIDE 1 MG: 1 TABLET ORAL at 14:44

## 2020-10-28 RX ADMIN — DEXTROSE MONOHYDRATE 25 G: 25 INJECTION, SOLUTION INTRAVENOUS at 09:41

## 2020-10-28 RX ADMIN — SULFAMETHOXAZOLE AND TRIMETHOPRIM 320 MG: 800; 160 TABLET ORAL at 21:47

## 2020-10-28 RX ADMIN — GABAPENTIN 100 MG: 100 CAPSULE ORAL at 21:47

## 2020-10-28 RX ADMIN — SULFAMETHOXAZOLE AND TRIMETHOPRIM 320 MG: 800; 160 TABLET ORAL at 14:44

## 2020-10-28 RX ADMIN — DIPHENHYDRAMINE HYDROCHLORIDE AND LIDOCAINE HYDROCHLORIDE AND ALUMINUM HYDROXIDE AND MAGNESIUM HYDRO 5 ML: KIT at 22:07

## 2020-10-28 RX ADMIN — DIPHENHYDRAMINE HYDROCHLORIDE AND LIDOCAINE HYDROCHLORIDE AND ALUMINUM HYDROXIDE AND MAGNESIUM HYDRO 5 ML: KIT at 00:05

## 2020-10-28 RX ADMIN — SODIUM CHLORIDE 0.2 MCG/KG/HR: 9 INJECTION, SOLUTION INTRAVENOUS at 10:46

## 2020-10-28 RX ADMIN — Medication 10 ML: at 21:47

## 2020-10-28 RX ADMIN — ATORVASTATIN CALCIUM 40 MG: 40 TABLET, FILM COATED ORAL at 21:47

## 2020-10-28 RX ADMIN — DEXTROSE MONOHYDRATE 25 G: 25 INJECTION, SOLUTION INTRAVENOUS at 05:28

## 2020-10-28 RX ADMIN — BUDESONIDE INHALATION 0.5 MG: 0.5 SUSPENSION RESPIRATORY (INHALATION) at 18:49

## 2020-10-28 RX ADMIN — DIPHENHYDRAMINE HYDROCHLORIDE AND LIDOCAINE HYDROCHLORIDE AND ALUMINUM HYDROXIDE AND MAGNESIUM HYDRO 5 ML: KIT at 14:37

## 2020-10-28 RX ADMIN — BUDESONIDE INHALATION 0.5 MG: 0.5 SUSPENSION RESPIRATORY (INHALATION) at 07:18

## 2020-10-28 RX ADMIN — PREDNISONE 20 MG: 20 TABLET ORAL at 14:44

## 2020-10-28 RX ADMIN — DEXTROSE MONOHYDRATE 25 G: 25 INJECTION, SOLUTION INTRAVENOUS at 00:37

## 2020-10-28 RX ADMIN — INSULIN LISPRO 3 UNITS: 100 INJECTION, SOLUTION INTRAVENOUS; SUBCUTANEOUS at 22:06

## 2020-10-29 ENCOUNTER — APPOINTMENT (OUTPATIENT)
Dept: GENERAL RADIOLOGY | Facility: HOSPITAL | Age: 70
End: 2020-10-29

## 2020-10-29 LAB
ANION GAP SERPL CALCULATED.3IONS-SCNC: 10 MMOL/L (ref 5–15)
ANISOCYTOSIS BLD QL: ABNORMAL
ARTERIAL PATENCY WRIST A: ABNORMAL
ATMOSPHERIC PRESS: ABNORMAL MM[HG]
BACTERIA SPEC RESP CULT: NORMAL
BASE EXCESS BLDA CALC-SCNC: 6.2 MMOL/L (ref 0–3)
BDY SITE: ABNORMAL
BUN SERPL-MCNC: 33 MG/DL (ref 8–23)
BUN/CREAT SERPL: 22.8 (ref 7–25)
CALCIUM SPEC-SCNC: 9.1 MG/DL (ref 8.6–10.5)
CHLORIDE SERPL-SCNC: 100 MMOL/L (ref 98–107)
CO2 BLDA-SCNC: 32.8 MMOL/L (ref 22–29)
CO2 SERPL-SCNC: 30 MMOL/L (ref 22–29)
CREAT SERPL-MCNC: 1.45 MG/DL (ref 0.57–1)
DEPRECATED RDW RBC AUTO: 61.7 FL (ref 37–54)
EOSINOPHIL # BLD MANUAL: 0.23 10*3/MM3 (ref 0–0.4)
EOSINOPHIL NFR BLD MANUAL: 3 % (ref 0.3–6.2)
ERYTHROCYTE [DISTWIDTH] IN BLOOD BY AUTOMATED COUNT: 18.7 % (ref 12.3–15.4)
GFR SERPL CREATININE-BSD FRML MDRD: 36 ML/MIN/1.73
GLUCOSE BLDC GLUCOMTR-MCNC: 174 MG/DL (ref 70–105)
GLUCOSE BLDC GLUCOMTR-MCNC: 192 MG/DL (ref 70–105)
GLUCOSE BLDC GLUCOMTR-MCNC: 212 MG/DL (ref 70–105)
GLUCOSE BLDC GLUCOMTR-MCNC: 343 MG/DL (ref 70–105)
GLUCOSE BLDC GLUCOMTR-MCNC: 349 MG/DL (ref 70–105)
GLUCOSE SERPL-MCNC: 173 MG/DL (ref 65–99)
GRAM STN SPEC: NORMAL
GRAM STN SPEC: NORMAL
HCO3 BLDA-SCNC: 31.3 MMOL/L (ref 21–28)
HCT VFR BLD AUTO: 27.3 % (ref 34–46.6)
HEMODILUTION: NO
HGB BLD-MCNC: 9.1 G/DL (ref 12–15.9)
INHALED O2 CONCENTRATION: 50 %
LYMPHOCYTES # BLD MANUAL: 0.45 10*3/MM3 (ref 0.7–3.1)
LYMPHOCYTES NFR BLD MANUAL: 1 % (ref 5–12)
LYMPHOCYTES NFR BLD MANUAL: 6 % (ref 19.6–45.3)
MAGNESIUM SERPL-MCNC: 1.5 MG/DL (ref 1.6–2.4)
MAGNESIUM SERPL-MCNC: 2.1 MG/DL (ref 1.6–2.4)
MCH RBC QN AUTO: 31 PG (ref 26.6–33)
MCHC RBC AUTO-ENTMCNC: 33.3 G/DL (ref 31.5–35.7)
MCV RBC AUTO: 93.3 FL (ref 79–97)
MODALITY: ABNORMAL
MONOCYTES # BLD AUTO: 0.08 10*3/MM3 (ref 0.1–0.9)
MYELOCYTES NFR BLD MANUAL: 2 % (ref 0–0)
NEUTROPHILS # BLD AUTO: 6.6 10*3/MM3 (ref 1.7–7)
NEUTROPHILS NFR BLD MANUAL: 82 % (ref 42.7–76)
NEUTS BAND NFR BLD MANUAL: 6 % (ref 0–5)
NRBC SPEC MANUAL: 1 /100 WBC (ref 0–0.2)
PCO2 BLDA: 47.7 MM HG (ref 35–48)
PEEP RESPIRATORY: 5 CM[H2O]
PH BLDA: 7.43 PH UNITS (ref 7.35–7.45)
PHOSPHATE SERPL-MCNC: 3.4 MG/DL (ref 2.5–4.5)
PLAT MORPH BLD: NORMAL
PLATELET # BLD AUTO: 248 10*3/MM3 (ref 140–450)
PMV BLD AUTO: 7.8 FL (ref 6–12)
PO2 BLDA: 64.8 MM HG (ref 83–108)
POLYCHROMASIA BLD QL SMEAR: ABNORMAL
POTASSIUM SERPL-SCNC: 3.4 MMOL/L (ref 3.5–5.2)
POTASSIUM SERPL-SCNC: 5 MMOL/L (ref 3.5–5.2)
PSV: 8 CMH2O
RBC # BLD AUTO: 2.92 10*6/MM3 (ref 3.77–5.28)
SAO2 % BLDCOA: 92.5 % (ref 94–98)
SCAN SLIDE: NORMAL
SODIUM SERPL-SCNC: 140 MMOL/L (ref 136–145)
VENTILATOR MODE: ABNORMAL
WBC # BLD AUTO: 7.5 10*3/MM3 (ref 3.4–10.8)
WBC MORPH BLD: NORMAL

## 2020-10-29 PROCEDURE — 97164 PT RE-EVAL EST PLAN CARE: CPT

## 2020-10-29 PROCEDURE — 25010000002 MAGNESIUM SULFATE 2 GM/50ML SOLUTION: Performed by: INTERNAL MEDICINE

## 2020-10-29 PROCEDURE — 25010000002 METHYLPREDNISOLONE PER 40 MG: Performed by: INTERNAL MEDICINE

## 2020-10-29 PROCEDURE — 94799 UNLISTED PULMONARY SVC/PX: CPT

## 2020-10-29 PROCEDURE — 63710000001 PREDNISONE PER 1 MG: Performed by: INTERNAL MEDICINE

## 2020-10-29 PROCEDURE — 63710000001 INSULIN GLARGINE PER 5 UNITS: Performed by: INTERNAL MEDICINE

## 2020-10-29 PROCEDURE — 82962 GLUCOSE BLOOD TEST: CPT

## 2020-10-29 PROCEDURE — 71045 X-RAY EXAM CHEST 1 VIEW: CPT

## 2020-10-29 PROCEDURE — 63710000001 INSULIN LISPRO (HUMAN) PER 5 UNITS: Performed by: INTERNAL MEDICINE

## 2020-10-29 PROCEDURE — 80048 BASIC METABOLIC PNL TOTAL CA: CPT | Performed by: INTERNAL MEDICINE

## 2020-10-29 PROCEDURE — 25010000003 POTASSIUM CHLORIDE 10 MEQ/100ML SOLUTION: Performed by: INTERNAL MEDICINE

## 2020-10-29 PROCEDURE — 84100 ASSAY OF PHOSPHORUS: CPT | Performed by: INTERNAL MEDICINE

## 2020-10-29 PROCEDURE — 83735 ASSAY OF MAGNESIUM: CPT | Performed by: INTERNAL MEDICINE

## 2020-10-29 PROCEDURE — 84132 ASSAY OF SERUM POTASSIUM: CPT | Performed by: INTERNAL MEDICINE

## 2020-10-29 PROCEDURE — 97530 THERAPEUTIC ACTIVITIES: CPT

## 2020-10-29 PROCEDURE — 82803 BLOOD GASES ANY COMBINATION: CPT

## 2020-10-29 PROCEDURE — 94003 VENT MGMT INPAT SUBQ DAY: CPT

## 2020-10-29 PROCEDURE — 85025 COMPLETE CBC W/AUTO DIFF WBC: CPT | Performed by: INTERNAL MEDICINE

## 2020-10-29 PROCEDURE — 85007 BL SMEAR W/DIFF WBC COUNT: CPT | Performed by: INTERNAL MEDICINE

## 2020-10-29 RX ORDER — MAGNESIUM SULFATE HEPTAHYDRATE 40 MG/ML
2 INJECTION, SOLUTION INTRAVENOUS ONCE
Status: COMPLETED | OUTPATIENT
Start: 2020-10-29 | End: 2020-10-29

## 2020-10-29 RX ORDER — POTASSIUM CHLORIDE 7.45 MG/ML
10 INJECTION INTRAVENOUS
Status: COMPLETED | OUTPATIENT
Start: 2020-10-29 | End: 2020-10-29

## 2020-10-29 RX ORDER — METHYLPREDNISOLONE SODIUM SUCCINATE 40 MG/ML
40 INJECTION, POWDER, LYOPHILIZED, FOR SOLUTION INTRAMUSCULAR; INTRAVENOUS EVERY 8 HOURS
Status: DISCONTINUED | OUTPATIENT
Start: 2020-10-29 | End: 2020-11-05

## 2020-10-29 RX ADMIN — SULFAMETHOXAZOLE AND TRIMETHOPRIM 320 MG: 800; 160 TABLET ORAL at 08:25

## 2020-10-29 RX ADMIN — AMLODIPINE BESYLATE 5 MG: 5 TABLET ORAL at 08:25

## 2020-10-29 RX ADMIN — INSULIN LISPRO 3 UNITS: 100 INJECTION, SOLUTION INTRAVENOUS; SUBCUTANEOUS at 08:47

## 2020-10-29 RX ADMIN — DIPHENHYDRAMINE HYDROCHLORIDE AND LIDOCAINE HYDROCHLORIDE AND ALUMINUM HYDROXIDE AND MAGNESIUM HYDRO 5 ML: KIT at 17:08

## 2020-10-29 RX ADMIN — INSULIN LISPRO 10 UNITS: 100 INJECTION, SOLUTION INTRAVENOUS; SUBCUTANEOUS at 17:43

## 2020-10-29 RX ADMIN — Medication 10 ML: at 09:03

## 2020-10-29 RX ADMIN — INSULIN GLARGINE 15 UNITS: 100 INJECTION, SOLUTION SUBCUTANEOUS at 08:45

## 2020-10-29 RX ADMIN — POTASSIUM CHLORIDE 10 MEQ: 7.46 INJECTION, SOLUTION INTRAVENOUS at 09:05

## 2020-10-29 RX ADMIN — ATORVASTATIN CALCIUM 40 MG: 40 TABLET, FILM COATED ORAL at 20:11

## 2020-10-29 RX ADMIN — DIPHENHYDRAMINE HYDROCHLORIDE AND LIDOCAINE HYDROCHLORIDE AND ALUMINUM HYDROXIDE AND MAGNESIUM HYDRO 5 ML: KIT at 04:11

## 2020-10-29 RX ADMIN — INSULIN LISPRO 10 UNITS: 100 INJECTION, SOLUTION INTRAVENOUS; SUBCUTANEOUS at 08:48

## 2020-10-29 RX ADMIN — BUDESONIDE INHALATION 0.5 MG: 0.5 SUSPENSION RESPIRATORY (INHALATION) at 07:22

## 2020-10-29 RX ADMIN — PREDNISONE 20 MG: 20 TABLET ORAL at 08:25

## 2020-10-29 RX ADMIN — METHYLPREDNISOLONE SODIUM SUCCINATE 40 MG: 40 INJECTION, POWDER, FOR SOLUTION INTRAMUSCULAR; INTRAVENOUS at 23:33

## 2020-10-29 RX ADMIN — SULFAMETHOXAZOLE AND TRIMETHOPRIM 320 MG: 800; 160 TABLET ORAL at 20:01

## 2020-10-29 RX ADMIN — Medication 10 ML: at 20:02

## 2020-10-29 RX ADMIN — GABAPENTIN 100 MG: 100 CAPSULE ORAL at 21:42

## 2020-10-29 RX ADMIN — PANTOPRAZOLE SODIUM 40 MG: 40 INJECTION, POWDER, FOR SOLUTION INTRAVENOUS at 08:25

## 2020-10-29 RX ADMIN — DIPHENHYDRAMINE HYDROCHLORIDE AND LIDOCAINE HYDROCHLORIDE AND ALUMINUM HYDROXIDE AND MAGNESIUM HYDRO 5 ML: KIT at 11:15

## 2020-10-29 RX ADMIN — PANTOPRAZOLE SODIUM 40 MG: 40 INJECTION, POWDER, FOR SOLUTION INTRAVENOUS at 20:01

## 2020-10-29 RX ADMIN — DIPHENHYDRAMINE HYDROCHLORIDE AND LIDOCAINE HYDROCHLORIDE AND ALUMINUM HYDROXIDE AND MAGNESIUM HYDRO 5 ML: KIT at 23:29

## 2020-10-29 RX ADMIN — MAGNESIUM SULFATE 2 G: 2 INJECTION INTRAVENOUS at 09:06

## 2020-10-29 RX ADMIN — ASPIRIN 81 MG CHEWABLE TABLET 81 MG: 81 TABLET CHEWABLE at 08:25

## 2020-10-29 RX ADMIN — POTASSIUM CHLORIDE 10 MEQ: 7.46 INJECTION, SOLUTION INTRAVENOUS at 11:14

## 2020-10-29 RX ADMIN — INSULIN LISPRO 5 UNITS: 100 INJECTION, SOLUTION INTRAVENOUS; SUBCUTANEOUS at 12:04

## 2020-10-29 RX ADMIN — METHYLPREDNISOLONE SODIUM SUCCINATE 40 MG: 40 INJECTION, POWDER, FOR SOLUTION INTRAMUSCULAR; INTRAVENOUS at 15:02

## 2020-10-29 RX ADMIN — INSULIN LISPRO 10 UNITS: 100 INJECTION, SOLUTION INTRAVENOUS; SUBCUTANEOUS at 20:01

## 2020-10-29 RX ADMIN — BUDESONIDE INHALATION 0.5 MG: 0.5 SUSPENSION RESPIRATORY (INHALATION) at 19:03

## 2020-10-29 RX ADMIN — INSULIN GLARGINE 15 UNITS: 100 INJECTION, SOLUTION SUBCUTANEOUS at 20:01

## 2020-10-29 RX ADMIN — BUMETANIDE 1 MG: 1 TABLET ORAL at 08:39

## 2020-10-29 RX ADMIN — POTASSIUM CHLORIDE 10 MEQ: 7.46 INJECTION, SOLUTION INTRAVENOUS at 12:12

## 2020-10-29 RX ADMIN — GABAPENTIN 100 MG: 100 CAPSULE ORAL at 14:59

## 2020-10-29 RX ADMIN — GABAPENTIN 100 MG: 100 CAPSULE ORAL at 05:41

## 2020-10-30 ENCOUNTER — APPOINTMENT (OUTPATIENT)
Dept: GENERAL RADIOLOGY | Facility: HOSPITAL | Age: 70
End: 2020-10-30

## 2020-10-30 LAB
ANION GAP SERPL CALCULATED.3IONS-SCNC: 8 MMOL/L (ref 5–15)
ANISOCYTOSIS BLD QL: ABNORMAL
ARTERIAL PATENCY WRIST A: ABNORMAL
ARTERIAL PATENCY WRIST A: ABNORMAL
ATMOSPHERIC PRESS: ABNORMAL MM[HG]
ATMOSPHERIC PRESS: ABNORMAL MM[HG]
BASE EXCESS BLDA CALC-SCNC: 4.7 MMOL/L (ref 0–3)
BASE EXCESS BLDA CALC-SCNC: 4.8 MMOL/L (ref 0–3)
BDY SITE: ABNORMAL
BDY SITE: ABNORMAL
BUN SERPL-MCNC: 42 MG/DL (ref 8–23)
BUN/CREAT SERPL: 33.6 (ref 7–25)
CALCIUM SPEC-SCNC: 9.2 MG/DL (ref 8.6–10.5)
CHLORIDE SERPL-SCNC: 99 MMOL/L (ref 98–107)
CMV DNA SPEC QL NAA+PROBE: ABNORMAL
CO2 BLDA-SCNC: 31.2 MMOL/L (ref 22–29)
CO2 BLDA-SCNC: 31.6 MMOL/L (ref 22–29)
CO2 SERPL-SCNC: 29 MMOL/L (ref 22–29)
CREAT SERPL-MCNC: 1.25 MG/DL (ref 0.57–1)
DEPRECATED RDW RBC AUTO: 63.4 FL (ref 37–54)
ERYTHROCYTE [DISTWIDTH] IN BLOOD BY AUTOMATED COUNT: 18.8 % (ref 12.3–15.4)
GFR SERPL CREATININE-BSD FRML MDRD: 42 ML/MIN/1.73
GLUCOSE BLDC GLUCOMTR-MCNC: 304 MG/DL (ref 70–105)
GLUCOSE BLDC GLUCOMTR-MCNC: 316 MG/DL (ref 70–105)
GLUCOSE BLDC GLUCOMTR-MCNC: 370 MG/DL (ref 70–105)
GLUCOSE BLDC GLUCOMTR-MCNC: 375 MG/DL (ref 70–105)
GLUCOSE SERPL-MCNC: 355 MG/DL (ref 65–99)
HCO3 BLDA-SCNC: 29.8 MMOL/L (ref 21–28)
HCO3 BLDA-SCNC: 30.1 MMOL/L (ref 21–28)
HCT VFR BLD AUTO: 24 % (ref 34–46.6)
HEMODILUTION: NO
HEMODILUTION: NO
HGB BLD-MCNC: 7.8 G/DL (ref 12–15.9)
INHALED O2 CONCENTRATION: 40 %
INHALED O2 CONCENTRATION: 50 %
LYMPHOCYTES # BLD MANUAL: 0.06 10*3/MM3 (ref 0.7–3.1)
LYMPHOCYTES NFR BLD MANUAL: 1 % (ref 19.6–45.3)
MAGNESIUM SERPL-MCNC: 2.1 MG/DL (ref 1.6–2.4)
MCH RBC QN AUTO: 30.9 PG (ref 26.6–33)
MCHC RBC AUTO-ENTMCNC: 32.6 G/DL (ref 31.5–35.7)
MCV RBC AUTO: 94.8 FL (ref 79–97)
MODALITY: ABNORMAL
MODALITY: ABNORMAL
NEUTROPHILS # BLD AUTO: 5.84 10*3/MM3 (ref 1.7–7)
NEUTROPHILS NFR BLD MANUAL: 91 % (ref 42.7–76)
NEUTS BAND NFR BLD MANUAL: 8 % (ref 0–5)
PCO2 BLDA: 46.4 MM HG (ref 35–48)
PCO2 BLDA: 48.4 MM HG (ref 35–48)
PEEP RESPIRATORY: 5 CM[H2O]
PH BLDA: 7.4 PH UNITS (ref 7.35–7.45)
PH BLDA: 7.42 PH UNITS (ref 7.35–7.45)
PHOSPHATE SERPL-MCNC: 3 MG/DL (ref 2.5–4.5)
PLAT MORPH BLD: NORMAL
PLATELET # BLD AUTO: 187 10*3/MM3 (ref 140–450)
PMV BLD AUTO: 7.8 FL (ref 6–12)
PO2 BLDA: 67.3 MM HG (ref 83–108)
PO2 BLDA: 69.7 MM HG (ref 83–108)
POTASSIUM SERPL-SCNC: 4.8 MMOL/L (ref 3.5–5.2)
PSV: 10 CMH2O
RBC # BLD AUTO: 2.53 10*6/MM3 (ref 3.77–5.28)
SAO2 % BLDCOA: 93.1 % (ref 94–98)
SAO2 % BLDCOA: 93.5 % (ref 94–98)
SCAN SLIDE: NORMAL
SODIUM SERPL-SCNC: 136 MMOL/L (ref 136–145)
SPECIMEN SOURCE: ABNORMAL
VENTILATOR MODE: ABNORMAL
WBC # BLD AUTO: 5.9 10*3/MM3 (ref 3.4–10.8)
WBC MORPH BLD: NORMAL

## 2020-10-30 PROCEDURE — 71045 X-RAY EXAM CHEST 1 VIEW: CPT

## 2020-10-30 PROCEDURE — 94799 UNLISTED PULMONARY SVC/PX: CPT

## 2020-10-30 PROCEDURE — 82962 GLUCOSE BLOOD TEST: CPT

## 2020-10-30 PROCEDURE — 94003 VENT MGMT INPAT SUBQ DAY: CPT

## 2020-10-30 PROCEDURE — 97530 THERAPEUTIC ACTIVITIES: CPT

## 2020-10-30 PROCEDURE — 83735 ASSAY OF MAGNESIUM: CPT | Performed by: INTERNAL MEDICINE

## 2020-10-30 PROCEDURE — 63710000001 INSULIN GLARGINE PER 5 UNITS: Performed by: INTERNAL MEDICINE

## 2020-10-30 PROCEDURE — 85007 BL SMEAR W/DIFF WBC COUNT: CPT | Performed by: INTERNAL MEDICINE

## 2020-10-30 PROCEDURE — 63710000001 INSULIN LISPRO (HUMAN) PER 5 UNITS: Performed by: INTERNAL MEDICINE

## 2020-10-30 PROCEDURE — 84100 ASSAY OF PHOSPHORUS: CPT | Performed by: INTERNAL MEDICINE

## 2020-10-30 PROCEDURE — 25010000002 METHYLPREDNISOLONE PER 40 MG: Performed by: INTERNAL MEDICINE

## 2020-10-30 PROCEDURE — 82803 BLOOD GASES ANY COMBINATION: CPT

## 2020-10-30 PROCEDURE — 85025 COMPLETE CBC W/AUTO DIFF WBC: CPT | Performed by: INTERNAL MEDICINE

## 2020-10-30 PROCEDURE — 80048 BASIC METABOLIC PNL TOTAL CA: CPT | Performed by: INTERNAL MEDICINE

## 2020-10-30 RX ORDER — INSULIN LISPRO 100 [IU]/ML
10 INJECTION, SOLUTION INTRAVENOUS; SUBCUTANEOUS EVERY 6 HOURS
Status: DISCONTINUED | OUTPATIENT
Start: 2020-10-30 | End: 2020-11-06 | Stop reason: HOSPADM

## 2020-10-30 RX ORDER — INSULIN LISPRO 100 [IU]/ML
0-24 INJECTION, SOLUTION INTRAVENOUS; SUBCUTANEOUS AS NEEDED
Status: DISCONTINUED | OUTPATIENT
Start: 2020-10-30 | End: 2020-11-06 | Stop reason: HOSPADM

## 2020-10-30 RX ORDER — SULFAMETHOXAZOLE AND TRIMETHOPRIM 800; 160 MG/1; MG/1
2 TABLET ORAL EVERY 8 HOURS SCHEDULED
Status: DISCONTINUED | OUTPATIENT
Start: 2020-10-30 | End: 2020-11-06 | Stop reason: HOSPADM

## 2020-10-30 RX ORDER — FLUDROCORTISONE ACETATE 0.1 MG/1
100 TABLET ORAL ONCE
Status: COMPLETED | OUTPATIENT
Start: 2020-10-30 | End: 2020-10-30

## 2020-10-30 RX ORDER — INSULIN LISPRO 100 [IU]/ML
0-24 INJECTION, SOLUTION INTRAVENOUS; SUBCUTANEOUS EVERY 6 HOURS SCHEDULED
Status: DISCONTINUED | OUTPATIENT
Start: 2020-10-30 | End: 2020-11-06 | Stop reason: HOSPADM

## 2020-10-30 RX ADMIN — BUDESONIDE INHALATION 0.5 MG: 0.5 SUSPENSION RESPIRATORY (INHALATION) at 06:28

## 2020-10-30 RX ADMIN — Medication 10 ML: at 10:12

## 2020-10-30 RX ADMIN — DIPHENHYDRAMINE HYDROCHLORIDE AND LIDOCAINE HYDROCHLORIDE AND ALUMINUM HYDROXIDE AND MAGNESIUM HYDRO 5 ML: KIT at 17:31

## 2020-10-30 RX ADMIN — METHYLPREDNISOLONE SODIUM SUCCINATE 40 MG: 40 INJECTION, POWDER, FOR SOLUTION INTRAMUSCULAR; INTRAVENOUS at 17:31

## 2020-10-30 RX ADMIN — SULFAMETHOXAZOLE AND TRIMETHOPRIM 320 MG: 800; 160 TABLET ORAL at 10:12

## 2020-10-30 RX ADMIN — INSULIN LISPRO 16 UNITS: 100 INJECTION, SOLUTION INTRAVENOUS; SUBCUTANEOUS at 23:26

## 2020-10-30 RX ADMIN — GABAPENTIN 100 MG: 100 CAPSULE ORAL at 21:25

## 2020-10-30 RX ADMIN — BUMETANIDE 1 MG: 1 TABLET ORAL at 10:11

## 2020-10-30 RX ADMIN — METHYLPREDNISOLONE SODIUM SUCCINATE 40 MG: 40 INJECTION, POWDER, FOR SOLUTION INTRAMUSCULAR; INTRAVENOUS at 10:12

## 2020-10-30 RX ADMIN — PANTOPRAZOLE SODIUM 40 MG: 40 INJECTION, POWDER, FOR SOLUTION INTRAVENOUS at 10:12

## 2020-10-30 RX ADMIN — INSULIN LISPRO 12 UNITS: 100 INJECTION, SOLUTION INTRAVENOUS; SUBCUTANEOUS at 05:12

## 2020-10-30 RX ADMIN — GABAPENTIN 100 MG: 100 CAPSULE ORAL at 05:12

## 2020-10-30 RX ADMIN — INSULIN GLARGINE 15 UNITS: 100 INJECTION, SOLUTION SUBCUTANEOUS at 21:25

## 2020-10-30 RX ADMIN — SULFAMETHOXAZOLE AND TRIMETHOPRIM 320 MG: 800; 160 TABLET ORAL at 21:24

## 2020-10-30 RX ADMIN — METHYLPREDNISOLONE SODIUM SUCCINATE 40 MG: 40 INJECTION, POWDER, FOR SOLUTION INTRAMUSCULAR; INTRAVENOUS at 23:25

## 2020-10-30 RX ADMIN — DIPHENHYDRAMINE HYDROCHLORIDE AND LIDOCAINE HYDROCHLORIDE AND ALUMINUM HYDROXIDE AND MAGNESIUM HYDRO 5 ML: KIT at 10:12

## 2020-10-30 RX ADMIN — INSULIN LISPRO 20 UNITS: 100 INJECTION, SOLUTION INTRAVENOUS; SUBCUTANEOUS at 18:24

## 2020-10-30 RX ADMIN — AMLODIPINE BESYLATE 5 MG: 5 TABLET ORAL at 10:12

## 2020-10-30 RX ADMIN — INSULIN GLARGINE 15 UNITS: 100 INJECTION, SOLUTION SUBCUTANEOUS at 10:12

## 2020-10-30 RX ADMIN — Medication 10 ML: at 21:31

## 2020-10-30 RX ADMIN — INSULIN LISPRO 10 UNITS: 100 INJECTION, SOLUTION INTRAVENOUS; SUBCUTANEOUS at 18:24

## 2020-10-30 RX ADMIN — PANTOPRAZOLE SODIUM 40 MG: 40 INJECTION, POWDER, FOR SOLUTION INTRAVENOUS at 21:24

## 2020-10-30 RX ADMIN — BUDESONIDE INHALATION 0.5 MG: 0.5 SUSPENSION RESPIRATORY (INHALATION) at 18:58

## 2020-10-30 RX ADMIN — ASPIRIN 81 MG CHEWABLE TABLET 81 MG: 81 TABLET CHEWABLE at 10:11

## 2020-10-30 RX ADMIN — DIPHENHYDRAMINE HYDROCHLORIDE AND LIDOCAINE HYDROCHLORIDE AND ALUMINUM HYDROXIDE AND MAGNESIUM HYDRO 5 ML: KIT at 04:18

## 2020-10-30 RX ADMIN — ATORVASTATIN CALCIUM 40 MG: 40 TABLET, FILM COATED ORAL at 21:25

## 2020-10-30 RX ADMIN — FLUDROCORTISONE ACETATE 100 MCG: 0.1 TABLET ORAL at 10:12

## 2020-10-30 RX ADMIN — GABAPENTIN 100 MG: 100 CAPSULE ORAL at 13:19

## 2020-10-30 RX ADMIN — INSULIN LISPRO 10 UNITS: 100 INJECTION, SOLUTION INTRAVENOUS; SUBCUTANEOUS at 23:25

## 2020-10-30 RX ADMIN — DIPHENHYDRAMINE HYDROCHLORIDE AND LIDOCAINE HYDROCHLORIDE AND ALUMINUM HYDROXIDE AND MAGNESIUM HYDRO 5 ML: KIT at 22:04

## 2020-10-30 RX ADMIN — INSULIN LISPRO 16 UNITS: 100 INJECTION, SOLUTION INTRAVENOUS; SUBCUTANEOUS at 13:19

## 2020-10-31 ENCOUNTER — APPOINTMENT (OUTPATIENT)
Dept: GENERAL RADIOLOGY | Facility: HOSPITAL | Age: 70
End: 2020-10-31

## 2020-10-31 LAB
ANION GAP SERPL CALCULATED.3IONS-SCNC: 9 MMOL/L (ref 5–15)
ANISOCYTOSIS BLD QL: ABNORMAL
BUN SERPL-MCNC: 50 MG/DL (ref 8–23)
BUN/CREAT SERPL: 39.7 (ref 7–25)
CALCIUM SPEC-SCNC: 9.6 MG/DL (ref 8.6–10.5)
CHLORIDE SERPL-SCNC: 100 MMOL/L (ref 98–107)
CO2 SERPL-SCNC: 31 MMOL/L (ref 22–29)
CREAT SERPL-MCNC: 1.26 MG/DL (ref 0.57–1)
DEPRECATED RDW RBC AUTO: 60.4 FL (ref 37–54)
ERYTHROCYTE [DISTWIDTH] IN BLOOD BY AUTOMATED COUNT: 18.3 % (ref 12.3–15.4)
GFR SERPL CREATININE-BSD FRML MDRD: 42 ML/MIN/1.73
GLUCOSE BLDC GLUCOMTR-MCNC: 249 MG/DL (ref 70–105)
GLUCOSE BLDC GLUCOMTR-MCNC: 272 MG/DL (ref 70–105)
GLUCOSE BLDC GLUCOMTR-MCNC: 292 MG/DL (ref 70–105)
GLUCOSE BLDC GLUCOMTR-MCNC: 307 MG/DL (ref 70–105)
GLUCOSE SERPL-MCNC: 218 MG/DL (ref 65–99)
HCT VFR BLD AUTO: 26 % (ref 34–46.6)
HGB BLD-MCNC: 8.5 G/DL (ref 12–15.9)
HIV1+2 AB SER QL: NORMAL
LYMPHOCYTES # BLD MANUAL: 0.09 10*3/MM3 (ref 0.7–3.1)
LYMPHOCYTES NFR BLD MANUAL: 1 % (ref 19.6–45.3)
LYMPHOCYTES NFR BLD MANUAL: 3 % (ref 5–12)
MAGNESIUM SERPL-MCNC: 2 MG/DL (ref 1.6–2.4)
MCH RBC QN AUTO: 31 PG (ref 26.6–33)
MCHC RBC AUTO-ENTMCNC: 32.8 G/DL (ref 31.5–35.7)
MCV RBC AUTO: 94.6 FL (ref 79–97)
METAMYELOCYTES NFR BLD MANUAL: 1 % (ref 0–0)
MONOCYTES # BLD AUTO: 0.26 10*3/MM3 (ref 0.1–0.9)
MYELOCYTES NFR BLD MANUAL: 1 % (ref 0–0)
NEUTROPHILS # BLD AUTO: 8.08 10*3/MM3 (ref 1.7–7)
NEUTROPHILS NFR BLD MANUAL: 93 % (ref 42.7–76)
NEUTS BAND NFR BLD MANUAL: 1 % (ref 0–5)
NRBC SPEC MANUAL: 1 /100 WBC (ref 0–0.2)
PHOSPHATE SERPL-MCNC: 2.8 MG/DL (ref 2.5–4.5)
PLAT MORPH BLD: NORMAL
PLATELET # BLD AUTO: 197 10*3/MM3 (ref 140–450)
PMV BLD AUTO: 7.7 FL (ref 6–12)
POLYCHROMASIA BLD QL SMEAR: ABNORMAL
POTASSIUM SERPL-SCNC: 5.2 MMOL/L (ref 3.5–5.2)
RBC # BLD AUTO: 2.74 10*6/MM3 (ref 3.77–5.28)
SCAN SLIDE: NORMAL
SODIUM SERPL-SCNC: 140 MMOL/L (ref 136–145)
TOXIC GRANULATION: ABNORMAL
WBC # BLD AUTO: 8.6 10*3/MM3 (ref 3.4–10.8)

## 2020-10-31 PROCEDURE — 63710000001 INSULIN GLARGINE PER 5 UNITS: Performed by: INTERNAL MEDICINE

## 2020-10-31 PROCEDURE — G0432 EIA HIV-1/HIV-2 SCREEN: HCPCS | Performed by: INTERNAL MEDICINE

## 2020-10-31 PROCEDURE — 63710000001 INSULIN LISPRO (HUMAN) PER 5 UNITS: Performed by: INTERNAL MEDICINE

## 2020-10-31 PROCEDURE — 94799 UNLISTED PULMONARY SVC/PX: CPT

## 2020-10-31 PROCEDURE — 84100 ASSAY OF PHOSPHORUS: CPT | Performed by: INTERNAL MEDICINE

## 2020-10-31 PROCEDURE — 85007 BL SMEAR W/DIFF WBC COUNT: CPT | Performed by: INTERNAL MEDICINE

## 2020-10-31 PROCEDURE — 25010000002 METHYLPREDNISOLONE PER 40 MG: Performed by: INTERNAL MEDICINE

## 2020-10-31 PROCEDURE — 82962 GLUCOSE BLOOD TEST: CPT

## 2020-10-31 PROCEDURE — 71045 X-RAY EXAM CHEST 1 VIEW: CPT

## 2020-10-31 PROCEDURE — 83735 ASSAY OF MAGNESIUM: CPT | Performed by: INTERNAL MEDICINE

## 2020-10-31 PROCEDURE — 92610 EVALUATE SWALLOWING FUNCTION: CPT

## 2020-10-31 PROCEDURE — 80048 BASIC METABOLIC PNL TOTAL CA: CPT | Performed by: INTERNAL MEDICINE

## 2020-10-31 PROCEDURE — 85025 COMPLETE CBC W/AUTO DIFF WBC: CPT | Performed by: INTERNAL MEDICINE

## 2020-10-31 PROCEDURE — 99232 SBSQ HOSP IP/OBS MODERATE 35: CPT | Performed by: PHYSICIAN ASSISTANT

## 2020-10-31 PROCEDURE — 25010000002 GANCICLOVIR PER 500 MG: Performed by: INTERNAL MEDICINE

## 2020-10-31 RX ORDER — SODIUM CHLORIDE 0.9 % (FLUSH) 0.9 %
10 SYRINGE (ML) INJECTION EVERY 12 HOURS SCHEDULED
Status: DISCONTINUED | OUTPATIENT
Start: 2020-10-31 | End: 2020-11-06 | Stop reason: HOSPADM

## 2020-10-31 RX ORDER — FLUDROCORTISONE ACETATE 0.1 MG/1
100 TABLET ORAL ONCE
Status: COMPLETED | OUTPATIENT
Start: 2020-10-31 | End: 2020-10-31

## 2020-10-31 RX ORDER — GANCICLOVIR 500 MG/10ML
2.5 INJECTION, POWDER, LYOPHILIZED, FOR SOLUTION INTRAVENOUS
Status: DISCONTINUED | OUTPATIENT
Start: 2020-10-31 | End: 2020-10-31

## 2020-10-31 RX ORDER — SODIUM CHLORIDE 0.9 % (FLUSH) 0.9 %
10 SYRINGE (ML) INJECTION AS NEEDED
Status: DISCONTINUED | OUTPATIENT
Start: 2020-10-31 | End: 2020-11-06 | Stop reason: HOSPADM

## 2020-10-31 RX ORDER — SODIUM CHLORIDE 0.9 % (FLUSH) 0.9 %
20 SYRINGE (ML) INJECTION AS NEEDED
Status: DISCONTINUED | OUTPATIENT
Start: 2020-10-31 | End: 2020-11-06 | Stop reason: HOSPADM

## 2020-10-31 RX ADMIN — FLUDROCORTISONE ACETATE 100 MCG: 0.1 TABLET ORAL at 20:06

## 2020-10-31 RX ADMIN — INSULIN GLARGINE 15 UNITS: 100 INJECTION, SOLUTION SUBCUTANEOUS at 09:05

## 2020-10-31 RX ADMIN — INSULIN LISPRO 10 UNITS: 100 INJECTION, SOLUTION INTRAVENOUS; SUBCUTANEOUS at 06:23

## 2020-10-31 RX ADMIN — Medication 10 ML: at 22:54

## 2020-10-31 RX ADMIN — INSULIN LISPRO 12 UNITS: 100 INJECTION, SOLUTION INTRAVENOUS; SUBCUTANEOUS at 06:24

## 2020-10-31 RX ADMIN — METHYLPREDNISOLONE SODIUM SUCCINATE 40 MG: 40 INJECTION, POWDER, FOR SOLUTION INTRAMUSCULAR; INTRAVENOUS at 09:04

## 2020-10-31 RX ADMIN — INSULIN LISPRO 10 UNITS: 100 INJECTION, SOLUTION INTRAVENOUS; SUBCUTANEOUS at 11:53

## 2020-10-31 RX ADMIN — BUDESONIDE INHALATION 0.5 MG: 0.5 SUSPENSION RESPIRATORY (INHALATION) at 17:02

## 2020-10-31 RX ADMIN — DIPHENHYDRAMINE HYDROCHLORIDE AND LIDOCAINE HYDROCHLORIDE AND ALUMINUM HYDROXIDE AND MAGNESIUM HYDRO 5 ML: KIT at 11:26

## 2020-10-31 RX ADMIN — GABAPENTIN 100 MG: 100 CAPSULE ORAL at 22:53

## 2020-10-31 RX ADMIN — ASPIRIN 81 MG CHEWABLE TABLET 81 MG: 81 TABLET CHEWABLE at 09:04

## 2020-10-31 RX ADMIN — INSULIN LISPRO 16 UNITS: 100 INJECTION, SOLUTION INTRAVENOUS; SUBCUTANEOUS at 11:53

## 2020-10-31 RX ADMIN — GABAPENTIN 100 MG: 100 CAPSULE ORAL at 06:23

## 2020-10-31 RX ADMIN — Medication 20 ML: at 22:57

## 2020-10-31 RX ADMIN — AMLODIPINE BESYLATE 5 MG: 5 TABLET ORAL at 11:26

## 2020-10-31 RX ADMIN — Medication 10 ML: at 22:52

## 2020-10-31 RX ADMIN — SULFAMETHOXAZOLE AND TRIMETHOPRIM 320 MG: 800; 160 TABLET ORAL at 14:49

## 2020-10-31 RX ADMIN — SULFAMETHOXAZOLE AND TRIMETHOPRIM 320 MG: 800; 160 TABLET ORAL at 06:23

## 2020-10-31 RX ADMIN — Medication 10 ML: at 09:05

## 2020-10-31 RX ADMIN — PANTOPRAZOLE SODIUM 40 MG: 40 INJECTION, POWDER, FOR SOLUTION INTRAVENOUS at 09:04

## 2020-10-31 RX ADMIN — INSULIN GLARGINE 15 UNITS: 100 INJECTION, SOLUTION SUBCUTANEOUS at 22:50

## 2020-10-31 RX ADMIN — Medication 20 ML: at 20:06

## 2020-10-31 RX ADMIN — SODIUM CHLORIDE 150 MG: 9 INJECTION, SOLUTION INTRAVENOUS at 19:10

## 2020-10-31 RX ADMIN — GABAPENTIN 100 MG: 100 CAPSULE ORAL at 14:49

## 2020-10-31 RX ADMIN — METHYLPREDNISOLONE SODIUM SUCCINATE 40 MG: 40 INJECTION, POWDER, FOR SOLUTION INTRAMUSCULAR; INTRAVENOUS at 18:23

## 2020-10-31 RX ADMIN — ATORVASTATIN CALCIUM 40 MG: 40 TABLET, FILM COATED ORAL at 20:05

## 2020-10-31 RX ADMIN — Medication 10 ML: at 09:11

## 2020-10-31 RX ADMIN — INSULIN LISPRO 12 UNITS: 100 INJECTION, SOLUTION INTRAVENOUS; SUBCUTANEOUS at 18:22

## 2020-10-31 RX ADMIN — DIPHENHYDRAMINE HYDROCHLORIDE AND LIDOCAINE HYDROCHLORIDE AND ALUMINUM HYDROXIDE AND MAGNESIUM HYDRO 5 ML: KIT at 19:12

## 2020-10-31 RX ADMIN — INSULIN LISPRO 10 UNITS: 100 INJECTION, SOLUTION INTRAVENOUS; SUBCUTANEOUS at 18:22

## 2020-10-31 RX ADMIN — SULFAMETHOXAZOLE AND TRIMETHOPRIM 320 MG: 800; 160 TABLET ORAL at 22:54

## 2020-10-31 RX ADMIN — BUMETANIDE 1 MG: 1 TABLET ORAL at 09:04

## 2020-10-31 RX ADMIN — DIPHENHYDRAMINE HYDROCHLORIDE AND LIDOCAINE HYDROCHLORIDE AND ALUMINUM HYDROXIDE AND MAGNESIUM HYDRO 5 ML: KIT at 06:00

## 2020-10-31 RX ADMIN — BUDESONIDE INHALATION 0.5 MG: 0.5 SUSPENSION RESPIRATORY (INHALATION) at 05:10

## 2020-10-31 RX ADMIN — PANTOPRAZOLE SODIUM 40 MG: 40 INJECTION, POWDER, FOR SOLUTION INTRAVENOUS at 20:06

## 2020-11-01 ENCOUNTER — APPOINTMENT (OUTPATIENT)
Dept: GENERAL RADIOLOGY | Facility: HOSPITAL | Age: 70
End: 2020-11-01

## 2020-11-01 PROBLEM — J96.02 ACUTE RESPIRATORY FAILURE WITH HYPOXIA AND HYPERCAPNIA (HCC): Status: ACTIVE | Noted: 2020-11-01

## 2020-11-01 PROBLEM — B37.0 ORAL THRUSH: Status: ACTIVE | Noted: 2020-11-01

## 2020-11-01 PROBLEM — I48.0 PAROXYSMAL ATRIAL FIBRILLATION (HCC): Chronic | Status: ACTIVE | Noted: 2020-10-12

## 2020-11-01 PROBLEM — E87.5 HYPERKALEMIA: Status: ACTIVE | Noted: 2020-11-01

## 2020-11-01 PROBLEM — K92.2 UPPER GI BLEED: Status: ACTIVE | Noted: 2020-11-01

## 2020-11-01 PROBLEM — J96.01 ACUTE RESPIRATORY FAILURE WITH HYPOXIA AND HYPERCAPNIA (HCC): Status: ACTIVE | Noted: 2020-11-01

## 2020-11-01 LAB
ANION GAP SERPL CALCULATED.3IONS-SCNC: 9 MMOL/L (ref 5–15)
ANISOCYTOSIS BLD QL: ABNORMAL
BUN SERPL-MCNC: 68 MG/DL (ref 8–23)
BUN/CREAT SERPL: 52.7 (ref 7–25)
CALCIUM SPEC-SCNC: 9.7 MG/DL (ref 8.6–10.5)
CHLORIDE SERPL-SCNC: 95 MMOL/L (ref 98–107)
CO2 SERPL-SCNC: 32 MMOL/L (ref 22–29)
CREAT SERPL-MCNC: 1.29 MG/DL (ref 0.57–1)
DEPRECATED RDW RBC AUTO: 63.4 FL (ref 37–54)
ERYTHROCYTE [DISTWIDTH] IN BLOOD BY AUTOMATED COUNT: 19.3 % (ref 12.3–15.4)
GFR SERPL CREATININE-BSD FRML MDRD: 41 ML/MIN/1.73
GLUCOSE BLDC GLUCOMTR-MCNC: 237 MG/DL (ref 70–105)
GLUCOSE BLDC GLUCOMTR-MCNC: 289 MG/DL (ref 70–105)
GLUCOSE BLDC GLUCOMTR-MCNC: 343 MG/DL (ref 70–105)
GLUCOSE BLDC GLUCOMTR-MCNC: 365 MG/DL (ref 70–105)
GLUCOSE BLDC GLUCOMTR-MCNC: 374 MG/DL (ref 70–105)
GLUCOSE SERPL-MCNC: 394 MG/DL (ref 65–99)
HCT VFR BLD AUTO: 27.9 % (ref 34–46.6)
HGB BLD-MCNC: 9.3 G/DL (ref 12–15.9)
LYMPHOCYTES # BLD MANUAL: 0.51 10*3/MM3 (ref 0.7–3.1)
LYMPHOCYTES NFR BLD MANUAL: 2 % (ref 5–12)
LYMPHOCYTES NFR BLD MANUAL: 5 % (ref 19.6–45.3)
MAGNESIUM SERPL-MCNC: 2 MG/DL (ref 1.6–2.4)
MCH RBC QN AUTO: 31.8 PG (ref 26.6–33)
MCHC RBC AUTO-ENTMCNC: 33.4 G/DL (ref 31.5–35.7)
MCV RBC AUTO: 95 FL (ref 79–97)
METAMYELOCYTES NFR BLD MANUAL: 2 % (ref 0–0)
MONOCYTES # BLD AUTO: 0.2 10*3/MM3 (ref 0.1–0.9)
MYELOCYTES NFR BLD MANUAL: 1 % (ref 0–0)
NEUTROPHILS # BLD AUTO: 9.09 10*3/MM3 (ref 1.7–7)
NEUTROPHILS NFR BLD MANUAL: 81 % (ref 42.7–76)
NEUTS BAND NFR BLD MANUAL: 9 % (ref 0–5)
NRBC SPEC MANUAL: 1 /100 WBC (ref 0–0.2)
PHOSPHATE SERPL-MCNC: 3.5 MG/DL (ref 2.5–4.5)
PLAT MORPH BLD: NORMAL
PLATELET # BLD AUTO: 204 10*3/MM3 (ref 140–450)
PMV BLD AUTO: 8 FL (ref 6–12)
POLYCHROMASIA BLD QL SMEAR: ABNORMAL
POTASSIUM SERPL-SCNC: 5.2 MMOL/L (ref 3.5–5.2)
POTASSIUM SERPL-SCNC: 5.4 MMOL/L (ref 3.5–5.2)
POTASSIUM SERPL-SCNC: 5.8 MMOL/L (ref 3.5–5.2)
RBC # BLD AUTO: 2.94 10*6/MM3 (ref 3.77–5.28)
SCAN SLIDE: NORMAL
SODIUM SERPL-SCNC: 136 MMOL/L (ref 136–145)
WBC # BLD AUTO: 10.1 10*3/MM3 (ref 3.4–10.8)
WBC MORPH BLD: NORMAL

## 2020-11-01 PROCEDURE — 92526 ORAL FUNCTION THERAPY: CPT

## 2020-11-01 PROCEDURE — 85007 BL SMEAR W/DIFF WBC COUNT: CPT | Performed by: INTERNAL MEDICINE

## 2020-11-01 PROCEDURE — 80048 BASIC METABOLIC PNL TOTAL CA: CPT | Performed by: INTERNAL MEDICINE

## 2020-11-01 PROCEDURE — 94799 UNLISTED PULMONARY SVC/PX: CPT

## 2020-11-01 PROCEDURE — 82962 GLUCOSE BLOOD TEST: CPT

## 2020-11-01 PROCEDURE — 97535 SELF CARE MNGMENT TRAINING: CPT

## 2020-11-01 PROCEDURE — 83735 ASSAY OF MAGNESIUM: CPT | Performed by: INTERNAL MEDICINE

## 2020-11-01 PROCEDURE — 84100 ASSAY OF PHOSPHORUS: CPT | Performed by: INTERNAL MEDICINE

## 2020-11-01 PROCEDURE — 25010000002 GANCICLOVIR PER 500 MG: Performed by: INTERNAL MEDICINE

## 2020-11-01 PROCEDURE — 97168 OT RE-EVAL EST PLAN CARE: CPT

## 2020-11-01 PROCEDURE — 99232 SBSQ HOSP IP/OBS MODERATE 35: CPT | Performed by: HOSPITALIST

## 2020-11-01 PROCEDURE — 84132 ASSAY OF SERUM POTASSIUM: CPT | Performed by: INTERNAL MEDICINE

## 2020-11-01 PROCEDURE — 63710000001 INSULIN LISPRO (HUMAN) PER 5 UNITS: Performed by: INTERNAL MEDICINE

## 2020-11-01 PROCEDURE — 25010000002 METHYLPREDNISOLONE PER 40 MG: Performed by: INTERNAL MEDICINE

## 2020-11-01 PROCEDURE — 71045 X-RAY EXAM CHEST 1 VIEW: CPT

## 2020-11-01 PROCEDURE — 63710000001 INSULIN GLARGINE PER 5 UNITS: Performed by: HOSPITALIST

## 2020-11-01 PROCEDURE — 85025 COMPLETE CBC W/AUTO DIFF WBC: CPT | Performed by: INTERNAL MEDICINE

## 2020-11-01 RX ORDER — BUMETANIDE 0.25 MG/ML
1 INJECTION INTRAMUSCULAR; INTRAVENOUS ONCE
Status: COMPLETED | OUTPATIENT
Start: 2020-11-01 | End: 2020-11-01

## 2020-11-01 RX ORDER — INSULIN GLARGINE 100 [IU]/ML
25 INJECTION, SOLUTION SUBCUTANEOUS EVERY 12 HOURS SCHEDULED
Status: DISCONTINUED | OUTPATIENT
Start: 2020-11-01 | End: 2020-11-01

## 2020-11-01 RX ORDER — FLUDROCORTISONE ACETATE 0.1 MG/1
100 TABLET ORAL ONCE
Status: COMPLETED | OUTPATIENT
Start: 2020-11-01 | End: 2020-11-01

## 2020-11-01 RX ORDER — INSULIN GLARGINE 100 [IU]/ML
20 INJECTION, SOLUTION SUBCUTANEOUS EVERY 12 HOURS SCHEDULED
Status: DISCONTINUED | OUTPATIENT
Start: 2020-11-01 | End: 2020-11-06 | Stop reason: HOSPADM

## 2020-11-01 RX ADMIN — PANTOPRAZOLE SODIUM 40 MG: 40 INJECTION, POWDER, FOR SOLUTION INTRAVENOUS at 08:50

## 2020-11-01 RX ADMIN — PANTOPRAZOLE SODIUM 40 MG: 40 INJECTION, POWDER, FOR SOLUTION INTRAVENOUS at 21:58

## 2020-11-01 RX ADMIN — FLUDROCORTISONE ACETATE 100 MCG: 0.1 TABLET ORAL at 13:13

## 2020-11-01 RX ADMIN — BUDESONIDE INHALATION 0.5 MG: 0.5 SUSPENSION RESPIRATORY (INHALATION) at 09:07

## 2020-11-01 RX ADMIN — Medication 10 ML: at 06:28

## 2020-11-01 RX ADMIN — SODIUM CHLORIDE 150 MG: 9 INJECTION, SOLUTION INTRAVENOUS at 17:34

## 2020-11-01 RX ADMIN — INSULIN LISPRO 20 UNITS: 100 INJECTION, SOLUTION INTRAVENOUS; SUBCUTANEOUS at 17:41

## 2020-11-01 RX ADMIN — SULFAMETHOXAZOLE AND TRIMETHOPRIM 320 MG: 800; 160 TABLET ORAL at 13:21

## 2020-11-01 RX ADMIN — Medication 10 ML: at 22:01

## 2020-11-01 RX ADMIN — DIPHENHYDRAMINE HYDROCHLORIDE AND LIDOCAINE HYDROCHLORIDE AND ALUMINUM HYDROXIDE AND MAGNESIUM HYDRO 5 ML: KIT at 13:42

## 2020-11-01 RX ADMIN — GABAPENTIN 100 MG: 100 CAPSULE ORAL at 06:25

## 2020-11-01 RX ADMIN — INSULIN GLARGINE 20 UNITS: 100 INJECTION, SOLUTION SUBCUTANEOUS at 09:29

## 2020-11-01 RX ADMIN — Medication 10 ML: at 22:00

## 2020-11-01 RX ADMIN — INSULIN LISPRO 10 UNITS: 100 INJECTION, SOLUTION INTRAVENOUS; SUBCUTANEOUS at 12:56

## 2020-11-01 RX ADMIN — SULFAMETHOXAZOLE AND TRIMETHOPRIM 320 MG: 800; 160 TABLET ORAL at 21:59

## 2020-11-01 RX ADMIN — GABAPENTIN 100 MG: 100 CAPSULE ORAL at 21:58

## 2020-11-01 RX ADMIN — Medication 10 ML: at 08:49

## 2020-11-01 RX ADMIN — ATORVASTATIN CALCIUM 40 MG: 40 TABLET, FILM COATED ORAL at 22:01

## 2020-11-01 RX ADMIN — Medication 10 ML: at 08:52

## 2020-11-01 RX ADMIN — Medication 10 ML: at 21:59

## 2020-11-01 RX ADMIN — DIPHENHYDRAMINE HYDROCHLORIDE AND LIDOCAINE HYDROCHLORIDE AND ALUMINUM HYDROXIDE AND MAGNESIUM HYDRO 5 ML: KIT at 06:19

## 2020-11-01 RX ADMIN — GABAPENTIN 100 MG: 100 CAPSULE ORAL at 13:21

## 2020-11-01 RX ADMIN — BUMETANIDE 1 MG: 0.25 INJECTION, SOLUTION INTRAMUSCULAR; INTRAVENOUS at 13:13

## 2020-11-01 RX ADMIN — METHYLPREDNISOLONE SODIUM SUCCINATE 40 MG: 40 INJECTION, POWDER, FOR SOLUTION INTRAMUSCULAR; INTRAVENOUS at 00:20

## 2020-11-01 RX ADMIN — INSULIN LISPRO 12 UNITS: 100 INJECTION, SOLUTION INTRAVENOUS; SUBCUTANEOUS at 06:20

## 2020-11-01 RX ADMIN — INSULIN LISPRO 16 UNITS: 100 INJECTION, SOLUTION INTRAVENOUS; SUBCUTANEOUS at 12:56

## 2020-11-01 RX ADMIN — METHYLPREDNISOLONE SODIUM SUCCINATE 40 MG: 40 INJECTION, POWDER, FOR SOLUTION INTRAMUSCULAR; INTRAVENOUS at 08:50

## 2020-11-01 RX ADMIN — BUMETANIDE 1 MG: 1 TABLET ORAL at 08:50

## 2020-11-01 RX ADMIN — DIPHENHYDRAMINE HYDROCHLORIDE AND LIDOCAINE HYDROCHLORIDE AND ALUMINUM HYDROXIDE AND MAGNESIUM HYDRO 5 ML: KIT at 18:19

## 2020-11-01 RX ADMIN — INSULIN LISPRO 10 UNITS: 100 INJECTION, SOLUTION INTRAVENOUS; SUBCUTANEOUS at 00:20

## 2020-11-01 RX ADMIN — BUDESONIDE INHALATION 0.5 MG: 0.5 SUSPENSION RESPIRATORY (INHALATION) at 20:09

## 2020-11-01 RX ADMIN — SULFAMETHOXAZOLE AND TRIMETHOPRIM 320 MG: 800; 160 TABLET ORAL at 06:24

## 2020-11-01 RX ADMIN — INSULIN LISPRO 12 UNITS: 100 INJECTION, SOLUTION INTRAVENOUS; SUBCUTANEOUS at 00:18

## 2020-11-01 RX ADMIN — AMLODIPINE BESYLATE 5 MG: 5 TABLET ORAL at 08:50

## 2020-11-01 RX ADMIN — ASPIRIN 81 MG CHEWABLE TABLET 81 MG: 81 TABLET CHEWABLE at 08:50

## 2020-11-01 RX ADMIN — INSULIN LISPRO 10 UNITS: 100 INJECTION, SOLUTION INTRAVENOUS; SUBCUTANEOUS at 06:20

## 2020-11-01 RX ADMIN — INSULIN GLARGINE 20 UNITS: 100 INJECTION, SOLUTION SUBCUTANEOUS at 21:44

## 2020-11-01 RX ADMIN — Medication 10 ML: at 08:53

## 2020-11-01 RX ADMIN — METHYLPREDNISOLONE SODIUM SUCCINATE 40 MG: 40 INJECTION, POWDER, FOR SOLUTION INTRAMUSCULAR; INTRAVENOUS at 17:43

## 2020-11-01 RX ADMIN — INSULIN LISPRO 10 UNITS: 100 INJECTION, SOLUTION INTRAVENOUS; SUBCUTANEOUS at 17:42

## 2020-11-01 NOTE — THERAPY RE-EVALUATION
Patient Name: Lesa Julio  : 1950    MRN: [id number]                              Today's Date: 2020       Admit Date: 10/8/2020    Visit Dx:     ICD-10-CM ICD-9-CM   1. Pneumonia of right upper lobe due to infectious organism  J18.9 486   2. Dyspnea, unspecified type  R06.00 786.09   3. Fever, unspecified fever cause  R50.9 780.60   4. Sepsis, due to unspecified organism, unspecified whether acute organ dysfunction present (CMS/[facility])  A41.9 038.9     995.91   5. Acute renal insufficiency  N28.9 593.9   6. Hypotension, unspecified hypotension type  I95.9 458.9   7. Diarrhea, unspecified type  R19.7 787.91   8. Anemia, unspecified type  D64.9 285.9     Patient Active Problem List   Diagnosis   • Cardiac murmur   • Chest discomfort   • Chronic obstructive pulmonary disease (CMS/[facility])   • Chronic renal insufficiency, stage III (moderate)   • Diabetes mellitus, type 2 (CMS/HCC)   • Elevated LFTs   • Essential hypertension   • Osteoporosis   • Pain of hand   • Polyarthritis, inflammatory (CMS/HCC)   • Rheumatoid arthritis (CMS/HCC)   • Shortness of breath   • Sinus tachycardia   • Tobacco abuse   • Leukocytoclastic vasculitis (CMS/HCC)   • Vasculitis (CMS/HCC)   • Allergic rhinitis   • Central obesity   • Cyst of right ovary   • Fatigue   • Grade 4 out of 6 intensity murmur   • Hearing loss   • Mass of ovary   • Mixed hyperlipidemia   • Nonalcoholic fatty liver disease   • Obesity   • Obstructive sleep apnea syndrome   • Otorrhea   • Prurigo nodularis   • Secondary polycythemia   • Tobacco dependence syndrome   • Tubular adenoma of colon   • Uncontrolled type 2 diabetes mellitus (CMS/HCC)   • Vitamin D deficiency   • Well adult health check   • Hypersensitivity angiitis (CMS/HCC)   • Vascular insufficiency   • Hyperkalemia   • Localized edema   • Other proteinuria   • Secondary hyperparathyroidism of renal origin (CMS/HCC)   • Pneumonia of right upper lobe due to infectious organism   • Paroxysmal atrial  fibrillation (CMS/HCC)   • Anemia   • Oral thrush   • Upper GI bleed   • Hyperkalemia   • Acute respiratory failure with hypoxia and hypercapnia (CMS/HCC)     Past Medical History:   Diagnosis Date   • Arthritis    • Diabetes mellitus, type 2 (CMS/HCC)    • Essential hypertension 11/6/2015   • Hearing loss     hearing aid right side   • Hyperlipidemia    • Hypertension    • Leukocytoclastic vasculitis (CMS/HCC)    • Obesity    • Osteoarthritis    • Peripheral arterial disease (CMS/[facility])    • Sleep apnea      Past Surgical History:   Procedure Laterality Date   • BRONCHOSCOPY Bilateral 10/13/2020    Procedure: BRONCHOSCOPY AT BEDSIDE with right lung washing and elective intubation;  Surgeon: Cristhian Hodge MD;  Location: [facility] ENDOSCOPY;  Service: Pulmonary;  Laterality: Bilateral;  Post: pneumonia   • BRONCHOSCOPY Bilateral 10/20/2020    Procedure: BRONCHOSCOPY WITH BRONCHOALVEOLAR LAVAGE  AT BEDSIDE;  Surgeon: Cristhian Hodge MD;  Location: [facility] ENDOSCOPY;  Service: Pulmonary;  Laterality: Bilateral;  POST- PNEUMONIA   • BRONCHOSCOPY N/A 10/27/2020    Procedure: BRONCHOSCOPY with bronchial washing and intubation;  Surgeon: Cristhian Hodge MD;  Location: [facility] ENDOSCOPY;  Service: Pulmonary;  Laterality: N/A;  post op:pneumonia   • CHOLECYSTECTOMY     • ENDOSCOPY N/A 10/13/2020    Procedure: ESOPHAGOGASTRODUODENOSCOPY AT BEDSIDE;  Surgeon: Omi Rivera MD;  Location: [facility] ENDOSCOPY;  Service: Gastroenterology;  Laterality: N/A;   • LIVER BIOPSY     • TUBAL ABDOMINAL LIGATION       General Information     Row Name 11/01/20 1342          General Information    Existing Precautions/Restrictions  fall;oxygen therapy device and L/min  -     Barriers to Rehab  medically complex;previous functional deficit  -     Row Name 11/01/20 1342          Living Environment    Lives With  child(leticia), adult  -     Row Name 11/01/20 1342          Home Main Entrance    Number of Stairs, Main Entrance  four  -     Row Name 11/01/20  1342          Stairs Within Home, Primary    Stair Railings, Within Home, Primary  none  -     Row Name 11/01/20 1342          Cognition    Orientation Status (Cognition)  oriented x 4;verbal cues/prompts needed for orientation  -     Row Name 11/01/20 1342          Safety Issues, Functional Mobility    Safety Issues Affecting Function (Mobility)  insight into deficits/self-awareness;judgment;problem-solving  -     Impairments Affecting Function (Mobility)  balance;coordination;endurance/activity tolerance;range of motion (ROM);strength;shortness of breath  -       User Key  (r) = Recorded By, (t) = Taken By, (c) = Cosigned By    Initials Name Provider Type     Eleanor Blas OT Occupational Therapist        Mobility/ADL's     Row Name 11/01/20 1343          Bed Mobility    Bed Mobility  supine-sit  -     Supine-Sit [place] (Bed Mobility)  2 person assist;minimum assist (75% patient effort)  -     Assistive Device (Bed Mobility)  bed rails;head of bed elevated  -     Row Name 11/01/20 1343          Transfers    Transfers  bed-chair transfer;sit-stand transfer  -     Bed-Chair [place] (Transfers)  2 person assist;minimum assist (75% patient effort)  -     Sit-Stand [place] (Transfers)  2 person assist;minimum assist (75% patient effort)  -     Row Name 11/01/20 1343          Functional Mobility    Functional Mobility- Ind. Level  unable to perform  -     Row Name 11/01/20 1343          Activities of Daily Living    BADL Assessment/Intervention  lower body dressing;grooming;feeding;toileting  -     Row Name 11/01/20 1343          Toileting Assessment/Training    [place] Level (Toileting)  dependent (less than 25% patient effort)  -     Comment (Toileting)  external suction catheter &  -     Row Name 11/01/20 1411          Lower Body Dressing Assessment/Training    [place] Level (Lower Body Dressing)  don;socks;dependent (less than 25% patient effort)  -      Position (Lower Body Dressing)  supine  -     Row Name 11/01/20 1411          Grooming Assessment/Training    [place] Level (Grooming)  hair care, combing/brushing;wash face, hands;set up;verbal cues  -     Row Name 11/01/20 1411          Self-Feeding Assessment/Training    [place] Level (Feeding)  scoop food and bring to mouth;modified independence  -     Comment (Feeding)  pt has NG tube for nutrition but tolerating ice chips well & plan for VFSS tomorrow.  -       User Key  (r) = Recorded By, (t) = Taken By, (c) = Cosigned By    Initials Name Provider Type     Eleanor Blas OT Occupational Therapist        Obj/Interventions     Row Name 11/01/20 1413          Range of Motion Comprehensive    Comment, General Range of Motion  obesity limits trunk flex 25%; shld flex is 75%  -     Row Name 11/01/20 1413          Strength Comprehensive (MMT)    Comment, General Manual Muscle Testing (MMT) Assessment   4/5; shld flex 4/5; BLE knees 4-/5; Rt hip flex 3+/5, Lt hip flex 3-/5  -     Row Name 11/01/20 1413          Balance    Balance Assessment  sitting static balance;sitting dynamic balance;standing static balance;standing dynamic balance  -     Static Sitting Balance  WFL  -     Dynamic Sitting Balance  WFL  -     Static Standing Balance  mild impairment  -     Dynamic Standing Balance  moderate impairment  -       User Key  (r) = Recorded By, (t) = Taken By, (c) = Cosigned By    Initials Name Provider Type     Eleanor Blas OT Occupational Therapist        Goals/Plan     Row Name 11/01/20 1420          Bed Mobility Goal 1 (OT)    Activity/Assistive Device (Bed Mobility Goal 1, OT)  bed mobility activities, all  -     Val[place] Level/Cues Needed (Bed Mobility Goal 1, OT)  set-up required  -     Time Frame (Bed Mobility Goal 1, OT)  2 weeks  -     Row Name 11/01/20 1420          Transfer Goal 1 (OT)    Activity/Assistive Device (Transfer Goal 1, OT)   bed-to-chair/chair-to-bed;commode, bedside without drop arms  -     Harding Level/Cues Needed (Transfer Goal 1, OT)  minimum assist (75% or more patient effort)  -     Time Frame (Transfer Goal 1, OT)  2 weeks  -     Row Name 11/01/20 7168          Toileting Goal 1 (OT)    Activity/Device (Toileting Goal 1, OT)  toileting skills, all;commode, bedside without drop arms  -     Harding Level/Cues Needed (Toileting Goal 1, OT)  moderate assist (50-74% patient effort)  -     Time Frame (Toileting Goal 1, OT)  2 weeks  -       User Key  (r) = Recorded By, (t) = Taken By, (c) = Cosigned By    Initials Name Provider Type     Eleanor Blas, OT Occupational Therapist        Clinical Impression     Row Name 11/01/20 5138          Pain Scale: Numbers Pre/Post-Treatment    Pretreatment Pain Rating  0/10 - no pain  -     Posttreatment Pain Rating  0/10 - no pain  -     Row Name 11/01/20 2529          Plan of Care Review    Plan of Care Reviewed With  patient  -     Progress  improving  -     Outcome Summary  Pt is downgraded from CVCU. She was extubated Friday. She has been hospitalized for about a month due to PNA & had acute respiratory failure requiring ventilation. Pt is A&O X4 but needs cues to answer orientation questions well. She lives with family & reports severral family home at any given time. Pt typically walks w/o assist & completes own ADL. She required a min assist of 2 persons for safety this date to acces her chair from her bed. She is dependent for toileting & on NG tube feeds. Anticipate steady functional improvement but pt is currently far below her functional baseline & will require IP rehab at d/c. PPE worn: mask, gloves, safety glasses.  -     Row Name 11/01/20 1414          Therapy Assessment/Plan (OT)    Rehab Potential (OT)  good, to achieve stated therapy goals  -     Criteria for Skilled Therapeutic Interventions Met (OT)  skilled treatment is necessary  -      Therapy Frequency (OT)  5 times/wk  -     Row Name 11/01/20 1415          Therapy Plan Review/Discharge Plan (OT)    Anticipated Discharge Disposition (OT)  inpatient rehabilitation facility  -     Row Name 11/01/20 1415          Vital Signs    Pre SpO2 (%)  92  -     O2 Delivery Pre Treatment  supplemental O2  -     Intra SpO2 (%)  87  -     O2 Delivery Intra Treatment  supplemental O2  -     Post SpO2 (%)  88  -     O2 Delivery Post Treatment  supplemental O2 on 7L HF. RN okay w/ sats between 88-92%  -     Pre Patient Position  Supine  -     Intra Patient Position  Standing  -     Post Patient Position  Sitting  -     Row Name 11/01/20 1415          Positioning and Restraints    Pre-Treatment Position  in bed  -     Post Treatment Position  chair  -     In Chair  notified nsg;sitting;legs elevated;exit alarm on;encouraged to call for assist;call light within reach  -       User Key  (r) = Recorded By, (t) = Taken By, (c) = Cosigned By    Initials Name Provider Type     Eleanor Blas, OT Occupational Therapist        Outcome Measures     Row Name 11/01/20 1421          How much help from another is currently needed...    Putting on and taking off regular lower body clothing?  1  -MH     Bathing (including washing, rinsing, and drying)  2  -MH     Toileting (which includes using toilet bed pan or urinal)  1  -MH     Putting on and taking off regular upper body clothing  2  -MH     Taking care of personal grooming (such as brushing teeth)  3  -MH     Eating meals  1  -     AM-PAC 6 Clicks Score (OT)  10  -     Row Name 11/01/20 1421          Modified Duchesne Scale    Modified Duchesne Scale  4 - Moderately severe disability.  Unable to walk without assistance, and unable to attend to own bodily needs without assistance.  -     Row Name 11/01/20 1421          Functional Assessment    Outcome Measure Options  AM-PAC 6 Clicks Daily Activity (OT);Modified Duchesne  -       User Key  (r)  = Recorded By, (t) = Taken By, (c) = Cosigned By    Initials Name Provider Type    Eleanor Foster OT Occupational Therapist        Occupational Therapy Education                 Title: PT OT SLP Therapies (Done)     Topic: Occupational Therapy (Done)     Point: ADL training (Done)     Description:   Instruct learner(s) on proper safety adaptation and remediation techniques during self care or transfers.   Instruct in proper use of assistive devices.              Learning Progress Summary           Patient Acceptance, E,TB, VU,NR by  at 11/1/2020 1422    Acceptance, E, DU by CORONA at 10/29/2020 0315    Comment: Patient is unable to speak due to ET tube    Acceptance, E,TB, VU by OLI at 10/24/2020 0315                   Point: Home exercise program (Done)     Description:   Instruct learner(s) on appropriate technique for monitoring, assisting and/or progressing therapeutic exercises/activities.              Learning Progress Summary           Patient Acceptance, E, DU by CORONA at 10/29/2020 0315    Comment: Patient is unable to speak due to ET tube    Acceptance, E,TB, VU by OLI at 10/24/2020 0315                   Point: Precautions (Done)     Description:   Instruct learner(s) on prescribed precautions during self-care and functional transfers.              Learning Progress Summary           Patient Acceptance, E,TB, VU,NR by  at 11/1/2020 1422    Acceptance, E, DU by CORONA at 10/29/2020 0315    Comment: Patient is unable to speak due to ET tube    Acceptance, E,TB, VU by OLI at 10/24/2020 0315                   Point: Body mechanics (Done)     Description:   Instruct learner(s) on proper positioning and spine alignment during self-care, functional mobility activities and/or exercises.              Learning Progress Summary           Patient Acceptance, E,TB, VU,NR by  at 11/1/2020 1422    Acceptance, E, DU by CORONA at 10/29/2020 0315    Comment: Patient is unable to speak due to ET tube    Acceptance, E,TB, VU by OLI  at 10/24/2020 0315    Acceptance, E,TB, VU,NR by  at 10/22/2020 1215                               User Key     Initials Effective Dates Name Provider Type Discipline     03/01/19 -  Eleanor Blas OT Occupational Therapist OT    NATI 03/01/19 -  Kalin Peter OT Occupational Therapist OT    CORONA 03/01/19 -  Carolynn Mcghee, RN Registered Nurse Nurse     06/24/19 -  Redd Arroyo LPN Licensed Nurse Nurse              OT Recommendation and Plan  Therapy Frequency (OT): 5 times/wk  Plan of Care Review  Plan of Care Reviewed With: patient  Progress: improving  Outcome Summary: Pt is downgraded from CVCU. She was extubated Friday. She has been hospitalized for about a month due to PNA & had acute respiratory failure requiring ventilation. Pt is A&O X4 but needs cues to answer orientation questions well. She lives with family & reports severral family home at any given time. Pt typically walks w/o assist & completes own ADL. She required a min assist of 2 persons for safety this date to acces her chair from her bed. She is dependent for toileting & on NG tube feeds. Anticipate steady functional improvement but pt is currently far below her functional baseline & will require IP rehab at d/c. PPE worn: mask, gloves, safety glasses.     Time Calculation:   Time Calculation- OT     Row Name 11/01/20 1423             Time Calculation-     OT Start Time  1240  -      OT Stop Time  1304  -      OT Time Calculation (min)  24 min  -      Total Timed Code Minutes- OT  10 minute(s)  -      OT Received On  11/01/20  -      OT - Next Appointment  11/03/20  -      OT Goal Re-Cert Due Date  11/15/20  -        User Key  (r) = Recorded By, (t) = Taken By, (c) = Cosigned By    Initials Name Provider Type     Eleanor Blas, TANISHA Occupational Therapist        Therapy Charges for Today     Code Description Service Date Service Provider Modifiers Qty    [id number]  OT SELF CARE/MGMT/TRAIN EA 15 MIN 11/1/2020  Eleanor Blas, OT GO 1    [id number]  OT RE-EVAL 2 11/1/2020 Eleanor Blas, OT GO 1               Eleanor Blas, OT  11/1/2020

## 2020-11-01 NOTE — PROGRESS NOTES
-  PROGRESS NOTE      Patient Name: Lesa Julio  : 1950  MRN: [id number]  Primary Care Physician: Manuel Sanders MD  Date of admission: 10/8/2020    Patient Care Team:  Maneul Sanders MD as PCP - General        Subjective   Patient seen and examined, she denies any new complaints, no chest pain or shortness of breath.  Subjective:       Review of systems:  Review of system unremarkable      Allergies:    Allergies   Allergen Reactions   • Lisinopril Other (See Comments)   • Ipratropium-Albuterol Itching and Rash       Objective   Exam:     Vital Signs  Temp:  [97.4 °F (36.3 °C)-98.4 °F (36.9 °C)] 97.4 °F (36.3 °C)  Heart Rate:  [63-71] 68  Resp:  [18-23] 18  BP: (124-153)/(40-52) 130/52  Arterial Line BP: (134-156)/(47-54) 134/47  SpO2:  [90 %-99 %] 94 %  on  Flow (L/min):  [10] 10;   Device (Oxygen Therapy): high-flow nasal cannula  Body mass index is 34.79 kg/m².     General:  Elderly female no acute distress  Head:      Normocephalic and atraumatic.    Eyes:      PERRL/EOM intact, conjunctiva and sclera clear with out nystagmus.    Neck:      No masses, thyromegaly,  trachea central with normal respiratory effort   Lungs:    Bilateral crackles left greater than right.    Heart:      Regular rate and rhythm, no murmur no gallop  Abd:        Soft, nontender, not distended, bowel sounds positive, no shifting dullness   Pulses:   Pulses palpable  Extr:        No cyanosis or clubbing--+1 edema.    Neuro:     Alert awake  skin:       Intact without lesions or rashes.    Psych:     Alert awake.      results Review:  I have personally reviewed most recent Data :  CBC    Results from last 7 days   Lab Units 20  0610 10/31/20  0312 10/30/20  0418 10/29/20  0411 10/28/20  0427 10/27/20  1124 10/27/20  0325 10/26/20  0249   WBC 10*3/mm3 10.10 8.60 5.90 7.50 4.90  --  7.80 5.40   HEMOGLOBIN g/dL 9.3* 8.5* 7.8* 9.1* 8.7*  --  9.7* 9.2*   HEMOGLOBIN, POC g/dL  --   --   --   --   --  9.5*   --   --    PLATELETS 10*3/mm3 204 197 187 248 218  --  266 260     CMP   Results from last 7 days   Lab Units 11/01/20  0610 11/01/20  0027 10/31/20  0312 10/30/20  0418 10/29/20  1745 10/29/20  0411 10/28/20  0427  10/27/20  0325 10/26/20  0249   SODIUM mmol/L 136  --  140 136  --  140 141  --  141 139   POTASSIUM mmol/L 5.4* 5.8* 5.2 4.8 5.0 3.4* 4.0   < > 5.4* 4.3   CHLORIDE mmol/L 95*  --  100 99  --  100 100  --  96* 97*   CO2 mmol/L 32.0*  --  31.0* 29.0  --  30.0* 33.0*  --  38.0* 32.0*   BUN mg/dL 68*  --  50* 42*  --  33* 34*  --  45* 42*   CREATININE mg/dL 1.29*  --  1.26* 1.25*  --  1.45* 1.52*  --  1.93* 1.53*   GLUCOSE mg/dL 394*  --  218* 355*  --  173* 62*  --  69 58*    < > = values in this interval not displayed.     ABG    Results from last 7 days   Lab Units 10/30/20  1434 10/30/20  0759 10/29/20  0831 10/28/20  0802 10/27/20  1124   PH, ARTERIAL pH units 7.416 7.403 7.426 7.516* 7.436   PCO2, ARTERIAL mm Hg 46.4 48.4* 47.7 42.3 51.5*   PO2 ART mm Hg 67.3* 69.7* 64.8* 58.8* 100.5   O2 SATURATION ART % 93.1* 93.5* 92.5* 92.4* 97.8   BASE EXCESS ART mmol/L 4.7* 4.8* 6.2* 10.3* 9.2*     Ct Chest Without Contrast    Result Date: 10/25/2020   1. Small stable right-sided pleural effusion. 2. There is significantly increased obstructive endobronchial debris in the right lung with complete right middle and right lower lobe atelectasis and partial consolidation of the right upper lobe. 3. Patchy airspace disease in the right upper lobe. 4. Emphysema. 5. Atherosclerosis. 6. Colonic diverticulosis. 7. Mild splenomegaly.  Electronically Signed BySanti Solomon On:10/25/2020 3:07 PM This report was finalized on [id number] by  Simon Solomon, .    Xr Chest 1 View    Result Date: 11/1/2020  Improved aeration in the right lung with persistent partial right lower lobe atelectasis. Improving consolidation in the inferior right upper lobe. Support lines and tubes are unchanged.   Electronically Signed ByCorey  DO. Jr On:11/1/2020 8:10 AM This report was finalized on [id number] by  Robert Norris DO..    Xr Chest 1 View    Result Date: 10/31/2020   1. Interval extubation. 2. Stable esophagogastric tube and left upper extremity PICC line. 3. No change in right midlung and right basilar airspace disease.  Electronically Signed By-Sandeep King On:10/31/2020 7:27 AM This report was finalized on [id number] by  Sandeep King, .    Xr Chest 1 View    Result Date: 10/30/2020  1.Endotracheal tube in good position. 2.Consolidation within right midlung appears unchanged.  Electronically Signed By-DR. Onofre Nicholas MD On:10/30/2020 7:39 AM This report was finalized on [id number] by DR. Onofre Nicholas MD.    Xr Chest 1 View    Result Date: 10/29/2020   1. Improved right lung aeration with persistent right upper lobe airspace opacity.  Electronically Signed By-Chriss Rider On:10/29/2020 7:31 AM This report was finalized on [id number] by  Chriss Rider, .    Xr Chest 1 View    Result Date: 10/28/2020   1. Endotracheal tube in expected position. 2. Improved aeration of the right lung with decreasing right-sided airspace opacities when compared to prior exam.  Electronically Signed By-Leonel Espitia On:10/28/2020 7:41 AM This report was finalized on [id number] by  Leonel Espitia, .    Xr Chest 1 View    Result Date: 10/27/2020  No interval change from yesterday's study.  Electronically Signed By-Chele Mcclure On:10/27/2020 7:43 AM This report was finalized on [id number] by  Chele Mcclure, .    Xr Chest 1 View    Result Date: 10/26/2020  1. Stable dense right basilar consolidation related to right lower lobe consolidation and/or atelectasis with associated volume loss and left to right shift of the heart and mediastinum. 2. Patchy right midlung airspace disease unchanged. 3. Clear left lung.  Electronically Signed By-Sandeep King On:10/26/2020 7:32 AM This report was finalized on [id number] by  Sandeep King,  .    Xr Chest 1 View    Result Date: 10/25/2020   1. Mobile moderate right pleural effusion is likely unchanged in volume. 2. Increased right basilar consolidation with stable rightward shift of mediastinum suggesting volume loss; either atelectasis or scarring. 3. Stable interstitial disease.  Electronically Signed By-Simon Solomon On:10/25/2020 8:55 AM This report was finalized on [id number] by  Simon Solomon, .    Xr Abdomen Kub    Result Date: 10/28/2020  The radiopaque tip of the Dobbhoff tube projects to the proximal gastric body level.  Electronically Signed By-Dr. Genesis Segovia MD On:10/28/2020 1:48 PM This report was finalized on [id number] by Dr. Genesis Segovia MD.      Results for orders placed during the hospital encounter of 10/08/20   Adult Transthoracic Echo Complete W/ Cont if Necessary Per Protocol    Narrative · Estimated left ventricular EF = 65% Left ventricular systolic function   is normal.  · Trace to mild mitral valve regurgitation is present.  · There is a small (<1cm) pericardial effusion.        Scheduled Meds:amLODIPine, 5 mg, Oral, Q24H  aspirin, 81 mg, Oral, Daily  atorvastatin, 40 mg, Oral, Daily  bisacodyl, 10 mg, Rectal, Once  budesonide, 0.5 mg, Nebulization, BID - RT  bumetanide, 1 mg, Oral, Daily  First Mouthwash (Magic Mouthwash), 5 mL, Swish & Spit, Q6H  gabapentin, 100 mg, Oral, Q8H  ganciclovir (CYTOVENE) IVPB, 2.5 mg/kg (Adjusted), Intravenous, Q24H  insulin glargine, 20 Units, Subcutaneous, Q12H  insulin lispro, 0-24 Units, Subcutaneous, Q6H  insulin lispro, 10 Units, Subcutaneous, Q6H  methylPREDNISolone sodium succinate, 40 mg, Intravenous, Q8H  pantoprazole, 40 mg, Intravenous, Q12H  sodium chloride, 10 mL, Intravenous, Q12H  sodium chloride, 10 mL, Intravenous, Q12H  sodium chloride, 10 mL, Intravenous, Q12H  sodium chloride, 10 mL, Intravenous, Q12H  sulfamethoxazole-trimethoprim, 2 tablet, Oral, Q8H      Continuous Infusions:   PRN Meds:•  acetaminophen  •   Calcium Gluconate-NaCl **AND** calcium gluconate **AND** Calcium, Ionized  •  dextrose  •  dextrose  •  glucagon (human recombinant)  •  guaifenesin-dextromethorphan  •  influenza vaccine  •  insulin lispro **AND** insulin lispro  •  lidocaine  •  lidocaine  •  LORazepam  •  magnesium sulfate **OR** magnesium sulfate **OR** magnesium sulfate  •  potassium chloride  •  sodium chloride  •  sodium chloride  •  sodium chloride  •  sodium chloride    Assessment/Plan   Assessment and Plan:         Pneumonia of right upper lobe due to infectious organism    Cardiac murmur    Chronic renal insufficiency, stage III (moderate)    Diabetes mellitus, type 2 (CMS/HCC)    Essential hypertension    Shortness of breath    Tobacco abuse    Hearing loss    Paroxysmal atrial fibrillation (CMS/[facility])    Anemia    Oral thrush    Upper GI bleed    Hyperkalemia    Acute respiratory failure with hypoxia and hypercapnia (CMS/[facility])     ASSESSMENT:  · Acute kidney injury likely ATN likely related to sepsis patient baseline creatinine is 0.9 back in June 2019, FENa 2.7  · Metabolic acidosis with increased lactic acid level  · Right upper lobe pneumonia  · History of rheumatoid arthritis  · History of hypertension  · History of vasculitis  · Hypernatremia  · Acute GI bleed with emesis           Plan:      · Renal function stable, creatinine 1.2  · We will give another dose of Florinef as potassium level is increasing likely due to the high-dose Bactrim, will also give dose of IV Bumex  · Elevated BUN probably related to steroids, creatinine is a stable  · Recheck potassium this afternoon  · Blood pressure is acceptable  · Sodium level acceptable  · Will follow         Note started  by Serjio Melara MD,   [facility] kidney consultant

## 2020-11-01 NOTE — THERAPY RE-EVALUATION
Acute Care - Speech Language Pathology   Swallow Re-Evaluation  Manny     Patient Name: Lesa Julio  : 1950  MRN: [id number]  Today's Date: 2020               Admit Date: 10/8/2020  Patient was not wearing a face mask during this therapy encounter. Therapist used appropriate personal protective equipment including mask, eye protection and gloves.  Mask used was standard procedure mask. Appropriate PPE was worn during the entire therapy session. Hand hygiene was completed before and after therapy session. Patient is not in enhanced droplet precautions.     Visit Dx:     ICD-10-CM ICD-9-CM   1. Pneumonia of right upper lobe due to infectious organism  J18.9 486   2. Dyspnea, unspecified type  R06.00 786.09   3. Fever, unspecified fever cause  R50.9 780.60   4. Sepsis, due to unspecified organism, unspecified whether acute organ dysfunction present (CMS/HCC)  A41.9 038.9     995.91   5. Acute renal insufficiency  N28.9 593.9   6. Hypotension, unspecified hypotension type  I95.9 458.9   7. Diarrhea, unspecified type  R19.7 787.91   8. Anemia, unspecified type  D64.9 285.9     Patient Active Problem List   Diagnosis   • Cardiac murmur   • Chest discomfort   • Chronic obstructive pulmonary disease (CMS/HCC)   • Chronic renal insufficiency, stage III (moderate)   • Diabetes mellitus, type 2 (CMS/HCC)   • Elevated LFTs   • Essential hypertension   • Osteoporosis   • Pain of hand   • Polyarthritis, inflammatory (CMS/HCC)   • Rheumatoid arthritis (CMS/HCC)   • Shortness of breath   • Sinus tachycardia   • Tobacco abuse   • Leukocytoclastic vasculitis (CMS/HCC)   • Vasculitis (CMS/HCC)   • Allergic rhinitis   • Central obesity   • Cyst of right ovary   • Fatigue   • Grade 4 out of 6 intensity murmur   • Hearing loss   • Mass of ovary   • Mixed hyperlipidemia   • Nonalcoholic fatty liver disease   • Obesity   • Obstructive sleep apnea syndrome   • Otorrhea   • Prurigo nodularis   • Secondary polycythemia   •  Tobacco dependence syndrome   • Tubular adenoma of colon   • Uncontrolled type 2 diabetes mellitus (CMS/HCC)   • Vitamin D deficiency   • Well adult health check   • Hypersensitivity angiitis (CMS/HCC)   • Vascular insufficiency   • Hyperkalemia   • Localized edema   • Other proteinuria   • Secondary hyperparathyroidism of renal origin (CMS/HCC)   • Pneumonia of right upper lobe due to infectious organism   • Paroxysmal atrial fibrillation (CMS/HCC)   • Anemia   • Oral thrush   • Upper GI bleed   • Hyperkalemia   • Acute respiratory failure with hypoxia and hypercapnia (CMS/HCC)     Past Medical History:   Diagnosis Date   • Arthritis    • Diabetes mellitus, type 2 (CMS/HCC)    • Essential hypertension 11/6/2015   • Hearing loss     hearing aid right side   • Hyperlipidemia    • Hypertension    • Leukocytoclastic vasculitis (CMS/HCC)    • Obesity    • Osteoarthritis    • Peripheral arterial disease (CMS/HCC)    • Sleep apnea      Past Surgical History:   Procedure Laterality Date   • BRONCHOSCOPY Bilateral 10/13/2020    Procedure: BRONCHOSCOPY AT BEDSIDE with right lung washing and elective intubation;  Surgeon: Cristhian Hodge MD;  [facility];  Service: Pulmonary;  Laterality: Bilateral;  Post: pneumonia   • BRONCHOSCOPY Bilateral 10/20/2020    Procedure: BRONCHOSCOPY WITH BRONCHOALVEOLAR LAVAGE  AT BEDSIDE;  Surgeon: Cristhian Hodge MD;  [facility];  Service: Pulmonary;  Laterality: Bilateral;  POST- PNEUMONIA   • BRONCHOSCOPY N/A 10/27/2020    Procedure: BRONCHOSCOPY with bronchial washing and intubation;  Surgeon: Cristhian Hodge MD;  [facility];  Service: Pulmonary;  Laterality: N/A;  post op:pneumonia   • CHOLECYSTECTOMY     • ENDOSCOPY N/A 10/13/2020    Procedure: ESOPHAGOGASTRODUODENOSCOPY AT BEDSIDE;  Surgeon: Omi Rivera MD;  [facility];  Service: Gastroenterology;  Laterality: N/A;   • LIVER BIOPSY     • TUBAL ABDOMINAL LIGATION          SWALLOW  EVALUATION (last 72 hours)      SLP Adult Swallow Evaluation     Row Name 11/01/20 1700          Document Type  re-evaluation  -CP    Subjective Information  no complaints  -CP    Patient Observations  alert;cooperative  -CP    Patient/Family/Caregiver Comments/Observations  Pt was alert and responsive. She was able to follow all commands and answer questions appropriately. Pt now on 3L O2.   -CP    Patient Effort  good  -CP    Comment  Pt seen for re-eval of swallow. Pt given trials of ice chips and water by spoon to determine readiness for VFSS. Pt exhibited good bolus control of ice chip and water with timely swlalow on all trials. pt had clear vocal quality and no overt s/s of aspiration on multiple ice chips and sips of water. pt does appear ready for VFSS in the am. It is rec that pt remain NPO until VFSS, but may have ice chips and small sips of water with nursing supervision for oral gratification. ST will follow up with recs from VFSS.   -CP      User Key  (r) = Recorded By, (t) = Taken By, (c) = Cosigned By    Initials Name Effective Dates    CP Kayleigh Okeefe, AMENA 03/01/19 -           EDUCATION  The patient has been educated in the following areas:   Dysphagia (Swallowing Impairment).         SLP GOALS     Row Name 11/01/20 2050 10/31/20 1300          Oral Nutrition/Hydration Goal 1 (SLP)    Oral Nutrition/Hydration Goal 1, SLP  Establish PO diet  -CP  Establish PO diet  -MC     Time Frame (Oral Nutrition/Hydration Goal 1, SLP)  by discharge  -CP  by discharge  -MC     Barriers (Oral Nutrition/Hydration Goal 1, SLP)  Pt to have VFSS in the am  -CP  --     Progress/Outcomes (Oral Nutrition/Hydration Goal 1, SLP)  goal ongoing  -CP  --        Oral Nutrition/Hydration Goal 2 (SLP)    Oral Nutrition/Hydration Goal 2, SLP  Pt will undergo re-evaluation of swallow in form of VFSS as appropriate  -CP  Pt will undergo re-evaluation of swallow in form of VFSS as appropriate  -MC     Time Frame (Oral  Nutrition/Hydration Goal 2, SLP)  2 days;3 days;by discharge  -CP  2 days;3 days;by discharge  -MC     Barriers (Oral Nutrition/Hydration Goal 2, SLP)  Pt ready for VFSS in the am. O2 needs are decreased  -CP  --     Progress/Outcomes (Oral Nutrition/Hydration Goal 2, SLP)  goal ongoing  -CP  --       User Key  (r) = Recorded By, (t) = Taken By, (c) = Cosigned By    Initials Name Provider Type    CP Kayleigh Okeefe, SLP Speech and Language Pathologist    Bridget Thompson, SLP Speech and Language Pathologist             Time Calculation:                AMENA Esteban  11/1/2020

## 2020-11-01 NOTE — PLAN OF CARE
Goal Outcome Evaluation:  Plan of Care Reviewed With: patient, other (see comments)  Progress: improving

## 2020-11-01 NOTE — PROGRESS NOTES
ICU Daily Progress Note        Pneumonia of right upper lobe due to infectious organism    Cardiac murmur    Chronic renal insufficiency, stage III (moderate)    Diabetes mellitus, type 2 (CMS/HCC)    Essential hypertension    Shortness of breath    Tobacco abuse    Hearing loss    Paroxysmal atrial fibrillation (CMS/HCC)    Anemia    Oral thrush    Upper GI bleed    Hyperkalemia    Acute respiratory failure with hypoxia and hypercapnia (CMS/HCC)            Assessment    BAL from bronchoscopy 10/27/2020 showed CMV positive     acute/chronic hypercapnic/hypoxic respiratory failure intubated 10/13/2020 extubated 10/21/2020    Reintubated 10/27/2020 due to large mucus plugs with bilateral lower lobe atelectasis for need of  suction and patient was on continuous non invasive ventilation extubated 10/20/20    Therapeutic bronchoscopy 08/27/2020    Therapeutic [id number]/27/20  copious amount of mucopurulent secretions with large mucus block since to be extracted therapeutically  due to significant hypoxia and need for continuous on invasive ventilation we elected to intubate the patient for adequate suctioning    BAL positive for Pneumocystis carinii  Positive rhinovirus    Bronchoscopy completed 10/13/2020   copious amount of mucopurulent secretions were noted which was suctioned therapeutically    BAL from 10/13/2020  showed positive for rhinovirus  Respiratory panel on admission from nasal swab was negative for rhino virus on 10/08/2020    Repeat bronchoscopy 10/20/2020  Very thick mucopurulent secretions right upper lobe suctioned therapeutically  BAL right upper lobe    CT scan of the chest  1022 he had a and there is improvement in pneumonia    Upper GI bleed with large amount coffee-ground emesis   status post EGD 10/13/2020    A. fib  Initially started with RVR, currently bradycardia    Right IJ central line was placed on admission and removed on 10/22/2020    RAMONE,   COPD  Smoker  Rheumatoid  arthritis  Inflammatory polyarthritis  Hyperlipidemia        Hypertension        Leukocytoclastic vasculitis        Hearing loss; hearing aid right side        obesity        peripheral arterial disease        diabetes; pre diabetic        Arthritis        Osteoarthritis  Osteoporosis      Plan    Awaiting CMV antigen titer, HIV test is negative on 10/31/2020  ganciclovir IV started on 10/31/2020 for positive CMV on most recent BAL, awaiting CMV antigen titer to decide on continuation of treatment    Pt/OT evaluate and treat  IS     Titrate oxygen  Nutrition support have speech re evaluate swallowing and nutrition support for adequate calories when taking oral currently recommended TF until VFSS    Continue steroids to IV Solu-Medrol 40 mg Q 8  Bactrim p.o. total 21 days total (for PCP pneumonia)    Bronchodilators  Glycemic control  Gi prophylaxis pantoprazole  DVT prophylaxis, on SCD, Due to large amount of coffee-ground emesis    Off Lovenox              LOS: 24 days         Vital signs for last 24 hours:  Vitals:    11/01/20 0700 11/01/20 0850 11/01/20 0907 11/01/20 0910   BP: 153/51 130/52     BP Location: Right arm      Patient Position: Lying      Pulse: 63 68 71 68   Resp: 20 18 18   Temp: 97.4 °F (36.3 °C)      TempSrc: Oral      SpO2: 96%  94%    Weight:       Height:           Intake/Output last 3 shifts:  I/O last 3 completed shifts:  In: 3632 [Other:451; NG/GT:3081; IV Piggyback:100]  Out: 4150 [Urine:3400; Emesis/NG output:750]  Intake/Output this shift:  I/O this shift:  In: 60 [Other:60]  Out: -     Vent settings for last 24 hours:       Hemodynamic parameters for last 24 hours:       Radiology  Imaging Results (Last 24 Hours)     Procedure Component Value Units Date/Time    XR Chest 1 View [[id number]] Collected: 11/01/20 0805     Updated: 11/01/20 0812    Narrative:      XR CHEST 1 VW-     Date of Exam: 11/1/2020 7:40 AM     Indication: resp distress; J18.9-Pneumonia, unspecified  organism;  R06.00-Dyspnea, unspecified; R50.9-Fever, unspecified; A41.9-Sepsis,  unspecified organism; N28.9-Disorder of kidney and ureter, unspecified;  I95.9-Hypotension, unspecified; R19.7-Diarrhea, unspecified;  D64.9-Anemia, unspecified     Comparison: 10/31/2020, 10/30/2020, 10/08/2020     Technique: 1 view(s) of the chest were obtained.     FINDINGS: Persistent but continued improvement in consolidation of  the right upper lobe. Aeration in the right middle and right lower lobe  has improved with persistent partial atelectasis. The left lung is  grossly clear. Feeding tube courses below the diaphragm with tip in the  proximal to mid stomach. A left PICC is present with the tip in the mid  to lower SVC. No pneumothorax. A small right pleural effusion is  suspected, unchanged. Bony structures are intact.       Impression:      Improved aeration in the right lung with persistent partial right lower  lobe atelectasis.  Improving consolidation in the inferior right upper lobe.  Support lines and tubes are unchanged.        Electronically Signed By-Robert Norris DO. On:11/1/2020 8:10 AM  This report was finalized on [id number] by  Robert Norris DO..          Labs:  Results from last 7 days   Lab Units 11/01/20  0610   WBC 10*3/mm3 10.10   HEMOGLOBIN g/dL 9.3*   HEMATOCRIT % 27.9*   PLATELETS 10*3/mm3 204     Results from last 7 days   Lab Units 11/01/20  0610   SODIUM mmol/L 136   POTASSIUM mmol/L 5.4*   CHLORIDE mmol/L 95*   CO2 mmol/L 32.0*   BUN mg/dL 68*   CREATININE mg/dL 1.29*   CALCIUM mg/dL 9.7   GLUCOSE mg/dL 394*     Results from last 7 days   Lab Units 10/30/20  1434   PH, ARTERIAL pH units 7.416   PO2 ART mm Hg 67.3*   PCO2, ARTERIAL mm Hg 46.4   HCO3 ART mmol/L 29.8*                 Results from last 7 days   Lab Units 11/01/20  0610   MAGNESIUM mg/dL 2.0                   Meds:   SCHEDULE  amLODIPine, 5 mg, Oral, Q24H  aspirin, 81 mg, Oral, Daily  atorvastatin, 40 mg, Oral, Daily  bisacodyl, 10  mg, Rectal, Once  budesonide, 0.5 mg, Nebulization, BID - RT  bumetanide, 1 mg, Oral, Daily  First Mouthwash (Magic Mouthwash), 5 mL, Swish & Spit, Q6H  gabapentin, 100 mg, Oral, Q8H  ganciclovir (CYTOVENE) IVPB, 2.5 mg/kg (Adjusted), Intravenous, Q24H  insulin glargine, 20 Units, Subcutaneous, Q12H  insulin lispro, 0-24 Units, Subcutaneous, Q6H  insulin lispro, 10 Units, Subcutaneous, Q6H  methylPREDNISolone sodium succinate, 40 mg, Intravenous, Q8H  pantoprazole, 40 mg, Intravenous, Q12H  sodium chloride, 10 mL, Intravenous, Q12H  sodium chloride, 10 mL, Intravenous, Q12H  sodium chloride, 10 mL, Intravenous, Q12H  sodium chloride, 10 mL, Intravenous, Q12H  sulfamethoxazole-trimethoprim, 2 tablet, Oral, Q8H      Infusions     PRNs  •  acetaminophen  •  Calcium Gluconate-NaCl **AND** calcium gluconate **AND** Calcium, Ionized  •  dextrose  •  dextrose  •  glucagon (human recombinant)  •  guaifenesin-dextromethorphan  •  influenza vaccine  •  insulin lispro **AND** insulin lispro  •  lidocaine  •  lidocaine  •  LORazepam  •  magnesium sulfate **OR** magnesium sulfate **OR** magnesium sulfate  •  potassium chloride  •  sodium chloride  •  sodium chloride  •  sodium chloride  •  sodium chloride    Physical Exam:  Physical Exam  Vitals signs reviewed.   Cardiovascular:      Heart sounds: Murmur present.   Pulmonary:      Breath sounds: Rhonchi present.   Musculoskeletal:      Right lower leg: Edema present.      Left lower leg: Edema present.   Skin:     General: Skin is warm and dry.   Neurological:      Mental Status: She is alert and oriented to person, place, and time.         ROS  Review of Systems   Constitutional: Positive for activity change and fatigue.

## 2020-11-01 NOTE — PLAN OF CARE
Pt seen for re-eval of swallow. Pt given trials of ice chips and water by spoon to determine readiness for VFSS. Pt exhibited good bolus control of ice chip and water with timely swlalow on all trials. pt had clear vocal quality and no overt s/s of aspiration on multiple ice chips and sips of water. pt does appear ready for VFSS in the am. It is rec that pt remain NPO until VFSS, but may have ice chips and small sips of water with nursing supervision for oral gratification. ST will follow up with recs from VFSS.

## 2020-11-01 NOTE — PLAN OF CARE
Problem: Adult Inpatient Plan of Care  Goal: Plan of Care Review  Recent Flowsheet Documentation  Taken 11/1/2020 1415 by Eleanor Blas OT  Progress: improving  Plan of Care Reviewed With: patient  Outcome Summary: Pt is downgraded from [facility]. She was extubated Friday. She has been hospitalized for about a month due to PNA & had acute respiratory failure requiring ventilation. She is reevaluated this date due to changes in her medical status. Pt is A&O X4 but needs cues to answer orientation questions well. She lives with family & reports severral family home at any given time. Pt typically walks with a walker & completes own ADL. She does not drive. She required a min assist of 2 persons for safety this date to acces her chair from her bed. She is dependent for toileting & on NG tube feeds. Anticipate steady functional improvement but pt is currently far below her functional baseline & will require IP rehab at d/c. PPE worn: mask, gloves, safety glasses.

## 2020-11-01 NOTE — PROGRESS NOTES
"  Chief complaint shortness of breath      Subjective     Seen and examined.  Patient is doing fairly well asking when she can eat.      Objective     Vital Signs  Visit Vitals  /45 (Patient Position: Lying)   Pulse 71   Temp 97.2 °F (36.2 °C) (Oral)   Resp 12   Ht 152.4 cm (60\")   Wt 80.8 kg (178 lb 2.1 oz)   SpO2 91%   BMI 34.79 kg/m²       Physical Exam:  Physical Exam   Constitutional:  oriented to person, place, and time. No distress.   HENT: NG in place  Head: Normocephalic and atraumatic.   Eyes: Conjunctivae and EOM are normal. Pupils are equal, round, and reactive to light.   Neck: No JVD present. No thyromegaly present.   Cardiovascular: Normal rate, regular rhythm, normal heart sounds and intact distal pulses. Exam reveals no gallop and no friction rub.   No murmur heard.  Pulmonary/Chest: Effort normal and breath sounds normal. No stridor. No respiratory distress.  has no wheezes.  has no rales.  exhibits no tenderness.   Abdominal: Soft. Bowel sounds are normal.  no distension and no mass. There is no tenderness. There is no rebound and no guarding. No hernia.   Musculoskeletal: Normal range of motion.   Lymphadenopathy:     no cervical adenopathy.   Neurological:  alert and oriented to person, place, and time. No cranial nerve deficit or sensory deficit. exhibits normal muscle tone.   Skin: No rash noted.  not diaphoretic.   Psychiatric:  normal mood and affect.   Vitals reviewed.    Physical Exam          Results Review:    CMP:  Lab Results   Component Value Date    BUN 68 (H) 11/01/2020    CREATININE 1.29 (H) 11/01/2020    EGFRIFNONA 41 (L) 11/01/2020    EGFRIFAFRI  10/21/2020      Comment:      <15 Indicative of kidney failure.     11/01/2020    K 5.4 (H) 11/01/2020    CL 95 (L) 11/01/2020    CALCIUM 9.7 11/01/2020    ALBUMIN 2.50 (L) 10/22/2020    BILITOT 0.2 10/22/2020    ALKPHOS 245 (H) 10/22/2020    AST 51 (H) 10/22/2020    ALT 44 (H) 10/22/2020     CBC:  Lab Results   Component " Value Date    WBC 10.10 11/01/2020    RBC 2.94 (L) 11/01/2020    HGB 9.3 (L) 11/01/2020    HCT 27.9 (L) 11/01/2020    MCV 95.0 11/01/2020    MCH 31.8 11/01/2020    MCHC 33.4 11/01/2020    RDW 19.3 (H) 11/01/2020     11/01/2020         Medication Review:     Scheduled Meds:amLODIPine, 5 mg, Oral, Q24H  aspirin, 81 mg, Oral, Daily  atorvastatin, 40 mg, Oral, Daily  bisacodyl, 10 mg, Rectal, Once  budesonide, 0.5 mg, Nebulization, BID - RT  bumetanide, 1 mg, Intravenous, Once  bumetanide, 1 mg, Oral, Daily  First Mouthwash (Magic Mouthwash), 5 mL, Swish & Spit, Q6H  fludrocortisone, 100 mcg, Oral, Once  gabapentin, 100 mg, Oral, Q8H  ganciclovir (CYTOVENE) IVPB, 2.5 mg/kg (Adjusted), Intravenous, Q24H  insulin glargine, 20 Units, Subcutaneous, Q12H  insulin lispro, 0-24 Units, Subcutaneous, Q6H  insulin lispro, 10 Units, Subcutaneous, Q6H  methylPREDNISolone sodium succinate, 40 mg, Intravenous, Q8H  pantoprazole, 40 mg, Intravenous, Q12H  sodium chloride, 10 mL, Intravenous, Q12H  sodium chloride, 10 mL, Intravenous, Q12H  sodium chloride, 10 mL, Intravenous, Q12H  sodium chloride, 10 mL, Intravenous, Q12H  sulfamethoxazole-trimethoprim, 2 tablet, Oral, Q8H      Continuous Infusions:   PRN Meds:.•  acetaminophen  •  Calcium Gluconate-NaCl **AND** calcium gluconate **AND** Calcium, Ionized  •  dextrose  •  dextrose  •  glucagon (human recombinant)  •  guaifenesin-dextromethorphan  •  influenza vaccine  •  insulin lispro **AND** insulin lispro  •  lidocaine  •  lidocaine  •  LORazepam  •  magnesium sulfate **OR** magnesium sulfate **OR** magnesium sulfate  •  potassium chloride  •  sodium chloride  •  sodium chloride  •  sodium chloride  •  sodium chloride    Assessment/Plan    Acute respiratory failure  Hypoxic hypercapnic  Intubated now extubated  Multifactorial  Status post bronchoscopy  On bronchodilators steroid taper  Diagnosed with PCP pneumonia and on treatment for that  Also positive for rhinovirus on  BAL  Continue BiPAP as needed  Per pulmonology    CMV infection  On ganciclovir  HIV negative     PCP pneumonia  Continue steroid taper and Bactrim high-dose  Per pulmonology  HIV negative  Status post multiple bronchoscopies     Paroxysmal A. fib  Now back in sinus rhythm  2D echocardiogram with normal EF and no gross valvular abnormality  [facility] normal EF no gross valvular abnormality  Chronic respiratory failure COPD  No anticoagulation due to concern for GI bleed     Acute kidney injury  On stage III CKD  Creatinine now oscillating around 1.2  Initial insult thought to be due to ATN  Volume now managed with Bumex  Per nephrology     GI bleed  Hemoglobin stable  Continue PPI  GI on board status post EGD with multiple superficial ulcerations in the gastric body fundus cardia suggestive of suction trauma from NG tube.     Oral thrush  Finish course of fluconazole     Hypertension  Continue amlodipine     Diabetes  Insulin sliding scale and Accu-Chek  Continue Lantus     Orthostatic hypotension  Did need some IV pressor support during her stay in ICU  Continue fludrocortisone     Dyslipidemia  continue statin     DVT PUD prophylaxis     Plan as above  Marianna Figueroa MD  11/01/20  12:50 EST

## 2020-11-02 ENCOUNTER — APPOINTMENT (OUTPATIENT)
Dept: GENERAL RADIOLOGY | Facility: HOSPITAL | Age: 70
End: 2020-11-02

## 2020-11-02 LAB
ANION GAP SERPL CALCULATED.3IONS-SCNC: 11 MMOL/L (ref 5–15)
ANISOCYTOSIS BLD QL: ABNORMAL
BUN SERPL-MCNC: 82 MG/DL (ref 8–23)
BUN/CREAT SERPL: 59 (ref 7–25)
CALCIUM SPEC-SCNC: 9.7 MG/DL (ref 8.6–10.5)
CHLORIDE SERPL-SCNC: 93 MMOL/L (ref 98–107)
CO2 SERPL-SCNC: 31 MMOL/L (ref 22–29)
CREAT SERPL-MCNC: 1.39 MG/DL (ref 0.57–1)
DEPRECATED RDW RBC AUTO: 61.7 FL (ref 37–54)
ERYTHROCYTE [DISTWIDTH] IN BLOOD BY AUTOMATED COUNT: 18.9 % (ref 12.3–15.4)
GFR SERPL CREATININE-BSD FRML MDRD: 37 ML/MIN/1.73
GLUCOSE BLDC GLUCOMTR-MCNC: 198 MG/DL (ref 70–105)
GLUCOSE BLDC GLUCOMTR-MCNC: 349 MG/DL (ref 70–105)
GLUCOSE BLDC GLUCOMTR-MCNC: 354 MG/DL (ref 70–105)
GLUCOSE BLDC GLUCOMTR-MCNC: 391 MG/DL (ref 70–105)
GLUCOSE SERPL-MCNC: 323 MG/DL (ref 65–99)
HCT VFR BLD AUTO: 28.2 % (ref 34–46.6)
HGB BLD-MCNC: 9.1 G/DL (ref 12–15.9)
LYMPHOCYTES # BLD MANUAL: 0.11 10*3/MM3 (ref 0.7–3.1)
LYMPHOCYTES NFR BLD MANUAL: 1 % (ref 19.6–45.3)
LYMPHOCYTES NFR BLD MANUAL: 1 % (ref 5–12)
MAGNESIUM SERPL-MCNC: 2.1 MG/DL (ref 1.6–2.4)
MCH RBC QN AUTO: 30.6 PG (ref 26.6–33)
MCHC RBC AUTO-ENTMCNC: 32.2 G/DL (ref 31.5–35.7)
MCV RBC AUTO: 95.1 FL (ref 79–97)
METAMYELOCYTES NFR BLD MANUAL: 3 % (ref 0–0)
MONOCYTES # BLD AUTO: 0.11 10*3/MM3 (ref 0.1–0.9)
NEUTROPHILS # BLD AUTO: 10.62 10*3/MM3 (ref 1.7–7)
NEUTROPHILS NFR BLD MANUAL: 85 % (ref 42.7–76)
NEUTS BAND NFR BLD MANUAL: 9 % (ref 0–5)
NRBC SPEC MANUAL: 1 /100 WBC (ref 0–0.2)
P JIROVECII DNA # SPEC NAA+PROBE: ABNORMAL {COPIES}/ML
PHOSPHATE SERPL-MCNC: 4.1 MG/DL (ref 2.5–4.5)
PLAT MORPH BLD: NORMAL
PLATELET # BLD AUTO: 187 10*3/MM3 (ref 140–450)
PMV BLD AUTO: 8.3 FL (ref 6–12)
POLYCHROMASIA BLD QL SMEAR: ABNORMAL
POTASSIUM SERPL-SCNC: 4.9 MMOL/L (ref 3.5–5.2)
POTASSIUM SERPL-SCNC: 5.4 MMOL/L (ref 3.5–5.2)
RBC # BLD AUTO: 2.96 10*6/MM3 (ref 3.77–5.28)
SCAN SLIDE: NORMAL
SODIUM SERPL-SCNC: 135 MMOL/L (ref 136–145)
SPECIMEN SOURCE: ABNORMAL
VARIANT LYMPHS NFR BLD MANUAL: 1 % (ref 0–5)
WBC # BLD AUTO: 11.3 10*3/MM3 (ref 3.4–10.8)
WBC MORPH BLD: NORMAL

## 2020-11-02 PROCEDURE — 71045 X-RAY EXAM CHEST 1 VIEW: CPT

## 2020-11-02 PROCEDURE — 99232 SBSQ HOSP IP/OBS MODERATE 35: CPT | Performed by: HOSPITALIST

## 2020-11-02 PROCEDURE — 63710000001 INSULIN LISPRO (HUMAN) PER 5 UNITS: Performed by: INTERNAL MEDICINE

## 2020-11-02 PROCEDURE — 82962 GLUCOSE BLOOD TEST: CPT

## 2020-11-02 PROCEDURE — 85025 COMPLETE CBC W/AUTO DIFF WBC: CPT | Performed by: INTERNAL MEDICINE

## 2020-11-02 PROCEDURE — 25010000002 GANCICLOVIR PER 500 MG: Performed by: INTERNAL MEDICINE

## 2020-11-02 PROCEDURE — 84100 ASSAY OF PHOSPHORUS: CPT | Performed by: INTERNAL MEDICINE

## 2020-11-02 PROCEDURE — 84132 ASSAY OF SERUM POTASSIUM: CPT | Performed by: INTERNAL MEDICINE

## 2020-11-02 PROCEDURE — 94799 UNLISTED PULMONARY SVC/PX: CPT

## 2020-11-02 PROCEDURE — 80048 BASIC METABOLIC PNL TOTAL CA: CPT | Performed by: INTERNAL MEDICINE

## 2020-11-02 PROCEDURE — 63710000001 INSULIN GLARGINE PER 5 UNITS: Performed by: HOSPITALIST

## 2020-11-02 PROCEDURE — 97110 THERAPEUTIC EXERCISES: CPT

## 2020-11-02 PROCEDURE — 92611 MOTION FLUOROSCOPY/SWALLOW: CPT

## 2020-11-02 PROCEDURE — 25010000002 METHYLPREDNISOLONE PER 40 MG: Performed by: INTERNAL MEDICINE

## 2020-11-02 PROCEDURE — 85007 BL SMEAR W/DIFF WBC COUNT: CPT | Performed by: INTERNAL MEDICINE

## 2020-11-02 PROCEDURE — 83735 ASSAY OF MAGNESIUM: CPT | Performed by: INTERNAL MEDICINE

## 2020-11-02 PROCEDURE — 74230 X-RAY XM SWLNG FUNCJ C+: CPT

## 2020-11-02 RX ORDER — SODIUM POLYSTYRENE SULFONATE 15 G/60ML
15 SUSPENSION ORAL; RECTAL ONCE
Status: COMPLETED | OUTPATIENT
Start: 2020-11-02 | End: 2020-11-02

## 2020-11-02 RX ORDER — FLUDROCORTISONE ACETATE 0.1 MG/1
100 TABLET ORAL ONCE
Status: COMPLETED | OUTPATIENT
Start: 2020-11-02 | End: 2020-11-02

## 2020-11-02 RX ADMIN — PANTOPRAZOLE SODIUM 40 MG: 40 INJECTION, POWDER, FOR SOLUTION INTRAVENOUS at 22:00

## 2020-11-02 RX ADMIN — INSULIN LISPRO 4 UNITS: 100 INJECTION, SOLUTION INTRAVENOUS; SUBCUTANEOUS at 12:25

## 2020-11-02 RX ADMIN — SULFAMETHOXAZOLE AND TRIMETHOPRIM 320 MG: 800; 160 TABLET ORAL at 06:00

## 2020-11-02 RX ADMIN — METHYLPREDNISOLONE SODIUM SUCCINATE 40 MG: 40 INJECTION, POWDER, FOR SOLUTION INTRAMUSCULAR; INTRAVENOUS at 09:38

## 2020-11-02 RX ADMIN — ASPIRIN 81 MG CHEWABLE TABLET 81 MG: 81 TABLET CHEWABLE at 09:38

## 2020-11-02 RX ADMIN — INSULIN GLARGINE 20 UNITS: 100 INJECTION, SOLUTION SUBCUTANEOUS at 09:39

## 2020-11-02 RX ADMIN — DIPHENHYDRAMINE HYDROCHLORIDE AND LIDOCAINE HYDROCHLORIDE AND ALUMINUM HYDROXIDE AND MAGNESIUM HYDRO 5 ML: KIT at 11:38

## 2020-11-02 RX ADMIN — DIPHENHYDRAMINE HYDROCHLORIDE AND LIDOCAINE HYDROCHLORIDE AND ALUMINUM HYDROXIDE AND MAGNESIUM HYDRO 5 ML: KIT at 05:58

## 2020-11-02 RX ADMIN — DIPHENHYDRAMINE HYDROCHLORIDE AND LIDOCAINE HYDROCHLORIDE AND ALUMINUM HYDROXIDE AND MAGNESIUM HYDRO 5 ML: KIT at 00:20

## 2020-11-02 RX ADMIN — METHYLPREDNISOLONE SODIUM SUCCINATE 40 MG: 40 INJECTION, POWDER, FOR SOLUTION INTRAMUSCULAR; INTRAVENOUS at 00:23

## 2020-11-02 RX ADMIN — DIPHENHYDRAMINE HYDROCHLORIDE AND LIDOCAINE HYDROCHLORIDE AND ALUMINUM HYDROXIDE AND MAGNESIUM HYDRO 5 ML: KIT at 22:01

## 2020-11-02 RX ADMIN — Medication 10 ML: at 09:40

## 2020-11-02 RX ADMIN — INSULIN LISPRO 10 UNITS: 100 INJECTION, SOLUTION INTRAVENOUS; SUBCUTANEOUS at 17:07

## 2020-11-02 RX ADMIN — GABAPENTIN 100 MG: 100 CAPSULE ORAL at 22:01

## 2020-11-02 RX ADMIN — INSULIN GLARGINE 20 UNITS: 100 INJECTION, SOLUTION SUBCUTANEOUS at 21:58

## 2020-11-02 RX ADMIN — Medication 10 ML: at 09:39

## 2020-11-02 RX ADMIN — PANTOPRAZOLE SODIUM 40 MG: 40 INJECTION, POWDER, FOR SOLUTION INTRAVENOUS at 09:38

## 2020-11-02 RX ADMIN — SODIUM CHLORIDE 150 MG: 9 INJECTION, SOLUTION INTRAVENOUS at 16:15

## 2020-11-02 RX ADMIN — SODIUM POLYSTYRENE SULFONATE 15 G: 15 SUSPENSION ORAL; RECTAL at 09:38

## 2020-11-02 RX ADMIN — INSULIN LISPRO 20 UNITS: 100 INJECTION, SOLUTION INTRAVENOUS; SUBCUTANEOUS at 05:59

## 2020-11-02 RX ADMIN — BUMETANIDE 1 MG: 1 TABLET ORAL at 09:38

## 2020-11-02 RX ADMIN — INSULIN LISPRO 10 UNITS: 100 INJECTION, SOLUTION INTRAVENOUS; SUBCUTANEOUS at 06:00

## 2020-11-02 RX ADMIN — BUDESONIDE INHALATION 0.5 MG: 0.5 SUSPENSION RESPIRATORY (INHALATION) at 06:30

## 2020-11-02 RX ADMIN — INSULIN LISPRO 8 UNITS: 100 INJECTION, SOLUTION INTRAVENOUS; SUBCUTANEOUS at 00:22

## 2020-11-02 RX ADMIN — INSULIN LISPRO 16 UNITS: 100 INJECTION, SOLUTION INTRAVENOUS; SUBCUTANEOUS at 17:07

## 2020-11-02 RX ADMIN — INSULIN LISPRO 10 UNITS: 100 INJECTION, SOLUTION INTRAVENOUS; SUBCUTANEOUS at 12:25

## 2020-11-02 RX ADMIN — INSULIN LISPRO 10 UNITS: 100 INJECTION, SOLUTION INTRAVENOUS; SUBCUTANEOUS at 00:23

## 2020-11-02 RX ADMIN — ATORVASTATIN CALCIUM 40 MG: 40 TABLET, FILM COATED ORAL at 22:01

## 2020-11-02 RX ADMIN — METHYLPREDNISOLONE SODIUM SUCCINATE 40 MG: 40 INJECTION, POWDER, FOR SOLUTION INTRAMUSCULAR; INTRAVENOUS at 16:15

## 2020-11-02 RX ADMIN — DIPHENHYDRAMINE HYDROCHLORIDE AND LIDOCAINE HYDROCHLORIDE AND ALUMINUM HYDROXIDE AND MAGNESIUM HYDRO 5 ML: KIT at 16:15

## 2020-11-02 RX ADMIN — BARIUM SULFATE 50 ML: 400 SUSPENSION ORAL at 13:00

## 2020-11-02 RX ADMIN — GABAPENTIN 100 MG: 100 CAPSULE ORAL at 12:26

## 2020-11-02 RX ADMIN — SULFAMETHOXAZOLE AND TRIMETHOPRIM 320 MG: 800; 160 TABLET ORAL at 22:06

## 2020-11-02 RX ADMIN — FLUDROCORTISONE ACETATE 100 MCG: 0.1 TABLET ORAL at 09:38

## 2020-11-02 RX ADMIN — Medication 10 ML: at 09:38

## 2020-11-02 RX ADMIN — GABAPENTIN 100 MG: 100 CAPSULE ORAL at 05:59

## 2020-11-02 RX ADMIN — AMLODIPINE BESYLATE 5 MG: 5 TABLET ORAL at 09:38

## 2020-11-02 RX ADMIN — SULFAMETHOXAZOLE AND TRIMETHOPRIM 320 MG: 800; 160 TABLET ORAL at 12:26

## 2020-11-02 RX ADMIN — Medication 10 ML: at 22:01

## 2020-11-02 NOTE — MBS/VFSS/FEES
Acute Care - Speech Language Pathology   Swallow Initial Evaluation  Manny     Patient Name: Lesa Julio  : 1950  MRN: [id number]  Today's Date: 2020               Admit Date: 10/8/2020    Visit Dx:     ICD-10-CM ICD-9-CM   1. Pneumonia of right upper lobe due to infectious organism  J18.9 486   2. Dyspnea, unspecified type  R06.00 786.09   3. Fever, unspecified fever cause  R50.9 780.60   4. Sepsis, due to unspecified organism, unspecified whether acute organ dysfunction present (CMS/HCC)  A41.9 038.9     995.91   5. Acute renal insufficiency  N28.9 593.9   6. Hypotension, unspecified hypotension type  I95.9 458.9   7. Diarrhea, unspecified type  R19.7 787.91   8. Anemia, unspecified type  D64.9 285.9     Patient Active Problem List   Diagnosis   • Cardiac murmur   • Chest discomfort   • Chronic obstructive pulmonary disease (CMS/HCC)   • Chronic renal insufficiency, stage III (moderate)   • Diabetes mellitus, type 2 (CMS/HCC)   • Elevated LFTs   • Essential hypertension   • Osteoporosis   • Pain of hand   • Polyarthritis, inflammatory (CMS/HCC)   • Rheumatoid arthritis (CMS/HCC)   • Shortness of breath   • Sinus tachycardia   • Tobacco abuse   • Leukocytoclastic vasculitis (CMS/HCC)   • Vasculitis (CMS/HCC)   • Allergic rhinitis   • Central obesity   • Cyst of right ovary   • Fatigue   • Grade 4 out of 6 intensity murmur   • Hearing loss   • Mass of ovary   • Mixed hyperlipidemia   • Nonalcoholic fatty liver disease   • Obesity   • Obstructive sleep apnea syndrome   • Otorrhea   • Prurigo nodularis   • Secondary polycythemia   • Tobacco dependence syndrome   • Tubular adenoma of colon   • Uncontrolled type 2 diabetes mellitus (CMS/HCC)   • Vitamin D deficiency   • Well adult health check   • Hypersensitivity angiitis (CMS/HCC)   • Vascular insufficiency   • Hyperkalemia   • Localized edema   • Other proteinuria   • Secondary hyperparathyroidism of renal origin (CMS/HCC)   • Pneumonia of right  upper lobe due to infectious organism   • Paroxysmal atrial fibrillation (CMS/HCC)   • Anemia   • Oral thrush   • Upper GI bleed   • Hyperkalemia   • Acute respiratory failure with hypoxia and hypercapnia (CMS/HCC)     Past Medical History:   Diagnosis Date   • Arthritis    • Diabetes mellitus, type 2 (CMS/HCC)    • Essential hypertension 11/6/2015   • Hearing loss     hearing aid right side   • Hyperlipidemia    • Hypertension    • Leukocytoclastic vasculitis (CMS/HCC)    • Obesity    • Osteoarthritis    • Peripheral arterial disease (CMS/HCC)    • Sleep apnea      Past Surgical History:   Procedure Laterality Date   • BRONCHOSCOPY Bilateral 10/13/2020    Procedure: BRONCHOSCOPY AT BEDSIDE with right lung washing and elective intubation;  Surgeon: Cristhian Hodge MD;  Location: [facility] ENDOSCOPY;  Service: Pulmonary;  Laterality: Bilateral;  Post: pneumonia   • BRONCHOSCOPY Bilateral 10/20/2020    Procedure: BRONCHOSCOPY WITH BRONCHOALVEOLAR LAVAGE  AT BEDSIDE;  Surgeon: Cristhian Hodge MD;  Location: [facility] ENDOSCOPY;  Service: Pulmonary;  Laterality: Bilateral;  POST- PNEUMONIA   • BRONCHOSCOPY N/A 10/27/2020    Procedure: BRONCHOSCOPY with bronchial washing and intubation;  Surgeon: Cristhian Hodge MD;  Location: [facility] ENDOSCOPY;  Service: Pulmonary;  Laterality: N/A;  post op:pneumonia   • CHOLECYSTECTOMY     • ENDOSCOPY N/A 10/13/2020    Procedure: ESOPHAGOGASTRODUODENOSCOPY AT BEDSIDE;  Surgeon: Omi Rivera MD;  Location: [facility] ENDOSCOPY;  Service: Gastroenterology;  Laterality: N/A;   • LIVER BIOPSY     • TUBAL ABDOMINAL LIGATION          SWALLOW EVALUATION (last 72 hours)      [facility] Adult Swallow Evaluation     Row Name 11/02/20 1500          Document Type  evaluation  -    Subjective Information  no complaints  -    Patient Observations  alert;cooperative;agree to therapy  -    Patient/Family/Caregiver Comments/Observations  No family present at this time  -    Care Plan Review  evaluation/treatment  results reviewed  -    Patient Effort  good  -SM    Comment  The patient was seen sitting up in the video swallow study chair for a VFSS procedure. She was alert and cooperative, however is very  hard of hearing. Patient was wearing a face mask during this therapy encounter. The patient's mask was removed to allow po trials to be administered for this assessment. Her mask was replaced prior to being transported back to her room.  Therapist used appropriate personal protective equipment including mask, eye protection and gloves.  Mask used was standard procedure mask. Appropriate PPE was worn during the entire therapy session. Hand hygiene was completed before and after therapy session. Patient is not in enhanced droplet precautions.       -SM    Symptoms Noted During/After Treatment  fatigue  -SM          Patient Profile Reviewed  yes  -SM    Pertinent History Of Current Problem  The patient has been followed by ST previously during this admission, following her first intubation which was from 10/13-10/21/2020. She was seen for a clinical swallow eval on 10/21/2020 with recs for NPO. The following date, 10/22, she was re-evaluated and placed on PO diet. She had a fast team called on 10/23 due to resp distress, 02 sats dropping to 70s, and was placed on bipap until being reintubated from 10/27-10/30. Pt seen for re-evaluations of swallowing following  Most recent extubation. NPO continue to be recommended with VFSS ordered prior to initiating a po diet.     EXAMINATION: XR CHEST 1 VW-     DATE OF EXAM: 11/2/2020 4:45 AM     INDICATION: resp distress; J18.9-Pneumonia, unspecified organism;  R06.00-Dyspnea, unspecified; R50.9-Fever, unspecified; A41.9-Sepsis,  unspecified organism; N28.9-Disorder of kidney and ureter, unspecified;  I95.9-Hypotension, unspecified; R19.7-Diarrhea, unspecified;  D64.9-Anemia, unspecified.     COMPARISON: Chest radiograph dated 11/1/2020     TECHNIQUE: Portable AP view of the chest was  obtained.     FINDINGS:  There is a feeding tube which courses below the diaphragm. There is a  left-sided PICC line with tip terminating over the mid SVC. The  cardiomediastinal silhouette appears enlarged and similar to the prior  examination. There is aortic arch atherosclerotic calcification. There  is persistent right midlung airspace consolidation, similar to the prior  examination. There is no pleural effusion or pneumothorax. There are  multilevel degenerative changes of the thoracic spine.     IMPRESSION:  1. Unchanged right midlung airspace consolidation, similar to the prior.  2. Support devices in expected position.      -    Current Method of Nutrition  NPO;nasogastric feedings  -    Precautions/Limitations, Vision  WFL;for purposes of eval  -    Precautions/Limitations, Hearing  hearing impairment, bilaterally Pt is very hard of hearing.   -    Prior Level of Function-Communication  WFL  -    Prior Level of Function-Swallowing  no diet consistency restrictions Prior to admission  -    Plans/Goals Discussed with  patient;other (see comments) And nursing staff.   -          Pretreatment Pain Rating  0/10 - no pain  -    Posttreatment Pain Rating  0/10 - no pain  -SM          Dentition Assessment  upper dentures/partial in place;lower dentures/partial in place  -    Secretion Management  WNL/WFL  -SM    Mucosal Quality  dry  -SM          Oral Motor General Assessment  WFL  -SM    Oral Motor, Comment  Lingual protrusion, lateralization, elevation all WFL. Labial protrusion/retraction adequate. Pt has full upper/lower dentures. Fit is good. The patient is verbal. She presents with good intelligibility. No hoarse/wet vocal quality noted.   -          Respiratory Support Currently in Use  nasal cannula Pt between 10 and 15 L 02 via n/c for procedure.   -    Eating/Swallowing Skills  self-fed;fed by SLP  -    Positioning During Eating  upright 90 degree;upright in chair  -           Utensils Used  cup;straw;spoon  -SM    Consistencies Trialed  nectar/syrup-thick liquids;pureed;soft textures;thin liquids  -SM          Pharyngeal Phase, Comment  The patient was seated up at a 90 degree angle and viewed in the lateral projection. She was assessed with the following consistencies:     NTL: The patient was assessed with NTL trials administered by spoon, by cup and by straw. Pt assisted with spoonful amounts by SLP tech and was able to drink from straw and cup independently. Labial seal is adequate with no anterior spillage. Premature spillage to the level of the valleculae noted with swallow delay of approximately 3 seconds. Upon initiation of the swallow, adequate laryngeal elevation, epiglottic inversion and laryngeal vestibular closure noted. No penetration or aspiration evident, with the patient obtaining a 1 on the Rosenbeck Penetration-Aspiration Scale. Similar results noted across all attempts, including spoon, straw and cup. Slightly increased delay noted with straw sips with bolus head reaching the level of the pyriform sinus. Pt clears oral and pharyngeal cavities well with no significant residual evident.     PUREE: The patient was assessed with 2 trials of puree (applesauce mixed with barium). Both trials administered by SLP tech.  Labial seal on spoon is adequate with no anterior spillage. Premature spillage to the level of the valleculae noted with swallow delay of approximately 4-6 seconds. Upon initiation of the swallow, adequate laryngeal elevation, epiglottic inversion and laryngeal vestibular closure noted. No penetration or aspiration evident, with the patient obtaining a 1 on the Rosenbeck Penetration-Aspiration Scale. Mild base of tongue and vallecular residue noted however she is able to clear most with a dry swallow.    MECH SOFT: The patient was given 2 trials of peaches coated with barium. Increased oral transit time noted with incomplete mastication of the bolus. Premature  spillage noted. Pt unable to completely masticate and swallows pieces of the fruit intact. Upon initiation of the swallow, adequate laryngeal elevation, epiglottic inversion and laryngeal vestibular closure noted. No penetration or aspiration evident, with the patient obtaining a 1 on the Rosenbeck Penetration-Aspiration Scale.     THIN LIQUID: The patient was then given a cup and assessed with thin liquid trials. Pt able to hold and drink  Independently. Premature spillage noted with bolus  Head reaching the pyriform sinus area prior to swallow initiation. Upon swallowing, decreased epiglottic inversion and incomplete laryngeal vestibular closure noted. This results in silent penetration and suspected silent aspiration (an 8 on the Rosenbeck Penetration-Aspiration Scale). She presents with difficulty following directions for swallow compensations during this procedure due to hearing impairment.     RECOMMENDATIONS: ST recommends pt initiate a puree diet with NTL at this time. ST will follow up with meal assessment and safe swallow recommendations including: upright posture during/after eating;small bites of food and sips of liquid;alternate between small bites of food and sips of liquid;check mouth frequently for oral residue/pocketing;assist with feeding. ST will attempt soft solid trials in therapy for possible diet upgrade. Pt will need repeat instrumental assessment of swallow prior to considering liquid upgrade due to the above results.            SLP Swallowing Diagnosis  mod-severe;oral dysphagia;pharyngeal dysphagia  -SM    Functional Impact  risk of aspiration/pneumonia;risk of malnutrition;risk of dehydration  -    Rehab Potential/Prognosis, Swallowing  good, to achieve stated therapy goals  -    Swallow Criteria for Skilled Therapeutic Interventions Met  demonstrates skilled criteria  -          Therapy Frequency (Swallow)  4 days per week;5 days per week  -    Predicted Duration Therapy  Intervention (Days)  until discharge  -SM    SLP Diet Recommendation  nectar thick liquids;puree  -SM    Recommended Diagnostics  reassess via VFSS ([facility] [place]  -SM    Recommended Precautions and Strategies  upright posture during/after eating;small bites of food and sips of liquid;alternate between small bites of food and sips of liquid;check mouth frequently for oral residue/pocketing;assist with feeding  -SM    Oral Care Recommendations  Oral Care BID/PRN  -SM    SLP Rec. for Method of Medication Administration  meds whole;with pudding or applesauce;meds crushed;as tolerated  -SM    Monitor for Signs of Aspiration  yes;notify SLP if any concerns;cough  -SM    Anticipated Discharge Disposition (SLP)  inpatient rehabilitation facility  -SM          Oral Nutrition/Hydration Goal Selection (SLP)  oral nutrition/hydration, SLP goal 1;oral nutrition/hydration, SLP goal 2;oral nutrition/hydration, SLP goal (free text)  -SM    Pharyngeal Strengthening Exercise Goal Selection (SLP)  pharyngeal strengthening exercise, SLP goal 1  -SM    Additional Documentation  pharyngeal strengthening exercise goal selection (SLP)  -SM          Oral Nutrition/Hydration Goal 1, SLP  Establish PO diet  -SM    Time Frame (Oral Nutrition/Hydration Goal 1, SLP)  by discharge  -SM    Barriers (Oral Nutrition/Hydration Goal 1, SLP)  ST recommends pt initiate a puree diet with NTL at this time.   -SM    Progress/Outcomes (Oral Nutrition/Hydration Goal 1, SLP)  goal met;goal revised this date  -SM          Oral Nutrition/Hydration Goal 2, SLP  Pt will undergo re-evaluation of swallow in form of VFSS as appropriate prior to considering liquid upgrade due to the above results. -SM    Time Frame (Oral Nutrition/Hydration Goal 2, SLP)  1 week  -SM    Barriers (Oral Nutrition/Hydration Goal 2, SLP)  VFSS completed this date. ST recommended po diet of puree and NTL. PT will need re-evaluation at bedside and will require repeat instrumental assessment of  swallow prior to upgrading liquids.   -SM    Progress/Outcomes (Oral Nutrition/Hydration Goal 2, SLP)  goal ongoing  -SM          Oral Nutrition/Hydration Goal, SLP  Patient will be seen at a meal within 24-48 hours to assess tolerance of current diet with further recommendations to be given as indicated  -SM    Time Frame (Oral Nutrition/Hydration Goal, SLP)  1 day;2 days  -SM    Barriers (Oral Nutrition/Hydration Goal, SLP)  The patient will be seen during established noon meal to assess safety and adequacy of recommended diet, and for trials of soft solid for possible diet upgrade.   -SM          Activity (Pharyngeal Strengthening Goal 1, SLP)  increase timing  -SM    Increase Timing  prepping - 3 second prep or suck swallow or 3-step swallow  -SM    [place]ennan/Accuracy (Pharyngeal Strengthening Goal 1, SLP)  with moderate cues (50-74% accuracy);with maximum cues (25-49% accuracy)  -SM    Time Frame (Pharyngeal Strengthening Goal 1, SLP)  1 week  -SM      User Key  (r) = Recorded By, (t) = Taken By, (c) = Cosigned By    Initials Name Effective Dates    CP Kayleigh Okeefe, SLP 03/01/19 -     Zoey Lagunas, SLP 03/01/19 -     Bridget Thompson SLP 03/01/19 -           EDUCATION  The patient has been educated in the following areas:   Dysphagia (Swallowing Impairment) Oral Care/Hydration Modified Diet Instruction.    SLP Recommendation and Plan  SLP Swallowing Diagnosis: mod-severe, oral dysphagia, pharyngeal dysphagia  SLP Diet Recommendation: nectar thick liquids, puree  Recommended Precautions and Strategies: upright posture during/after eating, small bites of food and sips of liquid, alternate between small bites of food and sips of liquid, check mouth frequently for oral residue/pocketing, assist with feeding  SLP Rec. for Method of Medication Administration: meds whole, with pudding or applesauce, meds crushed, as tolerated     Monitor for Signs of Aspiration: yes, notify SLP if any concerns,  cough  Recommended Diagnostics: reassess via VFSS ([facility] [place])  Swallow Criteria for Skilled Therapeutic Interventions Met: demonstrates skilled criteria  Anticipated Discharge Disposition (SLP): inpatient rehabilitation facility  Rehab Potential/Prognosis, Swallowing: good, to achieve stated therapy goals  Therapy Frequency (Swallow): 4 days per week, 5 days per week  Predicted Duration Therapy Intervention (Days): until discharge                              SLP GOALS     Row Name 11/02/20 1500          Oral Nutrition/Hydration Goal 1, SLP  Establish PO diet  -SM    Time Frame (Oral Nutrition/Hydration Goal 1, SLP)  by discharge  -SM    Barriers (Oral Nutrition/Hydration Goal 1, SLP)  ST recommends pt initiate a puree diet with NTL at this time.   -SM    Progress/Outcomes (Oral Nutrition/Hydration Goal 1, SLP)  goal met;goal revised this date  -SM          Oral Nutrition/Hydration Goal 2, SLP  Pt will undergo re-evaluation of swallow in form of VFSS as appropriate  -SM    Time Frame (Oral Nutrition/Hydration Goal 2, SLP)  1 week  -SM    Barriers (Oral Nutrition/Hydration Goal 2, SLP)  VFSS completed this date. ST recommended po diet. PT will need re-evaluation at bedside and will require repeat instrumental assessment of swallow prior to upgrading liquids.   -SM    Progress/Outcomes (Oral Nutrition/Hydration Goal 2, SLP)  goal ongoing  -SM          Oral Nutrition/Hydration Goal, SLP  Patient will be seen at a meal within 24-48 hours to assess tolerance of current diet with further recommendations to be given as indicated  -SM    Time Frame (Oral Nutrition/Hydration Goal, SLP)  1 day;2 days  -SM    Barriers (Oral Nutrition/Hydration Goal, SLP)  The patient will be seen during established noon meal to assess safety and adequacy of recommended diet, and for trials of soft solid for possible diet upgrade.   -SM          Activity (Pharyngeal Strengthening Goal 1, SLP)  increase timing  -SM    Increase Timing  prepping - 3  second prep or suck swallow or 3-step swallow  -SM    Sanders/Accuracy (Pharyngeal Strengthening Goal 1, SLP)  with moderate cues (50-74% accuracy);with maximum cues (25-49% accuracy)  -SM    Time Frame (Pharyngeal Strengthening Goal 1, SLP)  1 week  -SM      User Key  (r) = Recorded By, (t) = Taken By, (c) = Cosigned By    Initials Name Provider Type    Kayleigh Valdes, SLP Speech and Language Pathologist    Zoey Lagunas, SLP Speech and Language Pathologist    Bridget Thompson, SLP Speech and Language Pathologist             Time Calculation:                AMENA Zamora  11/2/2020

## 2020-11-02 NOTE — PROGRESS NOTES
Nutrition Services    Patient Name: Lesa Julio  YOB: 1950  MRN: [id number]  Admission date: 10/8/2020    Comments:    Diet advanced today to puree with NTL. Tube feeding stopped. Starting Novasource Renal orally once daily.       PPE Documentation        PPE Worn By Provider Mask and eye protection    PPE Worn By Patient  None      CLINICAL NUTRITION ASSESSMENT       Reason for Assessment  Follow-up protocol    TF re-consult received 10/28  10/14/20: Tube feed consult   H&P:      Past Medical History:   Diagnosis Date   • Arthritis    • Diabetes mellitus, type 2 (CMS/HCC)    • Essential hypertension 11/6/2015   • Hearing loss     hearing aid right side   • Hyperlipidemia    • Hypertension    • Leukocytoclastic vasculitis (CMS/HCC)    • Obesity    • Osteoarthritis    • Peripheral arterial disease (CMS/HCC)    • Sleep apnea        Past Surgical History:   Procedure Laterality Date   • BRONCHOSCOPY Bilateral 10/13/2020    Procedure: BRONCHOSCOPY AT BEDSIDE with right lung washing and elective intubation;  Surgeon: Cristhian Hodge MD;  Location: [facility] ENDOSCOPY;  Service: Pulmonary;  Laterality: Bilateral;  Post: pneumonia   • BRONCHOSCOPY Bilateral 10/20/2020    Procedure: BRONCHOSCOPY WITH BRONCHOALVEOLAR LAVAGE  AT BEDSIDE;  Surgeon: Cristhian Hodge MD;  Location: [facility] ENDOSCOPY;  Service: Pulmonary;  Laterality: Bilateral;  POST- PNEUMONIA   • BRONCHOSCOPY N/A 10/27/2020    Procedure: BRONCHOSCOPY with bronchial washing and intubation;  Surgeon: Cristhian Hodge MD;  Location: [facility] ENDOSCOPY;  Service: Pulmonary;  Laterality: N/A;  post op:pneumonia   • CHOLECYSTECTOMY     • ENDOSCOPY N/A 10/13/2020    Procedure: ESOPHAGOGASTRODUODENOSCOPY AT BEDSIDE;  Surgeon: Omi Rivera MD;  Location: [facility] ENDOSCOPY;  Service: Gastroenterology;  Laterality: N/A;   • LIVER BIOPSY     • TUBAL ABDOMINAL LIGATION         PMH also includes COPD, RA, smoker, osteoporosis     Current Problems:   Chief complaint of  "shortness of breath and fever.     Acute on chronic hypercapnic/hypoxic respiratory failure  -required intubation 10/13  - s/p bronch 10/13  - repeat bronch 10/20  - extubated 10/21  - Bronch on 10/27  - re-intubated 10/27  - extubated 10/30      Septic shock  -off pressor     PCP pna  -on PO bactrim     On low-dose fluconazole for oral thrush noted during bronchoscopy and BAL showing fungus      Acute kidney injury, improving  -Nephrology following     Upper GI bleed with large amount coffee-ground emesis  -s/p EGD 10/13 ->\"Multiple superficial ulcerations that were circular in the gastric body and fundus and cardia suggestive of suction trauma from NG tube. There were blood clots on a couple of these indicative of recent bleeding.\"     A. fib with RVR     Hyperglycemia       Nutrition/Diet History         Narrative     11/2: ST evaluated pt today and diet upgraded to puree NTL. Feeding tube has been pulled. Per RN ate well at first meal. Attempted to see pt today however was resting, did not enter room. Given pt's significant weight loss will proactively add oral nutritional supplements.     10/29: Pt remains on vent. TF documented at goal and has now had BM    10/27: Pt off floor for procedure this morning. Events noted per EMR review. Pt has been on oral diet form 10/22 until cancelled last night in preparation for procedure. Average intake since diet was advanced has been only 35% -- not meeting needs so far. During procedure today (bronchoscopy), pt was found to have very large mucus plugs with significant hypoxia and need for significant suctioning. Due to severity of condition, pt was reintubated and remains on mechanical ventilation presently. Likely will require EN to resume. See TF work-up below.      10/22:  Called pt via phone this afternoon, no answer. Chart reviewed with 1 meal documented at 75%. Will monitor PO trend & encourage intakes at meaks     10/20: Pt remains on vent. TF are currently off for " "bronchoscopy.     10/16: Pt remains intubated/sedated. GI was okay with TF advancement to goal per note on 10/15. Reached out to JESSIE Mejia to notify of new free water flushes as well as address hyperglycemia. Pts Hgb A1c was >9% PTA and is not currently on long acting insulin.     10/14: Pt is currently intubated/sedated & unable to provide nutrition related hx. S/w JESSIE Carlson to determine if GI okay with TF as current note dictated to leave NG to gravity. Nsg d/w NP, okay to start EN     Functional Status PT recommending inpt rehab      Food Allergies NKFA     Factors Affecting   Nutritional Intake Prolonged intubation, reintubation this admission     Anthropometrics        Current Height, Weight Height: 152.4 cm (60\")  Weight: 81.2 kg (179 lb 0.2 oz) (11/02/20 0500)     Noted significant change since admitting wt, pt is currently negative 21.4 Liters since 10/15. Some muscle loss expected dt atrophy in the context of critical illness. Will continue to monitor        Admit Height, Weight     Flowsheet Rows      First Filed Value   Admission Height  152.4 cm (60\") Documented at 10/08/2020 1422   Admission Weight  87 kg (191 lb 12.8 oz) Documented at 10/08/2020 1422             Ideal Body Weight (IBW) 100 lb   % Ideal Body Weight 179%       Usual Body Weight UTD   % Usual Body Weight UTD   Wt Change Observation See above   Weight Hx    Wt Readings from Last 30 Encounters:   11/02/20 0500 81.2 kg (179 lb 0.2 oz)   10/31/20 0422 80.8 kg (178 lb 2.1 oz)   10/29/20 0541 80.1 kg (176 lb 9.4 oz)   10/28/20 0600 81.3 kg (179 lb 3.7 oz)   10/28/20 0317 81.3 kg (179 lb 3.7 oz)   10/27/20 1114 80.9 kg (178 lb 5.6 oz)   10/27/20 0500 81.9 kg (180 lb 8.9 oz)   10/26/20 0453 81.7 kg (180 lb 1.9 oz)   10/25/20 0503 85.9 kg (189 lb 6 oz)   10/24/20 1333 84.7 kg (186 lb 11.7 oz)   10/23/20 2250 85.5 kg (188 lb 9.6 oz)   10/23/20 0600 86.7 kg (191 lb 2.2 oz)   10/22/20 0500 87.4 kg (192 lb 10.9 oz)   10/21/20 0600 87.8 kg (193 lb 9 " oz)   10/20/20 0542 89.3 kg (196 lb 13.9 oz)   10/20/20 0400 89.3 kg (196 lb 13.9 oz)   10/19/20 0540 87 kg (191 lb 12.8 oz)   10/18/20 0646 87.2 kg (192 lb 3.9 oz)   10/17/20 0600 86.3 kg (190 lb 4.1 oz)   10/14/20 0443 87.2 kg (192 lb 3.9 oz)   10/13/20 0448 84.1 kg (185 lb 6.5 oz)   10/12/20 0600 87.5 kg (192 lb 14.4 oz)   10/11/20 0938 88 kg (194 lb)   10/11/20 0600 88 kg (194 lb 0.1 oz)   10/09/20 0600 86.7 kg (191 lb 2.2 oz)   10/08/20 2238 86.7 kg (191 lb 2.2 oz)   10/08/20 1932 86.7 kg (191 lb 2.2 oz)   10/08/20 1422 87 kg (191 lb 12.8 oz)   06/22/20 0927 89.4 kg (197 lb)   06/26/19 1005 93 kg (205 lb)        BMI kg/m2 Body mass index is 34.96 kg/m².     Labs/Medications         Pertinent Labs    Results from last 7 days   Lab Units 11/02/20 0414 11/01/20  1608 11/01/20  0610  10/31/20  0312   SODIUM mmol/L 135*  --  136  --  140   POTASSIUM mmol/L 5.4* 5.2 5.4*   < > 5.2   CHLORIDE mmol/L 93*  --  95*  --  100   CO2 mmol/L 31.0*  --  32.0*  --  31.0*   BUN mg/dL 82*  --  68*  --  50*   CREATININE mg/dL 1.39*  --  1.29*  --  1.26*   CALCIUM mg/dL 9.7  --  9.7  --  9.6   GLUCOSE mg/dL 323*  --  394*  --  218*    < > = values in this interval not displayed.     Results from last 7 days   Lab Units 11/02/20 0414 11/01/20  0610 10/31/20  0312   MAGNESIUM mg/dL 2.1 2.0 2.0   PHOSPHORUS mg/dL 4.1 3.5 2.8   HEMOGLOBIN g/dL 9.1* 9.3* 8.5*   HEMATOCRIT % 28.2* 27.9* 26.0*     COVID19   Date Value Ref Range Status   10/27/2020 Not Detected Not Detected - Ref. Range Final     Lab Results   Component Value Date    HGBA1C 9.6 (H) 10/11/2020         Pertinent Medications Aspirin, Dulcolax, Bumex, Muna Magic Mouthwash, Neurontin, Cytovene, Lantus, Admelog, Solu-medrol, Protonix, Bactrim      Physical Findings        Overall Physical   Appearance, MSA 11/2: Pt sleeping at the time of visit, did not attempt to wake    -  Edema  No edema observed     Gastrointestinal BM documented on 11/2  (x1 day ago)     Tubes None     "  Oral/Mouth Cavity ST following patient, noted need for mechanically altered diet      Skin Abrasion on (L) elbow - no PI       Estimated/Assessed Needs       Energy Requirements    Height for Calculation  Height: 152.4 cm (60\")   Weight for Calculation    Method for Estimation     EST Needs (kcal/day)        Protein Requirements    Weight for Calculation    EST Protein Needs (g/kg)    EST Daily Needs (g/day)        Fluid Requirements     Estimated Needs (mL/day)        Fluid Deficit    Current Na Level (mEq/L) -   Desired Na Level (mEq/L) -   Estimated Fluid Deficit (L)  -     Current Nutrition Orders & Evaluation of Received Nutrient/Fluid Intake       Oral Nutrition     Current PO Diet Diet Texture; Pureed Diet; Thickened Liquids (Nectar)   Supplement -   PO Evaluation     % PO Intake 11/2: No meals recorded since diet advanced     10/29: 0% - NPO since last encounter    10/27:  Average intake while diet active only 35%      10/22: 75% x1 meal documented. Selections at breakfast & lunch reviewed (ordering full meals)     10/16 to 10/21: 0% - NPO     10/14: Average of 57% x7 meals documented (prior to intubation)   -  Enteral Nutrition    Enteral Route    TF Modular    TF Delivery Method    Current Ordered TF     Current Ordered H2O flush      TF Observation     EN Evaluation     TF Changes     TF Residual     TF Tolerance     Average EN Delivered -       Parenteral Nutrition     TPN Route -   Current Ordered TPN VOL  -   Dextrose (g/kcals)  -   Amino Acid (g/kcals) -   Lipids (mL/Concentration/FREQ)  -   MVI Frequency  -   Trace Element Frequency  -   TPN Observation  -    TPN Evaluation    Total # Days on TPN -   -  Clinical Course    Nutrition Course Details PO Diet:  10/8 NPO  10/9 -10/11 Healthy Heart, Consistent CHO, 2 GM Na+  10/12 to 10/21 NPO  10/22 - 10/27 Consistent CHO  10/27-11/1 NPO  11/2 Puree NTL      Nutrition Support:  TF started 10/14. At goal 10/16 thru 10/22, when TF D/C'd with extubation and " lack of enteral access.      TF restarted on 10/28, at goal 10/29  11/2 TF dcd after PO diet restarted.      Nutritional Risk Screening        NRS-2002 Score   -       Nutrition Diagnosis         Nutrition Dx Problem 1 Involuntary weight loss r/t critical illness & intermittent poor PO intakes when not on enteral nutrition AEB pt with a significant weight loss of 6.3% since admission       Nutrition Dx Problem 2 -       Intervention Goal         Intervention Goal(s) PO intakes > 50% of meals    Pt will be accepting of oral nutritional supplements      Nutrition Intervention        RD/Tech Action DC TF orders given no EN access    Start ONS        Nutrition Prescription          Diet Prescription Puree NTL    Supplement Prescription Novasource once daily    -  Enteral Prescription        TPN Prescription -     Monitor/Evaluation        Monitor Weights, intakes, labs, BM, skin and medication changes      Electronically signed by:  Margarita Samuels RD  11/02/20 14:51 EST

## 2020-11-02 NOTE — PLAN OF CARE
Video Swallow Study procedure completed this date. Pt assessed with thin liquids by cup, NTL by spoon, straw and cup, puree consistency and soft solids. Pt exhibits premature spillage of all consistencies, incomplete laryngeal vestibular closure with thin liquids which results in silent penetration and suspected silent aspiration of thin liquids. Poor  mastication of soft solids note with  patient swallowing pieces of unmasticated foods whole/intact.     RECOMMENDATIONS: ST recommends pt initiate a puree diet with NTL at this time. ST will follow up with meal assessment and safe swallow recommendations including: upright posture during/after eating;small bites of food and sips of liquid;alternate between small bites of food and sips of liquid;check mouth frequently for oral residue/pocketing;assist with feeding. ST will attempt soft solid trials in therapy for possible diet upgrade. Pt will need repeat instrumental assessment of swallow prior to considering liquid upgrade due to the above results.

## 2020-11-02 NOTE — THERAPY TREATMENT NOTE
Patient Name: Lesa Julio  : 1950    MRN: [id number]                              Today's Date: 2020       Admit Date: 10/8/2020    Visit Dx:     ICD-10-CM ICD-9-CM   1. Pneumonia of right upper lobe due to infectious organism  J18.9 486   2. Dyspnea, unspecified type  R06.00 786.09   3. Fever, unspecified fever cause  R50.9 780.60   4. Sepsis, due to unspecified organism, unspecified whether acute organ dysfunction present (CMS/[facility])  A41.9 038.9     995.91   5. Acute renal insufficiency  N28.9 593.9   6. Hypotension, unspecified hypotension type  I95.9 458.9   7. Diarrhea, unspecified type  R19.7 787.91   8. Anemia, unspecified type  D64.9 285.9     Patient Active Problem List   Diagnosis   • Cardiac murmur   • Chest discomfort   • Chronic obstructive pulmonary disease (CMS/[facility])   • Chronic renal insufficiency, stage III (moderate)   • Diabetes mellitus, type 2 (CMS/HCC)   • Elevated LFTs   • Essential hypertension   • Osteoporosis   • Pain of hand   • Polyarthritis, inflammatory (CMS/HCC)   • Rheumatoid arthritis (CMS/HCC)   • Shortness of breath   • Sinus tachycardia   • Tobacco abuse   • Leukocytoclastic vasculitis (CMS/HCC)   • Vasculitis (CMS/HCC)   • Allergic rhinitis   • Central obesity   • Cyst of right ovary   • Fatigue   • Grade 4 out of 6 intensity murmur   • Hearing loss   • Mass of ovary   • Mixed hyperlipidemia   • Nonalcoholic fatty liver disease   • Obesity   • Obstructive sleep apnea syndrome   • Otorrhea   • Prurigo nodularis   • Secondary polycythemia   • Tobacco dependence syndrome   • Tubular adenoma of colon   • Uncontrolled type 2 diabetes mellitus (CMS/HCC)   • Vitamin D deficiency   • Well adult health check   • Hypersensitivity angiitis (CMS/HCC)   • Vascular insufficiency   • Hyperkalemia   • Localized edema   • Other proteinuria   • Secondary hyperparathyroidism of renal origin (CMS/HCC)   • Pneumonia of right upper lobe due to infectious organism   • Paroxysmal atrial  fibrillation (CMS/HCC)   • Anemia   • Oral thrush   • Upper GI bleed   • Hyperkalemia   • Acute respiratory failure with hypoxia and hypercapnia (CMS/HCC)     Past Medical History:   Diagnosis Date   • Arthritis    • Diabetes mellitus, type 2 (CMS/HCC)    • Essential hypertension 11/6/2015   • Hearing loss     hearing aid right side   • Hyperlipidemia    • Hypertension    • Leukocytoclastic vasculitis (CMS/HCC)    • Obesity    • Osteoarthritis    • Peripheral arterial disease (CMS/HCC)    • Sleep apnea      Past Surgical History:   Procedure Laterality Date   • BRONCHOSCOPY Bilateral 10/13/2020    Procedure: BRONCHOSCOPY AT BEDSIDE with right lung washing and elective intubation;  Surgeon: Cristhian Hodge MD;  Location: [facility] ENDOSCOPY;  Service: Pulmonary;  Laterality: Bilateral;  Post: pneumonia   • BRONCHOSCOPY Bilateral 10/20/2020    Procedure: BRONCHOSCOPY WITH BRONCHOALVEOLAR LAVAGE  AT BEDSIDE;  Surgeon: Cristhian Hodge MD;  Location: [facility] ENDOSCOPY;  Service: Pulmonary;  Laterality: Bilateral;  POST- PNEUMONIA   • BRONCHOSCOPY N/A 10/27/2020    Procedure: BRONCHOSCOPY with bronchial washing and intubation;  Surgeon: Cristhian Hodge MD;  Location: [facility] ENDOSCOPY;  Service: Pulmonary;  Laterality: N/A;  post op:pneumonia   • CHOLECYSTECTOMY     • ENDOSCOPY N/A 10/13/2020    Procedure: ESOPHAGOGASTRODUODENOSCOPY AT BEDSIDE;  Surgeon: Omi Rivera MD;  Location: [facility] ENDOSCOPY;  Service: Gastroenterology;  Laterality: N/A;   • LIVER BIOPSY     • TUBAL ABDOMINAL LIGATION       General Information     Row Name 11/02/20 120          Physical Therapy Time and Intention    Document Type  therapy note (daily note)  -CR     Mode of Treatment  physical therapy  -CR     Row Name 11/02/20 1207          General Information    Patient Profile Reviewed  yes  -CR     Row Name 11/02/20 4683          Cognition    Orientation Status (Cognition)  oriented x 4  -CR       User Key  (r) = Recorded By, (t) = Taken By, (c) = Cosigned  By    Initials Name Provider Type    CR Reyes, Carmela, PT Physical Therapist        Mobility     Row Name 11/02/20 1205          Bed Mobility    Comment (Bed Mobility)  pt suppose to go for testing and did not want to get in chair  -CR       User Key  (r) = Recorded By, (t) = Taken By, (c) = Cosigned By    Initials Name Provider Type    CR Reyes, Carmela, PT Physical Therapist        Obj/Interventions     Row Name 11/02/20 1207          Motor Skills    Therapeutic Exercise  -- AAROM BLE to available range, AROM BUE  -CR       User Key  (r) = Recorded By, (t) = Taken By, (c) = Cosigned By    Initials Name Provider Type    CR Reyes, Carmela, PT Physical Therapist        Goals/Plan     Row Name 11/02/20 1236          Bed Mobility Goal 1 (PT)    Progress/Outcomes (Bed Mobility Goal 1, PT)  progress slower than expected  -CR       User Key  (r) = Recorded By, (t) = Taken By, (c) = Cosigned By    Initials Name Provider Type    CR Reyes, Carmela, VIGNESH Physical Therapist        Clinical Impression     Row Name 11/02/20 1233          Pain    Additional Documentation  Pain Scale: Numbers Pre/Post-Treatment (Group)  -CR     Row Name 11/02/20 1233          Pain Scale: Numbers Pre/Post-Treatment    Pretreatment Pain Rating  0/10 - no pain  -CR     Posttreatment Pain Rating  0/10 - no pain  -CR     Row Name 11/02/20 1233          Plan of Care Review    Plan of Care Reviewed With  patient  -CR     Outcome Summary  Patient performed AROM to BUE with report of fatigue at end but no desat. Needed AAROM with most LE ex due to mm weakness. Noted jerking with quad sets to both. Pt unable to perform transfers out of bed this session due to waiting to be taken down for testing. PPE gloves, mask with shield.  -CR       User Key  (r) = Recorded By, (t) = Taken By, (c) = Cosigned By    Initials Name Provider Type    CR Reyes, Carmela, PT Physical Therapist        Outcome Measures    No documentation.       Physical Therapy Education                  Title: PT OT SLP Therapies (In Progress)     Topic: Physical Therapy (In Progress)     Point: Mobility training (In Progress)     Learning Progress Summary           Patient Acceptance, E, NR by CR at 11/2/2020 1236    Acceptance, E,TB, VU by CM at 10/30/2020 1305    Acceptance, E,TB, VU,NR by CM at 10/29/2020 1302    Acceptance, E, DU by CORONA at 10/29/2020 0315    Comment: Patient is unable to speak due to ET tube    Acceptance, E, VU by SC at 10/26/2020 1423    Acceptance, E,TB, VU by KW at 10/24/2020 0315    Acceptance, E, NR by HC at 10/23/2020 1249    Acceptance, E, NR by HC at 10/22/2020 1608                   Point: Precautions (Done)     Learning Progress Summary           Patient Acceptance, E,TB, VU by CM at 10/30/2020 1305    Acceptance, E,TB, VU,NR by CM at 10/29/2020 1302    Acceptance, E, DU by CORONA at 10/29/2020 0315    Comment: Patient is unable to speak due to ET tube    Acceptance, E, VU by SC at 10/26/2020 1423    Acceptance, E,TB, VU by KW at 10/24/2020 0315    Acceptance, E, NR by HC at 10/23/2020 1249    Acceptance, E, NR by HC at 10/22/2020 1608                               User Key     Initials Effective Dates Name Provider Type Discipline     04/17/20 -  Alicia Al, PT Physical Therapist PT    CM 03/01/19 -  Aubrie Negro, PT Physical Therapist PT    CR 03/01/19 -  Reyes, Carmela, PT Physical Therapist PT    SC 03/01/19 -  Esperanza Guidry, PTA Physical Therapy Assistant PT    CORONA 03/01/19 -  Carolynn Mcghee, RN Registered Nurse Nurse    KW 06/24/19 -  Redd Arroyo LPN Licensed Nurse Nurse              PT Recommendation and Plan     Plan of Care Reviewed With: patient  Outcome Summary: Patient performed AROM to BUE with report of fatigue at end but no desat. Needed AAROM with most LE ex due to mm weakness. Noted jerking with quad sets to both. Pt unable to perform transfers out of bed this session due to waiting to be taken down for testing. PPE gloves, mask with  shield.     Time Calculation:   PT Charges     Row Name 11/02/20 1237             Time Calculation    Start Time  1105  -CR      Stop Time  1121  -CR      Time Calculation (min)  16 min  -CR      PT Received On  11/02/20  -CR      PT - Next Appointment  11/03/20  -CR         Time Calculation- PT    Total Timed Code Minutes- PT  16 minute(s)  -CR        User Key  (r) = Recorded By, (t) = Taken By, (c) = Cosigned By    Initials Name Provider Type    CR Reyes, Carmela, PT Physical Therapist        Therapy Charges for Today     Code Description Service Date Service Provider Modifiers Qty    02392114059 HC PT THER PROC EA 15 MIN 11/2/2020 Reyes, Carmela, PT GP 1          PT G-Codes  Outcome Measure Options: AM-PAC 6 Clicks Daily Activity (OT), Modified Paula  AM-PAC 6 Clicks Score (PT): 11  AM-PAC 6 Clicks Score (OT): 10  Modified Paula Scale: 4 - Moderately severe disability.  Unable to walk without assistance, and unable to attend to own bodily needs without assistance.    Carmela Reyes, VIGNESH  11/2/2020

## 2020-11-02 NOTE — PROGRESS NOTES
-  PROGRESS NOTE      Patient Name: Lesa Julio  : 1950  MRN: [id number]  Primary Care Physician: Manuel Sanders MD  Date of admission: 10/8/2020    Patient Care Team:  Manuel Sanders MD as PCP - General        Subjective   Patient seen and examined, she denies any new complaints, no chest pain or shortness of breath.  Subjective:       Review of systems:  Review of system unremarkable      Allergies:    Allergies   Allergen Reactions   • Lisinopril Other (See Comments)   • Ipratropium-Albuterol Itching and Rash       Objective   Exam:     Vital Signs  Temp:  [97.2 °F (36.2 °C)-97.9 °F (36.6 °C)] 97.2 °F (36.2 °C)  Heart Rate:  [68-80] 72  Resp:  [12-20] 16  BP: (136-157)/(45-58) 157/52  SpO2:  [89 %-99 %] 94 %  on  Flow (L/min):  [6-10] 10;   Device (Oxygen Therapy): humidified;high-flow nasal cannula  Body mass index is 34.96 kg/m².     General:  Elderly female no acute distress  Head:      Normocephalic and atraumatic.    Eyes:      PERRL/EOM intact, conjunctiva and sclera clear with out nystagmus.    Neck:      No masses, thyromegaly,  trachea central with normal respiratory effort   Lungs:    Bilateral crackles left greater than right.    Heart:      Regular rate and rhythm, no murmur no gallop  Abd:        Soft, nontender, not distended, bowel sounds positive, no shifting dullness   Pulses:   Pulses palpable  Extr:        No cyanosis or clubbing--+1 edema.    Neuro:     Alert awake  skin:       Intact without lesions or rashes.    Psych:     Alert awake.      results Review:  I have personally reviewed most recent Data :  CBC    Results from last 7 days   Lab Units 20  0414 20  0610 10/31/20  0312 10/30/20  0418 10/29/20  0411 10/28/20  0427 10/27/20  1124 10/27/20  0325   WBC 10*3/mm3 11.30* 10.10 8.60 5.90 7.50 4.90  --  7.80   HEMOGLOBIN g/dL 9.1* 9.3* 8.5* 7.8* 9.1* 8.7*  --  9.7*   HEMOGLOBIN, POC g/dL  --   --   --   --   --   --  9.5*  --    PLATELETS 10*3/mm3  187 204 197 187 248 218  --  266     CMP   Results from last 7 days   Lab Units 11/02/20  0414 11/01/20  1608 11/01/20  0610 11/01/20  0027 10/31/20  0312 10/30/20  0418 10/29/20  1745 10/29/20  0411 10/28/20  0427  10/27/20  0325   SODIUM mmol/L 135*  --  136  --  140 136  --  140 141  --  141   POTASSIUM mmol/L 5.4* 5.2 5.4* 5.8* 5.2 4.8 5.0 3.4* 4.0   < > 5.4*   CHLORIDE mmol/L 93*  --  95*  --  100 99  --  100 100  --  96*   CO2 mmol/L 31.0*  --  32.0*  --  31.0* 29.0  --  30.0* 33.0*  --  38.0*   BUN mg/dL 82*  --  68*  --  50* 42*  --  33* 34*  --  45*   CREATININE mg/dL 1.39*  --  1.29*  --  1.26* 1.25*  --  1.45* 1.52*  --  1.93*   GLUCOSE mg/dL 323*  --  394*  --  218* 355*  --  173* 62*  --  69    < > = values in this interval not displayed.     ABG    Results from last 7 days   Lab Units 10/30/20  1434 10/30/20  0759 10/29/20  0831 10/28/20  0802 10/27/20  1124   PH, ARTERIAL pH units 7.416 7.403 7.426 7.516* 7.436   PCO2, ARTERIAL mm Hg 46.4 48.4* 47.7 42.3 51.5*   PO2 ART mm Hg 67.3* 69.7* 64.8* 58.8* 100.5   O2 SATURATION ART % 93.1* 93.5* 92.5* 92.4* 97.8   BASE EXCESS ART mmol/L 4.7* 4.8* 6.2* 10.3* 9.2*     Ct Chest Without Contrast    Result Date: 10/25/2020   1. Small stable right-sided pleural effusion. 2. There is significantly increased obstructive endobronchial debris in the right lung with complete right middle and right lower lobe atelectasis and partial consolidation of the right upper lobe. 3. Patchy airspace disease in the right upper lobe. 4. Emphysema. 5. Atherosclerosis. 6. Colonic diverticulosis. 7. Mild splenomegaly.  Electronically Signed BySanti Solomon On:10/25/2020 3:07 PM This report was finalized on [id number] by  Simon Solomon, .    Xr Chest 1 View    Result Date: 11/2/2020  1. Unchanged right midlung airspace consolidation, similar to the prior. 2. Support devices in expected position.  Electronically Signed By-Leonel Espitia On:11/2/2020 7:47 AM This report was  finalized on [id number] by  Leonel Espitia .    Xr Chest 1 View    Result Date: 11/1/2020  Improved aeration in the right lung with persistent partial right lower lobe atelectasis. Improving consolidation in the inferior right upper lobe. Support lines and tubes are unchanged.   Electronically Signed By-Robert Norris DO. On:11/1/2020 8:10 AM This report was finalized on [id number] by  Robert Norris DO..    Xr Chest 1 View    Result Date: 10/31/2020   1. Interval extubation. 2. Stable esophagogastric tube and left upper extremity PICC line. 3. No change in right midlung and right basilar airspace disease.  Electronically Signed By-Sandeep King On:10/31/2020 7:27 AM This report was finalized on [id number] by  Sandeep King, .    Xr Chest 1 View    Result Date: 10/30/2020  1.Endotracheal tube in good position. 2.Consolidation within right midlung appears unchanged.  Electronically Signed By-DR. Onofre Nicholas MD On:10/30/2020 7:39 AM This report was finalized on [id number] by DR. Onofre Nicholas MD.    Xr Chest 1 View    Result Date: 10/29/2020   1. Improved right lung aeration with persistent right upper lobe airspace opacity.  Electronically Signed By-Chriss Rider On:10/29/2020 7:31 AM This report was finalized on [id number] by  Chriss Rider, .    Xr Chest 1 View    Result Date: 10/28/2020   1. Endotracheal tube in expected position. 2. Improved aeration of the right lung with decreasing right-sided airspace opacities when compared to prior exam.  Electronically Signed By-Leonel Espitia On:10/28/2020 7:41 AM This report was finalized on [id number] by  Leonel Espitia, .    Xr Chest 1 View    Result Date: 10/27/2020  No interval change from yesterday's study.  Electronically Signed By-Chele Mcclure On:10/27/2020 7:43 AM This report was finalized on [id number] by  Chele Mcclure, .    Xr Chest 1 View    Result Date: 10/26/2020  1. Stable dense right basilar consolidation related to right lower  lobe consolidation and/or atelectasis with associated volume loss and left to right shift of the heart and mediastinum. 2. Patchy right midlung airspace disease unchanged. 3. Clear left lung.  Electronically Signed By-Sandeep King On:10/26/2020 7:32 AM This report was finalized on [id number] by  Sandeep King, .    Xr Abdomen Kub    Result Date: 10/28/2020  The radiopaque tip of the Dobbhoff tube projects to the proximal gastric body level.  Electronically Signed By-Dr. Genesis Segovia MD On:10/28/2020 1:48 PM This report was finalized on [id number] by Dr. Genesis Segovia MD.      Results for orders placed during the hospital encounter of 10/08/20   Adult Transthoracic Echo Complete W/ Cont if Necessary Per Protocol    Narrative · Estimated left ventricular EF = 65% Left ventricular systolic function   is normal.  · Trace to mild mitral valve regurgitation is present.  · There is a small (<1cm) pericardial effusion.        Scheduled Meds:amLODIPine, 5 mg, Oral, Q24H  aspirin, 81 mg, Oral, Daily  atorvastatin, 40 mg, Oral, Daily  bisacodyl, 10 mg, Rectal, Once  budesonide, 0.5 mg, Nebulization, BID - RT  bumetanide, 1 mg, Oral, Daily  First Mouthwash (Magic Mouthwash), 5 mL, Swish & Spit, Q6H  gabapentin, 100 mg, Oral, Q8H  ganciclovir (CYTOVENE) IVPB, 2.5 mg/kg (Adjusted), Intravenous, Q24H  insulin glargine, 20 Units, Subcutaneous, Q12H  insulin lispro, 0-24 Units, Subcutaneous, Q6H  insulin lispro, 10 Units, Subcutaneous, Q6H  methylPREDNISolone sodium succinate, 40 mg, Intravenous, Q8H  pantoprazole, 40 mg, Intravenous, Q12H  sodium chloride, 10 mL, Intravenous, Q12H  sodium chloride, 10 mL, Intravenous, Q12H  sodium chloride, 10 mL, Intravenous, Q12H  sodium chloride, 10 mL, Intravenous, Q12H  sulfamethoxazole-trimethoprim, 2 tablet, Oral, Q8H      Continuous Infusions:   PRN Meds:•  acetaminophen  •  Calcium Gluconate-NaCl **AND** calcium gluconate **AND** Calcium, Ionized  •  dextrose  •  dextrose  •   glucagon (human recombinant)  •  guaifenesin-dextromethorphan  •  influenza vaccine  •  insulin lispro **AND** insulin lispro  •  lidocaine  •  lidocaine  •  LORazepam  •  magnesium sulfate **OR** magnesium sulfate **OR** magnesium sulfate  •  potassium chloride  •  sodium chloride  •  sodium chloride  •  sodium chloride  •  sodium chloride    Assessment/Plan   Assessment and Plan:         Pneumonia of right upper lobe due to infectious organism    Cardiac murmur    Chronic renal insufficiency, stage III (moderate)    Diabetes mellitus, type 2 (CMS/[facility])    Essential hypertension    Shortness of breath    Tobacco abuse    Hearing loss    Paroxysmal atrial fibrillation (CMS/[facility])    Anemia    Oral thrush    Upper GI bleed    Hyperkalemia    Acute respiratory failure with hypoxia and hypercapnia (CMS/[facility])     ASSESSMENT:  · Acute kidney injury likely ATN likely related to sepsis patient baseline creatinine is 0.9 back in June 2019, FENa 2.7  · Metabolic acidosis with increased lactic acid level  · Right upper lobe pneumonia  · History of rheumatoid arthritis  · History of hypertension  · History of vasculitis  · Hypernatremia  · Acute GI bleed with emesis           Plan:      · Renal function stable, creatinine 1.2  · We will give another dose of Florinef as potassium level is increasing likely due to the high-dose Bactrim, will also give dose of IV Bumex  · Elevated BUN probably related to steroids, creatinine is a stable  · Recheck potassium this afternoon  · Will giv ept a dose of kayexalate  · Blood pressure is acceptable  · Sodium level acceptable  · resp condition stable   · Will follow         Note started  by Serjio Melara MD,   [facility] kidney consultant

## 2020-11-02 NOTE — PROGRESS NOTES
ICU Daily Progress Note        Pneumonia of right upper lobe due to infectious organism    Cardiac murmur    Chronic renal insufficiency, stage III (moderate)    Diabetes mellitus, type 2 (CMS/HCC)    Essential hypertension    Shortness of breath    Tobacco abuse    Hearing loss    Paroxysmal atrial fibrillation (CMS/HCC)    Anemia    Oral thrush    Upper GI bleed    Hyperkalemia    Acute respiratory failure with hypoxia and hypercapnia (CMS/HCC)            Assessment    BAL from bronchoscopy 10/27/2020 showed CMV positive   HIV test is negative on 10/31/2020    acute/chronic hypercapnic/hypoxic respiratory failure intubated 10/13/2020 extubated 10/21/2020    Reintubated 10/27/2020 due to large mucus plugs with bilateral lower lobe atelectasis for need of  suction and patient was on continuous non invasive ventilation extubated 10/20/20    Therapeutic hegsjyrwvial80/27/20  copious amount of mucopurulent secretions with large mucus block since to be extracted therapeutically  due to significant hypoxia and need for continuous on invasive ventilation we elected to intubate the patient for adequate suctioning    BAL positive for Pneumocystis carinii  Positive rhinovirus    Bronchoscopy completed 10/13/2020   copious amount of mucopurulent secretions were noted which was suctioned therapeutically    BAL from 10/13/2020  showed positive for rhinovirus  Respiratory panel on admission from nasal swab was negative for rhino virus on 10/08/2020    Repeat bronchoscopy 10/20/2020  Very thick mucopurulent secretions right upper lobe suctioned therapeutically  BAL right upper lobe    CT scan of the chest  1022 he had a and there is improvement in pneumonia    Upper GI bleed with large amount coffee-ground emesis   status post EGD 10/13/2020    A. fib  Initially started with RVR, currently bradycardia    Right IJ central line was placed on admission and removed on 10/22/2020    RAMONE,   COPD  Smoker  Rheumatoid  arthritis  Inflammatory polyarthritis  Hyperlipidemia        Hypertension        Leukocytoclastic vasculitis        Hearing loss; hearing aid right side        obesity        peripheral arterial disease        diabetes; pre diabetic        Arthritis        Osteoarthritis  Osteoporosis      Plan    Awaiting CMV antigen titer,   ganciclovir IV started on 10/31/2020 for positive CMV on most recent BAL, awaiting CMV antigen titer to decide on continuation of treatment    Pt/OT evaluate and treat  IS   Flutter valve  Chest physical therapy    Titrate oxygen  Video swallow scheduled for today     Continue steroids to IV Solu-Medrol 40 mg Q 8  Bactrim p.o. total 21 days total (for PCP pneumonia)    Bronchodilators  Glycemic control  Gi prophylaxis pantoprazole  DVT prophylaxis, on SCD, Due to large amount of coffee-ground emesis    Off Lovenox              LOS: 25 days         Vital signs for last 24 hours:  Vitals:    11/02/20 0500 11/02/20 0605 11/02/20 0630 11/02/20 0633   BP:  157/52     BP Location:       Patient Position:       Pulse:  73 72 72   Resp:  20 16    Temp:  97.2 °F (36.2 °C)     TempSrc:  Oral     SpO2:  (!) 89% 91% 94%   Weight: 81.2 kg (179 lb 0.2 oz)      Height:           Intake/Output last 3 shifts:  I/O last 3 completed shifts:  In: 2731 [Other:492; NG/GT:2139; IV Piggyback:100]  Out: 4800 [Urine:4050; Emesis/NG output:750]  Intake/Output this shift:  No intake/output data recorded.    Vent settings for last 24 hours:       Hemodynamic parameters for last 24 hours:       Radiology  Imaging Results (Last 24 Hours)     Procedure Component Value Units Date/Time    XR Chest 1 View [[id number]] Collected: 11/02/20 0746     Updated: 11/02/20 0750    Narrative:      EXAMINATION: XR CHEST 1 VW-     DATE OF EXAM: 11/2/2020 4:45 AM     INDICATION: resp distress; J18.9-Pneumonia, unspecified organism;  R06.00-Dyspnea, unspecified; R50.9-Fever, unspecified; A41.9-Sepsis,  unspecified organism; N28.9-Disorder of  kidney and ureter, unspecified;  I95.9-Hypotension, unspecified; R19.7-Diarrhea, unspecified;  D64.9-Anemia, unspecified.     COMPARISON: Chest radiograph dated 11/1/2020     TECHNIQUE: Portable AP view of the chest was obtained.     FINDINGS:  There is a feeding tube which courses below the diaphragm. There is a  left-sided PICC line with tip terminating over the mid SVC. The  cardiomediastinal silhouette appears enlarged and similar to the prior  examination. There is aortic arch atherosclerotic calcification. There  is persistent right midlung airspace consolidation, similar to the prior  examination. There is no pleural effusion or pneumothorax. There are  multilevel degenerative changes of the thoracic spine.       Impression:      1. Unchanged right midlung airspace consolidation, similar to the prior.  2. Support devices in expected position.     Electronically Signed By-Leonel Espitia On:11/2/2020 7:47 AM  This report was finalized on [id number] by  Leonel Espitia, .          Labs:  Results from last 7 days   Lab Units 11/02/20  0414   WBC 10*3/mm3 11.30*   HEMOGLOBIN g/dL 9.1*   HEMATOCRIT % 28.2*   PLATELETS 10*3/mm3 187     Results from last 7 days   Lab Units 11/02/20  0414   SODIUM mmol/L 135*   POTASSIUM mmol/L 5.4*   CHLORIDE mmol/L 93*   CO2 mmol/L 31.0*   BUN mg/dL 82*   CREATININE mg/dL 1.39*   CALCIUM mg/dL 9.7   GLUCOSE mg/dL 323*     Results from last 7 days   Lab Units 10/30/20  1434   PH, ARTERIAL pH units 7.416   PO2 ART mm Hg 67.3*   PCO2, ARTERIAL mm Hg 46.4   HCO3 ART mmol/L 29.8*                 Results from last 7 days   Lab Units 11/02/20  0414   MAGNESIUM mg/dL 2.1                   Meds:   SCHEDULE  amLODIPine, 5 mg, Oral, Q24H  aspirin, 81 mg, Oral, Daily  atorvastatin, 40 mg, Oral, Daily  bisacodyl, 10 mg, Rectal, Once  budesonide, 0.5 mg, Nebulization, BID - RT  bumetanide, 1 mg, Oral, Daily  First Mouthwash (Magic Mouthwash), 5 mL, Swish & Spit, Q6H  gabapentin, 100 mg,  Oral, Q8H  ganciclovir (CYTOVENE) IVPB, 2.5 mg/kg (Adjusted), Intravenous, Q24H  insulin glargine, 20 Units, Subcutaneous, Q12H  insulin lispro, 0-24 Units, Subcutaneous, Q6H  insulin lispro, 10 Units, Subcutaneous, Q6H  methylPREDNISolone sodium succinate, 40 mg, Intravenous, Q8H  pantoprazole, 40 mg, Intravenous, Q12H  sodium chloride, 10 mL, Intravenous, Q12H  sodium chloride, 10 mL, Intravenous, Q12H  sodium chloride, 10 mL, Intravenous, Q12H  sodium chloride, 10 mL, Intravenous, Q12H  sulfamethoxazole-trimethoprim, 2 tablet, Oral, Q8H      Infusions     PRNs  •  acetaminophen  •  Calcium Gluconate-NaCl **AND** calcium gluconate **AND** Calcium, Ionized  •  dextrose  •  dextrose  •  glucagon (human recombinant)  •  guaifenesin-dextromethorphan  •  influenza vaccine  •  insulin lispro **AND** insulin lispro  •  lidocaine  •  lidocaine  •  LORazepam  •  magnesium sulfate **OR** magnesium sulfate **OR** magnesium sulfate  •  potassium chloride  •  sodium chloride  •  sodium chloride  •  sodium chloride  •  sodium chloride    Physical Exam:  Physical Exam  Vitals signs reviewed.   Cardiovascular:      Heart sounds: Murmur present.   Pulmonary:      Breath sounds: Rhonchi present.   Musculoskeletal:      Right lower leg: Edema present.      Left lower leg: Edema present.   Skin:     General: Skin is warm and dry.   Neurological:      Mental Status: She is alert and oriented to person, place, and time.         ROS  Review of Systems   Constitutional: Positive for activity change and fatigue.

## 2020-11-02 NOTE — PLAN OF CARE
Goal Outcome Evaluation:  Plan of Care Reviewed With: patient  Progress: improving more awake talking more. Vitals are good.

## 2020-11-02 NOTE — PLAN OF CARE
Patient had video swallow and is now on a puree diet with NTL. Patient ate 100% of lunch and is drinking adequately. NG tube removed and flores catheter removed as patient reports not having any difficulty with urine retention. Patient currently up in the chair is on 10 L HF NC. Patient is encouraged to use IS and flutter valve. Medications given for K of 5.4. No distress at this time, will continue to monitor.

## 2020-11-02 NOTE — PLAN OF CARE
Problem: Adult Inpatient Plan of Care  Goal: Plan of Care Review  Recent Flowsheet Documentation  Taken 11/2/2020 1233 by Reyes, Carmela, PT  Plan of Care Reviewed With: patient  Outcome Summary: Patient performed AROM to BUE with report of fatigue at end but no desat. Needed AAROM with most LE ex due to mm weakness. Noted jerking with quad sets to both. Pt unable to perform transfers out of bed this session due to waiting to be taken down for testing. PPE gloves, mask with shield.

## 2020-11-03 LAB
ANION GAP SERPL CALCULATED.3IONS-SCNC: 12 MMOL/L (ref 5–15)
ANISOCYTOSIS BLD QL: ABNORMAL
BUN SERPL-MCNC: 84 MG/DL (ref 8–23)
BUN/CREAT SERPL: 54.9 (ref 7–25)
CALCIUM SPEC-SCNC: 9.2 MG/DL (ref 8.6–10.5)
CHLORIDE SERPL-SCNC: 93 MMOL/L (ref 98–107)
CO2 SERPL-SCNC: 31 MMOL/L (ref 22–29)
CREAT SERPL-MCNC: 1.53 MG/DL (ref 0.57–1)
DEPRECATED RDW RBC AUTO: 62.6 FL (ref 37–54)
ERYTHROCYTE [DISTWIDTH] IN BLOOD BY AUTOMATED COUNT: 19.1 % (ref 12.3–15.4)
GFR SERPL CREATININE-BSD FRML MDRD: 33 ML/MIN/1.73
GLUCOSE BLDC GLUCOMTR-MCNC: 145 MG/DL (ref 70–105)
GLUCOSE BLDC GLUCOMTR-MCNC: 173 MG/DL (ref 70–105)
GLUCOSE BLDC GLUCOMTR-MCNC: 231 MG/DL (ref 70–105)
GLUCOSE BLDC GLUCOMTR-MCNC: 263 MG/DL (ref 70–105)
GLUCOSE BLDC GLUCOMTR-MCNC: 305 MG/DL (ref 70–105)
GLUCOSE BLDC GLUCOMTR-MCNC: 372 MG/DL (ref 70–105)
GLUCOSE BLDC GLUCOMTR-MCNC: 398 MG/DL (ref 70–105)
GLUCOSE BLDC GLUCOMTR-MCNC: 478 MG/DL (ref 70–105)
GLUCOSE SERPL-MCNC: 227 MG/DL (ref 65–99)
HCT VFR BLD AUTO: 26.3 % (ref 34–46.6)
HGB BLD-MCNC: 9.1 G/DL (ref 12–15.9)
LYMPHOCYTES # BLD MANUAL: 1.14 10*3/MM3 (ref 0.7–3.1)
LYMPHOCYTES NFR BLD MANUAL: 11 % (ref 19.6–45.3)
LYMPHOCYTES NFR BLD MANUAL: 3 % (ref 5–12)
MAGNESIUM SERPL-MCNC: 2.1 MG/DL (ref 1.6–2.4)
MCH RBC QN AUTO: 32.7 PG (ref 26.6–33)
MCHC RBC AUTO-ENTMCNC: 34.4 G/DL (ref 31.5–35.7)
MCV RBC AUTO: 94.8 FL (ref 79–97)
METAMYELOCYTES NFR BLD MANUAL: 1 % (ref 0–0)
MONOCYTES # BLD AUTO: 0.31 10*3/MM3 (ref 0.1–0.9)
NEUTROPHILS # BLD AUTO: 8.84 10*3/MM3 (ref 1.7–7)
NEUTROPHILS NFR BLD MANUAL: 74 % (ref 42.7–76)
NEUTS BAND NFR BLD MANUAL: 11 % (ref 0–5)
NRBC SPEC MANUAL: 1 /100 WBC (ref 0–0.2)
PHOSPHATE SERPL-MCNC: 4.7 MG/DL (ref 2.5–4.5)
PLAT MORPH BLD: NORMAL
PLATELET # BLD AUTO: 174 10*3/MM3 (ref 140–450)
PMV BLD AUTO: 8 FL (ref 6–12)
POTASSIUM SERPL-SCNC: 4.3 MMOL/L (ref 3.5–5.2)
RBC # BLD AUTO: 2.78 10*6/MM3 (ref 3.77–5.28)
SCAN SLIDE: NORMAL
SODIUM SERPL-SCNC: 136 MMOL/L (ref 136–145)
WBC # BLD AUTO: 10.4 10*3/MM3 (ref 3.4–10.8)
WBC MORPH BLD: NORMAL

## 2020-11-03 PROCEDURE — 94799 UNLISTED PULMONARY SVC/PX: CPT

## 2020-11-03 PROCEDURE — 92526 ORAL FUNCTION THERAPY: CPT

## 2020-11-03 PROCEDURE — 82962 GLUCOSE BLOOD TEST: CPT

## 2020-11-03 PROCEDURE — 97530 THERAPEUTIC ACTIVITIES: CPT

## 2020-11-03 PROCEDURE — 84100 ASSAY OF PHOSPHORUS: CPT | Performed by: INTERNAL MEDICINE

## 2020-11-03 PROCEDURE — 85007 BL SMEAR W/DIFF WBC COUNT: CPT | Performed by: INTERNAL MEDICINE

## 2020-11-03 PROCEDURE — 83735 ASSAY OF MAGNESIUM: CPT | Performed by: INTERNAL MEDICINE

## 2020-11-03 PROCEDURE — 86645 CMV ANTIBODY IGM: CPT | Performed by: NURSE PRACTITIONER

## 2020-11-03 PROCEDURE — 86644 CMV ANTIBODY: CPT | Performed by: NURSE PRACTITIONER

## 2020-11-03 PROCEDURE — 99232 SBSQ HOSP IP/OBS MODERATE 35: CPT | Performed by: HOSPITALIST

## 2020-11-03 PROCEDURE — 63710000001 INSULIN LISPRO (HUMAN) PER 5 UNITS: Performed by: INTERNAL MEDICINE

## 2020-11-03 PROCEDURE — 85025 COMPLETE CBC W/AUTO DIFF WBC: CPT | Performed by: INTERNAL MEDICINE

## 2020-11-03 PROCEDURE — 63710000001 INSULIN GLARGINE PER 5 UNITS: Performed by: HOSPITALIST

## 2020-11-03 PROCEDURE — 25010000002 METHYLPREDNISOLONE PER 40 MG: Performed by: INTERNAL MEDICINE

## 2020-11-03 PROCEDURE — 94667 MNPJ CHEST WALL 1ST: CPT

## 2020-11-03 PROCEDURE — 80048 BASIC METABOLIC PNL TOTAL CA: CPT | Performed by: INTERNAL MEDICINE

## 2020-11-03 RX ORDER — PANTOPRAZOLE SODIUM 40 MG/1
40 TABLET, DELAYED RELEASE ORAL
Status: DISCONTINUED | OUTPATIENT
Start: 2020-11-04 | End: 2020-11-06 | Stop reason: HOSPADM

## 2020-11-03 RX ORDER — VALGANCICLOVIR 450 MG/1
450 TABLET, FILM COATED ORAL
Status: DISCONTINUED | OUTPATIENT
Start: 2020-11-03 | End: 2020-11-03

## 2020-11-03 RX ORDER — VALGANCICLOVIR 450 MG/1
450 TABLET, FILM COATED ORAL
Status: DISCONTINUED | OUTPATIENT
Start: 2020-11-03 | End: 2020-11-06 | Stop reason: HOSPADM

## 2020-11-03 RX ORDER — AMLODIPINE BESYLATE 5 MG/1
10 TABLET ORAL
Status: DISCONTINUED | OUTPATIENT
Start: 2020-11-04 | End: 2020-11-06 | Stop reason: HOSPADM

## 2020-11-03 RX ADMIN — INSULIN LISPRO 24 UNITS: 100 INJECTION, SOLUTION INTRAVENOUS; SUBCUTANEOUS at 00:19

## 2020-11-03 RX ADMIN — Medication 10 ML: at 22:38

## 2020-11-03 RX ADMIN — PANTOPRAZOLE SODIUM 40 MG: 40 INJECTION, POWDER, FOR SOLUTION INTRAVENOUS at 08:35

## 2020-11-03 RX ADMIN — BUMETANIDE 1 MG: 1 TABLET ORAL at 08:34

## 2020-11-03 RX ADMIN — INSULIN LISPRO 10 UNITS: 100 INJECTION, SOLUTION INTRAVENOUS; SUBCUTANEOUS at 06:17

## 2020-11-03 RX ADMIN — INSULIN LISPRO 10 UNITS: 100 INJECTION, SOLUTION INTRAVENOUS; SUBCUTANEOUS at 12:35

## 2020-11-03 RX ADMIN — Medication 10 ML: at 08:35

## 2020-11-03 RX ADMIN — BUDESONIDE INHALATION 0.5 MG: 0.5 SUSPENSION RESPIRATORY (INHALATION) at 07:36

## 2020-11-03 RX ADMIN — INSULIN LISPRO 10 UNITS: 100 INJECTION, SOLUTION INTRAVENOUS; SUBCUTANEOUS at 00:18

## 2020-11-03 RX ADMIN — INSULIN LISPRO 4 UNITS: 100 INJECTION, SOLUTION INTRAVENOUS; SUBCUTANEOUS at 06:15

## 2020-11-03 RX ADMIN — SULFAMETHOXAZOLE AND TRIMETHOPRIM 320 MG: 800; 160 TABLET ORAL at 22:21

## 2020-11-03 RX ADMIN — GABAPENTIN 100 MG: 100 CAPSULE ORAL at 06:16

## 2020-11-03 RX ADMIN — ASPIRIN 81 MG CHEWABLE TABLET 81 MG: 81 TABLET CHEWABLE at 08:34

## 2020-11-03 RX ADMIN — VALGANCICLOVIR 450 MG: 450 TABLET, FILM COATED ORAL at 12:34

## 2020-11-03 RX ADMIN — GABAPENTIN 100 MG: 100 CAPSULE ORAL at 22:22

## 2020-11-03 RX ADMIN — ATORVASTATIN CALCIUM 40 MG: 40 TABLET, FILM COATED ORAL at 22:22

## 2020-11-03 RX ADMIN — BUDESONIDE INHALATION 0.5 MG: 0.5 SUSPENSION RESPIRATORY (INHALATION) at 17:14

## 2020-11-03 RX ADMIN — SULFAMETHOXAZOLE AND TRIMETHOPRIM 320 MG: 800; 160 TABLET ORAL at 06:16

## 2020-11-03 RX ADMIN — INSULIN GLARGINE 20 UNITS: 100 INJECTION, SOLUTION SUBCUTANEOUS at 22:21

## 2020-11-03 RX ADMIN — Medication 10 ML: at 22:39

## 2020-11-03 RX ADMIN — AMLODIPINE BESYLATE 5 MG: 5 TABLET ORAL at 08:34

## 2020-11-03 RX ADMIN — INSULIN GLARGINE 20 UNITS: 100 INJECTION, SOLUTION SUBCUTANEOUS at 08:37

## 2020-11-03 RX ADMIN — METHYLPREDNISOLONE SODIUM SUCCINATE 40 MG: 40 INJECTION, POWDER, FOR SOLUTION INTRAMUSCULAR; INTRAVENOUS at 00:15

## 2020-11-03 RX ADMIN — INSULIN LISPRO 20 UNITS: 100 INJECTION, SOLUTION INTRAVENOUS; SUBCUTANEOUS at 17:15

## 2020-11-03 RX ADMIN — Medication 10 ML: at 22:22

## 2020-11-03 RX ADMIN — METHYLPREDNISOLONE SODIUM SUCCINATE 40 MG: 40 INJECTION, POWDER, FOR SOLUTION INTRAMUSCULAR; INTRAVENOUS at 08:35

## 2020-11-03 RX ADMIN — DIPHENHYDRAMINE HYDROCHLORIDE AND LIDOCAINE HYDROCHLORIDE AND ALUMINUM HYDROXIDE AND MAGNESIUM HYDRO 5 ML: KIT at 22:21

## 2020-11-03 RX ADMIN — GABAPENTIN 100 MG: 100 CAPSULE ORAL at 12:37

## 2020-11-03 RX ADMIN — METHYLPREDNISOLONE SODIUM SUCCINATE 40 MG: 40 INJECTION, POWDER, FOR SOLUTION INTRAMUSCULAR; INTRAVENOUS at 17:14

## 2020-11-03 RX ADMIN — DIPHENHYDRAMINE HYDROCHLORIDE AND LIDOCAINE HYDROCHLORIDE AND ALUMINUM HYDROXIDE AND MAGNESIUM HYDRO 5 ML: KIT at 12:34

## 2020-11-03 RX ADMIN — INSULIN LISPRO 10 UNITS: 100 INJECTION, SOLUTION INTRAVENOUS; SUBCUTANEOUS at 17:15

## 2020-11-03 RX ADMIN — INSULIN LISPRO 20 UNITS: 100 INJECTION, SOLUTION INTRAVENOUS; SUBCUTANEOUS at 12:48

## 2020-11-03 RX ADMIN — DIPHENHYDRAMINE HYDROCHLORIDE AND LIDOCAINE HYDROCHLORIDE AND ALUMINUM HYDROXIDE AND MAGNESIUM HYDRO 5 ML: KIT at 17:15

## 2020-11-03 RX ADMIN — DIPHENHYDRAMINE HYDROCHLORIDE AND LIDOCAINE HYDROCHLORIDE AND ALUMINUM HYDROXIDE AND MAGNESIUM HYDRO 5 ML: KIT at 06:16

## 2020-11-03 RX ADMIN — Medication 10 ML: at 00:16

## 2020-11-03 RX ADMIN — SULFAMETHOXAZOLE AND TRIMETHOPRIM 320 MG: 800; 160 TABLET ORAL at 12:36

## 2020-11-03 NOTE — THERAPY TREATMENT NOTE
Acute Care - Speech Language Pathology   Swallow Treatment Note  Manny     Patient Name: Lesa Julio  : 1950  MRN: [id number]  Today's Date: 11/3/2020               Admit Date: 10/8/2020    Visit Dx:     ICD-10-CM ICD-9-CM   1. Pneumonia of right upper lobe due to infectious organism  J18.9 486   2. Dyspnea, unspecified type  R06.00 786.09   3. Fever, unspecified fever cause  R50.9 780.60   4. Sepsis, due to unspecified organism, unspecified whether acute organ dysfunction present (CMS/HCC)  A41.9 038.9     995.91   5. Acute renal insufficiency  N28.9 593.9   6. Hypotension, unspecified hypotension type  I95.9 458.9   7. Diarrhea, unspecified type  R19.7 787.91   8. Anemia, unspecified type  D64.9 285.9     Patient Active Problem List   Diagnosis   • Cardiac murmur   • Chest discomfort   • Chronic obstructive pulmonary disease (CMS/[facility])   • Chronic renal insufficiency, stage III (moderate)   • Diabetes mellitus, type 2 (CMS/HCC)   • Elevated LFTs   • Essential hypertension   • Osteoporosis   • Pain of hand   • Polyarthritis, inflammatory (CMS/HCC)   • Rheumatoid arthritis (CMS/HCC)   • Shortness of breath   • Sinus tachycardia   • Tobacco abuse   • Leukocytoclastic vasculitis (CMS/HCC)   • Vasculitis (CMS/HCC)   • Allergic rhinitis   • Central obesity   • Cyst of right ovary   • Fatigue   • Grade 4 out of 6 intensity murmur   • Hearing loss   • Mass of ovary   • Mixed hyperlipidemia   • Nonalcoholic fatty liver disease   • Obesity   • Obstructive sleep apnea syndrome   • Otorrhea   • Prurigo nodularis   • Secondary polycythemia   • Tobacco dependence syndrome   • Tubular adenoma of colon   • Uncontrolled type 2 diabetes mellitus (CMS/HCC)   • Vitamin D deficiency   • Well adult health check   • Hypersensitivity angiitis (CMS/HCC)   • Vascular insufficiency   • Hyperkalemia   • Localized edema   • Other proteinuria   • Secondary hyperparathyroidism of renal origin (CMS/HCC)   • Pneumonia of right upper  lobe due to infectious organism   • Paroxysmal atrial fibrillation (CMS/HCC)   • Anemia   • Oral thrush   • Upper GI bleed   • Hyperkalemia   • Acute respiratory failure with hypoxia and hypercapnia (CMS/HCC)     Past Medical History:   Diagnosis Date   • Arthritis    • Diabetes mellitus, type 2 (CMS/HCC)    • Essential hypertension 11/6/2015   • Hearing loss     hearing aid right side   • Hyperlipidemia    • Hypertension    • Leukocytoclastic vasculitis (CMS/HCC)    • Obesity    • Osteoarthritis    • Peripheral arterial disease (CMS/HCC)    • Sleep apnea      Past Surgical History:   Procedure Laterality Date   • BRONCHOSCOPY Bilateral 10/13/2020    Procedure: BRONCHOSCOPY AT BEDSIDE with right lung washing and elective intubation;  Surgeon: Cristhian Hodge MD;  Location: [facility] ENDOSCOPY;  Service: Pulmonary;  Laterality: Bilateral;  Post: pneumonia   • BRONCHOSCOPY Bilateral 10/20/2020    Procedure: BRONCHOSCOPY WITH BRONCHOALVEOLAR LAVAGE  AT BEDSIDE;  Surgeon: Cristhian Hodge MD;  Location: [facility] ENDOSCOPY;  Service: Pulmonary;  Laterality: Bilateral;  POST- PNEUMONIA   • BRONCHOSCOPY N/A 10/27/2020    Procedure: BRONCHOSCOPY with bronchial washing and intubation;  Surgeon: Cristhian Hodge MD;  Location: [facility] ENDOSCOPY;  Service: Pulmonary;  Laterality: N/A;  post op:pneumonia   • CHOLECYSTECTOMY     • ENDOSCOPY N/A 10/13/2020    Procedure: ESOPHAGOGASTRODUODENOSCOPY AT BEDSIDE;  Surgeon: Omi Rivera MD;  Location: [facility] ENDOSCOPY;  Service: Gastroenterology;  Laterality: N/A;   • LIVER BIOPSY     • TUBAL ABDOMINAL LIGATION       Patient was not wearing a face mask during this therapy encounter. Therapist used appropriate personal protective equipment including mask, eye protection and gloves.  Mask used was standard procedure mask. Appropriate PPE was worn during the entire therapy session. Hand hygiene was completed before and after therapy session. Patient is not in enhanced droplet precautions.         Skilled  "ST intervention conducted this date targeting dysphagia.  Pt alert, oriented and amenable to treatment.  denies pain. Pt on 5 liters/min via HF nasal cannula during session.         EDUCATION  The patient, son, and RN have been educated in the following areas:   Modified Diet Instruction,  VFSS results/recommendations, Risks of aspiration, ST goals/POC and Safe swallow positioning    SLP Recommendation and Plan     SLP Diet Recommendation: nectar thick liquids, puree          Plan of Care Reviewed With: patient, other (see comments)    SLP GOALS     Row Name 11/03/20 1100          Oral Nutrition/Hydration Goal 1, SLP  Pt will tolerate least restrictive diet with no complications from aspiration  -MC    Time Frame (Oral Nutrition/Hydration Goal 1, SLP)  by discharge  -MC    Barriers (Oral Nutrition/Hydration Goal 1, SLP)   puree diet with NTL established yesterday following VFSS with deep non-transient penetration & suspected silent aspiration thin liquids. VFSS results & evidence to support current diet were reviewed with pt, son, & RN this date. Pt expressed confusion \"I don't know how it could be going down the wrong way.\" Silent aspiration in the setting of prolonged intubation was explained, with pt & son verbalizing understanding.   -    Progress/Outcomes (Oral Nutrition/Hydration Goal 1, SLP)  goal revised this date  -          Oral Nutrition/Hydration Goal 2, SLP  Pt will undergo re-evaluation of swallow in form of VFSS as appropriate  -MC    Barriers (Oral Nutrition/Hydration Goal 2, SLP)  Recommend repeat VFSS prior to diet advancement from thin liquids.May be appropriate to advance to solids at bedside with considerations to include respiratory status  -MC    Progress/Outcomes (Oral Nutrition/Hydration Goal 2, SLP)  goal ongoing  -          Oral Nutrition/Hydration Goal, SLP  Patient will be seen at a meal within 24-48 hours to assess tolerance of current diet with further recommendations to be given " as indicated  -MC    Time Frame (Oral Nutrition/Hydration Goal, SLP)  1 day;2 days  -MC    Barriers (Oral Nutrition/Hydration Goal, SLP)  Pt seen following completion of breakfast meal. Pt consumed V8 juice by straw. 02 monitor not on upon SLP arrival. She was amenable to replace this and 02 sats fluctuated between 87-94. Sats were at 94 at end of session.   -MC    Progress/Outcomes (Oral Nutrition/Hydration Goal, SLP)  good progress toward goal;goal ongoing  -MC              User Key  (r) = Recorded By, (t) = Taken By, (c) = Cosigned By    Initials Name Provider Type    MC Bridget Pierce, SLP Speech and Language Pathologist            Bridget Pierce, AMENA  11/3/2020

## 2020-11-03 NOTE — PROGRESS NOTES
ICU Daily Progress Note        Pneumonia of right upper lobe due to infectious organism    Cardiac murmur    Chronic renal insufficiency, stage III (moderate)    Diabetes mellitus, type 2 (CMS/HCC)    Essential hypertension    Shortness of breath    Tobacco abuse    Hearing loss    Paroxysmal atrial fibrillation (CMS/HCC)    Anemia    Oral thrush    Upper GI bleed    Hyperkalemia    Acute respiratory failure with hypoxia and hypercapnia (CMS/HCC)            Assessment    BAL from bronchoscopy 10/27/2020 showed CMV positive   HIV test is negative on 10/31/2020    Acute/chronic hypercapnic/hypoxic respiratory failure intubated 10/13/2020 extubated 10/21/2020    Reintubated 10/27/2020 due to large mucus plugs with bilateral lower lobe atelectasis for need of  suction and patient was on continuous non invasive ventilation extubated 10/20/20    Therapeutic [id number]/27/20  copious amount of mucopurulent secretions with large mucus block since to be extracted therapeutically  due to significant hypoxia and need for continuous on invasive ventilation we elected to intubate the patient for adequate suctioning    BAL positive for Pneumocystis carinii  Positive rhinovirus    Bronchoscopy completed 10/13/2020   copious amount of mucopurulent secretions were noted which was suctioned therapeutically    BAL from 10/13/2020  showed positive for rhinovirus  Respiratory panel on admission from nasal swab was negative for rhino virus on 10/08/2020    Repeat bronchoscopy 10/20/2020  Very thick mucopurulent secretions right upper lobe suctioned therapeutically  BAL right upper lobe    CT scan of the chest  1022 he had a and there is improvement in pneumonia    Upper GI bleed with large amount coffee-ground emesis   status post EGD 10/13/2020    A. fib  Initially started with RVR, currently bradycardia    Right IJ central line was placed on admission and removed on 10/22/2020    RAMONE,   COPD  Smoker  Rheumatoid  arthritis  Inflammatory polyarthritis  Hyperlipidemia        Hypertension        Leukocytoclastic vasculitis        Hearing loss; hearing aid right side        obesity        peripheral arterial disease        diabetes; pre diabetic        Arthritis        Osteoarthritis  Osteoporosis      Plan    Passed VFSS with diet of pureed NTL and eating 100%  Awaiting CMV antigen titer   Ganciclovir IV started on 10/31/2020 for positive CMV on most recent BAL will change to po valganciclovir for renal dosing awaiting CMV antigen titer to decide on continuation of treatment    Pt/OT evaluate and treat  IS   Flutter valve  Chest physical therapy    Titrate oxygen  Continue steroids to IV Solu-Medrol 40 mg Q 8  Bactrim p.o. total 21 days total (for PCP pneumonia)    Bronchodilators  Glycemic control  Gi prophylaxis pantoprazole  DVT prophylaxis, on SCD, Due to large amount of coffee-ground emesis    Off Lovenox              LOS: 26 days         Vital signs for last 24 hours:  Vitals:    11/03/20 0601 11/03/20 0733 11/03/20 0736 11/03/20 0834   BP: 153/51   163/60   BP Location: Right arm      Patient Position: Lying      Pulse: 77 68 69 76   Resp: 18 18 16    Temp: 97.6 °F (36.4 °C)      TempSrc: Oral      SpO2: 95% 93% 93%    Weight: 80.7 kg (177 lb 14.6 oz)      Height:           Intake/Output last 3 shifts:  I/O last 3 completed shifts:  In: 1775 [P.O.:1060; Other:152; NG/GT:563]  Out: 2150 [Urine:2150]  Intake/Output this shift:  I/O this shift:  In: 300 [P.O.:300]  Out: 1300 [Urine:1300]    Vent settings for last 24 hours:       Hemodynamic parameters for last 24 hours:       Radiology  Imaging Results (Last 24 Hours)     Procedure Component Value Units Date/Time    FL Video Swallow With Speech Single Contrast [[id number]] Collected: 11/02/20 1230     Updated: 11/02/20 1232    Narrative:      DATE OF EXAM:  11/2/2020 11:46 AM     PROCEDURE:  FL VIDEO SWALLOW W SPEECH SINGLE-CONTRAST-     INDICATIONS:  Dysphagia;  J18.9-Pneumonia, unspecified organism; R06.00-Dyspnea,  unspecified; R50.9-Fever, unspecified; A41.9-Sepsis, unspecified  organism; N28.9-Disorder of kidney and ureter, unspecified;  I95.9-Hypotension, unspecified; R19.7-Diarrhea, unspecified;  D64.9-Anemia, unspecified     COMPARISON:  None available     TECHNIQUE:   This examination was performed in conjunction with speech pathology.  Lateral video fluoroscopic evaluation of the swallowing mechanism was  performed while correlate administering to the patient various  consistency food items mixed with barium.     Fluoroscopic Time:   2.0 minutes     FINDINGS:  Deep laryngeal penetration with thin barium consistency. No evidence of  aspiration.        Impression:      As above. Please see speech pathology report for detailed evaluation and  dietary recommendations.     Electronically Signed By-Leonel Espitia On:11/2/2020 12:30 PM  This report was finalized on [id number] by  Leonel Espitia, .          Labs:  Results from last 7 days   Lab Units 11/03/20  0353   WBC 10*3/mm3 10.40   HEMOGLOBIN g/dL 9.1*   HEMATOCRIT % 26.3*   PLATELETS 10*3/mm3 174     Results from last 7 days   Lab Units 11/03/20  0353   SODIUM mmol/L 136   POTASSIUM mmol/L 4.3   CHLORIDE mmol/L 93*   CO2 mmol/L 31.0*   BUN mg/dL 84*   CREATININE mg/dL 1.53*   CALCIUM mg/dL 9.2   GLUCOSE mg/dL 227*     Results from last 7 days   Lab Units 10/30/20  1434   PH, ARTERIAL pH units 7.416   PO2 ART mm Hg 67.3*   PCO2, ARTERIAL mm Hg 46.4   HCO3 ART mmol/L 29.8*                 Results from last 7 days   Lab Units 11/03/20  0353   MAGNESIUM mg/dL 2.1                   Meds:   SCHEDULE  [START ON 11/4/2020] amLODIPine, 10 mg, Oral, Q24H  aspirin, 81 mg, Oral, Daily  atorvastatin, 40 mg, Oral, Daily  Barium Sulfate, 1 teaspoon(s), Oral, Once in imaging  bisacodyl, 10 mg, Rectal, Once  budesonide, 0.5 mg, Nebulization, BID - RT  bumetanide, 1 mg, Oral, Daily  First Mouthwash (Magic Mouthwash), 5 mL,  Swish & Spit, Q6H  gabapentin, 100 mg, Oral, Q8H  ganciclovir (CYTOVENE) IVPB, 2.5 mg/kg (Adjusted), Intravenous, Q24H  insulin glargine, 20 Units, Subcutaneous, Q12H  insulin lispro, 0-24 Units, Subcutaneous, Q6H  insulin lispro, 10 Units, Subcutaneous, Q6H  methylPREDNISolone sodium succinate, 40 mg, Intravenous, Q8H  pantoprazole, 40 mg, Intravenous, Q12H  sodium chloride, 10 mL, Intravenous, Q12H  sodium chloride, 10 mL, Intravenous, Q12H  sodium chloride, 10 mL, Intravenous, Q12H  sodium chloride, 10 mL, Intravenous, Q12H  sulfamethoxazole-trimethoprim, 2 tablet, Oral, Q8H      Infusions     PRNs  •  acetaminophen  •  Calcium Gluconate-NaCl **AND** calcium gluconate **AND** Calcium, Ionized  •  dextrose  •  dextrose  •  glucagon (human recombinant)  •  guaifenesin-dextromethorphan  •  influenza vaccine  •  insulin lispro **AND** insulin lispro  •  lidocaine  •  lidocaine  •  magnesium sulfate **OR** magnesium sulfate **OR** magnesium sulfate  •  potassium chloride  •  sodium chloride  •  sodium chloride  •  sodium chloride  •  sodium chloride    Physical Exam:  Physical Exam  Vitals signs reviewed.   Cardiovascular:      Heart sounds: Murmur present.   Pulmonary:      Breath sounds: Wheezing present.   Musculoskeletal:      Right lower leg: Edema present.      Left lower leg: Edema present.   Skin:     General: Skin is warm and dry.   Neurological:      Mental Status: She is alert and oriented to person, place, and time.         ROS  Review of Systems   Constitutional: Positive for activity change and fatigue.

## 2020-11-03 NOTE — PLAN OF CARE
Problem: Adult Inpatient Plan of Care  Goal: Plan of Care Review  Recent Flowsheet Documentation  Taken 11/3/2020 1011 by Reyes, Carmela, PT  Plan of Care Reviewed With: patient  Outcome Summary: Patient making progress in bed mobility however continues to demonstrate marked bilateral LE mm weakness. Pt needing mod A of 2 for stand pivot transfer bed to [facility]. Max A of 1 to come to standing with max support needed to maintain standing for 30 seconds. Pt continues to be functioning below her baseline level of function and needs IP rehab once medically stable. PPE gloves, mask with shield.

## 2020-11-03 NOTE — PROGRESS NOTES
"  Chief complaint shortness of breath      Subjective     Doing even better this morning.  She is excited to have some food NG out      Objective     Vital Signs  Visit Vitals  /49   Pulse 91   Temp 97.7 °F (36.5 °C) (Oral)   Resp 20   Ht 152.4 cm (60\")   Wt 80.7 kg (177 lb 14.6 oz)   SpO2 91%   BMI 34.75 kg/m²       Physical Exam:  Physical Exam   Constitutional:  oriented to person, place, and time. No distress.   HENT: NG in place  Head: Normocephalic and atraumatic.   Eyes: Conjunctivae and EOM are normal. Pupils are equal, round, and reactive to light.   Neck: No JVD present. No thyromegaly present.   Cardiovascular: Normal rate, regular rhythm, normal heart sounds and intact distal pulses. Exam reveals no gallop and no friction rub.   No murmur heard.  Pulmonary/Chest: Effort normal and breath sounds normal. No stridor. No respiratory distress.  has no wheezes.  has no rales.  exhibits no tenderness.   Abdominal: Soft. Bowel sounds are normal.  no distension and no mass. There is no tenderness. There is no rebound and no guarding. No hernia.   Musculoskeletal: Normal range of motion.   Lymphadenopathy:     no cervical adenopathy.   Neurological:  alert and oriented to person, place, and time. No cranial nerve deficit or sensory deficit. exhibits normal muscle tone.   Skin: No rash noted.  not diaphoretic.   Psychiatric:  normal mood and affect.   Vitals reviewed.    Physical Exam          Results Review:    CMP:  Lab Results   Component Value Date    BUN 84 (H) 11/03/2020    CREATININE 1.53 (H) 11/03/2020    EGFRIFNONA 33 (L) 11/03/2020    EGFRIFAFRI  10/21/2020      Comment:      <15 Indicative of kidney failure.     11/03/2020    K 4.3 11/03/2020    CL 93 (L) 11/03/2020    CALCIUM 9.2 11/03/2020    ALBUMIN 2.50 (L) 10/22/2020    BILITOT 0.2 10/22/2020    ALKPHOS 245 (H) 10/22/2020    AST 51 (H) 10/22/2020    ALT 44 (H) 10/22/2020     CBC:  Lab Results   Component Value Date    WBC 10.40 " 11/03/2020    RBC 2.78 (L) 11/03/2020    HGB 9.1 (L) 11/03/2020    HCT 26.3 (L) 11/03/2020    MCV 94.8 11/03/2020    MCH 32.7 11/03/2020    MCHC 34.4 11/03/2020    RDW 19.1 (H) 11/03/2020     11/03/2020         Medication Review:     Scheduled Meds:[START ON 11/4/2020] amLODIPine, 10 mg, Oral, Q24H  aspirin, 81 mg, Oral, Daily  atorvastatin, 40 mg, Oral, Daily  Barium Sulfate, 1 teaspoon(s), Oral, Once in imaging  bisacodyl, 10 mg, Rectal, Once  budesonide, 0.5 mg, Nebulization, BID - RT  bumetanide, 1 mg, Oral, Daily  First Mouthwash (Magic Mouthwash), 5 mL, Swish & Spit, Q6H  gabapentin, 100 mg, Oral, Q8H  insulin glargine, 20 Units, Subcutaneous, Q12H  insulin lispro, 0-24 Units, Subcutaneous, Q6H  insulin lispro, 10 Units, Subcutaneous, Q6H  methylPREDNISolone sodium succinate, 40 mg, Intravenous, Q8H  [START ON 11/4/2020] pantoprazole, 40 mg, Oral, QAM AC  sodium chloride, 10 mL, Intravenous, Q12H  sodium chloride, 10 mL, Intravenous, Q12H  sodium chloride, 10 mL, Intravenous, Q12H  sodium chloride, 10 mL, Intravenous, Q12H  sulfamethoxazole-trimethoprim, 2 tablet, Oral, Q8H  valGANciclovir, 450 mg, Oral, Daily With Breakfast      Continuous Infusions:   PRN Meds:.•  acetaminophen  •  Calcium Gluconate-NaCl **AND** calcium gluconate **AND** Calcium, Ionized  •  dextrose  •  dextrose  •  glucagon (human recombinant)  •  guaifenesin-dextromethorphan  •  influenza vaccine  •  insulin lispro **AND** insulin lispro  •  lidocaine  •  lidocaine  •  magnesium sulfate **OR** magnesium sulfate **OR** magnesium sulfate  •  potassium chloride  •  sodium chloride  •  sodium chloride  •  sodium chloride  •  sodium chloride    Assessment/Plan    Acute respiratory failure  Hypoxic hypercapnic  Intubated now extubated  Multifactorial  Status post bronchoscopies  On bronchodilators steroid taper  Diagnosed with PCP pneumonia and on treatment for that  Also positive for rhinovirus CMV on BAL  Continue BiPAP as needed  Per  pulmonology    CMV infection  On ganciclovir  Titers pending  HIV negative  Per Dr. Hodge     PCP pneumonia  Continue steroid taper and Bactrim high-dose  Per pulmonology  HIV negative  Status post multiple bronchoscopies     Paroxysmal A. fib  Now back in sinus rhythm  2D echocardiogram with normal EF and no gross valvular abnormality  [facility] normal EF no gross valvular abnormality  Chronic respiratory failure COPD  No anticoagulation due to concern for GI bleed     Acute kidney injury  On stage III CKD  Creatinine now oscillating around 1.2  Initial insult thought to be due to ATN  Volume now managed with Bumex  Per Dr. Melara    Hyperkalemia  Treated with diurteic/kayaxalate  Cont insulin   Per Dr. Melara      GI bleed  Hemoglobin stable  Continue PPI  GI on board status post EGD with multiple superficial ulcerations in the gastric body fundus cardia suggestive of suction trauma from NG tube.     Oral thrush  Finish course of fluconazole     Hypertension  Continue amlodipine     Diabetes  Insulin sliding scale and Accu-Chek  Continue Lantus     Orthostatic hypotension  Did need some IV pressor support during her stay in ICU  Continue fludrocortisone     Dyslipidemia  continue statin    Nutrition  Passed swallow eval start on diet  NG out     DVT PUD prophylaxis     Plan as above  Marianna Figueroa MD  11/03/20  12:25 EST

## 2020-11-03 NOTE — PLAN OF CARE
Patient currently on 6L HF NC. Patient sat up in the chair for the majority of the day and is encouraged to use IS and flutter valve as often as possible. Patient's appetite is good and patient has been drinking novasource renal supplements.  No distress at this time, will continue to monitor.

## 2020-11-03 NOTE — PLAN OF CARE
Goal Outcome Evaluation:  Plan of Care Reviewed With: patient  Progress: improving  Patient slept well during the night. V/S stable. No complaints or discomfort noted. Will continue to monitor.

## 2020-11-03 NOTE — PROGRESS NOTES
-  PROGRESS NOTE      Patient Name: Lesa Julio  : 1950  MRN: [id number]  Primary Care Physician: Manuel Sanders MD  Date of admission: 10/8/2020    Patient Care Team:  Manuel Sanders MD as PCP - General        Subjective   Patient seen and examined, she denies any new complaints, no chest pain or shortness of breath.  Subjective:       Review of systems:  Review of system unremarkable      Allergies:    Allergies   Allergen Reactions    Lisinopril Other (See Comments)    Ipratropium-Albuterol Itching and Rash       Objective   Exam:     Vital Signs  Temp:  [97.2 °F (36.2 °C)-98.3 °F (36.8 °C)] 97.6 °F (36.4 °C)  Heart Rate:  [68-88] 76  Resp:  [16-20] 16  BP: (145-165)/(47-64) 163/60  SpO2:  [90 %-98 %] 93 %  on  Flow (L/min):  [5-10] 5;   Device (Oxygen Therapy): high-flow nasal cannula;humidified  Body mass index is 34.75 kg/m².     General:  Elderly female no acute distress  Head:      Normocephalic and atraumatic.    Eyes:      PERRL/EOM intact, conjunctiva and sclera clear with out nystagmus.    Neck:      No masses, thyromegaly,  trachea central with normal respiratory effort   Lungs:    Bilateral crackles left greater than right.    Heart:      Regular rate and rhythm, no murmur no gallop  Abd:        Soft, nontender, not distended, bowel sounds positive, no shifting dullness   Pulses:   Pulses palpable  Extr:        No cyanosis or clubbing--+1 edema.    Neuro:     Alert awake  skin:       Intact without lesions or rashes.    Psych:     Alert awake.      results Review:  I have personally reviewed most recent Data :  CBC    Results from last 7 days   Lab Units 20  0353 20  0414 20  0610 10/31/20  0312 10/30/20  0418 10/29/20  0411 10/28/20  0427   WBC 10*3/mm3 10.40 11.30* 10.10 8.60 5.90 7.50 4.90   HEMOGLOBIN g/dL 9.1* 9.1* 9.3* 8.5* 7.8* 9.1* 8.7*   PLATELETS 10*3/mm3 174 187 204 197 187 248 218     CMP   Results from last 7 days   Lab Units 20  035  11/02/20  1618 11/02/20  0414 11/01/20  1608 11/01/20  0610 11/01/20  0027 10/31/20  0312 10/30/20  0418  10/29/20  0411 10/28/20  0427   SODIUM mmol/L 136  --  135*  --  136  --  140 136  --  140 141   POTASSIUM mmol/L 4.3 4.9 5.4* 5.2 5.4* 5.8* 5.2 4.8   < > 3.4* 4.0   CHLORIDE mmol/L 93*  --  93*  --  95*  --  100 99  --  100 100   CO2 mmol/L 31.0*  --  31.0*  --  32.0*  --  31.0* 29.0  --  30.0* 33.0*   BUN mg/dL 84*  --  82*  --  68*  --  50* 42*  --  33* 34*   CREATININE mg/dL 1.53*  --  1.39*  --  1.29*  --  1.26* 1.25*  --  1.45* 1.52*   GLUCOSE mg/dL 227*  --  323*  --  394*  --  218* 355*  --  173* 62*    < > = values in this interval not displayed.     ABG    Results from last 7 days   Lab Units 10/30/20  1434 10/30/20  0759 10/29/20  0831 10/28/20  0802 10/27/20  1124   PH, ARTERIAL pH units 7.416 7.403 7.426 7.516* 7.436   PCO2, ARTERIAL mm Hg 46.4 48.4* 47.7 42.3 51.5*   PO2 ART mm Hg 67.3* 69.7* 64.8* 58.8* 100.5   O2 SATURATION ART % 93.1* 93.5* 92.5* 92.4* 97.8   BASE EXCESS ART mmol/L 4.7* 4.8* 6.2* 10.3* 9.2*     Fl Video Swallow With Speech Single Contrast    Result Date: 11/2/2020  As above. Please see speech pathology report for detailed evaluation and dietary recommendations.  Electronically Signed By-Leonel Espitia On:11/2/2020 12:30 PM This report was finalized on [id number] by  Leonel Espitia, Jin    Xr Chest 1 View    Result Date: 11/2/2020  1. Unchanged right midlung airspace consolidation, similar to the prior. 2. Support devices in expected position.  Electronically Signed By-Leonel Espitia On:11/2/2020 7:47 AM This report was finalized on [id number] by  Leonel Espitia, .    Xr Chest 1 View    Result Date: 11/1/2020  Improved aeration in the right lung with persistent partial right lower lobe atelectasis. Improving consolidation in the inferior right upper lobe. Support lines and tubes are unchanged.   Electronically Signed By-Robert Norris DO. On:11/1/2020 8:10 AM This  report was finalized on [id number] by  Robert Norris DO..    Xr Chest 1 View    Result Date: 10/31/2020   1. Interval extubation. 2. Stable esophagogastric tube and left upper extremity PICC line. 3. No change in right midlung and right basilar airspace disease.  Electronically Signed By-Sandeep King On:10/31/2020 7:27 AM This report was finalized on [id number] by  Sandeep King, .    Xr Chest 1 View    Result Date: 10/30/2020  1.Endotracheal tube in good position. 2.Consolidation within right midlung appears unchanged.  Electronically Signed By-DR. Onofre Nicholas MD On:10/30/2020 7:39 AM This report was finalized on [id number] by DR. Onofre Nicholas MD.    Xr Chest 1 View    Result Date: 10/29/2020   1. Improved right lung aeration with persistent right upper lobe airspace opacity.  Electronically Signed By-Chriss Rider On:10/29/2020 7:31 AM This report was finalized on [id number] by  Chriss Rider, .    Xr Chest 1 View    Result Date: 10/28/2020   1. Endotracheal tube in expected position. 2. Improved aeration of the right lung with decreasing right-sided airspace opacities when compared to prior exam.  Electronically Signed By-Leonel Espitia On:10/28/2020 7:41 AM This report was finalized on [id number] by  Leonel Espitia, .    Xr Chest 1 View    Result Date: 10/27/2020  No interval change from yesterday's study.  Electronically Signed By-Chele Mcclure On:10/27/2020 7:43 AM This report was finalized on [id number] by  Chele Mcclure, .    Xr Abdomen Kub    Result Date: 10/28/2020  The radiopaque tip of the Dobbhoff tube projects to the proximal gastric body level.  Electronically Signed By-Dr. Genesis Segovia MD On:10/28/2020 1:48 PM This report was finalized on [id number] by Dr. Genesis Segovia MD.      Results for orders placed during the hospital encounter of 10/08/20   Adult Transthoracic Echo Complete W/ Cont if Necessary Per Protocol    Narrative · Estimated left ventricular EF = 65% Left  ventricular systolic function   is normal.  · Trace to mild mitral valve regurgitation is present.  · There is a small (<1cm) pericardial effusion.        Scheduled Meds:amLODIPine, 5 mg, Oral, Q24H  aspirin, 81 mg, Oral, Daily  atorvastatin, 40 mg, Oral, Daily  Barium Sulfate, 1 teaspoon(s), Oral, Once in imaging  bisacodyl, 10 mg, Rectal, Once  budesonide, 0.5 mg, Nebulization, BID - RT  bumetanide, 1 mg, Oral, Daily  First Mouthwash (Magic Mouthwash), 5 mL, Swish & Spit, Q6H  gabapentin, 100 mg, Oral, Q8H  ganciclovir (CYTOVENE) IVPB, 2.5 mg/kg (Adjusted), Intravenous, Q24H  insulin glargine, 20 Units, Subcutaneous, Q12H  insulin lispro, 0-24 Units, Subcutaneous, Q6H  insulin lispro, 10 Units, Subcutaneous, Q6H  methylPREDNISolone sodium succinate, 40 mg, Intravenous, Q8H  pantoprazole, 40 mg, Intravenous, Q12H  sodium chloride, 10 mL, Intravenous, Q12H  sodium chloride, 10 mL, Intravenous, Q12H  sodium chloride, 10 mL, Intravenous, Q12H  sodium chloride, 10 mL, Intravenous, Q12H  sulfamethoxazole-trimethoprim, 2 tablet, Oral, Q8H      Continuous Infusions:   PRN Meds:  acetaminophen    Calcium Gluconate-NaCl **AND** calcium gluconate **AND** Calcium, Ionized    dextrose    dextrose    glucagon (human recombinant)    guaifenesin-dextromethorphan    influenza vaccine    insulin lispro **AND** insulin lispro    lidocaine    lidocaine    LORazepam    magnesium sulfate **OR** magnesium sulfate **OR** magnesium sulfate    potassium chloride    sodium chloride    sodium chloride    sodium chloride    sodium chloride    Assessment/Plan   Assessment and Plan:         Pneumonia of right upper lobe due to infectious organism    Cardiac murmur    Chronic renal insufficiency, stage III (moderate)    Diabetes mellitus, type 2 (CMS/HCC)    Essential hypertension    Shortness of breath    Tobacco abuse    Hearing loss    Paroxysmal atrial fibrillation (CMS/HCC)    Anemia    Oral thrush    Upper GI bleed    Hyperkalemia    Acute  respiratory failure with hypoxia and hypercapnia (CMS/HCC)     ASSESSMENT:  Acute kidney injury likely ATN likely related to sepsis patient baseline creatinine is 0.9 back in June 2019, FENa 2.7  Metabolic acidosis with increased lactic acid level  Right upper lobe pneumonia  History of rheumatoid arthritis  History of hypertension  History of vasculitis  Hypernatremia  Acute GI bleed with emesis  Chronic kidney disease stage III a likely with GFR around 50 mL/min at baseline           Plan:      Renal functions continue to be stable at this time   Potassium level is much better continue low-dose loop diuretics.  Reconsider Florinef if potassium level start increasing again  Elevated BUN probably related to steroids, creatinine is a stable  Recheck potassium this afternoon  We will give another dose of Kayexalate if needed  Blood pressure is acceptable  Sodium level acceptable  resp condition stable   Will follow         Note started  by Serjio Melara MD,   [facility] kidney consultant

## 2020-11-03 NOTE — THERAPY TREATMENT NOTE
Patient Name: Lesa Julio  : 1950    MRN: [id number]                              Today's Date: 11/3/2020       Admit Date: 10/8/2020    Visit Dx:     ICD-10-CM ICD-9-CM   1. Pneumonia of right upper lobe due to infectious organism  J18.9 486   2. Dyspnea, unspecified type  R06.00 786.09   3. Fever, unspecified fever cause  R50.9 780.60   4. Sepsis, due to unspecified organism, unspecified whether acute organ dysfunction present (CMS/[facility])  A41.9 038.9     995.91   5. Acute renal insufficiency  N28.9 593.9   6. Hypotension, unspecified hypotension type  I95.9 458.9   7. Diarrhea, unspecified type  R19.7 787.91   8. Anemia, unspecified type  D64.9 285.9     Patient Active Problem List   Diagnosis   • Cardiac murmur   • Chest discomfort   • Chronic obstructive pulmonary disease (CMS/[facility])   • Chronic renal insufficiency, stage III (moderate)   • Diabetes mellitus, type 2 (CMS/HCC)   • Elevated LFTs   • Essential hypertension   • Osteoporosis   • Pain of hand   • Polyarthritis, inflammatory (CMS/HCC)   • Rheumatoid arthritis (CMS/HCC)   • Shortness of breath   • Sinus tachycardia   • Tobacco abuse   • Leukocytoclastic vasculitis (CMS/HCC)   • Vasculitis (CMS/HCC)   • Allergic rhinitis   • Central obesity   • Cyst of right ovary   • Fatigue   • Grade 4 out of 6 intensity murmur   • Hearing loss   • Mass of ovary   • Mixed hyperlipidemia   • Nonalcoholic fatty liver disease   • Obesity   • Obstructive sleep apnea syndrome   • Otorrhea   • Prurigo nodularis   • Secondary polycythemia   • Tobacco dependence syndrome   • Tubular adenoma of colon   • Uncontrolled type 2 diabetes mellitus (CMS/HCC)   • Vitamin D deficiency   • Well adult health check   • Hypersensitivity angiitis (CMS/HCC)   • Vascular insufficiency   • Hyperkalemia   • Localized edema   • Other proteinuria   • Secondary hyperparathyroidism of renal origin (CMS/HCC)   • Pneumonia of right upper lobe due to infectious organism   • Paroxysmal atrial  fibrillation (CMS/HCC)   • Anemia   • Oral thrush   • Upper GI bleed   • Hyperkalemia   • Acute respiratory failure with hypoxia and hypercapnia (CMS/HCC)     Past Medical History:   Diagnosis Date   • Arthritis    • Diabetes mellitus, type 2 (CMS/HCC)    • Essential hypertension 11/6/2015   • Hearing loss     hearing aid right side   • Hyperlipidemia    • Hypertension    • Leukocytoclastic vasculitis (CMS/HCC)    • Obesity    • Osteoarthritis    • Peripheral arterial disease (CMS/HCC)    • Sleep apnea      Past Surgical History:   Procedure Laterality Date   • BRONCHOSCOPY Bilateral 10/13/2020    Procedure: BRONCHOSCOPY AT BEDSIDE with right lung washing and elective intubation;  Surgeon: Cristhian Hodge MD;  Location: [facility] ENDOSCOPY;  Service: Pulmonary;  Laterality: Bilateral;  Post: pneumonia   • BRONCHOSCOPY Bilateral 10/20/2020    Procedure: BRONCHOSCOPY WITH BRONCHOALVEOLAR LAVAGE  AT BEDSIDE;  Surgeon: Cristhian Hodge MD;  Location: [facility] ENDOSCOPY;  Service: Pulmonary;  Laterality: Bilateral;  POST- PNEUMONIA   • BRONCHOSCOPY N/A 10/27/2020    Procedure: BRONCHOSCOPY with bronchial washing and intubation;  Surgeon: Cristhian Hodge MD;  Location: [facility] ENDOSCOPY;  Service: Pulmonary;  Laterality: N/A;  post op:pneumonia   • CHOLECYSTECTOMY     • ENDOSCOPY N/A 10/13/2020    Procedure: ESOPHAGOGASTRODUODENOSCOPY AT BEDSIDE;  Surgeon: Omi Rivera MD;  Location: [facility] ENDOSCOPY;  Service: Gastroenterology;  Laterality: N/A;   • LIVER BIOPSY     • TUBAL ABDOMINAL LIGATION       General Information     Row Name 11/03/20 1004          Physical Therapy Time and Intention    Document Type  therapy note (daily note)  -CR     Mode of Treatment  physical therapy  -CR     Row Name 11/03/20 1004          General Information    Patient Profile Reviewed  yes  -CR     Row Name 11/03/20 1004          Cognition    Orientation Status (Cognition)  oriented x 4  -CR     Row Name 11/03/20 1004          Safety Issues, Functional  Mobility    Impairments Affecting Function (Mobility)  balance;coordination;endurance/activity tolerance;range of motion (ROM);strength;shortness of breath  -CR       User Key  (r) = Recorded By, (t) = Taken By, (c) = Cosigned By    Initials Name Provider Type    CR Reyes, Carmela, PT Physical Therapist        Mobility     Row Name 11/03/20 1004          Bed Mobility    Supine-Sit [place] (Bed Mobility)  contact guard  -CR     Assistive Device (Bed Mobility)  bed rails  -CR     Row Name 11/03/20 1004          Bed-Chair Transfer    Bed-Chair [place] (Transfers)  moderate assist (50% patient effort);2 person assist  -CR     Row Name 11/03/20 1004          Sit-Stand Transfer    Sit-Stand [place] (Transfers)  maximum assist (25% patient effort)  -CR     Row Name 11/03/20 1004          Gait/Stairs (Locomotion)    [place] Level (Gait)  unable to assess  -CR       User Key  (r) = Recorded By, (t) = Taken By, (c) = Cosigned By    Initials Name Provider Type    CR Reyes, Carmela, PT Physical Therapist        Obj/Interventions     Row Name 11/03/20 1005          Balance    Balance Assessment  standing static balance  -CR     Static Sitting Balance  WFL  -CR     Static Standing Balance  moderate impairment;supported  -CR     Dynamic Standing Balance  severe impairment;supported  -CR     Balance Interventions  weight shifting activity  -CR       User Key  (r) = Recorded By, (t) = Taken By, (c) = Cosigned By    Initials Name Provider Type    CR Reyes, Carmela, PT Physical Therapist        Goals/Plan     Row Name 11/03/20 1015          Bed Mobility Goal 1 (PT)    Progress/Outcomes (Bed Mobility Goal 1, PT)  continuing progress toward goal  -CR     Row Name 11/03/20 1015          Transfer Goal 1 (PT)    Progress/Outcome (Transfer Goal 1, PT)  progress slower than expected  -CR     Row Name 11/03/20 1015          Gait Training Goal 1 (PT)    Progress/Outcome (Gait Training Goal 1, PT)  progress slower than  expected  -CR       User Key  (r) = Recorded By, (t) = Taken By, (c) = Cosigned By    Initials Name Provider Type    CR Reyes, Carmela, PT Physical Therapist        Clinical Impression     Row Name 11/03/20 1011          Pain Scale: Numbers Pre/Post-Treatment    Pretreatment Pain Rating  0/10 - no pain  -CR     Posttreatment Pain Rating  0/10 - no pain  -CR     Row Name 11/03/20 1011          Plan of Care Review    Plan of Care Reviewed With  patient  -CR     Outcome Summary  Patient making progress in bed mobility however continues to demonstrate marked bilateral LE mm weakness. Pt needing mod A of 2 for stand pivot [place] bed [place] [facility] Max A of 1 to come to standing with max support needed to maintain standing for 30 seconds. Pt continues to be functioning below her baseline level of function and needs IP rehab once medically stable. PPE gloves, mask with shield.  -CR     Row Name 11/03/20 1011          Vital Signs    Pre Systolic BP Rehab  163  -CR     Pre Treatment Diastolic BP  60  -CR     Pre SpO2 (%)  95  -CR     O2 Delivery Pre Treatment  supplemental O2  -CR     Intra SpO2 (%)  89  -CR     O2 Delivery Intra Treatment  supplemental O2  -CR     Post SpO2 (%)  90  -CR     O2 Delivery Post Treatment  supplemental O2  -CR     Row Name 11/03/20 1011          Positioning and Restraints    Pre-Treatment Position  in bed  -CR     Post Treatment Position  chair  -CR     In Chair  with nsg;reclined;call light within reach;exit alarm on  -CR       User Key  (r) = Recorded By, (t) = Taken By, (c) = Cosigned By    Initials Name Provider Type    CR Reyes, Carmela, PT Physical Therapist        Outcome Measures     Row Name 11/03/20 1015          How much help from another person do you currently need...    Turning from your back to your side while in flat bed without using bedrails?  3  -CR     Moving from lying on back to sitting on the side of a flat bed without bedrails?  3  -CR     Moving to and from a bed to a chair  (including a wheelchair)?  2  -CR     Standing up from a chair using your arms (e.g., wheelchair, bedside chair)?  2  -CR     Climbing 3-5 steps with a railing?  1  -CR     To walk in hospital room?  1  -CR     AM-PAC 6 Clicks Score (PT)  12  -CR       User Key  (r) = Recorded By, (t) = Taken By, (c) = Cosigned By    Initials Name Provider Type    CR Reyes, Carmela, PT Physical Therapist        Physical Therapy Education                 Title: PT OT SLP Therapies (In Progress)     Topic: Physical Therapy (In Progress)     Point: Mobility training (In Progress)     Learning Progress Summary           Patient Acceptance, E, NR by CR at 11/3/2020 1016    Acceptance, E, NR by CR at 11/2/2020 1236    Acceptance, E,TB, VU by CM at 10/30/2020 1305    Acceptance, E,TB, VU,NR by CM at 10/29/2020 1302    Acceptance, E, DU by CORONA at 10/29/2020 0315    Comment: Patient is unable to speak due to ET tube    Acceptance, E, VU by SC at 10/26/2020 1423    Acceptance, E,TB, VU by  at 10/24/2020 0315    Acceptance, E, NR by HC at 10/23/2020 1249    Acceptance, E, NR by HC at 10/22/2020 1608                   Point: Precautions (Done)     Learning Progress Summary           Patient Acceptance, E,TB, VU by CM at 10/30/2020 1305    Acceptance, E,TB, VU,NR by CM at 10/29/2020 1302    Acceptance, E, DU by CORONA at 10/29/2020 0315    Comment: Patient is unable to speak due to ET tube    Acceptance, E, VU by SC at 10/26/2020 1423    Acceptance, E,TB, VU by KW at 10/24/2020 0315    Acceptance, E, NR by HC at 10/23/2020 1249    Acceptance, E, NR by HC at 10/22/2020 1608                               User Key     Initials Effective Dates Name Provider Type Discipline     04/17/20 -  Alicia Al, PT Physical Therapist PT    CM 03/01/19 -  Aubrie Negro, PT Physical Therapist PT    CR 03/01/19 -  Reyes, Carmela, PT Physical Therapist PT    SC 03/01/19 -  Esperanza Guidry, PTA Physical Therapy Assistant PT    CORONA 03/01/19 -  Carolynn Mcghee,  RN Registered Nurse Nurse     06/24/19 -  Redd Arroyo LPN Licensed Nurse Nurse              PT Recommendation and Plan     Plan of Care Reviewed With: patient  Outcome Summary: Patient making progress in bed mobility however continues to demonstrate marked bilateral LE mm weakness. Pt needing mod A of 2 for stand pivot transfer bed to [facility]. Max A of 1 to come to standing with max support needed to maintain standing for 30 seconds. Pt continues to be functioning below her baseline level of function and needs IP rehab once medically stable. PPE gloves, mask with shield.     Time Calculation:   PT Charges     Row Name 11/03/20 1016             Time Calculation    Start Time  0820  -CR      Stop Time  0845  -CR      Time Calculation (min)  25 min  -CR      PT Received On  11/03/20  -CR      PT - Next Appointment  11/04/20  -CR         Time Calculation- PT    Total Timed Code Minutes- PT  25 minute(s)  -CR        User Key  (r) = Recorded By, (t) = Taken By, (c) = Cosigned By    Initials Name Provider Type    CR Reyes, Carmela, PT Physical Therapist        Therapy Charges for Today     Code Description Service Date Service Provider Modifiers Qty    69646955470  PT THER PROC EA 15 MIN 11/2/2020 Reyes, Carmela, PT GP 1    56133715682 HC PT THERAPEUTIC ACT EA 15 MIN 11/3/2020 Reyes, Carmela, PT GP 2          PT G-Codes  Outcome Measure Options: AM-PAC 6 Clicks Daily Activity (OT), Modified [place]  AM-PAC 6 Clicks Score (PT): 12  AM-PAC 6 Clicks Score (OT): 10  Modified [place] Scale: 4 - Moderately severe disability.  Unable to walk without assistance, and unable to attend to own bodily needs without assistance.    Carmela Reyes, PT  11/3/2020

## 2020-11-04 LAB
ANION GAP SERPL CALCULATED.3IONS-SCNC: 11 MMOL/L (ref 5–15)
ANISOCYTOSIS BLD QL: ABNORMAL
BUN SERPL-MCNC: 86 MG/DL (ref 8–23)
BUN/CREAT SERPL: 55.1 (ref 7–25)
CALCIUM SPEC-SCNC: 9.6 MG/DL (ref 8.6–10.5)
CHLORIDE SERPL-SCNC: 95 MMOL/L (ref 98–107)
CO2 SERPL-SCNC: 32 MMOL/L (ref 22–29)
CREAT SERPL-MCNC: 1.56 MG/DL (ref 0.57–1)
DEPRECATED RDW RBC AUTO: 62.6 FL (ref 37–54)
ERYTHROCYTE [DISTWIDTH] IN BLOOD BY AUTOMATED COUNT: 19.1 % (ref 12.3–15.4)
GFR SERPL CREATININE-BSD FRML MDRD: 33 ML/MIN/1.73
GLUCOSE BLDC GLUCOMTR-MCNC: 128 MG/DL (ref 70–105)
GLUCOSE BLDC GLUCOMTR-MCNC: 129 MG/DL (ref 70–105)
GLUCOSE BLDC GLUCOMTR-MCNC: 219 MG/DL (ref 70–105)
GLUCOSE BLDC GLUCOMTR-MCNC: 222 MG/DL (ref 70–105)
GLUCOSE BLDC GLUCOMTR-MCNC: 264 MG/DL (ref 70–105)
GLUCOSE BLDC GLUCOMTR-MCNC: 332 MG/DL (ref 70–105)
GLUCOSE BLDC GLUCOMTR-MCNC: 344 MG/DL (ref 70–105)
GLUCOSE SERPL-MCNC: 137 MG/DL (ref 65–99)
HCT VFR BLD AUTO: 27.2 % (ref 34–46.6)
HGB BLD-MCNC: 9.1 G/DL (ref 12–15.9)
LYMPHOCYTES # BLD MANUAL: 1.08 10*3/MM3 (ref 0.7–3.1)
LYMPHOCYTES NFR BLD MANUAL: 10 % (ref 19.6–45.3)
LYMPHOCYTES NFR BLD MANUAL: 2 % (ref 5–12)
MAGNESIUM SERPL-MCNC: 2.1 MG/DL (ref 1.6–2.4)
MCH RBC QN AUTO: 31.9 PG (ref 26.6–33)
MCHC RBC AUTO-ENTMCNC: 33.5 G/DL (ref 31.5–35.7)
MCV RBC AUTO: 95.1 FL (ref 79–97)
MONOCYTES # BLD AUTO: 0.22 10*3/MM3 (ref 0.1–0.9)
NEUTROPHILS # BLD AUTO: 9.5 10*3/MM3 (ref 1.7–7)
NEUTROPHILS NFR BLD MANUAL: 86 % (ref 42.7–76)
NEUTS BAND NFR BLD MANUAL: 2 % (ref 0–5)
NRBC SPEC MANUAL: 2 /100 WBC (ref 0–0.2)
PHOSPHATE SERPL-MCNC: 4.5 MG/DL (ref 2.5–4.5)
PLATELET # BLD AUTO: 156 10*3/MM3 (ref 140–450)
PMV BLD AUTO: 8.2 FL (ref 6–12)
POTASSIUM SERPL-SCNC: 5.2 MMOL/L (ref 3.5–5.2)
RBC # BLD AUTO: 2.86 10*6/MM3 (ref 3.77–5.28)
SCAN SLIDE: NORMAL
SMALL PLATELETS BLD QL SMEAR: ADEQUATE
SODIUM SERPL-SCNC: 138 MMOL/L (ref 136–145)
WBC # BLD AUTO: 10.8 10*3/MM3 (ref 3.4–10.8)
WBC MORPH BLD: NORMAL

## 2020-11-04 PROCEDURE — 85007 BL SMEAR W/DIFF WBC COUNT: CPT | Performed by: INTERNAL MEDICINE

## 2020-11-04 PROCEDURE — 97535 SELF CARE MNGMENT TRAINING: CPT

## 2020-11-04 PROCEDURE — 99232 SBSQ HOSP IP/OBS MODERATE 35: CPT | Performed by: HOSPITALIST

## 2020-11-04 PROCEDURE — 85025 COMPLETE CBC W/AUTO DIFF WBC: CPT | Performed by: INTERNAL MEDICINE

## 2020-11-04 PROCEDURE — 94799 UNLISTED PULMONARY SVC/PX: CPT

## 2020-11-04 PROCEDURE — 63710000001 INSULIN GLARGINE PER 5 UNITS: Performed by: HOSPITALIST

## 2020-11-04 PROCEDURE — 25010000002 METHYLPREDNISOLONE PER 40 MG: Performed by: INTERNAL MEDICINE

## 2020-11-04 PROCEDURE — 82962 GLUCOSE BLOOD TEST: CPT

## 2020-11-04 PROCEDURE — 92526 ORAL FUNCTION THERAPY: CPT

## 2020-11-04 PROCEDURE — 63710000001 INSULIN LISPRO (HUMAN) PER 5 UNITS: Performed by: INTERNAL MEDICINE

## 2020-11-04 PROCEDURE — 84100 ASSAY OF PHOSPHORUS: CPT | Performed by: INTERNAL MEDICINE

## 2020-11-04 PROCEDURE — 80048 BASIC METABOLIC PNL TOTAL CA: CPT | Performed by: INTERNAL MEDICINE

## 2020-11-04 PROCEDURE — 97530 THERAPEUTIC ACTIVITIES: CPT

## 2020-11-04 PROCEDURE — 97110 THERAPEUTIC EXERCISES: CPT

## 2020-11-04 PROCEDURE — 83735 ASSAY OF MAGNESIUM: CPT | Performed by: INTERNAL MEDICINE

## 2020-11-04 RX ORDER — SODIUM POLYSTYRENE SULFONATE 15 G/60ML
15 SUSPENSION ORAL; RECTAL ONCE
Status: COMPLETED | OUTPATIENT
Start: 2020-11-04 | End: 2020-11-04

## 2020-11-04 RX ORDER — FLUDROCORTISONE ACETATE 0.1 MG/1
100 TABLET ORAL ONCE
Status: COMPLETED | OUTPATIENT
Start: 2020-11-04 | End: 2020-11-04

## 2020-11-04 RX ADMIN — METHYLPREDNISOLONE SODIUM SUCCINATE 40 MG: 40 INJECTION, POWDER, FOR SOLUTION INTRAMUSCULAR; INTRAVENOUS at 09:32

## 2020-11-04 RX ADMIN — Medication 10 ML: at 22:04

## 2020-11-04 RX ADMIN — ASPIRIN 81 MG CHEWABLE TABLET 81 MG: 81 TABLET CHEWABLE at 09:32

## 2020-11-04 RX ADMIN — GABAPENTIN 100 MG: 100 CAPSULE ORAL at 22:03

## 2020-11-04 RX ADMIN — INSULIN LISPRO 10 UNITS: 100 INJECTION, SOLUTION INTRAVENOUS; SUBCUTANEOUS at 18:02

## 2020-11-04 RX ADMIN — INSULIN LISPRO 12 UNITS: 100 INJECTION, SOLUTION INTRAVENOUS; SUBCUTANEOUS at 18:02

## 2020-11-04 RX ADMIN — BUMETANIDE 1 MG: 1 TABLET ORAL at 09:32

## 2020-11-04 RX ADMIN — AMLODIPINE BESYLATE 10 MG: 5 TABLET ORAL at 09:33

## 2020-11-04 RX ADMIN — INSULIN GLARGINE 20 UNITS: 100 INJECTION, SOLUTION SUBCUTANEOUS at 22:03

## 2020-11-04 RX ADMIN — FLUDROCORTISONE ACETATE 100 MCG: 0.1 TABLET ORAL at 12:27

## 2020-11-04 RX ADMIN — DIPHENHYDRAMINE HYDROCHLORIDE AND LIDOCAINE HYDROCHLORIDE AND ALUMINUM HYDROXIDE AND MAGNESIUM HYDRO 5 ML: KIT at 22:05

## 2020-11-04 RX ADMIN — ATORVASTATIN CALCIUM 40 MG: 40 TABLET, FILM COATED ORAL at 22:03

## 2020-11-04 RX ADMIN — Medication 10 ML: at 09:33

## 2020-11-04 RX ADMIN — INSULIN GLARGINE 20 UNITS: 100 INJECTION, SOLUTION SUBCUTANEOUS at 09:49

## 2020-11-04 RX ADMIN — DIPHENHYDRAMINE HYDROCHLORIDE AND LIDOCAINE HYDROCHLORIDE AND ALUMINUM HYDROXIDE AND MAGNESIUM HYDRO 5 ML: KIT at 06:08

## 2020-11-04 RX ADMIN — METHYLPREDNISOLONE SODIUM SUCCINATE 40 MG: 40 INJECTION, POWDER, FOR SOLUTION INTRAMUSCULAR; INTRAVENOUS at 18:01

## 2020-11-04 RX ADMIN — SULFAMETHOXAZOLE AND TRIMETHOPRIM 320 MG: 800; 160 TABLET ORAL at 06:09

## 2020-11-04 RX ADMIN — GABAPENTIN 100 MG: 100 CAPSULE ORAL at 06:09

## 2020-11-04 RX ADMIN — Medication 10 ML: at 09:36

## 2020-11-04 RX ADMIN — SODIUM POLYSTYRENE SULFONATE 15 G: 15 SUSPENSION ORAL; RECTAL at 12:27

## 2020-11-04 RX ADMIN — PANTOPRAZOLE SODIUM 40 MG: 40 TABLET, DELAYED RELEASE ORAL at 09:32

## 2020-11-04 RX ADMIN — Medication 10 ML: at 00:23

## 2020-11-04 RX ADMIN — METHYLPREDNISOLONE SODIUM SUCCINATE 40 MG: 40 INJECTION, POWDER, FOR SOLUTION INTRAMUSCULAR; INTRAVENOUS at 00:22

## 2020-11-04 RX ADMIN — INSULIN LISPRO 8 UNITS: 100 INJECTION, SOLUTION INTRAVENOUS; SUBCUTANEOUS at 12:27

## 2020-11-04 RX ADMIN — INSULIN LISPRO 8 UNITS: 100 INJECTION, SOLUTION INTRAVENOUS; SUBCUTANEOUS at 00:24

## 2020-11-04 RX ADMIN — INSULIN LISPRO 10 UNITS: 100 INJECTION, SOLUTION INTRAVENOUS; SUBCUTANEOUS at 13:45

## 2020-11-04 RX ADMIN — SULFAMETHOXAZOLE AND TRIMETHOPRIM 320 MG: 800; 160 TABLET ORAL at 13:46

## 2020-11-04 RX ADMIN — VALGANCICLOVIR 450 MG: 450 TABLET, FILM COATED ORAL at 09:35

## 2020-11-04 RX ADMIN — BUDESONIDE INHALATION 0.5 MG: 0.5 SUSPENSION RESPIRATORY (INHALATION) at 06:49

## 2020-11-04 RX ADMIN — DIPHENHYDRAMINE HYDROCHLORIDE AND LIDOCAINE HYDROCHLORIDE AND ALUMINUM HYDROXIDE AND MAGNESIUM HYDRO 5 ML: KIT at 12:27

## 2020-11-04 RX ADMIN — Medication 10 ML: at 09:35

## 2020-11-04 RX ADMIN — GABAPENTIN 100 MG: 100 CAPSULE ORAL at 13:46

## 2020-11-04 RX ADMIN — INSULIN LISPRO 10 UNITS: 100 INJECTION, SOLUTION INTRAVENOUS; SUBCUTANEOUS at 00:23

## 2020-11-04 RX ADMIN — DIPHENHYDRAMINE HYDROCHLORIDE AND LIDOCAINE HYDROCHLORIDE AND ALUMINUM HYDROXIDE AND MAGNESIUM HYDRO 5 ML: KIT at 18:01

## 2020-11-04 RX ADMIN — Medication 10 ML: at 09:50

## 2020-11-04 RX ADMIN — SULFAMETHOXAZOLE AND TRIMETHOPRIM 320 MG: 800; 160 TABLET ORAL at 22:08

## 2020-11-04 NOTE — THERAPY TREATMENT NOTE
Patient Name: Lesa Julio  : 1950    MRN: [id number]                              Today's Date: 2020       Admit Date: 10/8/2020    Visit Dx:     ICD-10-CM ICD-9-CM   1. Pneumonia of right upper lobe due to infectious organism  J18.9 486   2. Dyspnea, unspecified type  R06.00 786.09   3. Fever, unspecified fever cause  R50.9 780.60   4. Sepsis, due to unspecified organism, unspecified whether acute organ dysfunction present (CMS/[facility])  A41.9 038.9     995.91   5. Acute renal insufficiency  N28.9 593.9   6. Hypotension, unspecified hypotension type  I95.9 458.9   7. Diarrhea, unspecified type  R19.7 787.91   8. Anemia, unspecified type  D64.9 285.9     Patient Active Problem List   Diagnosis   • Cardiac murmur   • Chest discomfort   • Chronic obstructive pulmonary disease (CMS/[facility])   • Chronic renal insufficiency, stage III (moderate)   • Diabetes mellitus, type 2 (CMS/HCC)   • Elevated LFTs   • Essential hypertension   • Osteoporosis   • Pain of hand   • Polyarthritis, inflammatory (CMS/HCC)   • Rheumatoid arthritis (CMS/HCC)   • Shortness of breath   • Sinus tachycardia   • Tobacco abuse   • Leukocytoclastic vasculitis (CMS/HCC)   • Vasculitis (CMS/HCC)   • Allergic rhinitis   • Central obesity   • Cyst of right ovary   • Fatigue   • Grade 4 out of 6 intensity murmur   • Hearing loss   • Mass of ovary   • Mixed hyperlipidemia   • Nonalcoholic fatty liver disease   • Obesity   • Obstructive sleep apnea syndrome   • Otorrhea   • Prurigo nodularis   • Secondary polycythemia   • Tobacco dependence syndrome   • Tubular adenoma of colon   • Uncontrolled type 2 diabetes mellitus (CMS/HCC)   • Vitamin D deficiency   • Well adult health check   • Hypersensitivity angiitis (CMS/HCC)   • Vascular insufficiency   • Hyperkalemia   • Localized edema   • Other proteinuria   • Secondary hyperparathyroidism of renal origin (CMS/HCC)   • Pneumonia of right upper lobe due to infectious organism   • Paroxysmal atrial  fibrillation (CMS/HCC)   • Anemia   • Oral thrush   • Upper GI bleed   • Hyperkalemia   • Acute respiratory failure with hypoxia and hypercapnia (CMS/HCC)     Past Medical History:   Diagnosis Date   • Arthritis    • Diabetes mellitus, type 2 (CMS/HCC)    • Essential hypertension 11/6/2015   • Hearing loss     hearing aid right side   • Hyperlipidemia    • Hypertension    • Leukocytoclastic vasculitis (CMS/HCC)    • Obesity    • Osteoarthritis    • Peripheral arterial disease (CMS/HCC)    • Sleep apnea      Past Surgical History:   Procedure Laterality Date   • BRONCHOSCOPY Bilateral 10/13/2020    Procedure: BRONCHOSCOPY AT BEDSIDE with right lung washing and elective intubation;  Surgeon: Cristhian Hodge MD;  Location: [facility] ENDOSCOPY;  Service: Pulmonary;  Laterality: Bilateral;  Post: pneumonia   • BRONCHOSCOPY Bilateral 10/20/2020    Procedure: BRONCHOSCOPY WITH BRONCHOALVEOLAR LAVAGE  AT BEDSIDE;  Surgeon: Cristhian Hodge MD;  Location: [facility] ENDOSCOPY;  Service: Pulmonary;  Laterality: Bilateral;  POST- PNEUMONIA   • BRONCHOSCOPY N/A 10/27/2020    Procedure: BRONCHOSCOPY with bronchial washing and intubation;  Surgeon: Cristhian Hodge MD;  Location: [facility] ENDOSCOPY;  Service: Pulmonary;  Laterality: N/A;  post op:pneumonia   • CHOLECYSTECTOMY     • ENDOSCOPY N/A 10/13/2020    Procedure: ESOPHAGOGASTRODUODENOSCOPY AT BEDSIDE;  Surgeon: Omi Rivera MD;  Location: [facility] ENDOSCOPY;  Service: Gastroenterology;  Laterality: N/A;   • LIVER BIOPSY     • TUBAL ABDOMINAL LIGATION       General Information     Row Name 11/04/20 1500          OT Time and Intention    Document Type  therapy note (daily note)  -ES     Mode of Treatment  occupational therapy  -ES     Row Name 11/04/20 1500          General Information    Existing Precautions/Restrictions  fall;oxygen therapy device and L/min  -ES     Row Name 11/04/20 1500          Cognition    Orientation Status (Cognition)  oriented x 4  -ES     Row Name 11/04/20 1500           Safety Issues, Functional Mobility    Safety Issues Affecting Function (Mobility)  insight into deficits/self-awareness;judgment;safety precaution awareness  -ES     Impairments Affecting Function (Mobility)  balance;coordination;endurance/activity tolerance;range of motion (ROM);strength;shortness of breath  -ES     Cognitive Impairments, Mobility Safety/Performance  problem-solving/reasoning;sequencing abilities  -ES       User Key  (r) = Recorded By, (t) = Taken By, (c) = Cosigned By    Initials Name Provider Type    Dee Mireles OT Occupational Therapist        Mobility/ADL's     Row Name 11/04/20 1503          Bed Mobility    Bed Mobility  supine-sit  -ES     All Activities, [place] (Bed Mobility)  moderate assist (50% patient effort);2 person assist  -ES     Supine-Sit [place] (Bed Mobility)  minimum assist (75% patient effort)  -ES     Row Name 11/04/20 1503          Transfers    Transfers  sit-stand transfer  -ES     Sit-Stand [place] (Transfers)  moderate assist (50% patient effort);2 person assist  -ES     Row Name 11/04/20 1503          Activities of Daily Living    BADL Assessment/Intervention  lower body dressing;upper body dressing;toileting  -ES     Row Name 11/04/20 1503          Toileting Assessment/Training    Assistive Devices (Toileting)  bedpan  -ES     Row Name 11/04/20 1503          Lower Body Dressing Assessment/Training    [place] Level (Lower Body Dressing)  don;socks;dependent (less than 25% patient effort)  -ES     Row Name 11/04/20 1503          Upper Body Dressing Assessment/Training    [place] Level (Upper Body Dressing)  supervision  -ES       User Key  (r) = Recorded By, (t) = Taken By, (c) = Cosigned By    Initials Name Provider Type    Dee Mireles OT Occupational Therapist        Obj/Interventions     Row Name 11/04/20 1511          Sensory Assessment (Somatosensory)    Sensory Assessment (Somatosensory)  sensation intact  -ES      Row Name 11/04/20 1511          Balance    Balance Assessment  sitting static balance;sit to stand dynamic balance;sitting dynamic balance;standing dynamic balance;standing static balance  -ES     Static Sitting Balance  WNL  -ES     Dynamic Sitting Balance  WNL  -ES     Sit to Stand Dynamic Balance  moderate impairment  -ES     Static Standing Balance  moderate impairment  -ES     Dynamic Standing Balance  severe impairment  -ES     Balance Interventions  sitting;standing;sit to stand;supported;static  -ES       User Key  (r) = Recorded By, (t) = Taken By, (c) = Cosigned By    Initials Name Provider Type    Dee Mireles OT Occupational Therapist        Goals/Plan    No documentation.       Clinical Impression     Row Name 11/04/20 1513          Pain Scale: Numbers Pre/Post-Treatment    Pretreatment Pain Rating  2/10  -ES     Posttreatment Pain Rating  2/10  -ES     Pain Intervention(s)  Repositioned  -     Row Name 11/04/20 1513          Pain Scale: FACES Pre/Post-Treatment    Pain: FACES Scale, Pretreatment  2-->hurts little bit  -ES     Posttreatment Pain Rating  4-->hurts little more  -ES     Row Name 11/04/20 1513          Plan of Care Review    Outcome Summary  Patient is 69 yo female being followed for PNA, acute respiratory failure, Afib. Pt has just returned to bed prior to OT session, but is agreeable to work with OT. Pt completes bed mobility sit>supine with Mod A, and sits EOB >10 minutes.  Completes 6 sit>stand transfers with Mod A x2 and max verbal cuing for hand placement. Pt stands 15 - 60 seconds, and fatigues quickly. C/o minimal pain in hamstrings when standing. Patient declines transfer to chair for lunch, and requires Mod A x2 for return to supine. Cont to require IP rehab at discharge. PPE: Gloves, mask, eyewear  -     Row Name 11/04/20 1513          Therapy Plan Review/Discharge Plan (OT)    Anticipated Discharge Disposition (OT)  inpatient rehabilitation facility  -     [place]  Name 11/04/20 1513          Vital Signs    Pre SpO2 (%)  92  -ES     O2 Delivery Pre Treatment  hi-flow  -ES     Intra SpO2 (%)  90  -ES     O2 Delivery Intra Treatment  hi-flow  -ES     Post SpO2 (%)  95  -ES     O2 Delivery Post Treatment  hi-flow  -ES     Pre Patient Position  Supine  -ES     Intra Patient Position  Standing  -ES     Post Patient Position  Sitting  -ES     Row Name 11/04/20 1513          Positioning and Restraints    Pre-Treatment Position  in bed  -ES     Post Treatment Position  bed  -ES     In Chair  notified nsg;call light within reach;encouraged to call for assist;exit alarm on  -ES       User Key  (r) = Recorded By, (t) = Taken By, (c) = Cosigned By    Initials Name Provider Type    Dee Mireles OT Occupational Therapist        Outcome Measures    No documentation.       Occupational Therapy Education                 Title: PT OT SLP Therapies (In Progress)     Topic: Occupational Therapy (In Progress)     Point: ADL training (In Progress)     Description:   Instruct learner(s) on proper safety adaptation and remediation techniques during self care or transfers.   Instruct in proper use of assistive devices.              Learning Progress Summary           Patient Acceptance, E,TB, NR by TEE at 11/4/2020 1525    Acceptance, E,TB, VU,NR by  at 11/1/2020 1422    Acceptance, E, DU by CORONA at 10/29/2020 0315    Comment: Patient is unable to speak due to ET tube    Acceptance, E,TB, VU by OLI at 10/24/2020 0315                   Point: Home exercise program (Done)     Description:   Instruct learner(s) on appropriate technique for monitoring, assisting and/or progressing therapeutic exercises/activities.              Learning Progress Summary           Patient Acceptance, E, DU by CORONA at 10/29/2020 0315    Comment: Patient is unable to speak due to ET tube    Acceptance, E,TB, VU by OLI at 10/24/2020 0315                   Point: Precautions (In Progress)     Description:   Instruct  learner(s) on prescribed precautions during self-care and functional transfers.              Learning Progress Summary           Patient Acceptance, E,TB, NR by  at 11/4/2020 1525    Acceptance, E,TB, VU,NR by  at 11/1/2020 1422    Acceptance, E, DU by CORONA at 10/29/2020 0315    Comment: Patient is unable to speak due to ET tube    Acceptance, E,TB, VU by KW at 10/24/2020 0315                   Point: Body mechanics (In Progress)     Description:   Instruct learner(s) on proper positioning and spine alignment during self-care, functional mobility activities and/or exercises.              Learning Progress Summary           Patient Acceptance, E,TB, NR by  at 11/4/2020 1525    Acceptance, E,TB, VU,NR by  at 11/1/2020 1422    Acceptance, E, DU by CORONA at 10/29/2020 0315    Comment: Patient is unable to speak due to ET tube    Acceptance, E,TB, VU by KW at 10/24/2020 0315    Acceptance, E,TB, VU,NR by NATI at 10/22/2020 1215                               User Key     Initials Effective Dates Name Provider Type Discipline     03/01/19 -  Eleanor Blas OT Occupational Therapist OT    NATI 03/01/19 -  Kalin Peter OT Occupational Therapist OT    ES 03/01/19 -  Dee Rivas OT Occupational Therapist OT    CORONA 03/01/19 -  Carolynn Mcghee, RN Registered Nurse Nurse    OLI 06/24/19 -  Redd Arroyo LPN Licensed Nurse Nurse              OT Recommendation and Plan     Plan of Care Review  Outcome Summary: Patient is 71 yo female being followed for PNA, acute respiratory failure, Afib. Pt has just returned to bed prior to OT session, but is agreeable to work with OT. Pt completes bed mobility sit>supine with Mod A, and sits EOB >10 minutes.  Completes 6 sit>stand transfers with Mod A x2 and max verbal cuing for hand placement. Pt stands 15 - 60 seconds, and fatigues quickly. C/o minimal pain in hamstrings when standing. Patient declines transfer to chair for lunch, and requires Mod A x2 for return to supine. Cont  to require IP rehab at discharge. PPE: Gloves, mask, eyewear     Time Calculation:   Time Calculation- OT     Row Name 11/04/20 1524             Time Calculation- OT    OT Start Time  1300  -ES      OT Stop Time  1327  -ES      OT Time Calculation (min)  27 min  -      Total Timed Code Minutes- OT  27 minute(s)  -ES      OT Received On  11/04/20  -ES      OT - Next Appointment  11/05/20  -        User Key  (r) = Recorded By, (t) = Taken By, (c) = Cosigned By    Initials Name Provider Type     Dee Rivas OT Occupational Therapist        Therapy Charges for Today     Code Description Service Date Service Provider Modifiers Qty    90519395813  OT THERAPEUTIC ACT EA 15 MIN 11/4/2020 Dee Rivas OT GO 1    [id number]  OT SELF CARE/MGMT/TRAIN EA 15 MIN 11/4/2020 Dee Rivas OT GO 1               Dee Rivas OT  11/4/2020

## 2020-11-04 NOTE — PLAN OF CARE
Goal Outcome Evaluation:  Plan of Care Reviewed With: patient  Progress: improving  Patient slept well during the night. V/S stable. Continue on 6L HF/NC.  No complaints or discomfort noted. Will continue to monitor.

## 2020-11-04 NOTE — PLAN OF CARE
Problem: Adult Inpatient Plan of Care  Goal: Plan of Care Review  Recent Flowsheet Documentation  Taken 11/4/2020 1128 by Reyes, Carmela, PT  Plan of Care Reviewed With: patient  Outcome Summary: Patient tolerated supine/seated LE exercises without c/o SOA nor desat noted. Min A for supine to sit this session. Pt still needing max A to come to standing and to pivot to chair. Pt needs IP rehab to promote return to PLOF. PPE gloves, mask with shield, gown.

## 2020-11-04 NOTE — THERAPY TREATMENT NOTE
Patient Name: Lesa Julio  : 1950    MRN: [id number]                              Today's Date: 2020       Admit Date: 10/8/2020    Visit Dx:     ICD-10-CM ICD-9-CM   1. Pneumonia of right upper lobe due to infectious organism  J18.9 486   2. Dyspnea, unspecified type  R06.00 786.09   3. Fever, unspecified fever cause  R50.9 780.60   4. Sepsis, due to unspecified organism, unspecified whether acute organ dysfunction present (CMS/[facility])  A41.9 038.9     995.91   5. Acute renal insufficiency  N28.9 593.9   6. Hypotension, unspecified hypotension type  I95.9 458.9   7. Diarrhea, unspecified type  R19.7 787.91   8. Anemia, unspecified type  D64.9 285.9     Patient Active Problem List   Diagnosis   • Cardiac murmur   • Chest discomfort   • Chronic obstructive pulmonary disease (CMS/[facility])   • Chronic renal insufficiency, stage III (moderate)   • Diabetes mellitus, type 2 (CMS/HCC)   • Elevated LFTs   • Essential hypertension   • Osteoporosis   • Pain of hand   • Polyarthritis, inflammatory (CMS/HCC)   • Rheumatoid arthritis (CMS/HCC)   • Shortness of breath   • Sinus tachycardia   • Tobacco abuse   • Leukocytoclastic vasculitis (CMS/HCC)   • Vasculitis (CMS/HCC)   • Allergic rhinitis   • Central obesity   • Cyst of right ovary   • Fatigue   • Grade 4 out of 6 intensity murmur   • Hearing loss   • Mass of ovary   • Mixed hyperlipidemia   • Nonalcoholic fatty liver disease   • Obesity   • Obstructive sleep apnea syndrome   • Otorrhea   • Prurigo nodularis   • Secondary polycythemia   • Tobacco dependence syndrome   • Tubular adenoma of colon   • Uncontrolled type 2 diabetes mellitus (CMS/HCC)   • Vitamin D deficiency   • Well adult health check   • Hypersensitivity angiitis (CMS/HCC)   • Vascular insufficiency   • Hyperkalemia   • Localized edema   • Other proteinuria   • Secondary hyperparathyroidism of renal origin (CMS/HCC)   • Pneumonia of right upper lobe due to infectious organism   • Paroxysmal atrial  fibrillation (CMS/HCC)   • Anemia   • Oral thrush   • Upper GI bleed   • Hyperkalemia   • Acute respiratory failure with hypoxia and hypercapnia (CMS/HCC)     Past Medical History:   Diagnosis Date   • Arthritis    • Diabetes mellitus, type 2 (CMS/HCC)    • Essential hypertension 11/6/2015   • Hearing loss     hearing aid right side   • Hyperlipidemia    • Hypertension    • Leukocytoclastic vasculitis (CMS/HCC)    • Obesity    • Osteoarthritis    • Peripheral arterial disease (CMS/HCC)    • Sleep apnea      Past Surgical History:   Procedure Laterality Date   • BRONCHOSCOPY Bilateral 10/13/2020    Procedure: BRONCHOSCOPY AT BEDSIDE with right lung washing and elective intubation;  Surgeon: Cristhian Hodge MD;  Location: [facility] ENDOSCOPY;  Service: Pulmonary;  Laterality: Bilateral;  Post: pneumonia   • BRONCHOSCOPY Bilateral 10/20/2020    Procedure: BRONCHOSCOPY WITH BRONCHOALVEOLAR LAVAGE  AT BEDSIDE;  Surgeon: Cristhian Hodge MD;  Location: [facility] ENDOSCOPY;  Service: Pulmonary;  Laterality: Bilateral;  POST- PNEUMONIA   • BRONCHOSCOPY N/A 10/27/2020    Procedure: BRONCHOSCOPY with bronchial washing and intubation;  Surgeon: Cristhian Hodge MD;  Location: [facility] ENDOSCOPY;  Service: Pulmonary;  Laterality: N/A;  post op:pneumonia   • CHOLECYSTECTOMY     • ENDOSCOPY N/A 10/13/2020    Procedure: ESOPHAGOGASTRODUODENOSCOPY AT BEDSIDE;  Surgeon: Omi Rivera MD;  Location: [facility] ENDOSCOPY;  Service: Gastroenterology;  Laterality: N/A;   • LIVER BIOPSY     • TUBAL ABDOMINAL LIGATION       General Information     Row Name 11/04/20 1126          Physical Therapy Time and Intention    Document Type  therapy note (daily note)  -CR     Mode of Treatment  physical therapy  -CR     Row Name 11/04/20 1126          Cognition    Orientation Status (Cognition)  oriented x 4  -CR     Row Name 11/04/20 1126          Safety Issues, Functional Mobility    Impairments Affecting Function (Mobility)  balance;coordination;endurance/activity  tolerance;range of motion (ROM);strength;shortness of breath  -CR       User Key  (r) = Recorded By, (t) = Taken By, (c) = Cosigned By    Initials Name Provider Type    CR Reyes, Carmela, PT Physical Therapist        Mobility     Row Name 11/04/20 1126          Bed Mobility    Supine-Sit [place] (Bed Mobility)  minimum assist (75% patient effort)  -CR     Row Name 11/04/20 1126          Bed-Chair Transfer    Bed-Chair [place] (Transfers)  maximum assist (25% patient effort);1 person assist  -CR     Row Name 11/04/20 1126          Sit-Stand Transfer    Sit-Stand [place] (Transfers)  maximum assist (25% patient effort);1 person assist  -CR       User Key  (r) = Recorded By, (t) = Taken By, (c) = Cosigned By    Initials Name Provider Type    CR Reyes, Carmela, PT Physical Therapist        Obj/Interventions     Row Name 11/04/20 1127          Motor Skills    Therapeutic Exercise  -- LAQ, AP, seated marches  -CR       User Key  (r) = Recorded By, (t) = Taken By, (c) = Cosigned By    Initials Name Provider Type    CR Reyes, Carmela, PT Physical Therapist        Goals/Plan    No documentation.       Clinical Impression     Row Name 11/04/20 1128          Pain Scale: Numbers Pre/Post-Treatment    Pretreatment Pain Rating  0/10 - no pain  -CR     Posttreatment Pain Rating  0/10 - no pain  -CR     Row Name 11/04/20 1128          Plan of Care Review    Plan of Care Reviewed With  patient  -CR     Outcome Summary  Patient tolerated supine/seated LE exercises without c/o SOA nor desat noted. Min A for supine to sit this session. Pt still needing max A to come to standing and to pivot to chair. Pt needs IP rehab to promote return to PLOF. PPE gloves, mask with shield, gown.  -CR     Row Name 11/04/20 1128          Vital Signs    Intra SpO2 (%)  88  -CR     O2 Delivery Intra Treatment  supplemental O2  -CR     Post SpO2 (%)  90  -CR     O2 Delivery Post Treatment  supplemental O2  -CR     Row Name 11/04/20 1128           Positioning and Restraints    Pre-Treatment Position  in bed  -CR     Post Treatment Position  chair  -CR     In Chair  notified nsg;sitting;call light within reach;exit alarm on  -CR       User Key  (r) = Recorded By, (t) = Taken By, (c) = Cosigned By    Initials Name Provider Type    CR Reyes, Carmela, PT Physical Therapist        Outcome Measures    No documentation.       Physical Therapy Education                 Title: PT OT SLP Therapies (Done)     Topic: Physical Therapy (Done)     Point: Mobility training (Done)     Learning Progress Summary           Patient Acceptance, E, VU,NR by CR at 11/4/2020 1137    Acceptance, E, NR by CR at 11/3/2020 1016    Acceptance, E, NR by CR at 11/2/2020 1236    Acceptance, E,TB, VU by CM at 10/30/2020 1305    Acceptance, E,TB, VU,NR by  at 10/29/2020 1302    Acceptance, E, DU by CORONA at 10/29/2020 0315    Comment: Patient is unable to speak due to ET tube    Acceptance, E, VU by SC at 10/26/2020 1423    Acceptance, E,TB, VU by  at 10/24/2020 0315    Acceptance, E, NR by HC at 10/23/2020 1249    Acceptance, E, NR by HC at 10/22/2020 1608                   Point: Precautions (Done)     Learning Progress Summary           Patient Acceptance, E,TB, VU by CM at 10/30/2020 1305    Acceptance, E,TB, VU,NR by CM at 10/29/2020 1302    Acceptance, E, DU by CORONA at 10/29/2020 0315    Comment: Patient is unable to speak due to ET tube    Acceptance, E, VU by SC at 10/26/2020 1423    Acceptance, E,TB, VU by KW at 10/24/2020 0315    Acceptance, E, NR by HC at 10/23/2020 1249    Acceptance, E, NR by HC at 10/22/2020 1608                               User Key     Initials Effective Dates Name Provider Type Discipline     04/17/20 -  Alicai Al, PT Physical Therapist PT    CM 03/01/19 -  Aubrie Negro, PT Physical Therapist PT    CR 03/01/19 -  Reyes, Carmela, PT Physical Therapist PT    SC 03/01/19 -  Esperanza Guidry, PTA Physical Therapy Assistant PT    CORONA 03/01/19 -   Carolynn Mcghee, RN Registered Nurse Nurse     06/24/19 -  Redd Arroyo LPN Licensed Nurse Nurse              PT Recommendation and Plan     Plan of Care Reviewed With: patient  Outcome Summary: Patient tolerated supine/seated LE exercises without c/o SOA nor desat noted. Min A for supine to sit this session. Pt still needing max A to come to standing and to pivot to chair. Pt needs IP rehab to promote return to PLOF. PPE gloves, mask with shield, gown.     Time Calculation:   PT Charges     Row Name 11/04/20 1138             Time Calculation    Start Time  0945  -CR      Stop Time  1009  -CR      Time Calculation (min)  24 min  -CR      PT Received On  11/04/20  -CR      PT - Next Appointment  11/05/20  -CR         Time Calculation- PT    Total Timed Code Minutes- PT  24 minute(s)  -CR        User Key  (r) = Recorded By, (t) = Taken By, (c) = Cosigned By    Initials Name Provider Type    CR Reyes, Carmela, VIGNESH Physical Therapist        Therapy Charges for Today     Code Description Service Date Service Provider Modifiers Qty    42732365728 HC PT THERAPEUTIC ACT EA 15 MIN 11/3/2020 Reyes, Carmela, PT GP 2    51388943537 HC PT THER PROC EA 15 MIN 11/4/2020 Reyes, Carmela, PT GP 1    13084431176 HC PT THERAPEUTIC ACT EA 15 MIN 11/4/2020 Reyes, Carmela, PT GP 1          PT G-Codes  Outcome Measure Options: AM-PAC 6 Clicks Daily Activity (OT), Modified [place]  AM-PAC 6 Clicks Score (PT): 12  AM-PAC 6 Clicks Score (OT): 10  Modified [place] Scale: 4 - Moderately severe disability.  Unable to walk without assistance, and unable to attend to own bodily needs without assistance.    Carmela Reyes, PT  11/4/2020

## 2020-11-04 NOTE — PROGRESS NOTES
ICU Daily Progress Note        Pneumonia of right upper lobe due to infectious organism    Cardiac murmur    Chronic renal insufficiency, stage III (moderate)    Diabetes mellitus, type 2 (CMS/HCC)    Essential hypertension    Shortness of breath    Tobacco abuse    Hearing loss    Paroxysmal atrial fibrillation (CMS/HCC)    Anemia    Oral thrush    Upper GI bleed    Hyperkalemia    Acute respiratory failure with hypoxia and hypercapnia (CMS/HCC)      Assessment    BAL from bronchoscopy 10/27/2020 showed CMV positive   HIV test is negative on 10/31/2020    Acute/chronic hypercapnic/hypoxic respiratory failure intubated 10/13/2020 extubated 10/21/2020    Reintubated 10/27/2020 due to large mucus plugs with bilateral lower lobe atelectasis for need of  suction and patient was on continuous non invasive ventilation extubated 10/20/20    Therapeutic [id number]/27/20  copious amount of mucopurulent secretions with large mucus block since to be extracted therapeutically  due to significant hypoxia and need for continuous on invasive ventilation we elected to intubate the patient for adequate suctioning    BAL positive for Pneumocystis carinii  Positive rhinovirus    Bronchoscopy completed 10/13/2020   copious amount of mucopurulent secretions were noted which was suctioned therapeutically    BAL from 10/13/2020  showed positive for rhinovirus  Respiratory panel on admission from nasal swab was negative for rhino virus on 10/08/2020    Repeat bronchoscopy 10/20/2020  Very thick mucopurulent secretions right upper lobe suctioned therapeutically  BAL right upper lobe    CT scan of the chest  1022 he had a and there is improvement in pneumonia    Upper GI bleed with large amount coffee-ground emesis   status post EGD 10/13/2020    A. fib  Initially started with RVR, currently bradycardia    Right IJ central line was placed on admission and removed on 10/22/2020    RAMONE,   COPD  Smoker  Rheumatoid arthritis  Inflammatory  polyarthritis  Hyperlipidemia        Hypertension        Leukocytoclastic vasculitis        Hearing loss; hearing aid right side        obesity        peripheral arterial disease        diabetes; pre diabetic        Arthritis        Osteoarthritis  Osteoporosis      Plan    Diet of pureed NTL and eating 100%  Awaiting CMV antigen titer   Ganciclovir IV started on 10/31/2020 for positive CMV on most recent BAL will change to po valganciclovir for renal dosing awaiting CMV antigen titer to decide on continuation of treatment    Pt/OT evaluate and treat  IS   Flutter valve  Chest physical therapy    Titrate oxygen  Continue steroids to IV Solu-Medrol 40 mg Q 8  Bactrim p.o. total 21 days total (for PCP pneumonia)    Bronchodilators  Glycemic control  Gi prophylaxis pantoprazole  DVT prophylaxis, on SCD, Due to large amount of coffee-ground emesis    Off Lovenox              LOS: 27 days         Vital signs for last 24 hours:  Vitals:    11/04/20 0555 11/04/20 0649 11/04/20 0656 11/04/20 0932   BP: 152/53      BP Location:       Patient Position:       Pulse: 73 74 75 79   Resp: 20 18 18    Temp: 97.5 °F (36.4 °C)      TempSrc: Oral      SpO2: 92% 90% 95%    Weight:       Height:           Intake/Output last 3 shifts:  I/O last 3 completed shifts:  In: 1260 [P.O.:1260]  Out: 3000 [Urine:3000]  Intake/Output this shift:  No intake/output data recorded.    Vent settings for last 24 hours:       Hemodynamic parameters for last 24 hours:       Radiology  Imaging Results (Last 24 Hours)     ** No results found for the last 24 hours. **          Labs:  Results from last 7 days   Lab Units 11/04/20  0431   WBC 10*3/mm3 10.80   HEMOGLOBIN g/dL 9.1*   HEMATOCRIT % 27.2*   PLATELETS 10*3/mm3 156     Results from last 7 days   Lab Units 11/04/20  0431   SODIUM mmol/L 138   POTASSIUM mmol/L 5.2   CHLORIDE mmol/L 95*   CO2 mmol/L 32.0*   BUN mg/dL 86*   CREATININE mg/dL 1.56*   CALCIUM mg/dL 9.6   GLUCOSE mg/dL 137*     Results from  last 7 days   Lab Units 10/30/20  1434   PH, ARTERIAL pH units 7.416   PO2 ART mm Hg 67.3*   PCO2, ARTERIAL mm Hg 46.4   HCO3 ART mmol/L 29.8*                 Results from last 7 days   Lab Units 11/04/20  0431   MAGNESIUM mg/dL 2.1                   Meds:   SCHEDULE  amLODIPine, 10 mg, Oral, Q24H  aspirin, 81 mg, Oral, Daily  atorvastatin, 40 mg, Oral, Daily  Barium Sulfate, 1 teaspoon(s), Oral, Once in imaging  bisacodyl, 10 mg, Rectal, Once  budesonide, 0.5 mg, Nebulization, BID - RT  bumetanide, 1 mg, Oral, Daily  First Mouthwash (Magic Mouthwash), 5 mL, Swish & Spit, Q6H  fludrocortisone, 100 mcg, Oral, Once  gabapentin, 100 mg, Oral, Q8H  insulin glargine, 20 Units, Subcutaneous, Q12H  insulin lispro, 0-24 Units, Subcutaneous, Q6H  insulin lispro, 10 Units, Subcutaneous, Q6H  methylPREDNISolone sodium succinate, 40 mg, Intravenous, Q8H  pantoprazole, 40 mg, Oral, QAM AC  sodium chloride, 10 mL, Intravenous, Q12H  sodium chloride, 10 mL, Intravenous, Q12H  sodium chloride, 10 mL, Intravenous, Q12H  sodium chloride, 10 mL, Intravenous, Q12H  sodium polystyrene, 15 g, Oral, Once  sulfamethoxazole-trimethoprim, 2 tablet, Oral, Q8H  valGANciclovir, 450 mg, Oral, Daily With Breakfast      Infusions     PRNs  •  acetaminophen  •  Calcium Gluconate-NaCl **AND** calcium gluconate **AND** Calcium, Ionized  •  dextrose  •  dextrose  •  glucagon (human recombinant)  •  guaifenesin-dextromethorphan  •  influenza vaccine  •  insulin lispro **AND** insulin lispro  •  lidocaine  •  lidocaine  •  magnesium sulfate **OR** magnesium sulfate **OR** magnesium sulfate  •  potassium chloride  •  sodium chloride  •  sodium chloride  •  sodium chloride  •  sodium chloride    Physical Exam:  Physical Exam  Vitals signs reviewed.   Cardiovascular:      Heart sounds: Murmur present.   Pulmonary:      Breath sounds: Wheezing present.   Musculoskeletal:      Right lower leg: Edema present.      Left lower leg: Edema present.   Skin:      General: Skin is warm and dry.   Neurological:      Mental Status: She is alert and oriented to person, place, and time.         ROS  Review of Systems   Constitutional: Positive for activity change and fatigue.

## 2020-11-04 NOTE — PROGRESS NOTES
-  PROGRESS NOTE      Patient Name: Lesa Julio  : 1950  MRN: [id number]  Primary Care Physician: Manuel Sanders MD  Date of admission: 10/8/2020    Patient Care Team:  Manuel Sanders MD as PCP - General        Subjective   Patient seen and examined, she denies any new complaints, no chest pain or shortness of breath.  Subjective:       Review of systems:  Review of system unremarkable      Allergies:    Allergies   Allergen Reactions   • Lisinopril Other (See Comments)   • Ipratropium-Albuterol Itching and Rash       Objective   Exam:     Vital Signs  Temp:  [97.5 °F (36.4 °C)-97.9 °F (36.6 °C)] 97.5 °F (36.4 °C)  Heart Rate:  [72-99] 79  Resp:  [16-22] 18  BP: (110-165)/(39-58) 152/53  SpO2:  [89 %-100 %] 95 %  on  Flow (L/min):  [5-8] 5;   Device (Oxygen Therapy): high-flow nasal cannula;humidified  Body mass index is 35.56 kg/m².     General:  Elderly female no acute distress  Head:      Normocephalic and atraumatic.    Eyes:      PERRL/EOM intact, conjunctiva and sclera clear with out nystagmus.    Neck:      No masses, thyromegaly,  trachea central with normal respiratory effort   Lungs:    Bilateral crackles left greater than right.    Heart:      Regular rate and rhythm, no murmur no gallop  Abd:        Soft, nontender, not distended, bowel sounds positive, no shifting dullness   Pulses:   Pulses palpable  Extr:        No cyanosis or clubbing--+1 edema.    Neuro:     Alert awake  skin:       Intact without lesions or rashes.    Psych:     Alert awake.      results Review:  I have personally reviewed most recent Data :  CBC    Results from last 7 days   Lab Units 20  0431 20  0353 20  0414 20  0610 10/31/20  0312 10/30/20  0418 10/29/20  0411   WBC 10*3/mm3 10.80 10.40 11.30* 10.10 8.60 5.90 7.50   HEMOGLOBIN g/dL 9.1* 9.1* 9.1* 9.3* 8.5* 7.8* 9.1*   PLATELETS 10*3/mm3 156 174 187 204 197 187 248     CMP   Results from last 7 days   Lab Units  11/04/20  0431 11/03/20  0353 11/02/20  1618 11/02/20  0414 11/01/20  1608 11/01/20  0610 11/01/20  0027 10/31/20  0312 10/30/20  0418  10/29/20  0411   SODIUM mmol/L 138 136  --  135*  --  136  --  140 136  --  140   POTASSIUM mmol/L 5.2 4.3 4.9 5.4* 5.2 5.4* 5.8* 5.2 4.8   < > 3.4*   CHLORIDE mmol/L 95* 93*  --  93*  --  95*  --  100 99  --  100   CO2 mmol/L 32.0* 31.0*  --  31.0*  --  32.0*  --  31.0* 29.0  --  30.0*   BUN mg/dL 86* 84*  --  82*  --  68*  --  50* 42*  --  33*   CREATININE mg/dL 1.56* 1.53*  --  1.39*  --  1.29*  --  1.26* 1.25*  --  1.45*   GLUCOSE mg/dL 137* 227*  --  323*  --  394*  --  218* 355*  --  173*    < > = values in this interval not displayed.     ABG    Results from last 7 days   Lab Units 10/30/20  1434 10/30/20  0759 10/29/20  0831   PH, ARTERIAL pH units 7.416 7.403 7.426   PCO2, ARTERIAL mm Hg 46.4 48.4* 47.7   PO2 ART mm Hg 67.3* 69.7* 64.8*   O2 SATURATION ART % 93.1* 93.5* 92.5*   BASE EXCESS ART mmol/L 4.7* 4.8* 6.2*     Fl Video Swallow With Speech Single Contrast    Result Date: 11/2/2020  As above. Please see speech pathology report for detailed evaluation and dietary recommendations.  Electronically Signed By-Leonel Espitia On:11/2/2020 12:30 PM This report was finalized on [id number] by  Leonel Espitia, .    Xr Chest 1 View    Result Date: 11/2/2020  1. Unchanged right midlung airspace consolidation, similar to the prior. 2. Support devices in expected position.  Electronically Signed By-Leonel Espitia On:11/2/2020 7:47 AM This report was finalized on [id number] by  Leonel Espitia, .    Xr Chest 1 View    Result Date: 11/1/2020  Improved aeration in the right lung with persistent partial right lower lobe atelectasis. Improving consolidation in the inferior right upper lobe. Support lines and tubes are unchanged.   Electronically Signed By-Robert Norris DO. On:11/1/2020 8:10 AM This report was finalized on [id number] by  Robert Norris DO..    Xr Chest 1  View    Result Date: 10/31/2020   1. Interval extubation. 2. Stable esophagogastric tube and left upper extremity PICC line. 3. No change in right midlung and right basilar airspace disease.  Electronically Signed By-Sandeep King On:10/31/2020 7:27 AM This report was finalized on [id number] by  Sandeep King, .    Xr Chest 1 View    Result Date: 10/30/2020  1.Endotracheal tube in good position. 2.Consolidation within right midlung appears unchanged.  Electronically Signed By-DR. Onofre Nicholas MD On:10/30/2020 7:39 AM This report was finalized on [id number] by DR. Onofre Nicholas MD.    Xr Chest 1 View    Result Date: 10/29/2020   1. Improved right lung aeration with persistent right upper lobe airspace opacity.  Electronically Signed By-Chriss Rider On:10/29/2020 7:31 AM This report was finalized on [id number] by  Chriss Rider, .    Xr Chest 1 View    Result Date: 10/28/2020   1. Endotracheal tube in expected position. 2. Improved aeration of the right lung with decreasing right-sided airspace opacities when compared to prior exam.  Electronically Signed By-Leonel Espitia On:10/28/2020 7:41 AM This report was finalized on [id number] by  Leonel Espitia, .    Xr Abdomen Kub    Result Date: 10/28/2020  The radiopaque tip of the Dobbhoff tube projects to the proximal gastric body level.  Electronically Signed By-Dr. Genesis Segovia MD On:10/28/2020 1:48 PM This report was finalized on [id number] by Dr. Genesis Segovia MD.      Results for orders placed during the hospital encounter of 10/08/20   Adult Transthoracic Echo Complete W/ Cont if Necessary Per Protocol    Narrative · Estimated left ventricular EF = 65% Left ventricular systolic function   is normal.  · Trace to mild mitral valve regurgitation is present.  · There is a small (<1cm) pericardial effusion.        Scheduled Meds:amLODIPine, 10 mg, Oral, Q24H  aspirin, 81 mg, Oral, Daily  atorvastatin, 40 mg, Oral, Daily  Barium Sulfate, 1  teaspoon(s), Oral, Once in imaging  bisacodyl, 10 mg, Rectal, Once  budesonide, 0.5 mg, Nebulization, BID - RT  bumetanide, 1 mg, Oral, Daily  First Mouthwash (Magic Mouthwash), 5 mL, Swish & Spit, Q6H  gabapentin, 100 mg, Oral, Q8H  insulin glargine, 20 Units, Subcutaneous, Q12H  insulin lispro, 0-24 Units, Subcutaneous, Q6H  insulin lispro, 10 Units, Subcutaneous, Q6H  methylPREDNISolone sodium succinate, 40 mg, Intravenous, Q8H  pantoprazole, 40 mg, Oral, QAM AC  sodium chloride, 10 mL, Intravenous, Q12H  sodium chloride, 10 mL, Intravenous, Q12H  sodium chloride, 10 mL, Intravenous, Q12H  sodium chloride, 10 mL, Intravenous, Q12H  sulfamethoxazole-trimethoprim, 2 tablet, Oral, Q8H  valGANciclovir, 450 mg, Oral, Daily With Breakfast      Continuous Infusions:   PRN Meds:•  acetaminophen  •  Calcium Gluconate-NaCl **AND** calcium gluconate **AND** Calcium, Ionized  •  dextrose  •  dextrose  •  glucagon (human recombinant)  •  guaifenesin-dextromethorphan  •  influenza vaccine  •  insulin lispro **AND** insulin lispro  •  lidocaine  •  lidocaine  •  magnesium sulfate **OR** magnesium sulfate **OR** magnesium sulfate  •  potassium chloride  •  sodium chloride  •  sodium chloride  •  sodium chloride  •  sodium chloride    Assessment/Plan   Assessment and Plan:         Pneumonia of right upper lobe due to infectious organism    Cardiac murmur    Chronic renal insufficiency, stage III (moderate)    Diabetes mellitus, type 2 (CMS/HCC)    Essential hypertension    Shortness of breath    Tobacco abuse    Hearing loss    Paroxysmal atrial fibrillation (CMS/HCC)    Anemia    Oral thrush    Upper GI bleed    Hyperkalemia    Acute respiratory failure with hypoxia and hypercapnia (CMS/HCC)     ASSESSMENT:  · Acute kidney injury likely ATN likely related to sepsis patient baseline creatinine is 0.9 back in June 2019, FENa 2.7  · Metabolic acidosis with increased lactic acid level  · Right upper lobe pneumonia  · History of  rheumatoid arthritis  · History of hypertension  · History of vasculitis  · Hypernatremia  · Acute GI bleed with emesis  · Chronic kidney disease stage III a likely with GFR around 50 mL/min at baseline           Plan:      · Renal functions continue to be stable at this time   · Potassium level is again increasing  · We will give 1 dose of Kayexalate and Florinef again  · Continue low-dose Bumex  · Patient is on valganciclovir and also on Bactrim both can cause increase in creatinine  · Follow-up with repeat labs tomorrow morning  · Patient is still on high-dose steroid causing some azotemia  · Blood pressure is acceptable  · Sodium level acceptable  · resp condition stable   · Will follow         Note started  by Serjio Melara MD,   [facility] kidney consultant

## 2020-11-04 NOTE — THERAPY TREATMENT NOTE
Acute Care - Speech Language Pathology   Swallow Treatment Note    Manny     Patient Name: Lesa Julio  : 1950  MRN: [id number]  Today's Date: 2020               Admit Date: 10/8/2020    Visit Dx:      ICD-10-CM ICD-9-CM   1. Pneumonia of right upper lobe due to infectious organism  J18.9 486   2. Dyspnea, unspecified type  R06.00 786.09   3. Fever, unspecified fever cause  R50.9 780.60   4. Sepsis, due to unspecified organism, unspecified whether acute organ dysfunction present (CMS/HCC)  A41.9 038.9     995.91   5. Acute renal insufficiency  N28.9 593.9   6. Hypotension, unspecified hypotension type  I95.9 458.9   7. Diarrhea, unspecified type  R19.7 787.91   8. Anemia, unspecified type  D64.9 285.9     Patient Active Problem List   Diagnosis   • Cardiac murmur   • Chest discomfort   • Chronic obstructive pulmonary disease (CMS/[facility])   • Chronic renal insufficiency, stage III (moderate)   • Diabetes mellitus, type 2 (CMS/HCC)   • Elevated LFTs   • Essential hypertension   • Osteoporosis   • Pain of hand   • Polyarthritis, inflammatory (CMS/HCC)   • Rheumatoid arthritis (CMS/HCC)   • Shortness of breath   • Sinus tachycardia   • Tobacco abuse   • Leukocytoclastic vasculitis (CMS/HCC)   • Vasculitis (CMS/HCC)   • Allergic rhinitis   • Central obesity   • Cyst of right ovary   • Fatigue   • Grade 4 out of 6 intensity murmur   • Hearing loss   • Mass of ovary   • Mixed hyperlipidemia   • Nonalcoholic fatty liver disease   • Obesity   • Obstructive sleep apnea syndrome   • Otorrhea   • Prurigo nodularis   • Secondary polycythemia   • Tobacco dependence syndrome   • Tubular adenoma of colon   • Uncontrolled type 2 diabetes mellitus (CMS/HCC)   • Vitamin D deficiency   • Well adult health check   • Hypersensitivity angiitis (CMS/HCC)   • Vascular insufficiency   • Hyperkalemia   • Localized edema   • Other proteinuria   • Secondary hyperparathyroidism of renal origin (CMS/HCC)   • Pneumonia of right  upper lobe due to infectious organism   • Paroxysmal atrial fibrillation (CMS/HCC)   • Anemia   • Oral thrush   • Upper GI bleed   • Hyperkalemia   • Acute respiratory failure with hypoxia and hypercapnia (CMS/HCC)       Therapy Treatment           SLP GOALS     Row Name 11/04/20 1500       Oral Nutrition/Hydration Goal 1 (SLP)    Oral Nutrition/Hydration Goal 1, SLP  Pt will tolerate least restrictive diet with no complications from aspiration  -LF    Time Frame (Oral Nutrition/Hydration Goal 1, SLP)  by discharge  -LF    Barriers (Oral Nutrition/Hydration Goal 1, SLP)  Pt tolerating puree/NTL diet. Recommed upgrade pt's diet to soft to chew/NTL  -LF    Progress/Outcomes (Oral Nutrition/Hydration Goal 1, SLP)  good progress toward goal  -LF       Oral Nutrition/Hydration Goal 2 (SLP)    Oral Nutrition/Hydration Goal 2, SLP  Pt will undergo re-evaluation of swallow in form of VFSS as appropriate  -LF    Time Frame (Oral Nutrition/Hydration Goal 2, SLP)  1 week  -LF    Barriers (Oral Nutrition/Hydration Goal 2, SLP)  --    Progress/Outcomes (Oral Nutrition/Hydration Goal 2, SLP)  goal ongoing  -LF       Oral Nutrition/Hydration Goal (SLP)    Oral Nutrition/Hydration Goal, SLP  Patient will be seen at a meal within 24-48 hours to assess tolerance of current diet with further recommendations to be given as indicated  -LF    Time Frame (Oral Nutrition/Hydration Goal, SLP)  1 day;2 days  -LF    Barriers (Oral Nutrition/Hydration Goal, SLP)  Pt participated in trials of soft to chew this date. Pt displayed extended but functional mastication with no oral pocketing or residue. No clinical s/s of aspiraiton observed at any time. Pt additionally seen with lunch tray this date. Pt's lunch tray included mashed potatoes, pureed meat, pureed vegetables, and NTL milk. Pt self fed and independently demonstrated safe swallow strategies. No clinical s/s of aspiration observed at any time. PPE worn: gloves, mask, gown, and eye  protection -LF    Progress/Outcomes (Oral Nutrition/Hydration Goal, SLP)  good progress toward goal;goal ongoing  -LF      User Key  (r) = Recorded By, (t) = Taken By, (c) = Cosigned By    Initials Name Provider Type    Kayleigh Valdes, SLP Speech and Language Pathologist    Zoey Lagunas, SLP Speech and Language Pathologist    Bridget Thompson, SLP Speech and Language Pathologist    Kenzie Kidd, SLP Speech and Language Pathologist          EDUCATION  The patient has been educated in the following areas:   Modified Diet Instruction.                 Time Calculation:       Therapy Charges for Today     Code Description Service Date Service Provider Modifiers Qty    04006892272 HC ST TREATMENT SWALLOW 4 11/4/2020 Kenzie Zavala, SLP GN 1                    AMENA Barcenas  11/4/2020

## 2020-11-04 NOTE — PLAN OF CARE
Goal Outcome Evaluation:  Plan of Care Reviewed With: patient  Progress: improving     Diet upgraded per speech but patient still on nectar thick liquids. Patient pleasant and cooperative with care; worked with PT and OT; was up in chair. [facility]

## 2020-11-04 NOTE — PROGRESS NOTES
"  Chief complaint shortness of breath      Subjective     Continues to improve tolerating diet still in significant amount of oxygen supplemental      Objective     Vital Signs  Visit Vitals  /46   Pulse 86   Temp 97.4 °F (36.3 °C)   Resp 18   Ht 152.4 cm (60\")   Wt 82.6 kg (182 lb 1.6 oz)   SpO2 92%   BMI 35.56 kg/m²       Physical Exam:  Physical Exam   Constitutional:  oriented to person, place, and time. No distress.   HENT: NG in place  Head: Normocephalic and atraumatic.   Eyes: Conjunctivae and EOM are normal. Pupils are equal, round, and reactive to light.   Neck: No JVD present. No thyromegaly present.   Cardiovascular: Normal rate, regular rhythm, normal heart sounds and intact distal pulses. Exam reveals no gallop and no friction rub.   No murmur heard.  Pulmonary/Chest: Effort normal and breath sounds normal. No stridor. No respiratory distress.  has no wheezes.  has no rales.  exhibits no tenderness.   Abdominal: Soft. Bowel sounds are normal.  no distension and no mass. There is no tenderness. There is no rebound and no guarding. No hernia.   Musculoskeletal: Normal range of motion.   Lymphadenopathy:     no cervical adenopathy.   Neurological:  alert and oriented to person, place, and time. No cranial nerve deficit or sensory deficit. exhibits normal muscle tone.   Skin: No rash noted.  not diaphoretic.   Psychiatric:  normal mood and affect.   Vitals reviewed.    Physical Exam          Results Review:    CMP:  Lab Results   Component Value Date    BUN 86 (H) 11/04/2020    CREATININE 1.56 (H) 11/04/2020    EGFRIFNONA 33 (L) 11/04/2020    EGFRIFAFRI  10/21/2020      Comment:      <15 Indicative of kidney failure.     11/04/2020    K 5.2 11/04/2020    CL 95 (L) 11/04/2020    CALCIUM 9.6 11/04/2020    ALBUMIN 2.50 (L) 10/22/2020    BILITOT 0.2 10/22/2020    ALKPHOS 245 (H) 10/22/2020    AST 51 (H) 10/22/2020    ALT 44 (H) 10/22/2020     CBC:  Lab Results   Component Value Date    WBC 10.80 " 11/04/2020    RBC 2.86 (L) 11/04/2020    HGB 9.1 (L) 11/04/2020    HCT 27.2 (L) 11/04/2020    MCV 95.1 11/04/2020    MCH 31.9 11/04/2020    MCHC 33.5 11/04/2020    RDW 19.1 (H) 11/04/2020     11/04/2020         Medication Review:     Scheduled Meds:amLODIPine, 10 mg, Oral, Q24H  aspirin, 81 mg, Oral, Daily  atorvastatin, 40 mg, Oral, Daily  Barium Sulfate, 1 teaspoon(s), Oral, Once in imaging  bisacodyl, 10 mg, Rectal, Once  budesonide, 0.5 mg, Nebulization, BID - RT  bumetanide, 1 mg, Oral, Daily  First Mouthwash (Magic Mouthwash), 5 mL, Swish & Spit, Q6H  gabapentin, 100 mg, Oral, Q8H  insulin glargine, 20 Units, Subcutaneous, Q12H  insulin lispro, 0-24 Units, Subcutaneous, Q6H  insulin lispro, 10 Units, Subcutaneous, Q6H  methylPREDNISolone sodium succinate, 40 mg, Intravenous, Q8H  pantoprazole, 40 mg, Oral, QAM AC  sodium chloride, 10 mL, Intravenous, Q12H  sodium chloride, 10 mL, Intravenous, Q12H  sodium chloride, 10 mL, Intravenous, Q12H  sodium chloride, 10 mL, Intravenous, Q12H  sulfamethoxazole-trimethoprim, 2 tablet, Oral, Q8H  valGANciclovir, 450 mg, Oral, Daily With Breakfast      Continuous Infusions:   PRN Meds:.•  acetaminophen  •  Calcium Gluconate-NaCl **AND** calcium gluconate **AND** Calcium, Ionized  •  dextrose  •  dextrose  •  glucagon (human recombinant)  •  guaifenesin-dextromethorphan  •  influenza vaccine  •  insulin lispro **AND** insulin lispro  •  lidocaine  •  lidocaine  •  magnesium sulfate **OR** magnesium sulfate **OR** magnesium sulfate  •  potassium chloride  •  sodium chloride  •  sodium chloride  •  sodium chloride  •  sodium chloride    Assessment/Plan    Acute respiratory failure  Hypoxic hypercapnic  Intubated now extubated  Multifactorial  Status post bronchoscopies  On bronchodilators steroid taper  Diagnosed with PCP pneumonia and on treatment with Bactrim with plan for 21-day total  Also positive for rhinovirus CMV on BAL on oral ganciclovir now, pending titers to  decide on length of treatment  Continue BiPAP as needed  Per pulmonology     PCP pneumonia  Continue steroid taper and Bactrim high-dose  Per pulmonology  HIV negative  Status post multiple bronchoscopies     Paroxysmal A. fib  Now back in sinus rhythm  2D echocardiogram with normal EF and no gross valvular abnormality  [facility] normal EF no gross valvular abnormality  Chronic respiratory failure COPD  No anticoagulation due to concern for GI bleed     Acute kidney injury  On stage III CKD  Creatinine overall improved  Initial insult thought to be due to ATN  Volume now managed with Bumex  Per Dr. Melara    Hyperkalemia  Treated with diurteic/kayaxalate  Cont insulin   Per Dr. Melara      GI bleed  Hemoglobin stable  Continue PPI  GI on board status post EGD with multiple superficial ulcerations in the gastric body fundus cardia suggestive of suction trauma from NG tube.     Oral thrush  Finish course of fluconazole     Hypertension  Continue amlodipine     Diabetes  Insulin sliding scale and Accu-Chek  Continue Lantus     Orthostatic hypotension  Did need some IV pressor support during her stay in ICU  Continue fludrocortisone     Dyslipidemia  continue statin    Nutrition  Passed swallow eval start on diet tolerating  NG out     DVT PUD prophylaxis     Plan as above  Marianna Figueroa MD  11/04/20  12:51 EST

## 2020-11-04 NOTE — PROGRESS NOTES
Continued Stay Note  EDGAR Bryant     Patient Name: Lesa Julio  MRN: [id number]  Today's Date: 11/4/2020    Admit Date: 10/8/2020    Discharge Plan     Row Name 11/04/20 0824       Plan    Plan  D/C Plan : [facility] has accepted pt , pt is on 5l of o2 , PASRR approved , no precert required.    Plan Comments  Pt is on P.O. meds on 5l of o2 . Pt should be ready for d/c soon .        Discharge Codes    No documentation.       Expected Discharge Date and Time     Expected Discharge Date Expected Discharge Time    Nov 2, 2020             aFrzana Moeller RN     March 24, 2017      Katina Valverde  [address]  [facility] [address]          Please see below for your test results.    Resulted Orders   Lead, Blood ([facility])   Result Value Ref Range    Lead 2.0 <5.0 ug/dL    Collection Method Capillary     Narrative    Test performed by:  [facility]  [address] [place] [address]   Hemoglobin (HGB) ([facility])   Result Value Ref Range    Hemoglobin 12.6 10.5 - 14.0 g/dL         Carlos lead and hemoglobin tests were both normal! If you have any questions before her 15 month appointment, please feel free to call our office.    All the best,    Dr. Cary Decker

## 2020-11-04 NOTE — PROGRESS NOTES
Nutrition Services    Patient Name: Lesa Julio  YOB: 1950  MRN: [id number]  Admission date: 10/8/2020    *This assessment completed remotely with assistance from nursing communication and EMR documentation    PPE Documentation        PPE Worn By Provider Mask and eye protection    PPE Worn By Patient  N/A      PROGRESS NOTE      Encounter Information: Visited pt this morning who was very [place] and made interview difficult at times. Pt reports having a good appetite & RN also confirms this to be true. An NFPE was completed given her weight loss since admission & no s/s of malnutrition were noted. Weight loss is likely fluid, noting pt is negative 23.2 L since admission.        PO Diet: Diet Texture, Diabetic/Consistent Carbs; Diabetic - Consistent Carb; Pureed Diet; Thickened Liquids (Nectar)   PO Supplements: Novasource Renal q am    PO Intake:  % of meals, very good.        Nutrition support orders: N/A    Nutrition support review: N/A        Labs (reviewed below): K+, Mg, Phos WNL         GI Function:  + BM 11/4        Nutrition Intervention: Will DC Novasource Renal supplement given good PO intakes and results of NFPE.     Will continue to monitor.        Results from last 7 days   Lab Units 11/04/20  0431 11/03/20 0353 11/02/20  1618 11/02/20 0414   SODIUM mmol/L 138 136  --  135*   POTASSIUM mmol/L 5.2 4.3 4.9 5.4*   CHLORIDE mmol/L 95* 93*  --  93*   CO2 mmol/L 32.0* 31.0*  --  31.0*   BUN mg/dL 86* 84*  --  82*   CREATININE mg/dL 1.56* 1.53*  --  1.39*   CALCIUM mg/dL 9.6 9.2  --  9.7   GLUCOSE mg/dL 137* 227*  --  323*     Results from last 7 days   Lab Units 11/04/20  0431 11/03/20 0353 11/02/20 0414   MAGNESIUM mg/dL 2.1 2.1 2.1   PHOSPHORUS mg/dL 4.5 4.7* 4.1   HEMOGLOBIN g/dL 9.1* 9.1* 9.1*   HEMATOCRIT % 27.2* 26.3* 28.2*     COVID19   Date Value Ref Range Status   10/27/2020 Not Detected Not Detected - Ref. Range Final     Lab Results   Component Value Date    HGBA1C 9.6 (H)  10/11/2020       RD to follow up per protocol.    Electronically signed by:  Margarita Samuels RD  11/04/20 13:13 EST

## 2020-11-04 NOTE — PLAN OF CARE
Problem: Adult Inpatient Plan of Care  Goal: Plan of Care Review  Recent Flowsheet Documentation  Taken 11/4/2020 1513 by Dee Rivas OT  Outcome Summary: Patient is 69 yo female being followed for PNA, acute respiratory failure, Afib. Pt has just returned to bed prior to OT session, but is agreeable to work with OT. Pt completes bed mobility sit>supine with Mod A, and sits EOB >10 minutes.  Completes 6 sit>stand transfers with Mod A x2 and max verbal cuing for hand placement. Pt stands 15 - 60 seconds, and fatigues quickly. C/o minimal pain in hamstrings when standing. Patient declines transfer to chair for lunch, and requires Mod A x2 for return to supine. Cont to require IP rehab at discharge. PPE: Gloves, mask, eyewear

## 2020-11-05 ENCOUNTER — APPOINTMENT (OUTPATIENT)
Dept: GENERAL RADIOLOGY | Facility: HOSPITAL | Age: 70
End: 2020-11-05

## 2020-11-05 LAB
ANION GAP SERPL CALCULATED.3IONS-SCNC: 9 MMOL/L (ref 5–15)
ANISOCYTOSIS BLD QL: ABNORMAL
BASO STIPL COARSE BLD QL SMEAR: ABNORMAL
BUN SERPL-MCNC: 76 MG/DL (ref 8–23)
BUN/CREAT SERPL: 49.7 (ref 7–25)
CALCIUM SPEC-SCNC: 9.8 MG/DL (ref 8.6–10.5)
CHLORIDE SERPL-SCNC: 98 MMOL/L (ref 98–107)
CMV DNA SERPL NAA+PROBE-ACNC: 1490 IU/ML
CMV DNA SERPL NAA+PROBE-LOG IU: 3.17 LOG10 IU/ML
CMV IGG SERPL IA-ACNC: 6.4 U/ML (ref 0–0.59)
CMV IGM SERPL IA-ACNC: <30 AU/ML (ref 0–29.9)
CO2 SERPL-SCNC: 34 MMOL/L (ref 22–29)
CREAT SERPL-MCNC: 1.53 MG/DL (ref 0.57–1)
DACRYOCYTES BLD QL SMEAR: ABNORMAL
DEPRECATED RDW RBC AUTO: 63 FL (ref 37–54)
ERYTHROCYTE [DISTWIDTH] IN BLOOD BY AUTOMATED COUNT: 19.2 % (ref 12.3–15.4)
GFR SERPL CREATININE-BSD FRML MDRD: 33 ML/MIN/1.73
GLUCOSE BLDC GLUCOMTR-MCNC: 150 MG/DL (ref 70–105)
GLUCOSE BLDC GLUCOMTR-MCNC: 152 MG/DL (ref 70–105)
GLUCOSE BLDC GLUCOMTR-MCNC: 153 MG/DL (ref 70–105)
GLUCOSE BLDC GLUCOMTR-MCNC: 184 MG/DL (ref 70–105)
GLUCOSE BLDC GLUCOMTR-MCNC: 269 MG/DL (ref 70–105)
GLUCOSE SERPL-MCNC: 155 MG/DL (ref 65–99)
HCT VFR BLD AUTO: 26 % (ref 34–46.6)
HGB BLD-MCNC: 8.6 G/DL (ref 12–15.9)
LYMPHOCYTES # BLD MANUAL: 0.17 10*3/MM3 (ref 0.7–3.1)
LYMPHOCYTES NFR BLD MANUAL: 2 % (ref 19.6–45.3)
LYMPHOCYTES NFR BLD MANUAL: 4 % (ref 5–12)
MAGNESIUM SERPL-MCNC: 2.2 MG/DL (ref 1.6–2.4)
MCH RBC QN AUTO: 31.3 PG (ref 26.6–33)
MCHC RBC AUTO-ENTMCNC: 33.1 G/DL (ref 31.5–35.7)
MCV RBC AUTO: 94.6 FL (ref 79–97)
MONOCYTES # BLD AUTO: 0.35 10*3/MM3 (ref 0.1–0.9)
NEUTROPHILS # BLD AUTO: 8.18 10*3/MM3 (ref 1.7–7)
NEUTROPHILS NFR BLD MANUAL: 91 % (ref 42.7–76)
NEUTS BAND NFR BLD MANUAL: 3 % (ref 0–5)
NRBC SPEC MANUAL: 3 /100 WBC (ref 0–0.2)
PHOSPHATE SERPL-MCNC: 4.3 MG/DL (ref 2.5–4.5)
PLAT MORPH BLD: NORMAL
PLATELET # BLD AUTO: 140 10*3/MM3 (ref 140–450)
PMV BLD AUTO: 7.9 FL (ref 6–12)
POIKILOCYTOSIS BLD QL SMEAR: ABNORMAL
POLYCHROMASIA BLD QL SMEAR: ABNORMAL
POTASSIUM SERPL-SCNC: 4.3 MMOL/L (ref 3.5–5.2)
RBC # BLD AUTO: 2.75 10*6/MM3 (ref 3.77–5.28)
SCAN SLIDE: NORMAL
SODIUM SERPL-SCNC: 141 MMOL/L (ref 136–145)
WBC # BLD AUTO: 8.7 10*3/MM3 (ref 3.4–10.8)
WBC MORPH BLD: NORMAL

## 2020-11-05 PROCEDURE — 82962 GLUCOSE BLOOD TEST: CPT

## 2020-11-05 PROCEDURE — 92611 MOTION FLUOROSCOPY/SWALLOW: CPT

## 2020-11-05 PROCEDURE — 83735 ASSAY OF MAGNESIUM: CPT | Performed by: INTERNAL MEDICINE

## 2020-11-05 PROCEDURE — 85025 COMPLETE CBC W/AUTO DIFF WBC: CPT | Performed by: INTERNAL MEDICINE

## 2020-11-05 PROCEDURE — 74230 X-RAY XM SWLNG FUNCJ C+: CPT

## 2020-11-05 PROCEDURE — 97110 THERAPEUTIC EXERCISES: CPT

## 2020-11-05 PROCEDURE — 80048 BASIC METABOLIC PNL TOTAL CA: CPT | Performed by: INTERNAL MEDICINE

## 2020-11-05 PROCEDURE — 63710000001 INSULIN GLARGINE PER 5 UNITS: Performed by: HOSPITALIST

## 2020-11-05 PROCEDURE — 94799 UNLISTED PULMONARY SVC/PX: CPT

## 2020-11-05 PROCEDURE — 63710000001 INSULIN LISPRO (HUMAN) PER 5 UNITS: Performed by: INTERNAL MEDICINE

## 2020-11-05 PROCEDURE — 85007 BL SMEAR W/DIFF WBC COUNT: CPT | Performed by: INTERNAL MEDICINE

## 2020-11-05 PROCEDURE — 25010000002 METHYLPREDNISOLONE PER 40 MG: Performed by: INTERNAL MEDICINE

## 2020-11-05 PROCEDURE — 84100 ASSAY OF PHOSPHORUS: CPT | Performed by: INTERNAL MEDICINE

## 2020-11-05 PROCEDURE — 63710000001 PREDNISONE PER 1 MG: Performed by: NURSE PRACTITIONER

## 2020-11-05 PROCEDURE — 99232 SBSQ HOSP IP/OBS MODERATE 35: CPT | Performed by: HOSPITALIST

## 2020-11-05 RX ORDER — PREDNISONE 20 MG/1
20 TABLET ORAL 2 TIMES DAILY WITH MEALS
Status: DISCONTINUED | OUTPATIENT
Start: 2020-11-05 | End: 2020-11-06 | Stop reason: HOSPADM

## 2020-11-05 RX ADMIN — INSULIN GLARGINE 20 UNITS: 100 INJECTION, SOLUTION SUBCUTANEOUS at 09:31

## 2020-11-05 RX ADMIN — ATORVASTATIN CALCIUM 40 MG: 40 TABLET, FILM COATED ORAL at 20:54

## 2020-11-05 RX ADMIN — BARIUM SULFATE 50 ML: 400 SUSPENSION ORAL at 12:45

## 2020-11-05 RX ADMIN — BUMETANIDE 1 MG: 1 TABLET ORAL at 09:33

## 2020-11-05 RX ADMIN — INSULIN LISPRO 4 UNITS: 100 INJECTION, SOLUTION INTRAVENOUS; SUBCUTANEOUS at 17:54

## 2020-11-05 RX ADMIN — GABAPENTIN 100 MG: 100 CAPSULE ORAL at 14:06

## 2020-11-05 RX ADMIN — DIPHENHYDRAMINE HYDROCHLORIDE AND LIDOCAINE HYDROCHLORIDE AND ALUMINUM HYDROXIDE AND MAGNESIUM HYDRO 5 ML: KIT at 12:42

## 2020-11-05 RX ADMIN — INSULIN LISPRO 10 UNITS: 100 INJECTION, SOLUTION INTRAVENOUS; SUBCUTANEOUS at 00:04

## 2020-11-05 RX ADMIN — PANTOPRAZOLE SODIUM 40 MG: 40 TABLET, DELAYED RELEASE ORAL at 09:33

## 2020-11-05 RX ADMIN — DIPHENHYDRAMINE HYDROCHLORIDE AND LIDOCAINE HYDROCHLORIDE AND ALUMINUM HYDROXIDE AND MAGNESIUM HYDRO 5 ML: KIT at 17:53

## 2020-11-05 RX ADMIN — INSULIN LISPRO 4 UNITS: 100 INJECTION, SOLUTION INTRAVENOUS; SUBCUTANEOUS at 05:32

## 2020-11-05 RX ADMIN — Medication 10 ML: at 20:55

## 2020-11-05 RX ADMIN — Medication 10 ML: at 09:37

## 2020-11-05 RX ADMIN — INSULIN GLARGINE 20 UNITS: 100 INJECTION, SOLUTION SUBCUTANEOUS at 20:54

## 2020-11-05 RX ADMIN — SULFAMETHOXAZOLE AND TRIMETHOPRIM 320 MG: 800; 160 TABLET ORAL at 21:00

## 2020-11-05 RX ADMIN — GABAPENTIN 100 MG: 100 CAPSULE ORAL at 05:32

## 2020-11-05 RX ADMIN — SULFAMETHOXAZOLE AND TRIMETHOPRIM 320 MG: 800; 160 TABLET ORAL at 05:32

## 2020-11-05 RX ADMIN — INSULIN LISPRO 10 UNITS: 100 INJECTION, SOLUTION INTRAVENOUS; SUBCUTANEOUS at 05:32

## 2020-11-05 RX ADMIN — VALGANCICLOVIR 450 MG: 450 TABLET, FILM COATED ORAL at 09:34

## 2020-11-05 RX ADMIN — GABAPENTIN 100 MG: 100 CAPSULE ORAL at 21:00

## 2020-11-05 RX ADMIN — PREDNISONE 20 MG: 20 TABLET ORAL at 17:53

## 2020-11-05 RX ADMIN — ASPIRIN 81 MG CHEWABLE TABLET 81 MG: 81 TABLET CHEWABLE at 09:33

## 2020-11-05 RX ADMIN — METHYLPREDNISOLONE SODIUM SUCCINATE 40 MG: 40 INJECTION, POWDER, FOR SOLUTION INTRAMUSCULAR; INTRAVENOUS at 09:35

## 2020-11-05 RX ADMIN — INSULIN LISPRO 16 UNITS: 100 INJECTION, SOLUTION INTRAVENOUS; SUBCUTANEOUS at 00:03

## 2020-11-05 RX ADMIN — INSULIN LISPRO 10 UNITS: 100 INJECTION, SOLUTION INTRAVENOUS; SUBCUTANEOUS at 12:44

## 2020-11-05 RX ADMIN — SULFAMETHOXAZOLE AND TRIMETHOPRIM 320 MG: 800; 160 TABLET ORAL at 14:06

## 2020-11-05 RX ADMIN — BUDESONIDE INHALATION 0.5 MG: 0.5 SUSPENSION RESPIRATORY (INHALATION) at 20:26

## 2020-11-05 RX ADMIN — INSULIN LISPRO 12 UNITS: 100 INJECTION, SOLUTION INTRAVENOUS; SUBCUTANEOUS at 12:42

## 2020-11-05 RX ADMIN — METHYLPREDNISOLONE SODIUM SUCCINATE 40 MG: 40 INJECTION, POWDER, FOR SOLUTION INTRAMUSCULAR; INTRAVENOUS at 00:03

## 2020-11-05 RX ADMIN — INSULIN LISPRO 10 UNITS: 100 INJECTION, SOLUTION INTRAVENOUS; SUBCUTANEOUS at 17:54

## 2020-11-05 RX ADMIN — BARIUM SULFATE 183 ML: 960 POWDER, FOR SUSPENSION ORAL at 12:45

## 2020-11-05 RX ADMIN — Medication 10 ML: at 09:34

## 2020-11-05 RX ADMIN — DIPHENHYDRAMINE HYDROCHLORIDE AND LIDOCAINE HYDROCHLORIDE AND ALUMINUM HYDROXIDE AND MAGNESIUM HYDRO 5 ML: KIT at 05:32

## 2020-11-05 RX ADMIN — AMLODIPINE BESYLATE 10 MG: 5 TABLET ORAL at 09:34

## 2020-11-05 RX ADMIN — BUDESONIDE INHALATION 0.5 MG: 0.5 SUSPENSION RESPIRATORY (INHALATION) at 06:30

## 2020-11-05 NOTE — THERAPY TREATMENT NOTE
Patient Name: Lesa Julio  : 1950    MRN: [id number]                              Today's Date: 2020       Admit Date: 10/8/2020    Visit Dx:     ICD-10-CM ICD-9-CM   1. Pneumonia of right upper lobe due to infectious organism  J18.9 486   2. Dyspnea, unspecified type  R06.00 786.09   3. Fever, unspecified fever cause  R50.9 780.60   4. Sepsis, due to unspecified organism, unspecified whether acute organ dysfunction present (CMS/[facility])  A41.9 038.9     995.91   5. Acute renal insufficiency  N28.9 593.9   6. Hypotension, unspecified hypotension type  I95.9 458.9   7. Diarrhea, unspecified type  R19.7 787.91   8. Anemia, unspecified type  D64.9 285.9     Patient Active Problem List   Diagnosis   • Cardiac murmur   • Chest discomfort   • Chronic obstructive pulmonary disease (CMS/[facility])   • Chronic renal insufficiency, stage III (moderate)   • Diabetes mellitus, type 2 (CMS/HCC)   • Elevated LFTs   • Essential hypertension   • Osteoporosis   • Pain of hand   • Polyarthritis, inflammatory (CMS/HCC)   • Rheumatoid arthritis (CMS/HCC)   • Shortness of breath   • Sinus tachycardia   • Tobacco abuse   • Leukocytoclastic vasculitis (CMS/HCC)   • Vasculitis (CMS/HCC)   • Allergic rhinitis   • Central obesity   • Cyst of right ovary   • Fatigue   • Grade 4 out of 6 intensity murmur   • Hearing loss   • Mass of ovary   • Mixed hyperlipidemia   • Nonalcoholic fatty liver disease   • Obesity   • Obstructive sleep apnea syndrome   • Otorrhea   • Prurigo nodularis   • Secondary polycythemia   • Tobacco dependence syndrome   • Tubular adenoma of colon   • Uncontrolled type 2 diabetes mellitus (CMS/HCC)   • Vitamin D deficiency   • Well adult health check   • Hypersensitivity angiitis (CMS/HCC)   • Vascular insufficiency   • Hyperkalemia   • Localized edema   • Other proteinuria   • Secondary hyperparathyroidism of renal origin (CMS/HCC)   • Pneumonia of right upper lobe due to infectious organism   • Paroxysmal atrial  fibrillation (CMS/HCC)   • Anemia   • Oral thrush   • Upper GI bleed   • Hyperkalemia   • Acute respiratory failure with hypoxia and hypercapnia (CMS/HCC)     Past Medical History:   Diagnosis Date   • Arthritis    • Diabetes mellitus, type 2 (CMS/HCC)    • Essential hypertension 11/6/2015   • Hearing loss     hearing aid right side   • Hyperlipidemia    • Hypertension    • Leukocytoclastic vasculitis (CMS/HCC)    • Obesity    • Osteoarthritis    • Peripheral arterial disease (CMS/HCC)    • Sleep apnea      Past Surgical History:   Procedure Laterality Date   • BRONCHOSCOPY Bilateral 10/13/2020    Procedure: BRONCHOSCOPY AT BEDSIDE with right lung washing and elective intubation;  Surgeon: Cristhian Hodge MD;  Location: [facility] ENDOSCOPY;  Service: Pulmonary;  Laterality: Bilateral;  Post: pneumonia   • BRONCHOSCOPY Bilateral 10/20/2020    Procedure: BRONCHOSCOPY WITH BRONCHOALVEOLAR LAVAGE  AT BEDSIDE;  Surgeon: Cristhian Hodge MD;  Location: [facility] ENDOSCOPY;  Service: Pulmonary;  Laterality: Bilateral;  POST- PNEUMONIA   • BRONCHOSCOPY N/A 10/27/2020    Procedure: BRONCHOSCOPY with bronchial washing and intubation;  Surgeon: Cristhian Hodge MD;  Location: [facility] ENDOSCOPY;  Service: Pulmonary;  Laterality: N/A;  post op:pneumonia   • CHOLECYSTECTOMY     • ENDOSCOPY N/A 10/13/2020    Procedure: ESOPHAGOGASTRODUODENOSCOPY AT BEDSIDE;  Surgeon: Omi Rivera MD;  Location: [facility] ENDOSCOPY;  Service: Gastroenterology;  Laterality: N/A;   • LIVER BIOPSY     • TUBAL ABDOMINAL LIGATION       General Information     Row Name 11/05/20 5264          Physical Therapy Time and Intention    Document Type  therapy note (daily note)  -CR     Mode of Treatment  physical therapy  -CR     Row Name 11/05/20 7030          Cognition    Orientation Status (Cognition)  oriented x 4  -CR       User Key  (r) = Recorded By, (t) = Taken By, (c) = Cosigned By    Initials Name Provider Type    CR Reyes, Carmela, PT Physical Therapist        Mobility     No documentation.       Obj/Interventions     Row Name 11/05/20 1055          Motor Skills    Therapeutic Exercise  -- AROM BUE/LE in supine 10 reps x 2  -CR       User Key  (r) = Recorded By, (t) = Taken By, (c) = Cosigned By    Initials Name Provider Type    CR Reyes, Carmela, PT Physical Therapist        Goals/Plan    No documentation.       Clinical Impression     Row Name 11/05/20 1055          Pain    Additional Documentation  Pain Scale: Numbers Pre/Post-Treatment (Group)  -CR     Row Name 11/05/20 1055          Pain Scale: Numbers Pre/Post-Treatment    Pretreatment Pain Rating  0/10 - no pain  -CR     Posttreatment Pain Rating  0/10 - no pain  -CR     Row Name 11/05/20 1055          Plan of Care Review    Plan of Care Reviewed With  patient  -CR     Outcome Summary  Patient to have video swallow and RN requested to leave in bed. Patient performed supine LE ex to avail range with RPE 6/10 (hard) and UE ex with RPE 7/10. Pt getting disheartened by continued weakness and reporting of almost falling with nursing staff when transferring back to bed from chair yesterday. Pt needs extensive IP rehab services to promote inc inBLE mm strength to allow return to ambulatory status. PPE gloves, mask with shield, gown.  -CR     Row Name 11/05/20 1055          Positioning and Restraints    Pre-Treatment Position  in bed  -CR     Post Treatment Position  bed  -CR     In Bed  notified nsg;supine;call light within reach  -CR       User Key  (r) = Recorded By, (t) = Taken By, (c) = Cosigned By    Initials Name Provider Type    CR Reyes, Carmela, PT Physical Therapist        Outcome Measures     Row Name 11/05/20 1101          How much help from another person do you currently need...    Turning from your back to your side while in flat bed without using bedrails?  3  -CR     Moving from lying on back to sitting on the side of a flat bed without bedrails?  3  -CR     Moving to and from a bed to a chair (including a  wheelchair)?  2  -CR     Standing up from a chair using your arms (e.g., wheelchair, bedside chair)?  2  -CR     Climbing 3-5 steps with a railing?  1  -CR     To walk in hospital room?  1  -CR     AM-PAC 6 Clicks Score (PT)  12  -CR       User Key  (r) = Recorded By, (t) = Taken By, (c) = Cosigned By    Initials Name Provider Type    CR Reyes, Carmela, PT Physical Therapist        Physical Therapy Education                 Title: PT OT SLP Therapies (In Progress)     Topic: Physical Therapy (In Progress)     Point: Mobility training (In Progress)     Learning Progress Summary           Patient Acceptance, E, NR by CR at 11/5/2020 1102    Acceptance, E, VU,NR by CR at 11/4/2020 1137    Acceptance, E, NR by CR at 11/3/2020 1016    Acceptance, E, NR by CR at 11/2/2020 1236    Acceptance, E,TB, VU by CM at 10/30/2020 1305    Acceptance, E,TB, VU,NR by CM at 10/29/2020 1302    Acceptance, E, DU by CORONA at 10/29/2020 0315    Comment: Patient is unable to speak due to ET tube    Acceptance, E, VU by SC at 10/26/2020 1423    Acceptance, E,TB, VU by KW at 10/24/2020 0315    Acceptance, E, NR by HC at 10/23/2020 1249    Acceptance, E, NR by HC at 10/22/2020 1608                   Point: Precautions (Done)     Learning Progress Summary           Patient Acceptance, E,TB, VU by CM at 10/30/2020 1305    Acceptance, E,TB, VU,NR by CM at 10/29/2020 1302    Acceptance, E, DU by CORONA at 10/29/2020 0315    Comment: Patient is unable to speak due to ET tube    Acceptance, E, VU by SC at 10/26/2020 1423    Acceptance, E,TB, VU by KW at 10/24/2020 0315    Acceptance, E, NR by HC at 10/23/2020 1249    Acceptance, E, NR by HC at 10/22/2020 1608                               User Key     Initials Effective Dates Name Provider Type Discipline    HC 04/17/20 -  Alicia Al, PT Physical Therapist PT    CM 03/01/19 -  Aubrie Negro, PT Physical Therapist PT    CR 03/01/19 -  Reyes, Carmela, PT Physical Therapist PT    SC 03/01/19 -   Esperanza Guidry, PTA Physical Therapy Assistant PT    CORONA 03/01/19 -  Carolynn Mcghee, RN Registered Nurse Nurse    KW 06/24/19 -  Redd Arroyo LPN Licensed Nurse Nurse              PT Recommendation and Plan     Plan of Care Reviewed With: patient  Outcome Summary: Patient to have video swallow and RN requested to leave in bed. Patient performed supine LE ex to avail range with RPE 6/10 (hard) and UE ex with RPE 7/10. Pt getting disheartened by continued weakness and reporting of almost falling with nursing staff when transferring back to bed from chair yesterday. Pt needs extensive IP rehab services to promote inc inBLE mm strength to allow return to ambulatory status. PPE gloves, mask with shield, gown.     Time Calculation:   PT Charges     Row Name 11/05/20 1102             Time Calculation    Start Time  1039  -CR      Stop Time  1055  -CR      Time Calculation (min)  16 min  -CR      PT Received On  11/05/20  -CR      PT - Next Appointment  11/06/20  -CR         Time Calculation- PT    Total Timed Code Minutes- PT  16 minute(s)  -CR        User Key  (r) = Recorded By, (t) = Taken By, (c) = Cosigned By    Initials Name Provider Type    CR Reyes, Carmela, PT Physical Therapist        Therapy Charges for Today     Code Description Service Date Service Provider Modifiers Qty    04724882976 HC PT THER PROC EA 15 MIN 11/4/2020 Reyes, Carmela, PT GP 1    67690344718 HC PT THERAPEUTIC ACT EA 15 MIN 11/4/2020 Reyes, Carmela, PT GP 1    18588875482 HC PT THER PROC EA 15 MIN 11/5/2020 Reyes, Carmela, PT GP 1          PT G-Codes  Outcome Measure Options: AM-PAC 6 Clicks Daily Activity (OT), Modified [place]  AM-PAC 6 Clicks Score (PT): 12  AM-PAC 6 Clicks Score (OT): 10  Modified [place] Scale: 4 - Moderately severe disability.  Unable to walk without assistance, and unable to attend to own bodily needs without assistance.    Carmela Reyes, PT  11/5/2020

## 2020-11-05 NOTE — MBS/VFSS/FEES
Acute Care - Speech Language Pathology   Swallow Re-Evaluation  Manny     Patient Name: Lesa Julio  : 1950  MRN: [id number]  Today's Date: 2020               Admit Date: 10/8/2020    Visit Dx:     ICD-10-CM ICD-9-CM   1. Pneumonia of right upper lobe due to infectious organism  J18.9 486   2. Dyspnea, unspecified type  R06.00 786.09   3. Fever, unspecified fever cause  R50.9 780.60   4. Sepsis, due to unspecified organism, unspecified whether acute organ dysfunction present (CMS/HCC)  A41.9 038.9     995.91   5. Acute renal insufficiency  N28.9 593.9   6. Hypotension, unspecified hypotension type  I95.9 458.9   7. Diarrhea, unspecified type  R19.7 787.91   8. Anemia, unspecified type  D64.9 285.9     Patient Active Problem List   Diagnosis   • Cardiac murmur   • Chest discomfort   • Chronic obstructive pulmonary disease (CMS/HCC)   • Chronic renal insufficiency, stage III (moderate)   • Diabetes mellitus, type 2 (CMS/HCC)   • Elevated LFTs   • Essential hypertension   • Osteoporosis   • Pain of hand   • Polyarthritis, inflammatory (CMS/HCC)   • Rheumatoid arthritis (CMS/HCC)   • Shortness of breath   • Sinus tachycardia   • Tobacco abuse   • Leukocytoclastic vasculitis (CMS/HCC)   • Vasculitis (CMS/HCC)   • Allergic rhinitis   • Central obesity   • Cyst of right ovary   • Fatigue   • Grade 4 out of 6 intensity murmur   • Hearing loss   • Mass of ovary   • Mixed hyperlipidemia   • Nonalcoholic fatty liver disease   • Obesity   • Obstructive sleep apnea syndrome   • Otorrhea   • Prurigo nodularis   • Secondary polycythemia   • Tobacco dependence syndrome   • Tubular adenoma of colon   • Uncontrolled type 2 diabetes mellitus (CMS/HCC)   • Vitamin D deficiency   • Well adult health check   • Hypersensitivity angiitis (CMS/HCC)   • Vascular insufficiency   • Hyperkalemia   • Localized edema   • Other proteinuria   • Secondary hyperparathyroidism of renal origin (CMS/HCC)   • Pneumonia of right upper  lobe due to infectious organism   • Paroxysmal atrial fibrillation (CMS/HCC)   • Anemia   • Oral thrush   • Upper GI bleed   • Hyperkalemia   • Acute respiratory failure with hypoxia and hypercapnia (CMS/HCC)     Past Medical History:   Diagnosis Date   • Arthritis    • Diabetes mellitus, type 2 (CMS/HCC)    • Essential hypertension 11/6/2015   • Hearing loss     hearing aid right side   • Hyperlipidemia    • Hypertension    • Leukocytoclastic vasculitis (CMS/HCC)    • Obesity    • Osteoarthritis    • Peripheral arterial disease (CMS/HCC)    • Sleep apnea      Past Surgical History:   Procedure Laterality Date   • BRONCHOSCOPY Bilateral 10/13/2020    Procedure: BRONCHOSCOPY AT BEDSIDE with right lung washing and elective intubation;  Surgeon: Cristhian Hodge MD;  Location: [facility] ENDOSCOPY;  Service: Pulmonary;  Laterality: Bilateral;  Post: pneumonia   • BRONCHOSCOPY Bilateral 10/20/2020    Procedure: BRONCHOSCOPY WITH BRONCHOALVEOLAR LAVAGE  AT BEDSIDE;  Surgeon: Cristhian Hodge MD;  Location: [facility] ENDOSCOPY;  Service: Pulmonary;  Laterality: Bilateral;  POST- PNEUMONIA   • BRONCHOSCOPY N/A 10/27/2020    Procedure: BRONCHOSCOPY with bronchial washing and intubation;  Surgeon: Cristhian Hodge MD;  Location: [facility] ENDOSCOPY;  Service: Pulmonary;  Laterality: N/A;  post op:pneumonia   • CHOLECYSTECTOMY     • ENDOSCOPY N/A 10/13/2020    Procedure: ESOPHAGOGASTRODUODENOSCOPY AT BEDSIDE;  Surgeon: Omi Rivera MD;  Location: [facility] ENDOSCOPY;  Service: Gastroenterology;  Laterality: N/A;   • LIVER BIOPSY     • TUBAL ABDOMINAL LIGATION          SWALLOW EVALUATION (last 72 hours)      SLP Adult Swallow Evaluation     Row Name 11/05/20 1400       Rehab Evaluation    Document Type  re-evaluation  -MM    Subjective Information  no complaints  -MM    Patient Observations  alert;cooperative;agree to therapy  -MM    Patient/Family/Caregiver Comments/Observations  Patient is hoping to discharge today or tomorrow.  Patient reports all  she's been wanting is water because her mouth has been so dry  -MM    Care Plan Review  evaluation/treatment results reviewed;care plan/treatment goals reviewed  -MM    Patient Effort  good  -MM    Comment  PPE: gloves, mask, faceshield  -MM    Symptoms Noted During/After Treatment  --       General Information    Patient Profile Reviewed  yes  -MM    Pertinent History Of Current Problem  --    Current Method of Nutrition  soft textures;nectar/syrup-thick liquids  -MM    Precautions/Limitations, Vision  --    Precautions/Limitations, Hearing  --    Prior Level of Function-Communication  WFL  -MM    Prior Level of Function-Swallowing  no diet consistency restrictions  -MM    Plans/Goals Discussed with  patient  -MM    Barriers to Rehab  none identified  -MM       Pain Scale: Numbers Pre/Post-Treatment    Pretreatment Pain Rating  --    Posttreatment Pain Rating  --       Oral Motor Structure and Function    Dentition Assessment  upper dentures/partial in place;lower dentures/partial in place  -MM    Secretion Management  WNL/WFL  -MM    Mucosal Quality  --    Volitional Swallow  WFL  -MM    Volitional Cough  WFL  -MM       Oral Musculature and Cranial Nerve Assessment    Oral Motor General Assessment  WFL  -MM    Oral Motor, Comment  --       General Eating/Swallowing Observations    Respiratory Support Currently in Use  nasal cannula 5L  -MM    Eating/Swallowing Skills  self-fed;fed by SLP  -MM    Positioning During Eating  upright 90 degree;upright in chair  -MM       MBS/VFSS    Utensils Used  spoon;cup  -MM    Consistencies Trialed  chopped;pureed;thin liquids;nectar/syrup-thick liquids  -MM       MBS/VFSS Interpretation    Oral Prep Phase  WFL  -MM    Oral Transit Phase  impaired  -MM    Oral Residue  WFL  -MM    VFSS Summary  THIN:  Patient given trials of thin barium to conclude the evaluation.  On initial trial by cup, patient's head is in a neutral position.  Premature spillage to the pyriform sinus with  deep penetration prior to the swallow is noted.  Swallow initiation is timely and no new material is penetrated however preivously penetrated material is not fully cleared.  Patient cued to utilize a chin tuck for an additional two swallows.  On first trial, patient again has premature spillage entering laryngeal vestibule resulting in penetration prior to the swallow that does not clear.  On second trial with chin tuck there is no premature spillage however penetration occurs during the swallow secondary to incomplete laryngeal vestibule closure.    NTL:  Patient given two trials of NTL by cup to initiate the evaluation.  Spillage of bolus head reaches the vallecuale prior to swallow initiation.  No penetration or aspiration appreciated.   PUREE:  Patient given two trials of applesauce.  Bolus head again reaches the vallecuale prior to swallow initiation.  No penetration, aspiration or pharyngeal residue noted.  PEACHES:  Patient exhibits slightly prolonged mastication.  Mild spillage of juice reaches the valleculae.  Upon swallow initiation, there is no penetration, aspiration or pharyngeal residue.    OVERALL:  Patient presents with decreased base of tongue strength, laryngeal elevation and hyoid excursion which are negatively impacting swallowing function.  -MM       Oral Transit Phase    Impaired Oral Transit Phase  premature spillage of liquids into pharynx  -MM    Premature Spillage of Liquids into Pharynx  thin liquids;nectar-thick liquids;mixed consistency  -MM    Oral Transit Phase, Comment  Premature spillage of thin liquids results in penetration prior to swallow.  Spillage of NTL only reaches the valleculae and swallow initiation is quickly triggered following this.  Spillage of juice from peaches reaches the valleculae  -MM       Initiation of Pharyngeal Swallow    Initiation of Pharyngeal Swallow  bolus in valleculae  -MM    Pharyngeal Phase  impaired pharyngeal phase of swallowing  -MM    Penetration  Before the Swallow  thin liquids;secondary to reduced back of tongue control  -MM    Penetration During the Swallow  thin liquids;secondary to reduced laryngeal elevation;secondary to reduced vestibular closure  -MM    Response to Penetration  deep  -MM    Rosenbek's Scale  thin:;3--->level 3  -MM    Attempted Compensatory Maneuvers  chin tuck  -MM    Response to Attempted Compensatory Maneuvers  did not prevent penetration  -MM    Pharyngeal Phase, Comment  --       Clinical Impression    Daily Summary of Progress (SLP)  progress toward functional goals is good  -MM    SLP Swallowing Diagnosis  mild-moderate;oral dysphagia;pharyngeal dysphagia  -MM    Functional Impact  risk of aspiration/pneumonia  -MM    Rehab Potential/Prognosis, Swallowing  good, to achieve stated therapy goals  -MM    Swallow Criteria for Skilled Therapeutic Interventions Met  demonstrates skilled criteria  -MM       Recommendations    Therapy Frequency (Swallow)  PRN  -MM    Predicted Duration Therapy Intervention (Days)  until discharge  -MM    SLP Diet Recommendation  soft textures;nectar thick liquids;ice chips between meals after oral care, with supervision  -MM    Recommended Diagnostics  --    Recommended Precautions and Strategies  upright posture during/after eating;small bites of food and sips of liquid;alternate between small bites of food and sips of liquid;check mouth frequently for oral residue/pocketing;assist with feeding  -MM    Oral Care Recommendations  Oral Care BID/PRN  -MM    SLP Rec. for Method of Medication Administration  meds whole;with thick liquids;as tolerated  -MM    Monitor for Signs of Aspiration  yes;notify SLP if any concerns;cough  -MM    Anticipated Discharge Disposition (SLP)  inpatient rehabilitation facility  -MM       Swallow Goals (SLP)    Oral Nutrition/Hydration Goal Selection (SLP)  oral nutrition/hydration, SLP goal 1;oral nutrition/hydration, SLP goal 2;oral nutrition/hydration, SLP goal (free  text)  -MM    Pharyngeal Strengthening Exercise Goal Selection (SLP)  --    Additional Documentation  --       Oral Nutrition/Hydration Goal 1 (SLP)    Oral Nutrition/Hydration Goal 1, SLP  Pt will tolerate least restrictive diet with no complications from aspiration  -MM    Time Frame (Oral Nutrition/Hydration Goal 1, SLP)  by discharge  -MM    Barriers (Oral Nutrition/Hydration Goal 1, SLP)  --    Progress/Outcomes (Oral Nutrition/Hydration Goal 1, SLP)  --       Oral Nutrition/Hydration Goal 2 (SLP)    Oral Nutrition/Hydration Goal 2, SLP  Pt will undergo re-evaluation of swallow in form of VFSS as appropriate  -MM    Time Frame (Oral Nutrition/Hydration Goal 2, SLP)  1 week  -MM    Barriers (Oral Nutrition/Hydration Goal 2, SLP)  see above note  -MM    Progress/Outcomes (Oral Nutrition/Hydration Goal 2, SLP)  goal met  -MM       Oral Nutrition/Hydration Goal (SLP)    Oral Nutrition/Hydration Goal, SLP  Patient will be seen at a meal within 24-48 hours to assess tolerance of current diet with further recommendations to be given as indicated  -MM    Time Frame (Oral Nutrition/Hydration Goal, SLP)  --    Barriers (Oral Nutrition/Hydration Goal, SLP)  --         User Key  (r) = Recorded By, (t) = Taken By, (c) = Cosigned By    Initials Name Effective Dates    Zoey Lagunas, SLP 03/01/19 -     Yoselin Kumar, SLP 03/01/19 -     Bridget Thompson, SLP 03/01/19 -           EDUCATION  The patient has been educated in the following areas:   Dysphagia (Swallowing Impairment) Modified Diet Instruction.    SLP Recommendation and Plan  SLP Swallowing Diagnosis: mild-moderate, oral dysphagia, pharyngeal dysphagia  SLP Diet Recommendation: soft textures, nectar thick liquids, ice chips between meals after oral care, with supervision  Recommended Precautions and Strategies: upright posture during/after eating, small bites of food and sips of liquid, alternate between small bites of food and sips of liquid, check mouth  frequently for oral residue/pocketing, assist with feeding  SLP Rec. for Method of Medication Administration: meds whole, with thick liquids, as tolerated     Monitor for Signs of Aspiration: yes, notify SLP if any concerns, cough     Swallow Criteria for Skilled Therapeutic Interventions Met: demonstrates skilled criteria  Anticipated Discharge Disposition (SLP): inpatient rehabilitation facility  Rehab Potential/Prognosis, Swallowing: good, to achieve stated therapy goals  Therapy Frequency (Swallow): PRN  Predicted Duration Therapy Intervention (Days): until discharge     Daily Summary of Progress (SLP): progress toward functional goals is good                   Plan of Care Reviewed With: patient  Outcome Summary: Patient seen for re-evaluation of swallow function.  Patient alert and oriented, sitting in chair with noted improved vocal quality.  Patient assessed with trials of ice chips, water by spoon, cup and straw, applesauce, peaches and cracker.  Timely oral transit noted with functional mastication and no oral residue.  Swallow initiation is timely with no overt s/s of aspiration.  Patient has clear vocal quality throughout with no changein O2 sats.  Recommend a regular and thin liquid diet.  ST will follow up at a meal to ensure tolerance of current diet.    SLP GOALS     Row Name 11/05/20 1400 11/04/20 1500 11/03/20 1100       Oral Nutrition/Hydration Goal 1 (SLP)    Oral Nutrition/Hydration Goal 1, SLP  Pt will tolerate least restrictive diet with no complications from aspiration  -MM  Pt will tolerate least restrictive diet with no complications from aspiration  -LF  Pt will tolerate least restrictive diet with no complications from aspiration  -MC    Time Frame (Oral Nutrition/Hydration Goal 1, SLP)  by discharge  -MM  by discharge  -LF  by discharge  -    Barriers (Oral Nutrition/Hydration Goal 1, SLP)  --  Pt tolerating puree/NTL diet. Recommed upgrade pt's diet to soft to chew/NTL  -LF   puree  "diet with NTL established yesterday following VFSS with deep non-transient penetration & suspected silent aspiration thin liquids. VFSS results & evidence to support current diet were reviewed with pt, son, & RN this date. Pt expressed confusion \"I don't know how it could be going down the wrong way.\" Silent aspiration in the setting of prolonged intubation was explained, with pt & son verbalizing understanding.   -    Progress/Outcomes (Oral Nutrition/Hydration Goal 1, SLP)  --  good progress toward goal  -LF  goal revised this date  -       Oral Nutrition/Hydration Goal 2 (SLP)    Oral Nutrition/Hydration Goal 2, SLP  Pt will undergo re-evaluation of swallow in form of VFSS as appropriate  -MM  Pt will undergo re-evaluation of swallow in form of VFSS as appropriate  -LF  Pt will undergo re-evaluation of swallow in form of VFSS as appropriate  -    Time Frame (Oral Nutrition/Hydration Goal 2, SLP)  1 week  -MM  1 week  -LF  --    Barriers (Oral Nutrition/Hydration Goal 2, SLP)  see above note  -MM  --  Recommend repeat VFSS prior to diet advancement from thin liquids.May be appropriate to advance to solids at bedside with considerations to include respiratory status  -    Progress/Outcomes (Oral Nutrition/Hydration Goal 2, SLP)  goal met  -MM  goal ongoing  -LF  goal ongoing  -MC       Oral Nutrition/Hydration Goal (SLP)    Oral Nutrition/Hydration Goal, SLP  Patient will be seen at a meal within 24-48 hours to assess tolerance of current diet with further recommendations to be given as indicated  -MM  Patient will be seen at a meal within 24-48 hours to assess tolerance of current diet with further recommendations to be given as indicated  -LF  Patient will be seen at a meal within 24-48 hours to assess tolerance of current diet with further recommendations to be given as indicated  -    Time Frame (Oral Nutrition/Hydration Goal, SLP)  --  1 day;2 days  -LF  1 day;2 days  -MC    Barriers (Oral " Nutrition/Hydration Goal, SLP)  --  Pt participated in trials of soft to chew this date. Pt displayed extended but functional mastication with no oral pocketing or residue. No clinical s/s of aspiraiton observed at any time.  -LF  Pt seen following completion of breakfast meal. Pt consumed V8 juice by straw. 02 monitor not on upon SLP arrival. She was amenable to replace this and 02 sats fluctuated between 87-94. Sats were at 94 at end of session.   -MC    Progress/Outcomes (Oral Nutrition/Hydration Goal, SLP)  --  good progress toward goal;goal ongoing  -LF  good progress toward goal;goal ongoing  -    Row Name 11/02/20 1500             Oral Nutrition/Hydration Goal 1 (SLP)    Oral Nutrition/Hydration Goal 1, SLP  Establish PO diet  -SM      Time Frame (Oral Nutrition/Hydration Goal 1, SLP)  by discharge  -SM      Barriers (Oral Nutrition/Hydration Goal 1, SLP)  ST recommends pt initiate a puree diet with NTL at this time.   -SM      Progress/Outcomes (Oral Nutrition/Hydration Goal 1, SLP)  goal met;goal revised this date  -SM         Oral Nutrition/Hydration Goal 2 (SLP)    Oral Nutrition/Hydration Goal 2, SLP  Pt will undergo re-evaluation of swallow in form of VFSS as appropriate  -SM      Time Frame (Oral Nutrition/Hydration Goal 2, SLP)  1 week  -SM      Barriers (Oral Nutrition/Hydration Goal 2, SLP)  VFSS completed this date. ST recommended po diet. PT will need re-evaluation at bedside and will require repeat instrumental assessment of swallow prior to upgrading liquids.   -SM      Progress/Outcomes (Oral Nutrition/Hydration Goal 2, SLP)  goal ongoing  -SM         Oral Nutrition/Hydration Goal (SLP)    Oral Nutrition/Hydration Goal, SLP  Patient will be seen at a meal within 24-48 hours to assess tolerance of current diet with further recommendations to be given as indicated  -SM      Time Frame (Oral Nutrition/Hydration Goal, SLP)  1 day;2 days  -SM      Barriers (Oral Nutrition/Hydration Goal, SLP)  The  patient will be seen during established noon meal to assess safety and adequacy of recommended diet, and for trials of soft solid for possible diet upgrade.   -SM         Pharyngeal Strengthening Exercise Goal 1 (SLP)    Activity (Pharyngeal Strengthening Goal 1, SLP)  increase timing  -SM      Increase Timing  prepping - 3 second prep or suck swallow or 3-step swallow  -SM      [place]/Accuracy (Pharyngeal Strengthening Goal 1, SLP)  with moderate cues (50-74% accuracy);with maximum cues (25-49% accuracy)  -SM      Time Frame (Pharyngeal Strengthening Goal 1, SLP)  1 week  -SM        User Key  (r) = Recorded By, (t) = Taken By, (c) = Cosigned By    Initials Name Provider Type    SM Zoey Carvajal, SLP Speech and Language Pathologist    Yoselin Kumar, SLP Speech and Language Pathologist    Bridget Thompson, SLP Speech and Language Pathologist    LF Kenzie Zavala, SLP Speech and Language Pathologist             Time Calculation:                AMENA Lozada  11/5/2020

## 2020-11-05 NOTE — PROGRESS NOTES
-  PROGRESS NOTE      Patient Name: Lesa Julio  : 1950  MRN: [id number]  Primary Care Physician: Manuel Sanders MD  Date of admission: 10/8/2020    Patient Care Team:  Manuel Sanders MD as PCP - General        Subjective   Patient seen and examined, she denies any new complaints, no chest pain or shortness of breath.  Subjective:       Review of systems:  Review of system unremarkable      Allergies:    Allergies   Allergen Reactions   • Lisinopril Other (See Comments)   • Ipratropium-Albuterol Itching and Rash       Objective   Exam:     Vital Signs  Temp:  [97.4 °F (36.3 °C)-97.7 °F (36.5 °C)] 97.6 °F (36.4 °C)  Heart Rate:  [] 75  Resp:  [17-20] 18  BP: (127-160)/(46-74) 160/53  SpO2:  [90 %-93 %] 92 %  on  Flow (L/min):  [5-6] 6;   Device (Oxygen Therapy): high-flow nasal cannula;humidified  Body mass index is 35.13 kg/m².     General:  Elderly female no acute distress  Head:      Normocephalic and atraumatic.    Eyes:      PERRL/EOM intact, conjunctiva and sclera clear with out nystagmus.    Neck:      No masses, thyromegaly,  trachea central with normal respiratory effort   Lungs:    Bilateral crackles left greater than right.    Heart:      Regular rate and rhythm, no murmur no gallop  Abd:        Soft, nontender, not distended, bowel sounds positive, no shifting dullness   Pulses:   Pulses palpable  Extr:        No cyanosis or clubbing--+1 edema.    Neuro:     Alert awake  skin:       Intact without lesions or rashes.    Psych:     Alert awake.      results Review:  I have personally reviewed most recent Data :  CBC    Results from last 7 days   Lab Units 20  0321 20  0431 20  0353 20  0414 20  0610 10/31/20  0312 10/30/20  0418   WBC 10*3/mm3 8.70 10.80 10.40 11.30* 10.10 8.60 5.90   HEMOGLOBIN g/dL 8.6* 9.1* 9.1* 9.1* 9.3* 8.5* 7.8*   PLATELETS 10*3/mm3 140 156 174 187 204 197 187     CMP   Results from last 7 days   Lab Units  11/05/20  0321 11/04/20  0431 11/03/20  0353 11/02/20  1618 11/02/20  0414 11/01/20  1608 11/01/20  0610  10/31/20  0312 10/30/20  0418   SODIUM mmol/L 141 138 136  --  135*  --  136  --  140 136   POTASSIUM mmol/L 4.3 5.2 4.3 4.9 5.4* 5.2 5.4*   < > 5.2 4.8   CHLORIDE mmol/L 98 95* 93*  --  93*  --  95*  --  100 99   CO2 mmol/L 34.0* 32.0* 31.0*  --  31.0*  --  32.0*  --  31.0* 29.0   BUN mg/dL 76* 86* 84*  --  82*  --  68*  --  50* 42*   CREATININE mg/dL 1.53* 1.56* 1.53*  --  1.39*  --  1.29*  --  1.26* 1.25*   GLUCOSE mg/dL 155* 137* 227*  --  323*  --  394*  --  218* 355*    < > = values in this interval not displayed.     ABG    Results from last 7 days   Lab Units 10/30/20  1434 10/30/20  0759   PH, ARTERIAL pH units 7.416 7.403   PCO2, ARTERIAL mm Hg 46.4 48.4*   PO2 ART mm Hg 67.3* 69.7*   O2 SATURATION ART % 93.1* 93.5*   BASE EXCESS ART mmol/L 4.7* 4.8*     Fl Video Swallow With Speech Single Contrast    Result Date: 11/2/2020  As above. Please see speech pathology report for detailed evaluation and dietary recommendations.  Electronically Signed ByJeffrey Espitia On:11/2/2020 12:30 PM This report was finalized on [id number] by  Jin Herrmann    Xr Chest 1 View    Result Date: 11/2/2020  1. Unchanged right midlung airspace consolidation, similar to the prior. 2. Support devices in expected position.  Electronically Signed ByJeffrey Espitia On:11/2/2020 7:47 AM This report was finalized on [id number] by  Leonel Nupur, .    Xr Chest 1 View    Result Date: 11/1/2020  Improved aeration in the right lung with persistent partial right lower lobe atelectasis. Improving consolidation in the inferior right upper lobe. Support lines and tubes are unchanged.   Electronically Signed By-Robert Norris DO. On:11/1/2020 8:10 AM This report was finalized on [id number] by  Robert Norris DO..    Xr Chest 1 View    Result Date: 10/31/2020   1. Interval extubation. 2. Stable esophagogastric tube and  left upper extremity PICC line. 3. No change in right midlung and right basilar airspace disease.  Electronically Signed By-Sandeep King On:10/31/2020 7:27 AM This report was finalized on [id number] by  Sandeep King, .    Xr Chest 1 View    Result Date: 10/30/2020  1.Endotracheal tube in good position. 2.Consolidation within right midlung appears unchanged.  Electronically Signed By-DR. Onofre Nicholas MD On:10/30/2020 7:39 AM This report was finalized on [id number] by DR. Onofre Nicholas MD.    Xr Chest 1 View    Result Date: 10/29/2020   1. Improved right lung aeration with persistent right upper lobe airspace opacity.  Electronically Signed By-Chriss Rider On:10/29/2020 7:31 AM This report was finalized on [id number] by  Chriss Rider, .    Xr Abdomen Kub    Result Date: 10/28/2020  The radiopaque tip of the Dobbhoff tube projects to the proximal gastric body level.  Electronically Signed By-Dr. Genesis Segovia MD On:10/28/2020 1:48 PM This report was finalized on [id number] by Dr. Genesis Segovia MD.      Results for orders placed during the hospital encounter of 10/08/20   Adult Transthoracic Echo Complete W/ Cont if Necessary Per Protocol    Narrative · Estimated left ventricular EF = 65% Left ventricular systolic function   is normal.  · Trace to mild mitral valve regurgitation is present.  · There is a small (<1cm) pericardial effusion.        Scheduled Meds:amLODIPine, 10 mg, Oral, Q24H  aspirin, 81 mg, Oral, Daily  atorvastatin, 40 mg, Oral, Daily  Barium Sulfate, 1 teaspoon(s), Oral, Once in imaging  bisacodyl, 10 mg, Rectal, Once  budesonide, 0.5 mg, Nebulization, BID - RT  bumetanide, 1 mg, Oral, Daily  First Mouthwash (Magic Mouthwash), 5 mL, Swish & Spit, Q6H  gabapentin, 100 mg, Oral, Q8H  insulin glargine, 20 Units, Subcutaneous, Q12H  insulin lispro, 0-24 Units, Subcutaneous, Q6H  insulin lispro, 10 Units, Subcutaneous, Q6H  methylPREDNISolone sodium succinate, 40 mg, Intravenous,  Q8H  pantoprazole, 40 mg, Oral, QAM AC  sodium chloride, 10 mL, Intravenous, Q12H  sodium chloride, 10 mL, Intravenous, Q12H  sodium chloride, 10 mL, Intravenous, Q12H  sodium chloride, 10 mL, Intravenous, Q12H  sulfamethoxazole-trimethoprim, 2 tablet, Oral, Q8H  valGANciclovir, 450 mg, Oral, Daily With Breakfast      Continuous Infusions:   PRN Meds:•  acetaminophen  •  Calcium Gluconate-NaCl **AND** calcium gluconate **AND** Calcium, Ionized  •  dextrose  •  dextrose  •  glucagon (human recombinant)  •  guaifenesin-dextromethorphan  •  influenza vaccine  •  insulin lispro **AND** insulin lispro  •  lidocaine  •  lidocaine  •  magnesium sulfate **OR** magnesium sulfate **OR** magnesium sulfate  •  potassium chloride  •  sodium chloride  •  sodium chloride  •  sodium chloride  •  sodium chloride    Assessment/Plan   Assessment and Plan:         Pneumonia of right upper lobe due to infectious organism    Cardiac murmur    Chronic renal insufficiency, stage III (moderate)    Diabetes mellitus, type 2 (CMS/HCC)    Essential hypertension    Shortness of breath    Tobacco abuse    Hearing loss    Paroxysmal atrial fibrillation (CMS/HCC)    Anemia    Oral thrush    Upper GI bleed    Hyperkalemia    Acute respiratory failure with hypoxia and hypercapnia (CMS/HCC)     ASSESSMENT:  · Acute kidney injury likely ATN likely related to sepsis patient baseline creatinine is 0.9 back in June 2019, FENa 2.7  · Metabolic acidosis with increased lactic acid level  · Right upper lobe pneumonia  · History of rheumatoid arthritis  · History of hypertension  · History of vasculitis  · Hypernatremia  · Acute GI bleed with emesis  · Chronic kidney disease stage III a likely with GFR around 50 mL/min at baseline           Plan:        · Renal functions continue to be stable  · Potassium level is much better  · Continue low-dose Bumex  · Patient is on valganciclovir and also on Bactrim both can cause increase in creatinine  · Follow-up with  repeat labs tomorrow morning  · Patient is still on high-dose steroid causing some azotemia which is slightly better than yesterday  · Blood pressure is acceptable  · Sodium level acceptable  · resp condition stable   · Will follow         Note started  by Serjio Melara MD,   [facility] kidney consultant

## 2020-11-05 NOTE — PLAN OF CARE
Problem: Adult Inpatient Plan of Care  Goal: Plan of Care Review  Recent Flowsheet Documentation  Taken 11/5/2020 1055 by Reyes, Carmela, PT  Plan of Care Reviewed With: patient  Outcome Summary: Patient to have video swallow and RN requested to leave in bed. Patient performed supine LE ex to avail range with RPE 6/10 (hard) and UE ex with RPE 7/10. Pt getting disheartened by continued weakness and reporting of almost falling with nursing staff when transferring back to bed from chair yesterday. Pt needs extensive IP rehab services to promote inc inBLE mm strength to allow return to ambulatory status. PPE gloves, mask with shield, gown.

## 2020-11-05 NOTE — PROGRESS NOTES
"  Chief complaint shortness of breath      Subjective     Slowly improving wanting to drink water she is on nectar thick liquids at this time.  Having another speech evaluation today.      Objective     Vital Signs  Visit Vitals  BP (!) 135/39   Pulse 93   Temp 97.7 °F (36.5 °C)   Resp 18   Ht 152.4 cm (60\")   Wt 81.6 kg (179 lb 14.3 oz)   SpO2 96%   BMI 35.13 kg/m²       Physical Exam:  Physical Exam   Constitutional:  oriented to person, place, and time. No distress.   HENT: NG in place  Head: Normocephalic and atraumatic.   Eyes: Conjunctivae and EOM are normal. Pupils are equal, round, and reactive to light.   Neck: No JVD present. No thyromegaly present.   Cardiovascular: Normal rate, regular rhythm, normal heart sounds and intact distal pulses. Exam reveals no gallop and no friction rub.   No murmur heard.  Pulmonary/Chest: Effort normal and breath sounds normal. No stridor. No respiratory distress.  has no wheezes.  has no rales.  exhibits no tenderness.   Abdominal: Soft. Bowel sounds are normal.  no distension and no mass. There is no tenderness. There is no rebound and no guarding. No hernia.   Musculoskeletal: Normal range of motion.   Lymphadenopathy:     no cervical adenopathy.   Neurological:  alert and oriented to person, place, and time. No cranial nerve deficit or sensory deficit. exhibits normal muscle tone.   Skin: No rash noted.  not diaphoretic.   Psychiatric:  normal mood and affect.   Vitals reviewed.    Physical Exam          Results Review:    CMP:  Lab Results   Component Value Date    BUN 76 (H) 11/05/2020    CREATININE 1.53 (H) 11/05/2020    EGFRIFNONA 33 (L) 11/05/2020    EGFRIFAFRI  10/21/2020      Comment:      <15 Indicative of kidney failure.     11/05/2020    K 4.3 11/05/2020    CL 98 11/05/2020    CALCIUM 9.8 11/05/2020    ALBUMIN 2.50 (L) 10/22/2020    BILITOT 0.2 10/22/2020    ALKPHOS 245 (H) 10/22/2020    AST 51 (H) 10/22/2020    ALT 44 (H) 10/22/2020     CBC:  Lab Results "   Component Value Date    WBC 8.70 11/05/2020    RBC 2.75 (L) 11/05/2020    HGB 8.6 (L) 11/05/2020    HCT 26.0 (L) 11/05/2020    MCV 94.6 11/05/2020    MCH 31.3 11/05/2020    MCHC 33.1 11/05/2020    RDW 19.2 (H) 11/05/2020     11/05/2020         Medication Review:     Scheduled Meds:amLODIPine, 10 mg, Oral, Q24H  aspirin, 81 mg, Oral, Daily  atorvastatin, 40 mg, Oral, Daily  [COMPLETED] barium sulfate, 183 mL, Oral, Once in imaging  Barium Sulfate, 1 teaspoon(s), Oral, Once in imaging  [COMPLETED] barium sulfate, 50 mL, Oral, Once in imaging  bisacodyl, 10 mg, Rectal, Once  budesonide, 0.5 mg, Nebulization, BID - RT  bumetanide, 1 mg, Oral, Daily  First Mouthwash (Magic Mouthwash), 5 mL, Swish & Spit, Q6H  gabapentin, 100 mg, Oral, Q8H  insulin glargine, 20 Units, Subcutaneous, Q12H  insulin lispro, 0-24 Units, Subcutaneous, Q6H  insulin lispro, 10 Units, Subcutaneous, Q6H  pantoprazole, 40 mg, Oral, QAM AC  predniSONE, 20 mg, Oral, BID With Meals  sodium chloride, 10 mL, Intravenous, Q12H  sodium chloride, 10 mL, Intravenous, Q12H  sodium chloride, 10 mL, Intravenous, Q12H  sodium chloride, 10 mL, Intravenous, Q12H  sulfamethoxazole-trimethoprim, 2 tablet, Oral, Q8H  valGANciclovir, 450 mg, Oral, Daily With Breakfast      Continuous Infusions:   PRN Meds:.•  acetaminophen  •  Calcium Gluconate-NaCl **AND** calcium gluconate **AND** Calcium, Ionized  •  dextrose  •  dextrose  •  glucagon (human recombinant)  •  guaifenesin-dextromethorphan  •  influenza vaccine  •  insulin lispro **AND** insulin lispro  •  lidocaine  •  lidocaine  •  magnesium sulfate **OR** magnesium sulfate **OR** magnesium sulfate  •  potassium chloride  •  sodium chloride  •  sodium chloride  •  sodium chloride  •  sodium chloride    Assessment/Plan    Acute respiratory failure  Hypoxic hypercapnic  Intubated now extubated  Multifactorial  Status post bronchoscopies  On bronchodilators steroid taper  Diagnosed with PCP pneumonia and on  treatment with Bactrim with plan for 21-day total  Also positive for rhinovirus CMV on BAL on oral ganciclovir -with plan for 7 days total from pulmonology standpoint  Continue BiPAP as needed  Per pulmonology     PCP pneumonia  Continue steroid taper and Bactrim high-dose-21-day course total  Per pulmonology  HIV negative  Status post multiple bronchoscopies     Paroxysmal A. fib  Now back in sinus rhythm  2D echocardiogram with normal EF and no gross valvular abnormality  [facility] normal EF no gross valvular abnormality  Chronic respiratory failure COPD  No anticoagulation due to concern for GI bleed     Acute kidney injury  On stage III CKD  Creatinine overall improved  Initial insult thought to be due to ATN  Volume now managed with Bumex  Per Dr. Melara    Hyperkalemia  Treated with diurteic/kayaxalate  Cont insulin   Per Dr. Melara      GI bleed  Hemoglobin stable  Continue PPI  GI on board status post EGD with multiple superficial ulcerations in the gastric body fundus cardia suggestive of suction trauma from NG tube.     Oral thrush  Finish course of fluconazole     Hypertension  Continue amlodipine     Diabetes  Insulin sliding scale and Accu-Chek  Continue Lantus     Orthostatic hypotension  Did need some IV pressor support during her stay in ICU  Continue fludrocortisone     Dyslipidemia  continue statin    Nutrition  Hoping to advance diet she is on nectar thick liquids now  NG out     DVT PUD prophylaxis     Plan as long as patient is reliably eating and we can advance the diet and she tolerates it overnight we will plan on discharging her back to rehab facility tomorrow morning if cleared by all consultants.  Marianna Figueroa MD  11/05/20  12:02 EST

## 2020-11-05 NOTE — PLAN OF CARE
Repeat VFSS completed this date with trials of thin barium by cup, NTL, applesauce and peaches.  Patient continues to present with mild oral dysphagia including premature spillage of most consistencies.  With thin liquid the premature spillage enters laryngeal vestibule resulting in deep penetration prior to the swallow.  A chin tuck was attempted however this did not eliminate the penetration from occurring.  No penetration or aspiration appreciated with NTL, applesauce or peaches.  Recommend continuing a soft to chew and NTL diet.  Patient can have ice chips between meals and after oral care.  ST will continue to follow and provide further recommendations as indicated.

## 2020-11-05 NOTE — PLAN OF CARE
Goal Outcome Evaluation:  Plan of Care Reviewed With: patient  Progress: improving  Pt remains on 5L HFNC and stable. Pt tolerating nectar-thick liquids. Vitals stable, plan is to return to [facility] at discharge. Will continue to monitor.

## 2020-11-05 NOTE — PROGRESS NOTES
ICU Daily Progress Note        Pneumonia of right upper lobe due to infectious organism    Cardiac murmur    Chronic renal insufficiency, stage III (moderate)    Diabetes mellitus, type 2 (CMS/HCC)    Essential hypertension    Shortness of breath    Tobacco abuse    Hearing loss    Paroxysmal atrial fibrillation (CMS/HCC)    Anemia    Oral thrush    Upper GI bleed    Hyperkalemia    Acute respiratory failure with hypoxia and hypercapnia (CMS/HCC)      Assessment    BAL from bronchoscopy 10/27/2020 showed CMV positive   HIV test is negative on 10/31/2020  CMV IgG 6.40  CMV IgM less than 30  Acute/chronic hypercapnic/hypoxic respiratory failure intubated 10/13/2020 extubated 10/21/2020    Reintubated 10/27/2020 due to large mucus plugs with bilateral lower lobe atelectasis for need of  suction and patient was on continuous non invasive ventilation extubated 10/20/20    Therapeutic gsiekmjwzdet11/27/20  copious amount of mucopurulent secretions with large mucus block since to be extracted therapeutically  due to significant hypoxia and need for continuous on invasive ventilation we elected to intubate the patient for adequate suctioning    BAL positive for Pneumocystis carinii  Positive rhinovirus    Bronchoscopy completed 10/13/2020   copious amount of mucopurulent secretions were noted which was suctioned therapeutically    BAL from 10/13/2020  showed positive for rhinovirus  Respiratory panel on admission from nasal swab was negative for rhino virus on 10/08/2020    Repeat bronchoscopy 10/20/2020  Very thick mucopurulent secretions right upper lobe suctioned therapeutically  BAL right upper lobe    CT scan of the chest  1022 he had a and there is improvement in pneumonia    Upper GI bleed with large amount coffee-ground emesis   status post EGD 10/13/2020    A. fib  Initially started with RVR, currently bradycardia    Right IJ central line was placed on admission and removed on 10/22/2020    RAMONE,    COPD  Smoker  Rheumatoid arthritis  Inflammatory polyarthritis  Hyperlipidemia        Hypertension        Leukocytoclastic vasculitis        Hearing loss; hearing aid right side        obesity        peripheral arterial disease        diabetes; pre diabetic        Arthritis        Osteoarthritis  Osteoporosis      Plan    Will continue po valganciclovir for total of 7 days  Pt/OT evaluate and treat  IS   Flutter valve  Chest physical therapy    Titrate oxygen    Bactrim p.o. total 21 days total (for PCP pneumonia)    Bronchodilators  Glycemic control  Gi prophylaxis pantoprazole  DVT prophylaxis, on SCD, Due to large amount of coffee-ground emesis    Off Lovenox              LOS: 28 days         Vital signs for last 24 hours:  Vitals:    11/05/20 0542 11/05/20 0630 11/05/20 0633 11/05/20 0933   BP: 160/53   161/55   BP Location: Right arm      Patient Position: Lying      Pulse: 83 74 75 78   Resp: 20 18     Temp: 97.6 °F (36.4 °C)      TempSrc: Oral      SpO2: 90% 91% 92%    Weight:       Height:           Intake/Output last 3 shifts:  I/O last 3 completed shifts:  In: 2040 [P.O.:2040]  Out: 2950 [Urine:2950]  Intake/Output this shift:  No intake/output data recorded.    Vent settings for last 24 hours:       Hemodynamic parameters for last 24 hours:       Radiology  Imaging Results (Last 24 Hours)     ** No results found for the last 24 hours. **          Labs:  Results from last 7 days   Lab Units 11/05/20  0321   WBC 10*3/mm3 8.70   HEMOGLOBIN g/dL 8.6*   HEMATOCRIT % 26.0*   PLATELETS 10*3/mm3 140     Results from last 7 days   Lab Units 11/05/20  0321   SODIUM mmol/L 141   POTASSIUM mmol/L 4.3   CHLORIDE mmol/L 98   CO2 mmol/L 34.0*   BUN mg/dL 76*   CREATININE mg/dL 1.53*   CALCIUM mg/dL 9.8   GLUCOSE mg/dL 155*     Results from last 7 days   Lab Units 10/30/20  1434   PH, ARTERIAL pH units 7.416   PO2 ART mm Hg 67.3*   PCO2, ARTERIAL mm Hg 46.4   HCO3 ART mmol/L 29.8*                 Results from last 7  days   Lab Units 11/05/20  0321   MAGNESIUM mg/dL 2.2                   Meds:   SCHEDULE  amLODIPine, 10 mg, Oral, Q24H  aspirin, 81 mg, Oral, Daily  atorvastatin, 40 mg, Oral, Daily  Barium Sulfate, 1 teaspoon(s), Oral, Once in imaging  bisacodyl, 10 mg, Rectal, Once  budesonide, 0.5 mg, Nebulization, BID - RT  bumetanide, 1 mg, Oral, Daily  First Mouthwash (Magic Mouthwash), 5 mL, Swish & Spit, Q6H  gabapentin, 100 mg, Oral, Q8H  insulin glargine, 20 Units, Subcutaneous, Q12H  insulin lispro, 0-24 Units, Subcutaneous, Q6H  insulin lispro, 10 Units, Subcutaneous, Q6H  methylPREDNISolone sodium succinate, 40 mg, Intravenous, Q8H  pantoprazole, 40 mg, Oral, QAM AC  sodium chloride, 10 mL, Intravenous, Q12H  sodium chloride, 10 mL, Intravenous, Q12H  sodium chloride, 10 mL, Intravenous, Q12H  sodium chloride, 10 mL, Intravenous, Q12H  sulfamethoxazole-trimethoprim, 2 tablet, Oral, Q8H  valGANciclovir, 450 mg, Oral, Daily With Breakfast      Infusions     PRNs  •  acetaminophen  •  Calcium Gluconate-NaCl **AND** calcium gluconate **AND** Calcium, Ionized  •  dextrose  •  dextrose  •  glucagon (human recombinant)  •  guaifenesin-dextromethorphan  •  influenza vaccine  •  insulin lispro **AND** insulin lispro  •  lidocaine  •  lidocaine  •  magnesium sulfate **OR** magnesium sulfate **OR** magnesium sulfate  •  potassium chloride  •  sodium chloride  •  sodium chloride  •  sodium chloride  •  sodium chloride    Physical Exam:  Physical Exam  Vitals signs reviewed.   Cardiovascular:      Heart sounds: Murmur present.   Pulmonary:      Breath sounds: Wheezing present.   Musculoskeletal:      Right lower leg: Edema present.      Left lower leg: Edema present.   Skin:     General: Skin is warm and dry.   Neurological:      Mental Status: She is alert and oriented to person, place, and time.         ROS  Review of Systems   Constitutional: Positive for activity change and fatigue.

## 2020-11-05 NOTE — PLAN OF CARE
Goal Outcome Evaluation:  Plan of Care Reviewed With: patient  Progress: improving     Swallow study completed; patient still on nectar thick liquids but can have ice chips between meals. Planned discharge for tomorrow to [place].

## 2020-11-06 VITALS
BODY MASS INDEX: 34.97 KG/M2 | SYSTOLIC BLOOD PRESSURE: 162 MMHG | HEIGHT: 60 IN | WEIGHT: 178.13 LBS | OXYGEN SATURATION: 92 % | RESPIRATION RATE: 19 BRPM | TEMPERATURE: 97.8 F | HEART RATE: 93 BPM | DIASTOLIC BLOOD PRESSURE: 55 MMHG

## 2020-11-06 LAB
ANION GAP SERPL CALCULATED.3IONS-SCNC: 10 MMOL/L (ref 5–15)
ANISOCYTOSIS BLD QL: ABNORMAL
BUN SERPL-MCNC: 64 MG/DL (ref 8–23)
BUN/CREAT SERPL: 52.9 (ref 7–25)
CALCIUM SPEC-SCNC: 9.9 MG/DL (ref 8.6–10.5)
CHLORIDE SERPL-SCNC: 98 MMOL/L (ref 98–107)
CO2 SERPL-SCNC: 33 MMOL/L (ref 22–29)
CREAT SERPL-MCNC: 1.21 MG/DL (ref 0.57–1)
DEPRECATED RDW RBC AUTO: 63.4 FL (ref 37–54)
ERYTHROCYTE [DISTWIDTH] IN BLOOD BY AUTOMATED COUNT: 19.2 % (ref 12.3–15.4)
GFR SERPL CREATININE-BSD FRML MDRD: 44 ML/MIN/1.73
GLUCOSE BLDC GLUCOMTR-MCNC: 135 MG/DL (ref 70–105)
GLUCOSE BLDC GLUCOMTR-MCNC: 74 MG/DL (ref 70–105)
GLUCOSE SERPL-MCNC: 90 MG/DL (ref 65–99)
HCT VFR BLD AUTO: 26.3 % (ref 34–46.6)
HGB BLD-MCNC: 8.7 G/DL (ref 12–15.9)
LYMPHOCYTES # BLD MANUAL: 0.39 10*3/MM3 (ref 0.7–3.1)
LYMPHOCYTES NFR BLD MANUAL: 2 % (ref 5–12)
LYMPHOCYTES NFR BLD MANUAL: 4 % (ref 19.6–45.3)
MAGNESIUM SERPL-MCNC: 2 MG/DL (ref 1.6–2.4)
MCH RBC QN AUTO: 31.9 PG (ref 26.6–33)
MCHC RBC AUTO-ENTMCNC: 33.1 G/DL (ref 31.5–35.7)
MCV RBC AUTO: 96.3 FL (ref 79–97)
METAMYELOCYTES NFR BLD MANUAL: 2 % (ref 0–0)
MONOCYTES # BLD AUTO: 0.19 10*3/MM3 (ref 0.1–0.9)
MYELOCYTES NFR BLD MANUAL: 1 % (ref 0–0)
NEUTROPHILS # BLD AUTO: 8.83 10*3/MM3 (ref 1.7–7)
NEUTROPHILS NFR BLD MANUAL: 83 % (ref 42.7–76)
NEUTS BAND NFR BLD MANUAL: 8 % (ref 0–5)
NRBC SPEC MANUAL: 4 /100 WBC (ref 0–0.2)
PHOSPHATE SERPL-MCNC: 4 MG/DL (ref 2.5–4.5)
PLATELET # BLD AUTO: 134 10*3/MM3 (ref 140–450)
PMV BLD AUTO: 8.2 FL (ref 6–12)
POIKILOCYTOSIS BLD QL SMEAR: ABNORMAL
POLYCHROMASIA BLD QL SMEAR: ABNORMAL
POTASSIUM SERPL-SCNC: 5.2 MMOL/L (ref 3.5–5.2)
RBC # BLD AUTO: 2.73 10*6/MM3 (ref 3.77–5.28)
SCAN SLIDE: NORMAL
SMALL PLATELETS BLD QL SMEAR: ABNORMAL
SODIUM SERPL-SCNC: 141 MMOL/L (ref 136–145)
WBC # BLD AUTO: 9.7 10*3/MM3 (ref 3.4–10.8)
WBC MORPH BLD: NORMAL

## 2020-11-06 PROCEDURE — 82962 GLUCOSE BLOOD TEST: CPT

## 2020-11-06 PROCEDURE — 85007 BL SMEAR W/DIFF WBC COUNT: CPT | Performed by: INTERNAL MEDICINE

## 2020-11-06 PROCEDURE — 90686 IIV4 VACC NO PRSV 0.5 ML IM: CPT | Performed by: INTERNAL MEDICINE

## 2020-11-06 PROCEDURE — 25010000002 INFLUENZA VAC SPLIT QUAD 0.5 ML SUSPENSION PREFILLED SYRINGE: Performed by: INTERNAL MEDICINE

## 2020-11-06 PROCEDURE — 63710000001 INSULIN GLARGINE PER 5 UNITS: Performed by: HOSPITALIST

## 2020-11-06 PROCEDURE — 63710000001 PREDNISONE PER 1 MG: Performed by: NURSE PRACTITIONER

## 2020-11-06 PROCEDURE — 99239 HOSP IP/OBS DSCHRG MGMT >30: CPT | Performed by: HOSPITALIST

## 2020-11-06 PROCEDURE — 94799 UNLISTED PULMONARY SVC/PX: CPT

## 2020-11-06 PROCEDURE — 85025 COMPLETE CBC W/AUTO DIFF WBC: CPT | Performed by: INTERNAL MEDICINE

## 2020-11-06 PROCEDURE — G0008 ADMIN INFLUENZA VIRUS VAC: HCPCS | Performed by: INTERNAL MEDICINE

## 2020-11-06 PROCEDURE — 84100 ASSAY OF PHOSPHORUS: CPT | Performed by: INTERNAL MEDICINE

## 2020-11-06 PROCEDURE — 83735 ASSAY OF MAGNESIUM: CPT | Performed by: INTERNAL MEDICINE

## 2020-11-06 PROCEDURE — 63710000001 INSULIN LISPRO (HUMAN) PER 5 UNITS: Performed by: INTERNAL MEDICINE

## 2020-11-06 PROCEDURE — 80048 BASIC METABOLIC PNL TOTAL CA: CPT | Performed by: INTERNAL MEDICINE

## 2020-11-06 RX ORDER — PREDNISONE 20 MG/1
20 TABLET ORAL 2 TIMES DAILY WITH MEALS
Qty: 14 TABLET
Start: 2020-11-06 | End: 2020-11-13

## 2020-11-06 RX ORDER — PANTOPRAZOLE SODIUM 40 MG/1
40 TABLET, DELAYED RELEASE ORAL
Start: 2020-11-07 | End: 2021-07-10

## 2020-11-06 RX ORDER — BUMETANIDE 1 MG/1
1 TABLET ORAL DAILY
Status: CANCELLED | OUTPATIENT
Start: 2020-11-06

## 2020-11-06 RX ORDER — HYDRALAZINE HYDROCHLORIDE 10 MG/1
10 TABLET, FILM COATED ORAL EVERY 6 HOURS PRN
Status: DISCONTINUED | OUTPATIENT
Start: 2020-11-06 | End: 2020-11-06 | Stop reason: HOSPADM

## 2020-11-06 RX ORDER — BUMETANIDE 1 MG/1
1 TABLET ORAL DAILY
Start: 2020-11-07 | End: 2021-07-10

## 2020-11-06 RX ORDER — BUDESONIDE 0.5 MG/2ML
0.5 INHALANT ORAL
Start: 2020-11-06 | End: 2021-07-10

## 2020-11-06 RX ORDER — DIPHENHYDRAMINE HYDROCHLORIDE AND LIDOCAINE HYDROCHLORIDE AND ALUMINUM HYDROXIDE AND MAGNESIUM HYDRO
5 KIT EVERY 6 HOURS
Start: 2020-11-06 | End: 2020-12-20 | Stop reason: HOSPADM

## 2020-11-06 RX ORDER — GABAPENTIN 100 MG/1
100 CAPSULE ORAL EVERY 8 HOURS SCHEDULED
Start: 2020-11-06 | End: 2020-12-20 | Stop reason: HOSPADM

## 2020-11-06 RX ORDER — INSULIN LISPRO 100 [IU]/ML
10 INJECTION, SOLUTION INTRAVENOUS; SUBCUTANEOUS EVERY 6 HOURS
Refills: 12
Start: 2020-11-06 | End: 2020-12-20 | Stop reason: HOSPADM

## 2020-11-06 RX ORDER — SODIUM POLYSTYRENE SULFONATE 15 G/60ML
15 SUSPENSION ORAL; RECTAL ONCE
Status: CANCELLED | OUTPATIENT
Start: 2020-11-06 | End: 2020-11-06

## 2020-11-06 RX ORDER — SULFAMETHOXAZOLE AND TRIMETHOPRIM 800; 160 MG/1; MG/1
2 TABLET ORAL EVERY 8 HOURS SCHEDULED
Qty: 8 TABLET | Refills: 0
Start: 2020-11-06 | End: 2020-11-08

## 2020-11-06 RX ORDER — INSULIN GLARGINE 100 [IU]/ML
20 INJECTION, SOLUTION SUBCUTANEOUS EVERY 12 HOURS SCHEDULED
Refills: 12
Start: 2020-11-06 | End: 2021-07-10

## 2020-11-06 RX ORDER — FLUDROCORTISONE ACETATE 0.1 MG/1
0.1 TABLET ORAL EVERY OTHER DAY
Qty: 3 TABLET | Refills: 0
Start: 2020-11-06 | End: 2020-12-20 | Stop reason: HOSPADM

## 2020-11-06 RX ORDER — VALGANCICLOVIR 450 MG/1
450 TABLET, FILM COATED ORAL
Qty: 4 TABLET | Refills: 0 | Status: SHIPPED | OUTPATIENT
Start: 2020-11-07 | End: 2020-11-11

## 2020-11-06 RX ORDER — INSULIN LISPRO 100 [IU]/ML
0-24 INJECTION, SOLUTION INTRAVENOUS; SUBCUTANEOUS EVERY 6 HOURS SCHEDULED
Refills: 12
Start: 2020-11-06 | End: 2020-12-20 | Stop reason: HOSPADM

## 2020-11-06 RX ADMIN — AMLODIPINE BESYLATE 10 MG: 5 TABLET ORAL at 08:31

## 2020-11-06 RX ADMIN — Medication 10 ML: at 08:32

## 2020-11-06 RX ADMIN — GABAPENTIN 100 MG: 100 CAPSULE ORAL at 13:45

## 2020-11-06 RX ADMIN — SULFAMETHOXAZOLE AND TRIMETHOPRIM 320 MG: 800; 160 TABLET ORAL at 05:32

## 2020-11-06 RX ADMIN — PREDNISONE 20 MG: 20 TABLET ORAL at 08:31

## 2020-11-06 RX ADMIN — BUDESONIDE INHALATION 0.5 MG: 0.5 SUSPENSION RESPIRATORY (INHALATION) at 06:46

## 2020-11-06 RX ADMIN — GABAPENTIN 100 MG: 100 CAPSULE ORAL at 05:32

## 2020-11-06 RX ADMIN — HYDRALAZINE HYDROCHLORIDE 10 MG: 10 TABLET, FILM COATED ORAL at 00:33

## 2020-11-06 RX ADMIN — INSULIN LISPRO 10 UNITS: 100 INJECTION, SOLUTION INTRAVENOUS; SUBCUTANEOUS at 00:34

## 2020-11-06 RX ADMIN — INFLUENZA VIRUS VACCINE 0.5 ML: 15; 15; 15; 15 SUSPENSION INTRAMUSCULAR at 13:45

## 2020-11-06 RX ADMIN — PANTOPRAZOLE SODIUM 40 MG: 40 TABLET, DELAYED RELEASE ORAL at 08:31

## 2020-11-06 RX ADMIN — INSULIN LISPRO 10 UNITS: 100 INJECTION, SOLUTION INTRAVENOUS; SUBCUTANEOUS at 13:47

## 2020-11-06 RX ADMIN — INSULIN LISPRO 4 UNITS: 100 INJECTION, SOLUTION INTRAVENOUS; SUBCUTANEOUS at 00:33

## 2020-11-06 RX ADMIN — DIPHENHYDRAMINE HYDROCHLORIDE AND LIDOCAINE HYDROCHLORIDE AND ALUMINUM HYDROXIDE AND MAGNESIUM HYDRO 5 ML: KIT at 05:32

## 2020-11-06 RX ADMIN — BUMETANIDE 1 MG: 1 TABLET ORAL at 08:31

## 2020-11-06 RX ADMIN — Medication 10 ML: at 08:31

## 2020-11-06 RX ADMIN — INSULIN GLARGINE 20 UNITS: 100 INJECTION, SOLUTION SUBCUTANEOUS at 08:30

## 2020-11-06 RX ADMIN — ASPIRIN 81 MG CHEWABLE TABLET 81 MG: 81 TABLET CHEWABLE at 08:31

## 2020-11-06 RX ADMIN — DIPHENHYDRAMINE HYDROCHLORIDE AND LIDOCAINE HYDROCHLORIDE AND ALUMINUM HYDROXIDE AND MAGNESIUM HYDRO 5 ML: KIT at 12:19

## 2020-11-06 RX ADMIN — VALGANCICLOVIR 450 MG: 450 TABLET, FILM COATED ORAL at 08:31

## 2020-11-06 RX ADMIN — SULFAMETHOXAZOLE AND TRIMETHOPRIM 320 MG: 800; 160 TABLET ORAL at 13:45

## 2020-11-06 NOTE — DISCHARGE INSTR - DIET
Diet upgraded to Mechanical soft/soft to chew 11/5/20 per speech therapy.  May have ice chips in between meals.

## 2020-11-06 NOTE — PLAN OF CARE
Goal Outcome Evaluation:  Plan of Care Reviewed With: patient  Progress: improving     Patient discharging to [place]. Report called to Ame.

## 2020-11-06 NOTE — PLAN OF CARE
Goal Outcome Evaluation:  Plan of Care Reviewed With: patient  Progress: improving    Pt to discharge to [place] today. Pt on 5L HFNC. High BP treated with prn hydralazine. Still tolerating mechanical soft diet, NTL and ice chips. Will continue to monitor.

## 2020-11-06 NOTE — NURSING NOTE
Discharged ambulance transport to [place]. PICC line discontinued. Report previously called to Ame.

## 2020-11-06 NOTE — DISCHARGE SUMMARY
"  Date of Admission: 10/8/2020    Date of Discharge:  11/6/2020    Length of stay:  LOS: 29 days   Hospital Course  \"70 year old morbidly obese  female with history of diabetes type 2, hypertension, hyperlipidemia COPD, PAD presents the ER per EMS with chief complaint of shortness of breath and fever.  Patient is poor historian, very hard of hearing, history is difficult to obtain.  Per report from EMS patient lives at home, history of COPD, on 2 L nasal cannula chronically.  Patient states \"I just feel sick\".  Patient denies chest pain and shortness of breath, does report intermittent productive cough, headache body aches nausea vomiting and diarrhea.  Patient states she wears oxygen chronically at home 2 L nasal cannula for COPD.  Patient denies any chest pain, denies any known exposure for COVID.  Patient also reports diffuse abdominal pain, rates 8/10.  Patient unable to tell me when the pain started or describe the pain.  While in the ED IV was placed and labs were obtained appropriate PPE was worn during exam and throughout all encounters with the patient.  Patient had the above evaluation.  On arrival patient had rectal temperature at 102.4, SPO2 92%, patient hypotensive on arrival.  Patient triggered simple sepsis and protocol was initiated.  Patient given 1 g Tylenol p.o., IV sepsis bolus administered.  Patient was given cefepime and vancomycin while in the ER.  Lab work obtained and as noted above.  Patient COVID-19 not detected.  Chest x-ray shows right upper lobe pneumonia, most likely bacterial.  Patient has elevated d-dimer at 1.85, however creatinine elevated 2.35, BUN 52, patient unable to tolerate IV contrast due to elevated kidney function. CT abdomen pelvis pending.  Chemistries significant for hyperglycemia, hypocalcemia.  Patient blood pressure began to decline, 60s systolic, patient was placed on Levophed.  Respiratory was paged, patient placed on BiPAP.  After Levophed and BiPAP " "placement patient's oxygen increased 96%, patient appeared less anxious, tolerating BiPAP well.  Blood pressure increased to 90s/60s.  Patient stable on admission to ICU.\"    Physical Exam   Constitutional:  oriented to person, place, and time. No distress.   HENT:   Head: Normocephalic and atraumatic.   Eyes: Conjunctivae and EOM are normal. Pupils are equal, round, and reactive to light.   Neck: No JVD present. No thyromegaly present.   Cardiovascular: Normal rate, regular rhythm, normal heart sounds and intact distal pulses. Exam reveals no gallop and no friction rub.   No murmur heard.  Pulmonary/Chest: Effort normal and breath sounds normal. No stridor. No respiratory distress.  has no wheezes.  has no rales.  exhibits no tenderness.   Abdominal: Soft. Bowel sounds are normal.  no distension and no mass. There is no tenderness. There is no rebound and no guarding. No hernia.   Musculoskeletal: Normal range of motion.   Lymphadenopathy:     no cervical adenopathy.   Neurological:  alert and oriented to person, place, and time. No cranial nerve deficit or sensory deficit. exhibits normal muscle tone.   Skin: No rash noted.  not diaphoretic.   Psychiatric:  normal mood and affect.   Vitals reviewed.      Hospital course and problem list  Acute respiratory failure  Hypoxic hypercapnic  Intubated now extubated  Multifactorial  Status post bronchoscopies  On bronchodilators and slow steroid taper  Diagnosed with PCP pneumonia and on treatment with Bactrim with plan for 21-day total  Also positive for rhinovirus CMV on BAL on oral ganciclovir -with plan for 7 days total from pulmonology standpoint  Continue BiPAP as needed  Per pulmonology     PCP pneumonia  Continue steroid taper and Bactrim high-dose-21-day course total  Per pulmonology  HIV negative  Status post multiple bronchoscopies     Paroxysmal A. fib  Now back in sinus rhythm  2D echocardiogram with normal EF and no gross valvular abnormality  [facility] normal EF " no gross valvular abnormality  Chronic respiratory failure COPD  No anticoagulation due to concern for GI bleed     Acute kidney injury  On stage III CKD  Creatinine overall improved  Initial insult thought to be due to ATN  Volume now managed with Bumex  Per Dr. Melara     Hyperkalemia  Resolved  Treated with diurteic/kayaxalate  Cont insulin   Per Dr. Melara      GI bleed  Hemoglobin stable  Continue PPI  GI on board status post EGD with multiple superficial ulcerations in the gastric body fundus cardia suggestive of suction trauma from NG tube.     Oral thrush  Finish course of fluconazole     Hypertension  Continue amlodipine     Diabetes  Insulin sliding scale and Accu-Chek  Continue Lantus     Orthostatic hypotension  Did need some IV pressor support during her stay in ICU  Florinef q other day for one week -discussed with Dr. Melara to treat hyperkalemia as well      Dyslipidemia  continue statin     Nutrition  NG was taken out patient was started on diet advanced as tolerated     DVT PUD prophylaxis     Plan discharge to rehab facility for further care and treatment.  She is in stable condition follow-up with nephrology and pulmonology as an outpatient and with SNF provider upon discharge    Pertinent Test Results:     Lab Results (last 48 hours)     Procedure Component Value Units Date/Time    POC Glucose Once [[id number]]  (Abnormal) Collected: 11/06/20 1121    Specimen: Blood Updated: 11/06/20 1122     Glucose 135 mg/dL      Comment: Serial Number: [id number]:  [id number]       Manual Differential [[id number]]  (Abnormal) Collected: 11/06/20 0500    Specimen: Blood Updated: 11/06/20 0745     Neutrophil % 83.0 %      Lymphocyte % 4.0 %      Monocyte % 2.0 %      Bands %  8.0 %      Metamyelocyte % 2.0 %      Myelocyte % 1.0 %      Neutrophils Absolute 8.83 10*3/mm3      Lymphocytes Absolute 0.39 10*3/mm3      Monocytes Absolute 0.19 10*3/mm3      nRBC 4.0 /100 WBC      Anisocytosis Slight/1+      Poikilocytes Slight/1+     Polychromasia Mod/2+     WBC Morphology Normal     Platelet Estimate Decreased    CBC & Differential [[id number]]  (Abnormal) Collected: 11/06/20 0500    Specimen: Blood Updated: 11/06/20 0745    Narrative:      The following orders were created for panel order CBC & Differential.  Procedure                               Abnormality         Status                     ---------                               -----------         ------                     CBC Auto Differential[[id number]]        Abnormal            Final result                 Please view results for these tests on the individual orders.    CBC Auto Differential [[id number]]  (Abnormal) Collected: 11/06/20 0500    Specimen: Blood Updated: 11/06/20 0745     WBC 9.70 10*3/mm3      RBC 2.73 10*6/mm3      Hemoglobin 8.7 g/dL      Hematocrit 26.3 %      MCV 96.3 fL      MCH 31.9 pg      MCHC 33.1 g/dL      RDW 19.2 %      RDW-SD 63.4 fl      MPV 8.2 fL      Platelets 134 10*3/mm3     Scan Slide [[id number]] Collected: 11/06/20 0500    Specimen: Blood Updated: 11/06/20 0745     Scan Slide --     Comment: See Manual Differential Results       Basic Metabolic Panel [[id number]]  (Abnormal) Collected: 11/06/20 0500    Specimen: Blood Updated: 11/06/20 0546     Glucose 90 mg/dL      BUN 64 mg/dL      Creatinine 1.21 mg/dL      Sodium 141 mmol/L      Potassium 5.2 mmol/L      Chloride 98 mmol/L      CO2 33.0 mmol/L      Calcium 9.9 mg/dL      eGFR Non African Amer 44 mL/min/1.73      BUN/Creatinine Ratio 52.9     Anion Gap 10.0 mmol/L     Narrative:      GFR Normal >60  Chronic Kidney Disease <60  Kidney Failure <15      Magnesium [[id number]]  (Normal) Collected: 11/06/20 0500    Specimen: Blood Updated: 11/06/20 0546     Magnesium 2.0 mg/dL     Phosphorus [[id number]]  (Normal) Collected: 11/06/20 0500    Specimen: Blood Updated: 11/06/20 0546     Phosphorus 4.0 mg/dL     POC Glucose Once [[id number]]  (Normal) Collected: 11/06/20 0521     Specimen: Blood Updated: 11/06/20 0521     Glucose 74 mg/dL      Comment: Serial Number: [id number]:  [id number]       POC Glucose Once [[id number]]  (Abnormal) Collected: 11/05/20 2341    Specimen: Blood Updated: 11/05/20 2343     Glucose 152 mg/dL      Comment: Serial Number: [id number]:  [id number]       POC Glucose Once [[id number]]  (Abnormal) Collected: 11/05/20 2011    Specimen: Blood Updated: 11/05/20 2012     Glucose 184 mg/dL      Comment: Serial Number: [id number]:  [id number]       POC Glucose Once [[id number]]  (Abnormal) Collected: 11/05/20 1122    Specimen: Blood Updated: 11/05/20 1128     Glucose 269 mg/dL      Comment: Serial Number: [id number]:  [id number]       Fungus Culture - Wash, Bronchus [[id number]]  (Abnormal) Collected: 10/27/20 0934    Specimen: Wash from Bronchus Updated: 11/05/20 0835     Fungus Culture Candida species    POC Glucose Once [[id number]]  (Abnormal) Collected: 11/05/20 0718    Specimen: Blood Updated: 11/05/20 0719     Glucose 153 mg/dL      Comment: Serial Number: [id number]:  [id number]       CMV DNA, Quantitative, PCR [[id number]] Collected: 10/31/20 1556    Specimen: Blood Updated: 11/05/20 0710     CMV DNA Quant 1490 IU/mL      Comment: The quantitative range of this assay is 200 to 1 million IU/mL.  This test was developed and its performance characteristics determined  by [facility]. It has not been cleared or approved by the Food and Drug  Administration. The FDA has determined that such clearance or  approval is not necessary.        log 10 CMV Qn DNA PI 3.173 log10 IU/mL     Narrative:      Performed at:  [address]  [id number]  : Lalita Moreno MD, Phone:  [phone]    Cytomegalovirus Antibody, IgG [[id number]]  (Abnormal) Collected: 11/03/20 1236    Specimen: Blood Updated: 11/05/20 0611     CMV IgG 6.40 U/mL      Comment:                                Negative          <0.60                                  Equivocal   0.60 - 0.69                                 Positive          >0.69       Narrative:      Performed at:  [address] [facility]  [id number]  : Shiva Negrete PhD, Phone:  [phone]    Cytomegalovirus Antibody, IgM [[id number]] Collected: 11/03/20 1236    Specimen: Blood Updated: 11/05/20 0611     CMV IgM <30.0 AU/mL      Comment:                                 Negative         <30.0                                  Equivocal  30.0 - 34.9                                  Positive         >34.9  A positive result is generally indicative of acute  infection, reactivation or persistent IgM production.       Narrative:      Performed at:  01 - [facility] [address]  [id number]  : Shiva Negrete PhD, Phone:  [phone]    POC Glucose Once [[id number]]  (Abnormal) Collected: 11/05/20 0520    Specimen: Blood Updated: 11/05/20 0525     Glucose 150 mg/dL      Comment: Serial Number: [id number]:  [id number]       Scan Slide [[id number]] Collected: 11/05/20 0321    Specimen: Blood Updated: 11/05/20 0414     Scan Slide --     Comment: See Manual Differential Results       Manual Differential [[id number]]  (Abnormal) Collected: 11/05/20 0321    Specimen: Blood Updated: 11/05/20 0414     Neutrophil % 91.0 %      Lymphocyte % 2.0 %      Monocyte % 4.0 %      Bands %  3.0 %      Neutrophils Absolute 8.18 10*3/mm3      Lymphocytes Absolute 0.17 10*3/mm3      Monocytes Absolute 0.35 10*3/mm3      nRBC 3.0 /100 WBC      Anisocytosis Slight/1+     Basophilic Stippling Slight/1+     Dacrocytes Slight/1+     Poikilocytes Slight/1+     Polychromasia Slight/1+     WBC Morphology Normal     Platelet Morphology Normal    CBC & Differential [[id number]]  (Abnormal) Collected: 11/05/20 0321    Specimen: Blood Updated: 11/05/20 0414    Narrative:      The following orders were created for panel order CBC &  Differential.  Procedure                               Abnormality         Status                     ---------                               -----------         ------                     CBC Auto Differential[[id number]]        Abnormal            Final result                 Please view results for these tests on the individual orders.    CBC Auto Differential [[id number]]  (Abnormal) Collected: 11/05/20 0321    Specimen: Blood Updated: 11/05/20 0414     WBC 8.70 10*3/mm3      RBC 2.75 10*6/mm3      Hemoglobin 8.6 g/dL      Hematocrit 26.0 %      MCV 94.6 fL      MCH 31.3 pg      MCHC 33.1 g/dL      RDW 19.2 %      RDW-SD 63.0 fl      MPV 7.9 fL      Platelets 140 10*3/mm3     Basic Metabolic Panel [[id number]]  (Abnormal) Collected: 11/05/20 0321    Specimen: Blood Updated: 11/05/20 0353     Glucose 155 mg/dL      BUN 76 mg/dL      Creatinine 1.53 mg/dL      Sodium 141 mmol/L      Potassium 4.3 mmol/L      Chloride 98 mmol/L      CO2 34.0 mmol/L      Calcium 9.8 mg/dL      eGFR Non African Amer 33 mL/min/1.73      BUN/Creatinine Ratio 49.7     Anion Gap 9.0 mmol/L     Narrative:      GFR Normal >60  Chronic Kidney Disease <60  Kidney Failure <15      Phosphorus [[id number]]  (Normal) Collected: 11/05/20 0321    Specimen: Blood Updated: 11/05/20 0353     Phosphorus 4.3 mg/dL     Magnesium [[id number]]  (Normal) Collected: 11/05/20 0321    Specimen: Blood Updated: 11/05/20 0353     Magnesium 2.2 mg/dL     POC Glucose Once [[id number]]  (Abnormal) Collected: 11/04/20 2345    Specimen: Blood Updated: 11/04/20 2347     Glucose 332 mg/dL      Comment: Serial Number: [id number]:  [id number]       POC Glucose Once [[id number]]  (Abnormal) Collected: 11/04/20 2045    Specimen: Blood Updated: 11/04/20 2048     Glucose 344 mg/dL      Comment: Serial Number: [id number]:  [id number]       POC Glucose Once [[id number]]  (Abnormal) Collected: 11/04/20 1618    Specimen: Blood Updated: 11/04/20 1621     Glucose 264 mg/dL       Comment: Serial Number: [id number]:  [id number]               Results for orders placed during the hospital encounter of 10/08/20   Adult Transthoracic Echo Complete W/ Cont if Necessary Per Protocol    Narrative · Estimated left ventricular EF = 65% Left ventricular systolic function   is normal.  · Trace to mild mitral valve regurgitation is present.  · There is a small (<1cm) pericardial effusion.          Imaging Results (All)     Procedure Component Value Units Date/Time    FL Video Swallow With Speech Single Contrast [[id number]] Collected: 11/05/20 1252     Updated: 11/05/20 1255    Narrative:      DATE OF EXAM:  11/5/2020 11:30 AM     PROCEDURE:  FL VIDEO SWALLOW W SPEECH SINGLE-CONTRAST-     INDICATIONS:  dysphagia; J18.9-Pneumonia, unspecified organism; R06.00-Dyspnea,  unspecified; R50.9-Fever, unspecified; A41.9-Sepsis, unspecified  organism; N28.9-Disorder of kidney and ureter, unspecified;  I95.9-Hypotension, unspecified; R19.7-Diarrhea, unspecified;  D64.9-Anemia, unspecified     COMPARISON:  No Comparisons Available     TECHNIQUE:   This examination was performed in conjunction with speech pathology.  Lateral video fluoroscopic evaluation of the swallowing mechanism was  performed while correlate administering to the patient various  consistency food items mixed with barium.     Fluoroscopic Time:   2 minutes     FINDINGS:  There was laryngeal penetration demonstrated with thin consistency  barium which did not improve with chin tuck maneuver. No laryngeal  penetration or tracheal aspiration was identified with any of the other  tested consistencies. Please see speech therapy report for additional  findings and detail as well as feeding recommendations.        Impression:      See above.     Electronically Signed By-Arnulfo Jewell On:11/5/2020 12:53 PM  This report was finalized on [id number] by  Arnulfo Jewell, .    FL Video Swallow With Speech Single Contrast [[id number]] Collected: 11/02/20  1230     Updated: 11/02/20 1232    Narrative:      DATE OF EXAM:  11/2/2020 11:46 AM     PROCEDURE:  FL VIDEO SWALLOW W SPEECH SINGLE-CONTRAST-     INDICATIONS:  Dysphagia; J18.9-Pneumonia, unspecified organism; R06.00-Dyspnea,  unspecified; R50.9-Fever, unspecified; A41.9-Sepsis, unspecified  organism; N28.9-Disorder of kidney and ureter, unspecified;  I95.9-Hypotension, unspecified; R19.7-Diarrhea, unspecified;  D64.9-Anemia, unspecified     COMPARISON:  None available     TECHNIQUE:   This examination was performed in conjunction with speech pathology.  Lateral video fluoroscopic evaluation of the swallowing mechanism was  performed while correlate administering to the patient various  consistency food items mixed with barium.     Fluoroscopic Time:   2.0 minutes     FINDINGS:  Deep laryngeal penetration with thin barium consistency. No evidence of  aspiration.        Impression:      As above. Please see speech pathology report for detailed evaluation and  dietary recommendations.     Electronically Signed By-Leonel Espitia On:11/2/2020 12:30 PM  This report was finalized on [id number] by  Leonel Espitia, .    XR Chest 1 View [[id number]] Collected: 11/02/20 0746     Updated: 11/02/20 0750    Narrative:      EXAMINATION: XR CHEST 1 VW-     DATE OF EXAM: 11/2/2020 4:45 AM     INDICATION: resp distress; J18.9-Pneumonia, unspecified organism;  R06.00-Dyspnea, unspecified; R50.9-Fever, unspecified; A41.9-Sepsis,  unspecified organism; N28.9-Disorder of kidney and ureter, unspecified;  I95.9-Hypotension, unspecified; R19.7-Diarrhea, unspecified;  D64.9-Anemia, unspecified.     COMPARISON: Chest radiograph dated 11/1/2020     TECHNIQUE: Portable AP view of the chest was obtained.     FINDINGS:  There is a feeding tube which courses below the diaphragm. There is a  left-sided PICC line with tip terminating over the mid SVC. The  cardiomediastinal silhouette appears enlarged and similar to the prior  examination.  There is aortic arch atherosclerotic calcification. There  is persistent right midlung airspace consolidation, similar to the prior  examination. There is no pleural effusion or pneumothorax. There are  multilevel degenerative changes of the thoracic spine.       Impression:      1. Unchanged right midlung airspace consolidation, similar to the prior.  2. Support devices in expected position.     Electronically Signed By-Leonel Espitia On:11/2/2020 7:47 AM  This report was finalized on [id number] by  Leonel Espitia, .    XR Chest 1 View [[id number]] Collected: 11/01/20 0805     Updated: 11/01/20 0812    Narrative:      XR CHEST 1 VW-     Date of Exam: 11/1/2020 7:40 AM     Indication: resp distress; J18.9-Pneumonia, unspecified organism;  R06.00-Dyspnea, unspecified; R50.9-Fever, unspecified; A41.9-Sepsis,  unspecified organism; N28.9-Disorder of kidney and ureter, unspecified;  I95.9-Hypotension, unspecified; R19.7-Diarrhea, unspecified;  D64.9-Anemia, unspecified     Comparison: 10/31/2020, 10/30/2020, 10/08/2020     Technique: 1 view(s) of the chest were obtained.     FINDINGS: Persistent but continued improvement in consolidation of  the right upper lobe. Aeration in the right middle and right lower lobe  has improved with persistent partial atelectasis. The left lung is  grossly clear. Feeding tube courses below the diaphragm with tip in the  proximal to mid stomach. A left PICC is present with the tip in the mid  to lower SVC. No pneumothorax. A small right pleural effusion is  suspected, unchanged. Bony structures are intact.       Impression:      Improved aeration in the right lung with persistent partial right lower  lobe atelectasis.  Improving consolidation in the inferior right upper lobe.  Support lines and tubes are unchanged.        Electronically Signed By-Robert Norris DO. On:11/1/2020 8:10 AM  This report was finalized on [id number] by  Robert Norris DO..    XR Chest 1 View [[id number]]  Collected: 10/31/20 0726     Updated: 10/31/20 0729    Narrative:         DATE OF EXAM:   10/31/2020 5:52 AM     PROCEDURE:   XR CHEST 1 VW-     INDICATIONS:   resp distress; J18.9-Pneumonia, unspecified organism; R06.00-Dyspnea,  unspecified; R50.9-Fever, unspecified; A41.9-Sepsis, unspecified  organism; N28.9-Disorder of kidney and ureter, unspecified;  I95.9-Hypotension, unspecified; R19.7-Diarrhea, unspecified;  D64.9-Anemia, unspecified     COMPARISON:  10/30/2020     TECHNIQUE:   Portable chest radiograph.     FINDINGS:    Interval extubation. Esophagogastric tube below the diaphragm. Left  upper extremity PICC line tip is at the low SVC. The patient is rotated  to the right. Heart size top normal, stable. Right midlung and right  basilar consolidation unchanged. Left lung is clear. No significant  pleural effusion. No pneumothorax.       Impression:         1. Interval extubation.  2. Stable esophagogastric tube and left upper extremity PICC line.  3. No change in right midlung and right basilar airspace disease.     Electronically Signed By-Sandeep King On:10/31/2020 7:27 AM  This report was finalized on [id number] by  Sandeep King, .    XR Chest 1 View [[id number]] Collected: 10/30/20 0737     Updated: 10/30/20 0741    Narrative:      XR CHEST 1 VW-     Date of Exam: 10/30/2020 4:43 AM     Indication: Respiratory distress.     Comparison Exams: October 29, 2020     Technique: Single AP chest radiograph     FINDINGS:  An endotracheal tube has its tip in the midtrachea. A Dobbhoff tube has  its tip in the proximal stomach. A left-sided PICC has its tip at the  cavoatrial junction. A consolidation within the right midlung appears  unchanged. The heart and mediastinal contours appear stable. The  pulmonary vasculature appears normal. The osseous structures appear  intact.       Impression:      1.Endotracheal tube in good position.  2.Consolidation within right midlung appears unchanged.     Electronically  Signed By-DR. Onofre Nicholas MD On:10/30/2020 7:39 AM  This report was finalized on [id number] by DR. Onofre Nicholas MD.    XR Chest 1 View [[id number]] Collected: 10/29/20 0727     Updated: 10/29/20 0733    Narrative:      DATE OF EXAM:  10/29/2020 5:13 AM     PROCEDURE:  XR CHEST 1 VW-     INDICATIONS:  Respiratory distress     COMPARISON:  10/28/2020     TECHNIQUE:   Single radiographic view of the chest was obtained.     FINDINGS:  Persistent patchy airspace opacity in the right upper lobe appears  unchanged from prior exam. There is improved aeration overall within the  right lung compared to prior exam. ET tube is in satisfactory position.  PICC line is in satisfactory position. Dobbhoff tube overlies the  stomach. Mild subsegmental basilar atelectasis on the left. Cardiac and  mediastinal contours are unchanged given differences in projection.       Impression:         1. Improved right lung aeration with persistent right upper lobe  airspace opacity.     Electronically Signed By-Chriss Rider On:10/29/2020 7:31 AM  This report was finalized on [id number] by  Chriss Rider, .    XR Abdomen KUB [[id number]] Collected: 10/28/20 1347     Updated: 10/28/20 1350    Narrative:      DATE OF EXAM:  10/28/2020 12:25 PM     PROCEDURE:  XR ABDOMEN KUB-     INDICATIONS:  Dobbhoff placement.     COMPARISON:  KUB 10/13/2020.     TECHNIQUE:   Single radiographic view of the abdomen was obtained.        FINDINGS:  Radiopaque tip of the Dobbhoff tube projects to the proximal gastric  body level.     Airspace disease is present in the right upper lobe. Fine interstitial  thickening is present within the lung bases. Mild cardiomegaly. ET tube  tip projects approximately 2.3 cm above the shea. Nonspecific but  nonobstructive bowel gas pattern.        Impression:      The radiopaque tip of the Dobbhoff tube projects to the proximal gastric  body level.     Electronically Signed By-Dr. Genesis Segovia MD On:10/28/2020  1:48 PM  This report was finalized on [id number] by Dr. Genesis Segovia MD.    XR Chest 1 View [[id number]] Collected: 10/28/20 0740     Updated: 10/28/20 0743    Narrative:      EXAMINATION: XR CHEST 1 VW-     DATE OF EXAM: 10/28/2020 3:35 AM     INDICATION: resp distress; J18.9-Pneumonia, unspecified organism;  R06.00-Dyspnea, unspecified; R50.9-Fever, unspecified; A41.9-Sepsis,  unspecified organism; N28.9-Disorder of kidney and ureter, unspecified;  I95.9-Hypotension, unspecified; R19.7-Diarrhea, unspecified;  D64.9-Anemia, unspecified.     COMPARISON: Chest radiograph dated 10/27/2020     TECHNIQUE: Portable AP view of the chest was obtained.     FINDINGS:  The endotracheal tube is 3 cm above the shea in expected position. The  cardiomediastinal silhouette is within normal limits. There is aortic  arch atherosclerotic calcification. There is improved aeration of the  right lung with decreasing inferior right upper lobe and right basilar  airspace consolidation. The left lung is grossly clear. There is no  left-sided pleural effusion or pneumothorax.       Impression:         1. Endotracheal tube in expected position.  2. Improved aeration of the right lung with decreasing right-sided  airspace opacities when compared to prior exam.     Electronically Signed By-Leonel Espitia On:10/28/2020 7:41 AM  This report was finalized on [id number] by  Leonel Espitia, .    XR Chest 1 View [[id number]] Collected: 10/27/20 0741     Updated: 10/27/20 0745    Narrative:      DATE OF EXAM:  10/27/2020 5:09 AM     PROCEDURE:  XR CHEST 1 VW-     INDICATIONS:  Respiratory distress, pneumonia, fever, hypotension.      COMPARISON:  10/26/2020.     TECHNIQUE:   Single radiographic view of the chest was obtained.     FINDINGS:  There is persistent right-sided volume loss with shift of the heart and  mediastinal structures rightward. There are as abnormal parenchymal  consolidation in the right perihilar region and right lung  base which  probably represents a combination of pneumonia, atelectasis, and a  pleural effusion. There is no airspace disease in the left lung. There  is diffuse prominence of the interstitial markings which can be seen  with low-grade vascular congestion or an atypical infection. The heart  is mildly enlarged. There are chronic age-related changes involving the  bony thorax and thoracic aorta.       Impression:      No interval change from yesterday's study.     Electronically Signed By-Chele Mcclure On:10/27/2020 7:43 AM  This report was finalized on [id number] by  Chele Mcclure, .    XR Chest 1 View [[id number]] Collected: 10/26/20 0730     Updated: 10/26/20 0734    Narrative:         DATE OF EXAM:   10/26/2020 4:34 AM     PROCEDURE:   XR CHEST 1 VW-     INDICATIONS:   resp distress; J18.9-Pneumonia, unspecified organism; R06.00-Dyspnea,  unspecified; R50.9-Fever, unspecified; A41.9-Sepsis, unspecified  organism; N28.9-Disorder of kidney and ureter, unspecified;  I95.9-Hypotension, unspecified; R19.7-Diarrhea, unspecified;  D64.9-Anemia, unspecified     COMPARISON:  CT chest 10/25/2020     TECHNIQUE:   Portable chest radiograph.     FINDINGS:    There is redemonstration of left to right shift of the heart and  mediastinum related to right lower lobe consolidation/collapse.  Endobronchial debris in the right lower lobe bronchus better assessed on  the recent CT exam. Patchy right midlung airspace disease unchanged. The  left lung is clear. No pneumothorax.       Impression:      1. Stable dense right basilar consolidation related to right lower lobe  consolidation and/or atelectasis with associated volume loss and left to  right shift of the heart and mediastinum.  2. Patchy right midlung airspace disease unchanged.  3. Clear left lung.     Electronically Signed By-Sandeep King On:10/26/2020 7:32 AM  This report was finalized on [id number] by  Sandeep King, .    CT Chest Without Contrast [[id number]] Collected:  10/25/20 1503     Updated: 10/25/20 1509    Narrative:      Examination: CT CHEST WO CONTRAST-     Date of Exam: 10/25/2020 1:57 PM     Indication: Abnormal xray - pleural effusion; J18.9-Pneumonia,  unspecified organism; R06.00-Dyspnea, unspecified; R50.9-Fever,  unspecified; A41.9-Sepsis, unspecified organism; N28.9-Disorder of  kidney and ureter, unspecified; I95.9-Hypotension, unspecified;  R19.7-Diarrhea, unspecified; D64.9-Anemia, unspecified.     Comparison: 10/20/2020.     Technique: Non-contrast axial volumetric CT imaging of the chest was  performed. Automated exposure control and iterative reconstruction  methods were used.     Findings:  There is progressive complete atelectasis of the right middle and right  lower lobes with obstructive endobronchial debris throughout the airways  in the right lower lobe and right middle lobe. There is segmentally  obstructive endobronchial debris within the airways to the right upper  lobe with partial right upper lobe consolidation. There is interlobular  septal thickening and patchy airspace disease in the right upper lobe.  There is mild emphysema. There is mild medial left basilar atelectasis.  No suspicious lung nodules. There is a small right-sided pleural  effusion, which is unchanged from the prior study. There are aortic and  great vessel atherosclerotic calcifications. There is no pericardial  effusion. The heart size is normal. Limited images of the upper abdomen  demonstrate no acute findings. Spleen measures 14.9 cm. There is colonic  diverticulosis. There are no acute osseous abnormalities or destructive  bone lesions. There are are mild thoracic degenerative changes.       Impression:         1. Small stable right-sided pleural effusion.  2. There is significantly increased obstructive endobronchial debris in  the right lung with complete right middle and right lower lobe  atelectasis and partial consolidation of the right upper lobe.  3. Patchy airspace  disease in the right upper lobe.  4. Emphysema.  5. Atherosclerosis.  6. Colonic diverticulosis.  7. Mild splenomegaly.     Electronically Signed BySanti Solomon On:10/25/2020 3:07 PM  This report was finalized on [id number] by  Simon Solomon, .    XR Chest 1 View [[id number]] Collected: 10/25/20 0850     Updated: 10/25/20 0857    Narrative:      Examination: XR CHEST 1 VW-     Date of Exam: 10/25/2020 5:05 AM     Indication: Respiratory distress.     Comparison: 10/24/2020     Technique: Single radiographic view of the chest was obtained.     Findings:  There is volume loss in the right lung, which appears increased from the  prior study. There is an evolving likely stable volume moderate right  pleural effusion with mediastinal shift to the right and right basilar  consolidation. There is interstitial disease in the lungs. No  pneumothorax or left-sided pleural effusion.       Impression:         1. [place] moderate right pleural effusion is likely unchanged in volume.  2. Increased right basilar consolidation with stable rightward shift of  mediastinum suggesting volume loss; either atelectasis or scarring.  3. Stable interstitial disease.     Electronically Signed BySanti Solomon On:10/25/2020 8:55 AM  This report was finalized on [id number] by  Simon Solomon, .    XR Chest 1 View [[id number]] Collected: 10/24/20 0247     Updated: 10/24/20 0451    Narrative:      Exam: Frontal chest    INDICATION: Severe respiratory distress    COMPARISON: October 23, 2020    FINDINGS:  There has been marked interval progression of a large right-sided pleural effusion, likely loculated.  There is also increasing right lung atelectasis that is displaced the mediastinum to the right.    The left lung is clear.  Normal upper abdomen.      Impression:      1.  There has been interval development of marked right middle lobe atelectasis.  This volume loss has drawn as the midline structures to the right.  2.  There is been a  marked interval increase in the size of a large loculated right-sided effusion.    Follow-up CT scan may be helpful in further evaluating these findings.    Electronically signed by:  Chriss Campo M.D.    10/24/2020 2:50 AM    XR Chest 1 View [[id number]] Collected: 10/23/20 0739     Updated: 10/23/20 0743    Narrative:      DATE OF EXAM:  10/23/2020 3:01 AM     PROCEDURE:  XR CHEST 1 VW-     INDICATIONS:  Respiratory distress. Shortness of breath.     COMPARISON:  Chest radiographs 10/22/2020, 10/21/2020, and 10/20/2020. CT chest  10/20/2020.     TECHNIQUE:   Single radiographic AP view of the chest was obtained.     FINDINGS:  The study is limited by lordotic patient positioning. Multiple overlying  artifacts. Interval removal of the enteric tube and right IJ central  venous catheter. Low lung volumes. Stable patchy opacities in the base  of the right lower lobe and right upper lobe. Hazy left basilar opacity.  No pneumothorax. Unchanged cardiomediastinal contours. Calcified  atherosclerotic disease in the thoracic aorta. No acute osseous  abnormality is identified.        Impression:         1. Limited study demonstrating interval removal of the enteric tube and  right IJ central venous catheter.  2. Low lung volumes with stable patchy opacities in the right lung,  likely pneumonia, and ill-defined left basilar opacity could represent  atelectasis and/or pneumonia.     Electronically Signed By-Jose F Franklin On:10/23/2020 7:41 AM  This report was finalized on [id number] by  Jose F Franklin, .    XR Chest 1 View [[id number]] Collected: 10/22/20 0729     Updated: 10/22/20 0732    Narrative:      Examination: XR CHEST 1 VW-     Date of Exam: 10/22/2020 5:30 AM     Indication: resp distress; J18.9-Pneumonia, unspecified organism;  R06.00-Dyspnea, unspecified; R50.9-Fever, unspecified; A41.9-Sepsis,  unspecified organism; N28.9-Disorder of kidney and ureter, unspecified;  I95.9-Hypotension, unspecified; R19.7-Diarrhea,  unspecified;  D64.9-Anemia, unspecified.     Comparison: Radiograph 10/21/2020, 10/20/2020, CT 10/20/2020     Technique: 1 view of the chest      Findings:  The endotracheal tube is been removed. An enteric tube is present with  incomplete visualization of the tip. The heart appears mildly enlarged.  Patchy airspace changes are present within the subpleural right mid lung  and the right lung base which appears to have progressed within the  right lung base as compared to the previous study. Stable right internal  jugular CVC catheter is present with the tip in the distal SVC. The  heart appears enlarged. There may be a small right-sided pleural  effusion. There is mild indistinctness of the pulmonary vasculature. No  acute osseous abnormality identified.       Impression:      Mild worsening of airspace disease within the right lung base with a  small right-sided pleural effusion, likely related to pneumonia. Patchy  airspace changes are present within the subpleural right mid lung which  appears similar.     Electronically Signed By-Day James On:10/22/2020 7:30 AM  This report was finalized on [id number] by  Day James, .    XR Chest 1 View [[id number]] Collected: 10/21/20 0824     Updated: 10/21/20 0830    Narrative:      DATE OF EXAM:  10/21/2020 4:40 AM     PROCEDURE:  XR CHEST 1 VW-     INDICATIONS:  Respiratory distress. Pneumonia.     COMPARISON:  Radiographs 10/20/2020, 10/19/2020, and 10/18/2020. CT 10/20/2020.     TECHNIQUE:   Single radiographic AP view of the chest was obtained.     FINDINGS:  The study is limited by lordotic patient positioning. Overlying  artifacts. Stable endotracheal tube, enteric tube, and right IJ central  venous catheter. Persistent patchy consolidation of the lateral base of  the right upper lobe and the medial right lower lobe with blunting of  right costophrenic angle. Ill-defined left basilar opacity. No  pneumothorax. Unchanged cardiomediastinal contours.  Calcified  atherosclerotic disease in the thoracic aorta. No acute osseous  abnormality is identified.        Impression:         1. Limited study demonstrating stable support tubes and line.  2. Persistent patchy consolidation at the lateral base of the right  upper lobe and in the medial base of the right lower lobe, likely  pneumonia, with a suspected small right pleural effusion.  3. Ill-defined left basilar opacity, which could represent atelectasis  and/or pneumonia.     Electronically Signed By-Jose F Franklin On:10/21/2020 8:28 AM  This report was finalized on [id number] by  Jose F Franklin, .    CT Chest Without Contrast [[id number]] Collected: 10/20/20 0845     Updated: 10/20/20 0903    Narrative:      CT CHEST WO CONTRAST-     Date of Exam: 10/20/2020 8:34 AM     Indication: Pneumonia.     Comparison Exams: Chest radiograph from earlier today and CT abdomen and  pelvis from October 12, 2020     Technique: CT scan of the chest without IV contrast. Automated exposure  control and iterative reconstruction methods were used.     FINDINGS:  An endotracheal tube is present, and otherwise the central  tracheobronchial tree is clear. There is moderate centrilobular  emphysema. There are consolidations within the right mid and lower lung.  There is bibasilar atelectasis. There are small right and trace left  pleural effusions.     A right internal jugular catheter has its tip at the mid SVC. The heart  size appears normal. The great vessels are normal in caliber. No  abnormally enlarged lymph nodes are identified.     Partial evaluation of the upper abdomen demonstrates an NG tube with its  tip in the stomach. There is some perihepatic ascites. A left adrenal  adenoma appears unchanged.     No aggressive osseous lesions are identified.       Impression:      1.Consolidations within right mid and lower lung, suggesting pneumonia.  2.Bibasilar atelectasis.  3.Small right and trace left pleural effusions.  4.Moderate  emphysema.  5.Perihepatic ascites.     Electronically Signed By-DR. Onofre Nicholas MD On:10/20/2020 8:51 AM  This report was finalized on [id number] by DR. Onofre Nicholas MD.    XR Chest 1 View [[id number]] Collected: 10/20/20 0753     Updated: 10/20/20 0758    Narrative:      XR CHEST 1 VW-     Date of Exam: 10/20/2020 6:07 AM     Indication: Respiratory distress.     Comparison Exams: October 19, 2020     Technique: Single AP chest radiograph     FINDINGS:  An endotracheal tube has its tip in the midtrachea. An NG tube has its  tip below the diaphragm. A right internal jugular catheter has its tip  at the mid SVC. Consolidations within the right lung have slightly  improved in the right lower lung. There are bibasilar opacities. The  heart and mediastinal contours appear stable. A mild pulmonary edema  pattern appears slightly improved. There is a small right pleural  effusion. The osseous structures appear intact.       Impression:      1.Endotracheal tube in good position.  2.Consolidations on the right have slightly improved in the right lower  lung, likely representing pneumonia.  3.Bibasilar opacities, which could reflect atelectasis or pneumonia.  4.Mild pulmonary edema pattern appears slightly improved.  5.Small right pleural effusion.     Electronically Signed By-DR. Onofre Nicholas MD On:10/20/2020 7:56 AM  This report was finalized on [id number] by DR. Onofre Nicholas MD.    XR Chest 1 View [[id number]] Collected: 10/19/20 0737     Updated: 10/19/20 0740    Narrative:      Examination: XR CHEST 1 VW-     Date of Exam: 10/19/2020 5:27 AM     Indication: resp distress; J18.9-Pneumonia, unspecified organism;  R06.00-Dyspnea, unspecified; R50.9-Fever, unspecified; A41.9-Sepsis,  unspecified organism; N28.9-Disorder of kidney and ureter, unspecified;  I95.9-Hypotension, unspecified; R19.7-Diarrhea, unspecified;  D64.9-Anemia, unspecified.     Comparison: Radiograph 10/18/2020, 10/17/2020      Technique: 1 view of the chest      Findings:  The heart appears enlarged. There is a right internal jugular CVC  catheter with the tip in the distal SVC. Endotracheal tube is present  with the tip in the distal trachea. Enteric tube is present with  incomplete visualization of the tip. Patchy airspace disease is seen  within the right lung base and along the right major fissure. The left  lung appears grossly clear. There is mild indistinctness of the  pulmonary vasculature. There is a small right-sided pleural effusion. No  acute osseous abnormality identified.       Impression:      No significant changes compared to the previous study. Redemonstration  of bibasilar airspace disease with a small right-sided pleural effusion.  There is a probable mild pulmonary edema pattern.     Electronically Signed By-Day James On:10/19/2020 7:38 AM  This report was finalized on [id number] by  Day James, .    XR Chest 1 View [[id number]] Collected: 10/18/20 0914     Updated: 10/18/20 0919    Narrative:      DATE OF EXAM:  10/18/2020 4:14 AM     PROCEDURE:  XR CHEST 1 VW-     INDICATIONS:  resp distress; J18.9-Pneumonia, unspecified organism; R06.00-Dyspnea,  unspecified; R50.9-Fever, unspecified; A41.9-Sepsis, unspecified  organism; N28.9-Disorder of kidney and ureter, unspecified;  I95.9-Hypotension, unspecified; R19.7-Diarrhea, unspecified;  D64.9-Anemia, unspecified     COMPARISON:  10/17/2020.     TECHNIQUE:   Single radiographic view of the chest was obtained.     FINDINGS:  Endotracheal tube tip is approximately 4.7 cm above the shea. Enteric  tube projects in the left upper quadrant. Right IJ catheter is in stable  position.  Heart size appears enlarged, as before.  There is persistent  consolidation in the periphery of the right midlung with wedge-shaped  morphology, similar to recent prior studies. There also right basilar  opacities which appear grossly unchanged. No definite new infiltrate,  pneumothorax,  or effusion is seen.       Impression:      1.Persistent right mid and lower lung airspace opacities and  consolidation.  2.Tubes and lines appear in expected positions.     Electronically Signed By-DR. Sheng Fajardo MD On:10/18/2020 9:17 AM  This report was finalized on [id number] by DR. Sheng Fajardo MD.    XR Chest 1 View [[id number]] Collected: 10/17/20 1105     Updated: 10/17/20 1108    Narrative:      DATE OF EXAM:  10/17/2020 4:09 AM     PROCEDURE:  XR CHEST 1 VW-     INDICATIONS:  resp distress; J18.9-Pneumonia, unspecified organism; R06.00-Dyspnea,  unspecified; R50.9-Fever, unspecified; A41.9-Sepsis, unspecified  organism; N28.9-Disorder of kidney and ureter, unspecified;  I95.9-Hypotension, unspecified; R19.7-Diarrhea, unspecified;  D64.9-Anemia, unspecified respiratory failure. Patient on ventilator  today     COMPARISON:  Single frontal view chest formed on 10/16/2020     TECHNIQUE:   Single radiographic AP view of the chest was obtained.     FINDINGS:  Single frontal view chest AP semierect portable reveals inferior tip the  endotracheal tube lies 4 cm from the shea and is unchanged.  Nasogastric course the esophagus and stomach below diaphragm. The heart  and mediastinum are normal. There is a persistent abnormal  triangular-shaped density in the periphery of the right midlung zone  unchanged. There is either small pleural effusion or mild pleural  thickening right lung base unchanged. There is diffuse mild infiltrate  throughout the left lung unchanged. Proximal tip of the right internal  jugular central venous line catheter projects over the superior vena  cava.        Impression:         1. No significant change in radiographic appearance the chest since  previous study performed 10/16/2020.     Electronically Signed By-DR. Nicholas Barnes MD On:10/17/2020 11:06  AM  This report was finalized on [id number] by DR. Nicholas Barnes MD.    XR Chest 1 View [[id number]] Collected:  10/16/20 0718     Updated: 10/16/20 0721    Narrative:      Examination: XR CHEST 1 VW-     Date of Exam: 10/16/2020 6:18 AM     Indication: resp distress; J18.9-Pneumonia, unspecified organism;  R06.00-Dyspnea, unspecified; R50.9-Fever, unspecified; A41.9-Sepsis,  unspecified organism; N28.9-Disorder of kidney and ureter, unspecified;  I95.9-Hypotension, unspecified; R19.7-Diarrhea, unspecified;  D64.9-Anemia, unspecified.     Comparison: Radiograph 10/15/2020, 10/14/2020     Technique: 1 view of the chest      Findings:  An endotracheal tube is present with the tip in the mid to distal  trachea. The heart appears mildly enlarged. An airspace consolidation is  present within the right midlung. Airspace disease within the right lung  base appears to have improved as compared to the previous study. There  is stable right internal jugular CVC catheter with the tip likely within  the distal SVC. There is indistinctness of the pulmonary vasculature. A  small right-sided pleural effusion is present which appears similar. No  pneumothorax.. No acute osseous abnormality identified.       Impression:      Improvement in bibasilar airspace disease. Otherwise, stable exam.     Electronically Signed By-Day James On:10/16/2020 7:19 AM  This report was finalized on [id number] by  Day James, .    XR Chest 1 View [[id number]] Collected: 10/15/20 0722     Updated: 10/15/20 0725    Narrative:      XR CHEST 1 VW-     Date of Exam: 10/15/2020 5:50 AM     Indication: resp distress; J18.9-Pneumonia, unspecified organism;  R06.00-Dyspnea, unspecified; R50.9-Fever, unspecified; A41.9-Sepsis,  unspecified organism; N28.9-Disorder of kidney and ureter, unspecified;  I95.9-Hypotension, unspecified; R19.7-Diarrhea, unspecified;  D64.9-Anemia, unspecified.     Comparison: None available.     Technique: A single view of the chest was obtained.     FINDINGS:      There have been no interval tube or line changes.  Cardiomegaly is  stable.   There has been no significant change in airspace consolidation  within the mid right lung and right lung base.  Small right-sided  pleural effusion is stable.  Left lung is clear.  No definite left  pleural effusion identified.             Impression:            1.  No change in right-sided airspace consolidation and small right  pleural effusion.  2.  No interval to borderline change.        Electronically Signed By-Goran Mesa On:10/15/2020 7:23 AM  This report was finalized on [id number] by  Goran Mesa, .    XR Chest 1 View [[id number]] Collected: 10/14/20 1044     Updated: 10/14/20 1047    Narrative:      DATE OF EXAM:  10/14/2020 8:20 AM     PROCEDURE:  XR CHEST 1 VW-     INDICATIONS:  resp distress; J18.9-Pneumonia, unspecified organism; R06.00-Dyspnea,  unspecified; R50.9-Fever, unspecified; A41.9-Sepsis, unspecified  organism; N28.9-Disorder of kidney and ureter, unspecified;  I95.9-Hypotension, unspecified; R19.7-Diarrhea, unspecified;  D64.9-Anemia, unspecified     COMPARISON:  10/12/2020     TECHNIQUE:   Single radiographic view of the chest was obtained.     FINDINGS:  Endotracheal tube tip terminates about 4 cm from the shea. There is a  right internal jugular central venous catheter terminating at the  superior vena cava. Heart is enlarged. There is dense consolidation in  the right midlung measuring up to 10 cm. Can't exclude right pleural  effusion. There is right middle lobe opacity, atelectasis versus  pneumonia. When compared to the previous examination no significant  change in lung parenchyma has occurred.       Impression:      Masslike consolidation right midlung.  Endotracheal tube in central venous catheter tip have appropriate  position.  Increased opacity right middle lobe atelectasis versus pneumonia.  Cardiomegaly.     Electronically Signed By-Ashia Fitzgerald MD On:10/14/2020 10:45 AM  This report was finalized on [id number] by  Ashia Fitzgerald MD.    XR Abdomen KUB [[id number]]  Collected: 10/13/20 1421     Updated: 10/13/20 1425    Narrative:      DATE OF EXAM:  10/13/2020 2:00 PM     PROCEDURE:  XR ABDOMEN KUB-     INDICATIONS:  NG placement; J18.9-Pneumonia, unspecified organism; R06.00-Dyspnea,  unspecified; R50.9-Fever, unspecified; A41.9-Sepsis, unspecified  organism; N28.9-Disorder of kidney and ureter, unspecified;  I95.9-Hypotension, unspecified; R19.7-Diarrhea, unspecified;  D64.9-Anemia, unspecified     COMPARISON:  Radiograph 10/11/2020. CT abdomen pelvis 10/12/2020.     TECHNIQUE:   Single radiographic view of the abdomen was obtained.        FINDINGS:  Enteric tube terminating in the proximal stomach with the sidehole just  distal to the GE junction. Mildly air distended loops of small and large  bowel the upper abdomen. No significantly dilated loops of bowel are  seen. No evidence of pneumatosis. Partially evaluated opacities in the  right lung base.        Impression:      Enteric tube terminating in the proximal stomach.     Electronically Signed By-Jose F Franklin On:10/13/2020 2:23 PM  This report was finalized on [id number] by  Jose F Franklin, .    CT Abdomen Pelvis Without Contrast [[id number]] Collected: 10/12/20 1549     Updated: 10/12/20 1559    Narrative:      CT ABDOMEN PELVIS WO CONTRAST-     Date of Exam: 10/12/2020 2:32 PM     Indication: abdominal distention, Large amt NG output, eval for  obstruction; J18.9-Pneumonia, unspecified organism; R06.00-Dyspnea,  unspecified; R50.9-Fever, unspecified; A41.9-Sepsis, unspecified  organism; N28.9-Disorder of kidney and ureter, unspecified;  I95.9-Hypotension, unspecified; R19.7-Diarrhea, unspecified.     Comparison: 10/08/2020     Technique: CT scan of the abdomen and pelvis without IV contrast.  Automated exposure control and iterative reconstruction methods were  used.     FINDINGS:  Within the lung bases is a dense consolidation within the visualized  right lower lung that has progressed from prior CT. There is  left  basilar atelectasis. There is a small right pleural effusion.     The unenhanced liver, right adrenal gland, kidneys, spleen, and pancreas  are unremarkable. The gallbladder appears surgically absent. There is a  stable left adrenal adenoma.     An NG tube has its tip in the stomach. The small bowel appears normal in  caliber. The colon appears normal. The appendix is not well-visualized.  There is a small amount of perihepatic ascites. No loculated collection  is identified. No abnormally enlarged lymph nodes are identified. There  is atherosclerotic plaque in the abdominal aorta. There is a small  ventral hernia containing fluid.     The rectum is unremarkable. There is a bulky calcification within the  left uterine fundus, likely a partially calcified fibroid. Air within  the urinary bladder is likely secondary to Moreno catheterization.     No aggressive osseous lesions are identified.       Impression:      1.Dense consolidation within visualized right lower lung, progressed  from prior CT, suggesting a severe pneumonia. Small right pleural  effusion.  2.NG tube in good position.  3.No evidence of acute bowel obstruction.  4.Small amount of perihepatic ascites.  5.Small ventral hernia containing fluid.  6.Air within urinary bladder, likely secondary to Moreno catheterization.           Electronically Signed By-DR. Onofre Nicholas MD On:10/12/2020 3:57 PM  This report was finalized on [id number] by DR. Onofre Nicholas MD.    XR Chest 1 View [[id number]] Collected: 10/12/20 1153     Updated: 10/12/20 1158    Narrative:      DATE OF EXAM:  10/12/2020 11:32 AM     PROCEDURE:  XR CHEST 1 VW-     INDICATIONS:  changes in breathing; J18.9-Pneumonia, unspecified organism;  R06.00-Dyspnea, unspecified; R50.9-Fever, unspecified; A41.9-Sepsis,  unspecified organism; N28.9-Disorder of kidney and ureter, unspecified;  I95.9-Hypotension, unspecified; R19.7-Diarrhea, unspecified     COMPARISON:  AP chest x-ray  10/11/2020, 10/08/2020.     TECHNIQUE:   Single radiographic AP view of the chest was obtained.     FINDINGS:  Masslike consolidation in the right midlung is stable. There are also  stable airspace opacities throughout the right lung and in the left  lower lung. Cardiomediastinal contours are stable.     Tip of the enteric tube terminates in the proximal stomach. Tip of the  right internal jugular central venous catheter terminates in the lower  SVC.        Impression:      Stable appearance of the chest with masslike consolidation in the mid  right lung with diffuse right lung airspace opacities and left lower  lung airspace opacities, concerning for pneumonia. Recommend chest x-ray  follow-up or further evaluation with CT to exclude a left lung mass.     Electronically Signed By-Franci Biggs On:10/12/2020 11:56 AM  This report was finalized on [id number] by  Franci Biggs, .    XR Abdomen KUB [[id number]] Collected: 10/11/20 1615     Updated: 10/11/20 1619    Narrative:      DATE OF EXAM:  10/11/2020 3:40 PM     PROCEDURE:  XR ABDOMEN KUB-     INDICATIONS:  Sepsis, nasogastric tube placement.     COMPARISON:  10/11/2020 at 0920 hours     TECHNIQUE:   Single radiographic view of the abdomen was obtained.        FINDINGS:  There has been placement of a nasogastric tube with tip terminating in  the fundus of the stomach. The patient has a nonspecific bowel gas  pattern. Both flanks and the lower pelvis were excluded from the  field-of-view. There are abnormal densities seen in the lower chest  which were discussed on the previous chest x-ray dictation.        Impression:      The nasogastric tube tip terminates within the fundus of the stomach.     Electronically Signed By-Chele Mcclure On:10/11/2020 4:17 PM  This report was finalized on [id number] by  Chele Mcclure, .    XR Chest 1 View [[id number]] Collected: 10/11/20 1039     Updated: 10/11/20 1043    Narrative:      DATE OF EXAM:  10/11/2020 9:15 AM      PROCEDURE:  XR CHEST 1 VW-     INDICATIONS:  Pneumonia, severe shortness of breath, hypoxia.      COMPARISON:  10/10/2020 at 1935 hours     TECHNIQUE:   Single radiographic view of the chest was obtained.     FINDINGS:  There is still a dense area of consolidation in the right upper lobe  consistent with pneumonia. There are patchy densities in the right lung  base which are unchanged and likely represents subsegmental atelectasis.  The heart is enlarged. The pulmonary interstitial markings are again  diffusely prominent and unchanged. There are no moderate to large  pleural effusions. There is no pneumothorax.  There is a stable right  internal jugular line in place.       Impression:         1. Stable dense area of consolidation in the right upper lobe consistent  with pneumonia.  2. Patchy right basilar densities which likely represents subsegmental  atelectasis.  2. Cardiomegaly with prominent interstitial lung markings.     Electronically Signed By-Chele Mcclure On:10/11/2020 10:41 AM  This report was finalized on [id number] by  Chele Mcclure, .    XR Abdomen KUB [[id number]] Collected: 10/11/20 1029     Updated: 10/11/20 1033    Narrative:      DATE OF EXAM:  10/11/2020 9:15 AM     PROCEDURE:  XR ABDOMEN KUB-     INDICATIONS:  C. Difficile colitis, abdominal distention, pain.     COMPARISON:  CT of the abdomen and pelvis performed on 10/08/2020.     TECHNIQUE:   Single radiographic view of the abdomen was obtained.        FINDINGS:  There are no dilated loops of bowel to suggest an obstructive process.  There is no pneumatosis or free air. There are no significant  intra-abdominal calcifications. There are degenerative changes of the  thoracolumbar spine. There are abnormal interstitial densities in the  lung bases and parenchymal consolidation in the right aspect of the  chest which was discussed under the separate chest x-ray dictation.        Impression:      Unremarkable bowel gas pattern. There is no  pneumatosis or free air.     Electronically Signed By-Chele Mcclure On:10/11/2020 10:31 AM  This report was finalized on [id number] by  Chele Mcclure, .    XR Chest 1 View [[id number]] Collected: 10/10/20 1957     Updated: 10/10/20 2001    Narrative:      DATE OF EXAM:  10/10/2020 7:32 PM     PROCEDURE:  XR CHEST 1 VW-     INDICATIONS:  Central line placement, pneumonia, tobacco abuse.      COMPARISON:  10/08/2020.     TECHNIQUE:   Single radiographic view of the chest was obtained.     FINDINGS:  There is a stable right internal jugular line in place with the tip  terminating in the superior vena cava. There is no pneumothorax. The  heart is enlarged. The interstitial markings are prominent and  unchanged. The patient has a dense area of consolidation in the right  upper lobe abutting the major fissure consistent with pneumonia. The  right hemidiaphragm now appears obscured. The patient may have a small  right basilar pleural effusion with developing pneumonia or atelectasis.  The left pleural space is clear.       Impression:         1. Stable right internal jugular line with the tip terminating in  superior vena cava. There is no pneumothorax.  2. Right upper lobe pneumonia.  3. Worsening consolidation in the right lower chest with diagnostic  considerations discussed above.  4. Stable cardiomegaly with prominent interstitial markings.     Electronically Signed By-Chele Mcclure On:10/10/2020 7:59 PM  This report was finalized on [id number] by  Chele Mcclure, .    CT Abdomen Pelvis Without Contrast [[id number]] Collected: 10/08/20 1914     Updated: 10/08/20 1921    Narrative:         DATE OF EXAM:  10/8/2020 7:09 PM     PROCEDURE:  CT ABDOMEN PELVIS WO CONTRAST-     INDICATIONS:  abd pain; J18.9-Pneumonia, unspecified organism; R06.00-Dyspnea,  unspecified; R50.9-Fever, unspecified; A41.9-Sepsis, unspecified  organism; N28.9-Disorder of kidney and ureter, unspecified;  I95.9-Hypotension, unspecified; R19.7-Diarrhea,  unspecified     COMPARISON:  No Comparisons Available     TECHNIQUE:  Routine transaxial slices were obtained through the abdomen and pelvis  without the administration of intravenous contrast. Reconstructed  coronal and sagittal images were also obtained. Automated exposure  control and iterative construction methods were used.       FINDINGS:  Upper slices through the lower chest reveal pleural-based density within  the right lung measuring 10.6 x 4.6 cm. Whether this reflects more of a  consolidative pneumonia is uncertain. There are probably is some  atelectasis in the right basilar area.     There is no definite abnormality of the liver, spleen or pancreas. There  is a left adrenal lesion with Hounsfield units of -0.1 that measures 2  cm that could reflect an adenoma. There is no definite renal  abnormality.     The lack of oral contrast makes assessment of the bowel limited. There  is a ventral wall abdominal hernia for which some small bowel extends  into around the umbilicus. It does not look like the bowel is  obstructed.     Atherosclerotic changes are present.     The bladder does not appear unusual for the degree of distention.     Osseous structures do not appear abnormal.       Impression:      1.Unusual pleural-based density within the right hemithorax. There is  some atelectasis at the right lung base.  2.Left adrenal lesion which could reflect adenoma.  3.Ventral wall abdominal hernia for which some small bowel extends into.  4.Atherosclerotic changes are present.     Electronically Signed By-Kelechi Haas On:10/8/2020 7:19 PM  This report was finalized on [id number] by  Kelechi Haas, .    XR Chest 1 View [[id number]] Collected: 10/08/20 1843     Updated: 10/08/20 1849    Narrative:      DATE OF EXAM:  10/8/2020 6:37 PM     PROCEDURE:  XR CHEST 1 VW-     INDICATIONS:  central line placement; J18.9-Pneumonia, unspecified organism;  R06.00-Dyspnea, unspecified; R50.9-Fever, unspecified;  A41.9-Sepsis,  unspecified organism; N28.9-Disorder of kidney and ureter, unspecified;  I95.9-Hypotension, unspecified; R19.7-Diarrhea, unspecified     COMPARISON:  10/08/2020 3:21 PM.     TECHNIQUE:   Single radiographic AP view of the chest was obtained.     FINDINGS:  There is a right IJ line noted with its tip in the superior vena cava.  There is no pneumothorax. The heart looks enlarged. There are some  interstitial changes involving the lungs which have been suggested. A  pleural-based density within the right mid chest also unchanged.        Impression:      1.Pleural-based density right mid chest. At some point CT would be  recommended to reassess.  2.Cardiomegaly  3.There are some vague interstitial changes within both lungs which are  nonspecific. In part this could reflect more chronic change. An  inflammatory infectious process not excluded.  4.Right IJ line is present.     Electronically Signed ByKarel Haas On:10/8/2020 6:47 PM  This report was finalized on [id number] by  Kelechi Haas, .    XR Chest 1 View [[id number]] Collected: 10/08/20 1536     Updated: 10/08/20 1539    Narrative:         DATE OF EXAM:   10/8/2020 3:25 PM     PROCEDURE:   XR CHEST 1 VW-     INDICATIONS:   Simple Sepsis Protocol     COMPARISON:  No Comparisons Available     TECHNIQUE:   Portable chest radiograph.     FINDINGS:    Within the right upper lobe there is a dense 7.5 cm area of  consolidation most consistent with right upper lobe pneumonia. The heart  is enlarged. The patient is rotated to the right. There is no  pneumothorax. No large pleural effusion. Osseous structures intact.       Impression:      1. Dense area of consolidation involving the right upper lobe most  consistent with pneumonia. Radiographic follow-up recommended to ensure  complete resolution.  2. Cardiomegaly.  3. Clear left lung.     Electronically Signed By-Sandeep King On:10/8/2020 3:37 PM  This report was finalized on [id number] by  Sandeep  "Mouser, .            Vital Signs  Visit Vitals  BP (!) 186/57   Pulse 95   Temp 97.8 °F (36.6 °C)   Resp 19   Ht 152.4 cm (60\")   Wt 80.8 kg (178 lb 2.1 oz)   SpO2 93%   BMI 34.79 kg/m²       Physical Exam:  Physical Exam      Discharge Medications     Discharge Medications      New Medications      Instructions Start Date   budesonide 0.5 MG/2ML nebulizer solution  Commonly known as: PULMICORT   0.5 mg, Nebulization, 2 Times Daily - RT      bumetanide 1 MG tablet  Commonly known as: BUMEX   1 mg, Oral, Daily   Start Date: November 7, 2020     First Mouthwash (Magic Mouthwash) suspension   5 mL, Swish & Spit, Every 6 Hours      gabapentin 100 MG capsule  Commonly known as: NEURONTIN   100 mg, Oral, Every 8 Hours Scheduled      insulin glargine 100 UNIT/ML injection  Commonly known as: LANTUS   20 Units, Subcutaneous, Every 12 Hours Scheduled      insulin lispro 100 UNIT/ML injection  Commonly known as: ADMELOG   0-24 Units, Subcutaneous, Every 6 Hours Scheduled      insulin lispro 100 UNIT/ML injection  Commonly known as: ADMELOG   10 Units, Subcutaneous, Every 6 Hours      pantoprazole 40 MG EC tablet  Commonly known as: PROTONIX   40 mg, Oral, Every Morning Before Breakfast   Start Date: November 7, 2020     sulfamethoxazole-trimethoprim 800-160 MG per tablet  Commonly known as: BACTRIM DS,SEPTRA DS   320 mg, Oral, Every 8 Hours Scheduled      valGANciclovir 450 MG tablet  Commonly known as: VALCYTE   450 mg, Oral, Daily With Breakfast   Start Date: November 7, 2020        Changes to Medications      Instructions Start Date   predniSONE 20 MG tablet  Commonly known as: DELTASONE  What changed:   · when to take this  · additional instructions   20 mg, Oral, 2 Times Daily With Meals         Continue These Medications      Instructions Start Date   Adult Aspirin EC Low Strength 81 MG EC tablet  Generic drug: aspirin   81 mg, Oral, Daily      amLODIPine 10 MG tablet  Commonly known as: NORVASC   10 mg, Oral, Daily    "   atorvastatin 40 MG tablet  Commonly known as: LIPITOR   40 mg, Oral, Daily         Stop These Medications    azaTHIOprine 50 MG tablet  Commonly known as: IMURAN     glipizide 10 MG tablet  Commonly known as: GLUCOTROL     hydrALAZINE 50 MG tablet  Commonly known as: APRESOLINE     Lopid 600 MG tablet  Generic drug: gemfibrozil     metFORMIN 500 MG tablet  Commonly known as: GLUCOPHAGE     metoprolol tartrate 50 MG tablet  Commonly known as: LOPRESSOR     olmesartan 20 MG tablet  Commonly known as: BENICAR     theophylline 300 MG 12 hr tablet  Commonly known as: THEODUR     torsemide 10 MG tablet  Commonly known as: DEMADEX     Ventolin  (90 Base) MCG/ACT inhaler  Generic drug: albuterol sulfate HFA     vitamin D 1.25 MG (46588 UT) capsule capsule  Commonly known as: ERGOCALCIFEROL            Discharge Diet:   Diet Instructions     Diet: Dysphagia; Nectar / Syrup Thick Liquids; Pureed      Discharge Diet: Dysphagia    Fluid Consistency: Nectar / Syrup Thick Liquids    Pureed Options: Pureed          Activity at Discharge:   Activity Instructions     Activity as Tolerated            Follow-up Appointments  No future appointments.  Additional Instructions for the Follow-ups that You Need to Schedule     Discharge Follow-up with PCP   As directed       Currently Documented PCP:    Manuel Sanders MD    PCP Phone Number:    [phone]     Follow Up Details: snf provider asap upon d/ c         Discharge Follow-up with Specialty: Dr. Saritha jacobo one week, Dr. Hodge from pulmonology one week   As directed      Specialty: Dr. Saritha jacobo one week, Dr. Hodge from pulmonology one week                   Marianna Figueroa MD  11/06/20  11:57 EST    Time: Discharge 38 min

## 2020-11-06 NOTE — PROGRESS NOTES
ICU Daily Progress Note        Pneumonia of right upper lobe due to infectious organism    Cardiac murmur    Chronic renal insufficiency, stage III (moderate)    Diabetes mellitus, type 2 (CMS/HCC)    Essential hypertension    Shortness of breath    Tobacco abuse    Hearing loss    Paroxysmal atrial fibrillation (CMS/HCC)    Anemia    Oral thrush    Upper GI bleed    Hyperkalemia    Acute respiratory failure with hypoxia and hypercapnia (CMS/HCC)      Assessment    BAL from bronchoscopy 10/27/2020 showed CMV positive   HIV test is negative on 10/31/2020  CMV IgG 6.40  CMV IgM less than 30  Acute/chronic hypercapnic/hypoxic respiratory failure intubated 10/13/2020 extubated 10/21/2020    Reintubated 10/27/2020 due to large mucus plugs with bilateral lower lobe atelectasis for need of  suction and patient was on continuous non invasive ventilation extubated 10/20/20    Therapeutic [id number]/27/20  copious amount of mucopurulent secretions with large mucus block since to be extracted therapeutically  due to significant hypoxia and need for continuous on invasive ventilation we elected to intubate the patient for adequate suctioning    BAL positive for Pneumocystis carinii  Positive rhinovirus    Bronchoscopy completed 10/13/2020   copious amount of mucopurulent secretions were noted which was suctioned therapeutically    BAL from 10/13/2020  showed positive for rhinovirus  Respiratory panel on admission from nasal swab was negative for rhino virus on 10/08/2020    Repeat bronchoscopy 10/20/2020  Very thick mucopurulent secretions right upper lobe suctioned therapeutically  BAL right upper lobe    CT scan of the chest  1022 he had a and there is improvement in pneumonia    Upper GI bleed with large amount coffee-ground emesis   status post EGD 10/13/2020    A. fib  Initially started with RVR, currently bradycardia    Right IJ central line was placed on admission and removed on 10/22/2020    RAMONE,    COPD  Smoker  Rheumatoid arthritis  Inflammatory polyarthritis  Hyperlipidemia        Hypertension        Leukocytoclastic vasculitis        Hearing loss; hearing aid right side        obesity        peripheral arterial disease        diabetes; pre diabetic        Arthritis        Osteoarthritis  Osteoporosis      Plan     po valganciclovir for total of 7 days    Titrate oxygen    Bactrim p.o. completed  total 21 days total (for PCP pneumonia)    Aloe up with me in the office after 4 weeks             LOS: 29 days         Vital signs for last 24 hours:  Vitals:    11/06/20 0646 11/06/20 0653 11/06/20 1046 11/06/20 1454   BP:   (!) 186/57 162/55   Pulse: 77 80 95 93   Resp: 18  19 19   Temp:   97.8 °F (36.6 °C) 97.8 °F (36.6 °C)   TempSrc:       SpO2: (!) 88% 92% 93% 92%   Weight:       Height:           Intake/Output last 3 shifts:  I/O last 3 completed shifts:  In: 2280 [P.O.:2280]  Out: 3200 [Urine:2800; Stool:400]  Intake/Output this shift:  I/O this shift:  In: 480 [P.O.:480]  Out: 1300 [Urine:1300]    Vent settings for last 24 hours:       Hemodynamic parameters for last 24 hours:       Radiology  Imaging Results (Last 24 Hours)     ** No results found for the last 24 hours. **          Labs:  Results from last 7 days   Lab Units 11/06/20  0500   WBC 10*3/mm3 9.70   HEMOGLOBIN g/dL 8.7*   HEMATOCRIT % 26.3*   PLATELETS 10*3/mm3 134*     Results from last 7 days   Lab Units 11/06/20  0500   SODIUM mmol/L 141   POTASSIUM mmol/L 5.2   CHLORIDE mmol/L 98   CO2 mmol/L 33.0*   BUN mg/dL 64*   CREATININE mg/dL 1.21*   CALCIUM mg/dL 9.9   GLUCOSE mg/dL 90                     Results from last 7 days   Lab Units 11/06/20  0500   MAGNESIUM mg/dL 2.0                   Meds:   SCHEDULE  amLODIPine, 10 mg, Oral, Q24H  aspirin, 81 mg, Oral, Daily  atorvastatin, 40 mg, Oral, Daily  Barium Sulfate, 1 teaspoon(s), Oral, Once in imaging  bisacodyl, 10 mg, Rectal, Once  budesonide, 0.5 mg, Nebulization, BID - RT  bumetanide, 1  mg, Oral, Daily  First Mouthwash (Magic Mouthwash), 5 mL, Swish & Spit, Q6H  gabapentin, 100 mg, Oral, Q8H  insulin glargine, 20 Units, Subcutaneous, Q12H  insulin lispro, 0-24 Units, Subcutaneous, Q6H  insulin lispro, 10 Units, Subcutaneous, Q6H  pantoprazole, 40 mg, Oral, QAM AC  predniSONE, 20 mg, Oral, BID With Meals  sodium chloride, 10 mL, Intravenous, Q12H  sodium chloride, 10 mL, Intravenous, Q12H  sodium chloride, 10 mL, Intravenous, Q12H  sodium chloride, 10 mL, Intravenous, Q12H  sulfamethoxazole-trimethoprim, 2 tablet, Oral, Q8H  valGANciclovir, 450 mg, Oral, Daily With Breakfast      Infusions     PRNs  •  acetaminophen  •  Calcium Gluconate-NaCl **AND** calcium gluconate **AND** Calcium, Ionized  •  dextrose  •  dextrose  •  glucagon (human recombinant)  •  guaifenesin-dextromethorphan  •  hydrALAZINE  •  insulin lispro **AND** insulin lispro  •  lidocaine  •  lidocaine  •  magnesium sulfate **OR** magnesium sulfate **OR** magnesium sulfate  •  potassium chloride  •  sodium chloride  •  sodium chloride  •  sodium chloride  •  sodium chloride    Physical Exam:  Physical Exam  Vitals signs reviewed.   Cardiovascular:      Heart sounds: Murmur present.   Pulmonary:      Breath sounds: Wheezing present.   Musculoskeletal:      Right lower leg: Edema present.      Left lower leg: Edema present.   Skin:     General: Skin is warm and dry.   Neurological:      Mental Status: She is alert and oriented to person, place, and time.         ROS  Review of Systems   Constitutional: Positive for activity change and fatigue.

## 2020-11-06 NOTE — PROGRESS NOTES
-  PROGRESS NOTE      Patient Name: Lesa Julio  : 1950  MRN: [id number]  Primary Care Physician: Manuel Sanders MD  Date of admission: 10/8/2020    Patient Care Team:  Manuel Sanders MD as PCP - General        Subjective   Patient seen and examined, she denies any new complaints, no chest pain or shortness of breath.  Subjective:       Review of systems:  Review of system unremarkable      Allergies:    Allergies   Allergen Reactions   • Lisinopril Other (See Comments)   • Ipratropium-Albuterol Itching and Rash       Objective   Exam:     Vital Signs  Temp:  [97.5 °F (36.4 °C)-98.3 °F (36.8 °C)] 97.5 °F (36.4 °C)  Heart Rate:  [] 80  Resp:  [18-20] 18  BP: (135-198)/(39-64) 172/55  SpO2:  [88 %-99 %] 92 %  on  Flow (L/min):  [5-6] 6;   Device (Oxygen Therapy): high-flow nasal cannula;humidified  Body mass index is 34.79 kg/m².     General:  Elderly female no acute distress  Head:      Normocephalic and atraumatic.    Eyes:      PERRL/EOM intact, conjunctiva and sclera clear with out nystagmus.    Neck:      No masses, thyromegaly,  trachea central with normal respiratory effort   Lungs:    Bilateral crackles left greater than right.    Heart:      Regular rate and rhythm, no murmur no gallop  Abd:        Soft, nontender, not distended, bowel sounds positive, no shifting dullness   Pulses:   Pulses palpable  Extr:        No cyanosis or clubbing--+1 edema.    Neuro:     Alert awake  skin:       Intact without lesions or rashes.    Psych:     Alert awake.      results Review:  I have personally reviewed most recent Data :  CBC    Results from last 7 days   Lab Units 20  0500 20  0321 20  0431 20  0353 20  0414 20  0610 10/31/20  0312   WBC 10*3/mm3 9.70 8.70 10.80 10.40 11.30* 10.10 8.60   HEMOGLOBIN g/dL 8.7* 8.6* 9.1* 9.1* 9.1* 9.3* 8.5*   PLATELETS 10*3/mm3 134* 140 156 174 187 204 197     CMP   Results from last 7 days   Lab Units  11/06/20  0500 11/05/20  0321 11/04/20  0431 11/03/20  0353 11/02/20  1618 11/02/20  0414 11/01/20  1608 11/01/20  0610  10/31/20  0312   SODIUM mmol/L 141 141 138 136  --  135*  --  136  --  140   POTASSIUM mmol/L 5.2 4.3 5.2 4.3 4.9 5.4* 5.2 5.4*   < > 5.2   CHLORIDE mmol/L 98 98 95* 93*  --  93*  --  95*  --  100   CO2 mmol/L 33.0* 34.0* 32.0* 31.0*  --  31.0*  --  32.0*  --  31.0*   BUN mg/dL 64* 76* 86* 84*  --  82*  --  68*  --  50*   CREATININE mg/dL 1.21* 1.53* 1.56* 1.53*  --  1.39*  --  1.29*  --  1.26*   GLUCOSE mg/dL 90 155* 137* 227*  --  323*  --  394*  --  218*    < > = values in this interval not displayed.     ABG    Results from last 7 days   Lab Units 10/30/20  1434   PH, ARTERIAL pH units 7.416   PCO2, ARTERIAL mm Hg 46.4   PO2 ART mm Hg 67.3*   O2 SATURATION ART % 93.1*   BASE EXCESS ART mmol/L 4.7*     Fl Video Swallow With Speech Single Contrast    Result Date: 11/5/2020  See above.  Electronically Signed ByRuben Jewell On:11/5/2020 12:53 PM This report was finalized on [id number] by  Arnulfo Jewell, .    Fl Video Swallow With Speech Single Contrast    Result Date: 11/2/2020  As above. Please see speech pathology report for detailed evaluation and dietary recommendations.  Electronically Signed ByJeffrey Espitia On:11/2/2020 12:30 PM This report was finalized on [id number] by  Leonel Espitia, .    Xr Chest 1 View    Result Date: 11/2/2020  1. Unchanged right midlung airspace consolidation, similar to the prior. 2. Support devices in expected position.  Electronically Signed ByJeffrey Espitia On:11/2/2020 7:47 AM This report was finalized on [id number] by  Leonel Espitia, .    Xr Chest 1 View    Result Date: 11/1/2020  Improved aeration in the right lung with persistent partial right lower lobe atelectasis. Improving consolidation in the inferior right upper lobe. Support lines and tubes are unchanged.   Electronically Signed By-Robert Norris DO. On:11/1/2020 8:10 AM This report  was finalized on [id number] by  Robert Norris DO..    Xr Chest 1 View    Result Date: 10/31/2020   1. Interval extubation. 2. Stable esophagogastric tube and left upper extremity PICC line. 3. No change in right midlung and right basilar airspace disease.  Electronically Signed By-Sandeep King On:10/31/2020 7:27 AM This report was finalized on [id number] by  Sandeep King, .    Xr Chest 1 View    Result Date: 10/30/2020  1.Endotracheal tube in good position. 2.Consolidation within right midlung appears unchanged.  Electronically Signed By-DR. Onofre Nicholas MD On:10/30/2020 7:39 AM This report was finalized on [id number] by DR. Onofre Nicholas MD.      Results for orders placed during the hospital encounter of 10/08/20   Adult Transthoracic Echo Complete W/ Cont if Necessary Per Protocol    Narrative · Estimated left ventricular EF = 65% Left ventricular systolic function   is normal.  · Trace to mild mitral valve regurgitation is present.  · There is a small (<1cm) pericardial effusion.        Scheduled Meds:amLODIPine, 10 mg, Oral, Q24H  aspirin, 81 mg, Oral, Daily  atorvastatin, 40 mg, Oral, Daily  Barium Sulfate, 1 teaspoon(s), Oral, Once in imaging  bisacodyl, 10 mg, Rectal, Once  budesonide, 0.5 mg, Nebulization, BID - RT  bumetanide, 1 mg, Oral, Daily  First Mouthwash (Magic Mouthwash), 5 mL, Swish & Spit, Q6H  gabapentin, 100 mg, Oral, Q8H  insulin glargine, 20 Units, Subcutaneous, Q12H  insulin lispro, 0-24 Units, Subcutaneous, Q6H  insulin lispro, 10 Units, Subcutaneous, Q6H  pantoprazole, 40 mg, Oral, QAM AC  predniSONE, 20 mg, Oral, BID With Meals  sodium chloride, 10 mL, Intravenous, Q12H  sodium chloride, 10 mL, Intravenous, Q12H  sodium chloride, 10 mL, Intravenous, Q12H  sodium chloride, 10 mL, Intravenous, Q12H  sulfamethoxazole-trimethoprim, 2 tablet, Oral, Q8H  valGANciclovir, 450 mg, Oral, Daily With Breakfast      Continuous Infusions:   PRN Meds:•  acetaminophen  •  Calcium  Gluconate-NaCl **AND** calcium gluconate **AND** Calcium, Ionized  •  dextrose  •  dextrose  •  glucagon (human recombinant)  •  guaifenesin-dextromethorphan  •  hydrALAZINE  •  influenza vaccine  •  insulin lispro **AND** insulin lispro  •  lidocaine  •  lidocaine  •  magnesium sulfate **OR** magnesium sulfate **OR** magnesium sulfate  •  potassium chloride  •  sodium chloride  •  sodium chloride  •  sodium chloride  •  sodium chloride    Assessment/Plan   Assessment and Plan:         Pneumonia of right upper lobe due to infectious organism    Cardiac murmur    Chronic renal insufficiency, stage III (moderate)    Diabetes mellitus, type 2 (CMS/HCC)    Essential hypertension    Shortness of breath    Tobacco abuse    Hearing loss    Paroxysmal atrial fibrillation (CMS/HCC)    Anemia    Oral thrush    Upper GI bleed    Hyperkalemia    Acute respiratory failure with hypoxia and hypercapnia (CMS/HCC)     ASSESSMENT:  · Acute kidney injury likely ATN likely related to sepsis patient baseline creatinine is 0.9 back in June 2019, FENa 2.7  · Metabolic acidosis with increased lactic acid level  · Right upper lobe pneumonia  · History of rheumatoid arthritis  · History of hypertension  · History of vasculitis  · Hypernatremia  · Acute GI bleed with emesis  · Chronic kidney disease stage III a likely with GFR around 50 mL/min at baseline           Plan:        · Renal functions continue to be stable  · Potassium level is increasing again   · Continue low-dose Bumex  · Patient is on valganciclovir and also on Bactrim both can cause increase in creatinine  · Follow-up with repeat labs tomorrow morning  · Patient is still on high-dose steroid causing some azotemia which is slightly better than yesterday  · Blood pressure is acceptable  · Sodium level acceptable  · resp condition stable   · We will start patient on Florinef 1 mcg every other day for hyperkalemia.  · Florinef should be stopped once Bactrim is stopped  · Will  follow         Note started  by Serjio Melara MD,   [facility] kidney consultant

## 2020-11-09 NOTE — PROGRESS NOTES
Case Management Discharge Note      Final Note: milesnelia         Selected Continued Care - Discharged on 11/6/2020 Admission date: 10/8/2020 - Discharge disposition: Skilled Nursing Facility (DC - External)                     Final Discharge Disposition Code: 03 - skilled nursing facility (SNF)

## 2020-11-12 LAB — FUNGUS WND CULT: ABNORMAL

## 2020-11-19 LAB — FUNGUS WND CULT: ABNORMAL

## 2020-11-24 ENCOUNTER — INPATIENT HOSPITAL (OUTPATIENT)
Dept: URBAN - NONMETROPOLITAN AREA HOSPITAL 5 | Facility: HOSPITAL | Age: 70
End: 2020-11-24
Payer: MEDICARE

## 2020-11-24 DIAGNOSIS — D50.9 IRON DEFICIENCY ANEMIA, UNSPECIFIED: ICD-10-CM

## 2020-11-24 DIAGNOSIS — T85.598A OTHER MECHANICAL COMPLICATION OF OTHER GASTROINTESTINAL PROS: ICD-10-CM

## 2020-11-24 DIAGNOSIS — U07.1 COVID-19: ICD-10-CM

## 2020-11-24 DIAGNOSIS — K25.9 GASTRIC ULCER, UNSPECIFIED AS ACUTE OR CHRONIC, WITHOUT HEMO: ICD-10-CM

## 2020-11-24 LAB
MYCOBACTERIUM SPEC CULT: NORMAL
NIGHT BLUE STAIN TISS: NORMAL

## 2020-11-24 PROCEDURE — 99222 1ST HOSP IP/OBS MODERATE 55: CPT | Performed by: INTERNAL MEDICINE

## 2020-11-27 LAB — FUNGUS WND CULT: ABNORMAL

## 2020-12-01 LAB
MYCOBACTERIUM SPEC CULT: NORMAL
NIGHT BLUE STAIN TISS: NORMAL

## 2020-12-08 LAB
MYCOBACTERIUM SPEC CULT: NORMAL
NIGHT BLUE STAIN TISS: NORMAL

## 2020-12-13 ENCOUNTER — APPOINTMENT (OUTPATIENT)
Dept: GENERAL RADIOLOGY | Facility: HOSPITAL | Age: 70
End: 2020-12-13

## 2020-12-13 ENCOUNTER — APPOINTMENT (OUTPATIENT)
Dept: CT IMAGING | Facility: HOSPITAL | Age: 70
End: 2020-12-13

## 2020-12-13 ENCOUNTER — HOSPITAL ENCOUNTER (INPATIENT)
Facility: HOSPITAL | Age: 70
LOS: 7 days | Discharge: NURSING, REHAB OR LONG-TERM CARE | End: 2020-12-20
Attending: INTERNAL MEDICINE | Admitting: INTERNAL MEDICINE

## 2020-12-13 DIAGNOSIS — J44.9 CHRONIC OBSTRUCTIVE PULMONARY DISEASE, UNSPECIFIED COPD TYPE (HCC): Primary | ICD-10-CM

## 2020-12-13 PROBLEM — I95.9 SEPSIS ASSOCIATED HYPOTENSION (HCC): Status: ACTIVE | Noted: 2020-12-13

## 2020-12-13 PROBLEM — E88.09 HYPOALBUMINEMIA: Status: ACTIVE | Noted: 2020-12-13

## 2020-12-13 PROBLEM — A41.9 SEPTIC SHOCK (HCC): Status: ACTIVE | Noted: 2020-12-13

## 2020-12-13 PROBLEM — R19.7 DIARRHEA OF PRESUMED INFECTIOUS ORIGIN: Status: ACTIVE | Noted: 2020-12-13

## 2020-12-13 PROBLEM — Z74.09 MUSCULOSKELETAL IMMOBILITY: Status: ACTIVE | Noted: 2020-12-13

## 2020-12-13 PROBLEM — R53.81 PHYSICAL DECONDITIONING: Status: ACTIVE | Noted: 2020-12-13

## 2020-12-13 PROBLEM — A41.9 SEPSIS ASSOCIATED HYPOTENSION (HCC): Status: ACTIVE | Noted: 2020-12-13

## 2020-12-13 PROBLEM — R65.21 SEPTIC SHOCK (HCC): Status: ACTIVE | Noted: 2020-12-13

## 2020-12-13 LAB
ALBUMIN SERPL-MCNC: 3.2 G/DL (ref 3.5–5.2)
ALBUMIN/GLOB SERPL: 1.2 G/DL
ALP SERPL-CCNC: 99 U/L (ref 39–117)
ALT SERPL W P-5'-P-CCNC: 7 U/L (ref 1–33)
ANION GAP SERPL CALCULATED.3IONS-SCNC: 12 MMOL/L (ref 5–15)
APTT PPP: 24.2 SECONDS (ref 24–31)
AST SERPL-CCNC: 16 U/L (ref 1–32)
B PARAPERT DNA SPEC QL NAA+PROBE: NOT DETECTED
B PERT DNA SPEC QL NAA+PROBE: NOT DETECTED
BACTERIA UR QL AUTO: ABNORMAL /HPF
BILIRUB SERPL-MCNC: 0.2 MG/DL (ref 0–1.2)
BILIRUB UR QL STRIP: NEGATIVE
BUN SERPL-MCNC: 27 MG/DL (ref 8–23)
BUN/CREAT SERPL: 32.9 (ref 7–25)
C PNEUM DNA NPH QL NAA+NON-PROBE: NOT DETECTED
CA-I SERPL ISE-MCNC: 1.14 MMOL/L (ref 1.2–1.3)
CALCIUM SPEC-SCNC: 8.2 MG/DL (ref 8.6–10.5)
CHLORIDE SERPL-SCNC: 113 MMOL/L (ref 98–107)
CHOLEST SERPL-MCNC: 157 MG/DL (ref 0–200)
CK SERPL-CCNC: 32 U/L (ref 20–180)
CLARITY UR: CLEAR
CO2 SERPL-SCNC: 21 MMOL/L (ref 22–29)
COLOR UR: YELLOW
CORTIS SERPL-MCNC: 12.1 MCG/DL
CREAT SERPL-MCNC: 0.82 MG/DL (ref 0.57–1)
CRP SERPL-MCNC: 0.63 MG/DL (ref 0–0.5)
D-LACTATE SERPL-SCNC: 0.8 MMOL/L (ref 0.5–2)
D-LACTATE SERPL-SCNC: 0.9 MMOL/L (ref 0.5–2)
DACRYOCYTES BLD QL SMEAR: ABNORMAL
DEPRECATED RDW RBC AUTO: 68.3 FL (ref 37–54)
EOSINOPHIL # BLD MANUAL: 0.52 10*3/MM3 (ref 0–0.4)
EOSINOPHIL NFR BLD MANUAL: 5 % (ref 0.3–6.2)
ERYTHROCYTE [DISTWIDTH] IN BLOOD BY AUTOMATED COUNT: 19.8 % (ref 12.3–15.4)
FLUAV SUBTYP SPEC NAA+PROBE: NOT DETECTED
FLUBV RNA ISLT QL NAA+PROBE: NOT DETECTED
GFR SERPL CREATININE-BSD FRML MDRD: 69 ML/MIN/1.73
GIANT PLATELETS: ABNORMAL
GLOBULIN UR ELPH-MCNC: 2.6 GM/DL
GLUCOSE BLDC GLUCOMTR-MCNC: 143 MG/DL (ref 70–105)
GLUCOSE BLDC GLUCOMTR-MCNC: 162 MG/DL (ref 70–105)
GLUCOSE BLDC GLUCOMTR-MCNC: 199 MG/DL (ref 70–105)
GLUCOSE BLDC GLUCOMTR-MCNC: 199 MG/DL (ref 70–105)
GLUCOSE BLDC GLUCOMTR-MCNC: 213 MG/DL (ref 70–105)
GLUCOSE BLDC GLUCOMTR-MCNC: 274 MG/DL (ref 70–105)
GLUCOSE BLDC GLUCOMTR-MCNC: 68 MG/DL (ref 70–105)
GLUCOSE SERPL-MCNC: 68 MG/DL (ref 65–99)
GLUCOSE UR STRIP-MCNC: NEGATIVE MG/DL
HADV DNA SPEC NAA+PROBE: NOT DETECTED
HCOV 229E RNA SPEC QL NAA+PROBE: NOT DETECTED
HCOV HKU1 RNA SPEC QL NAA+PROBE: NOT DETECTED
HCOV NL63 RNA SPEC QL NAA+PROBE: NOT DETECTED
HCOV OC43 RNA SPEC QL NAA+PROBE: NOT DETECTED
HCT VFR BLD AUTO: 32.8 % (ref 34–46.6)
HDLC SERPL-MCNC: 51 MG/DL (ref 40–60)
HGB BLD-MCNC: 10.3 G/DL (ref 12–15.9)
HGB UR QL STRIP.AUTO: NEGATIVE
HMPV RNA NPH QL NAA+NON-PROBE: NOT DETECTED
HPIV1 RNA SPEC QL NAA+PROBE: NOT DETECTED
HPIV2 RNA SPEC QL NAA+PROBE: NOT DETECTED
HPIV3 RNA NPH QL NAA+PROBE: NOT DETECTED
HPIV4 P GENE NPH QL NAA+PROBE: NOT DETECTED
HYALINE CASTS UR QL AUTO: ABNORMAL /LPF
INR PPP: 1.05 (ref 0.93–1.1)
KETONES UR QL STRIP: NEGATIVE
L PNEUMO1 AG UR QL IA: NEGATIVE
LARGE PLATELETS: ABNORMAL
LDLC SERPL CALC-MCNC: 85 MG/DL (ref 0–100)
LDLC/HDLC SERPL: 1.63 {RATIO}
LEUKOCYTE ESTERASE UR QL STRIP.AUTO: ABNORMAL
LYMPHOCYTES # BLD MANUAL: 0.42 10*3/MM3 (ref 0.7–3.1)
LYMPHOCYTES NFR BLD MANUAL: 4 % (ref 19.6–45.3)
LYMPHOCYTES NFR BLD MANUAL: 7 % (ref 5–12)
M PNEUMO IGG SER IA-ACNC: NOT DETECTED
MAGNESIUM SERPL-MCNC: 1.6 MG/DL (ref 1.6–2.4)
MCH RBC QN AUTO: 30.6 PG (ref 26.6–33)
MCHC RBC AUTO-ENTMCNC: 31.4 G/DL (ref 31.5–35.7)
MCV RBC AUTO: 97.6 FL (ref 79–97)
METAMYELOCYTES NFR BLD MANUAL: 3 % (ref 0–0)
MONOCYTES # BLD AUTO: 0.73 10*3/MM3 (ref 0.1–0.9)
NEUTROPHILS # BLD AUTO: 8.22 10*3/MM3 (ref 1.7–7)
NEUTROPHILS NFR BLD MANUAL: 69 % (ref 42.7–76)
NEUTS BAND NFR BLD MANUAL: 10 % (ref 0–5)
NITRITE UR QL STRIP: NEGATIVE
PH UR STRIP.AUTO: 6 [PH] (ref 5–8)
PHOSPHATE SERPL-MCNC: 3.2 MG/DL (ref 2.5–4.5)
PLATELET # BLD AUTO: 337 10*3/MM3 (ref 140–450)
PMV BLD AUTO: 8.3 FL (ref 6–12)
POIKILOCYTOSIS BLD QL SMEAR: ABNORMAL
POTASSIUM SERPL-SCNC: 3.5 MMOL/L (ref 3.5–5.2)
PROCALCITONIN SERPL-MCNC: 0.19 NG/ML (ref 0–0.25)
PROT SERPL-MCNC: 5.8 G/DL (ref 6–8.5)
PROT UR QL STRIP: NEGATIVE
PROTHROMBIN TIME: 11.5 SECONDS (ref 9.6–11.7)
RBC # BLD AUTO: 3.36 10*6/MM3 (ref 3.77–5.28)
RBC # UR: ABNORMAL /HPF
REF LAB TEST METHOD: ABNORMAL
RHINOVIRUS RNA SPEC NAA+PROBE: NOT DETECTED
RSV RNA NPH QL NAA+NON-PROBE: NOT DETECTED
S PNEUM AG SPEC QL LA: NEGATIVE
SARS-COV-2 RNA NPH QL NAA+NON-PROBE: NOT DETECTED
SCAN SLIDE: NORMAL
SMALL PLATELETS BLD QL SMEAR: ADEQUATE
SODIUM SERPL-SCNC: 146 MMOL/L (ref 136–145)
SP GR UR STRIP: 1.01 (ref 1–1.03)
SQUAMOUS #/AREA URNS HPF: ABNORMAL /HPF
TRIGL SERPL-MCNC: 114 MG/DL (ref 0–150)
TROPONIN T SERPL-MCNC: 0.03 NG/ML (ref 0–0.03)
TSH SERPL DL<=0.05 MIU/L-ACNC: 1.84 UIU/ML (ref 0.27–4.2)
UROBILINOGEN UR QL STRIP: ABNORMAL
VARIANT LYMPHS NFR BLD MANUAL: 2 % (ref 0–5)
VLDLC SERPL-MCNC: 21 MG/DL (ref 5–40)
WBC # BLD AUTO: 10.4 10*3/MM3 (ref 3.4–10.8)
WBC MORPH BLD: NORMAL
WBC UR QL AUTO: ABNORMAL /HPF

## 2020-12-13 PROCEDURE — 84484 ASSAY OF TROPONIN QUANT: CPT | Performed by: NURSE PRACTITIONER

## 2020-12-13 PROCEDURE — 71045 X-RAY EXAM CHEST 1 VIEW: CPT

## 2020-12-13 PROCEDURE — 0BH18EZ INSERTION OF ENDOTRACHEAL AIRWAY INTO TRACHEA, VIA NATURAL OR ARTIFICIAL OPENING ENDOSCOPIC: ICD-10-PCS | Performed by: INTERNAL MEDICINE

## 2020-12-13 PROCEDURE — P9047 ALBUMIN (HUMAN), 25%, 50ML: HCPCS | Performed by: NURSE PRACTITIONER

## 2020-12-13 PROCEDURE — 94002 VENT MGMT INPAT INIT DAY: CPT

## 2020-12-13 PROCEDURE — 63710000001 INSULIN LISPRO (HUMAN) PER 5 UNITS: Performed by: NURSE PRACTITIONER

## 2020-12-13 PROCEDURE — 25010000002 METHYLPREDNISOLONE PER 125 MG: Performed by: NURSE PRACTITIONER

## 2020-12-13 PROCEDURE — 82533 TOTAL CORTISOL: CPT | Performed by: NURSE PRACTITIONER

## 2020-12-13 PROCEDURE — 31500 INSERT EMERGENCY AIRWAY: CPT

## 2020-12-13 PROCEDURE — 06HY33Z INSERTION OF INFUSION DEVICE INTO LOWER VEIN, PERCUTANEOUS APPROACH: ICD-10-PCS | Performed by: NURSE PRACTITIONER

## 2020-12-13 PROCEDURE — 94799 UNLISTED PULMONARY SVC/PX: CPT

## 2020-12-13 PROCEDURE — 87899 AGENT NOS ASSAY W/OPTIC: CPT | Performed by: NURSE PRACTITIONER

## 2020-12-13 PROCEDURE — 84145 PROCALCITONIN (PCT): CPT | Performed by: NURSE PRACTITIONER

## 2020-12-13 PROCEDURE — 63710000001 INSULIN LISPRO (HUMAN) PER 5 UNITS: Performed by: INTERNAL MEDICINE

## 2020-12-13 PROCEDURE — 25010000002 CEFEPIME PER 500 MG: Performed by: NURSE PRACTITIONER

## 2020-12-13 PROCEDURE — 0202U NFCT DS 22 TRGT SARS-COV-2: CPT | Performed by: NURSE PRACTITIONER

## 2020-12-13 PROCEDURE — 85610 PROTHROMBIN TIME: CPT | Performed by: NURSE PRACTITIONER

## 2020-12-13 PROCEDURE — 83735 ASSAY OF MAGNESIUM: CPT | Performed by: NURSE PRACTITIONER

## 2020-12-13 PROCEDURE — 25010000002 HEPARIN (PORCINE) PER 1000 UNITS: Performed by: NURSE PRACTITIONER

## 2020-12-13 PROCEDURE — 25010000002 ALBUMIN HUMAN 25% PER 50 ML: Performed by: NURSE PRACTITIONER

## 2020-12-13 PROCEDURE — 86140 C-REACTIVE PROTEIN: CPT | Performed by: NURSE PRACTITIONER

## 2020-12-13 PROCEDURE — 84443 ASSAY THYROID STIM HORMONE: CPT | Performed by: NURSE PRACTITIONER

## 2020-12-13 PROCEDURE — 94640 AIRWAY INHALATION TREATMENT: CPT

## 2020-12-13 PROCEDURE — 25010000002 PIPERACILLIN SOD-TAZOBACTAM PER 1 G: Performed by: NURSE PRACTITIONER

## 2020-12-13 PROCEDURE — 82962 GLUCOSE BLOOD TEST: CPT

## 2020-12-13 PROCEDURE — 25010000002 PROPOFOL 10 MG/ML EMULSION: Performed by: NURSE PRACTITIONER

## 2020-12-13 PROCEDURE — 80061 LIPID PANEL: CPT | Performed by: NURSE PRACTITIONER

## 2020-12-13 PROCEDURE — 87040 BLOOD CULTURE FOR BACTERIA: CPT | Performed by: NURSE PRACTITIONER

## 2020-12-13 PROCEDURE — 85007 BL SMEAR W/DIFF WBC COUNT: CPT | Performed by: NURSE PRACTITIONER

## 2020-12-13 PROCEDURE — 83605 ASSAY OF LACTIC ACID: CPT | Performed by: NURSE PRACTITIONER

## 2020-12-13 PROCEDURE — 5A1945Z RESPIRATORY VENTILATION, 24-96 CONSECUTIVE HOURS: ICD-10-PCS | Performed by: INTERNAL MEDICINE

## 2020-12-13 PROCEDURE — 25010000002 FENTANYL CITRATE (PF) 100 MCG/2ML SOLUTION

## 2020-12-13 PROCEDURE — 84100 ASSAY OF PHOSPHORUS: CPT | Performed by: NURSE PRACTITIONER

## 2020-12-13 PROCEDURE — 82550 ASSAY OF CK (CPK): CPT | Performed by: NURSE PRACTITIONER

## 2020-12-13 PROCEDURE — 25010000002 MIDAZOLAM PER 1 MG

## 2020-12-13 PROCEDURE — 25010000002 CALCIUM GLUCONATE-NACL 1-0.675 GM/50ML-% SOLUTION: Performed by: NURSE PRACTITIONER

## 2020-12-13 PROCEDURE — 81001 URINALYSIS AUTO W/SCOPE: CPT | Performed by: NURSE PRACTITIONER

## 2020-12-13 PROCEDURE — 82330 ASSAY OF CALCIUM: CPT | Performed by: NURSE PRACTITIONER

## 2020-12-13 PROCEDURE — 80053 COMPREHEN METABOLIC PANEL: CPT | Performed by: NURSE PRACTITIONER

## 2020-12-13 PROCEDURE — 25010000002 MAGNESIUM SULFATE IN D5W 1G/100ML (PREMIX) 1-5 GM/100ML-% SOLUTION: Performed by: NURSE PRACTITIONER

## 2020-12-13 PROCEDURE — 85025 COMPLETE CBC W/AUTO DIFF WBC: CPT | Performed by: NURSE PRACTITIONER

## 2020-12-13 PROCEDURE — 85730 THROMBOPLASTIN TIME PARTIAL: CPT | Performed by: NURSE PRACTITIONER

## 2020-12-13 RX ORDER — ONDANSETRON 4 MG/1
4 TABLET, FILM COATED ORAL EVERY 6 HOURS PRN
Status: DISCONTINUED | OUTPATIENT
Start: 2020-12-13 | End: 2020-12-20 | Stop reason: HOSPADM

## 2020-12-13 RX ORDER — FUROSEMIDE 10 MG/ML
40 INJECTION INTRAMUSCULAR; INTRAVENOUS EVERY 8 HOURS
Status: DISCONTINUED | OUTPATIENT
Start: 2020-12-14 | End: 2020-12-20 | Stop reason: HOSPADM

## 2020-12-13 RX ORDER — SODIUM CHLORIDE 0.9 % (FLUSH) 0.9 %
10 SYRINGE (ML) INJECTION AS NEEDED
Status: DISCONTINUED | OUTPATIENT
Start: 2020-12-13 | End: 2020-12-20 | Stop reason: HOSPADM

## 2020-12-13 RX ORDER — ALBUMIN (HUMAN) 12.5 G/50ML
50 SOLUTION INTRAVENOUS ONCE
Status: DISCONTINUED | OUTPATIENT
Start: 2020-12-13 | End: 2020-12-13

## 2020-12-13 RX ORDER — MAGNESIUM SULFATE HEPTAHYDRATE 40 MG/ML
2 INJECTION, SOLUTION INTRAVENOUS AS NEEDED
Status: DISCONTINUED | OUTPATIENT
Start: 2020-12-13 | End: 2020-12-20 | Stop reason: HOSPADM

## 2020-12-13 RX ORDER — FENTANYL CITRATE 50 UG/ML
INJECTION, SOLUTION INTRAMUSCULAR; INTRAVENOUS
Status: COMPLETED
Start: 2020-12-13 | End: 2020-12-13

## 2020-12-13 RX ORDER — BUDESONIDE 0.5 MG/2ML
0.5 INHALANT ORAL
Status: DISCONTINUED | OUTPATIENT
Start: 2020-12-13 | End: 2020-12-20 | Stop reason: HOSPADM

## 2020-12-13 RX ORDER — ERGOCALCIFEROL 1.25 MG/1
50000 CAPSULE ORAL 2 TIMES WEEKLY
COMMUNITY

## 2020-12-13 RX ORDER — ACETAMINOPHEN 325 MG/1
650 TABLET ORAL EVERY 4 HOURS PRN
Status: DISCONTINUED | OUTPATIENT
Start: 2020-12-13 | End: 2020-12-20 | Stop reason: HOSPADM

## 2020-12-13 RX ORDER — SODIUM CHLORIDE 9 MG/ML
125 INJECTION, SOLUTION INTRAVENOUS CONTINUOUS
Status: DISCONTINUED | OUTPATIENT
Start: 2020-12-13 | End: 2020-12-13

## 2020-12-13 RX ORDER — CALCIUM GLUCONATE 20 MG/ML
1 INJECTION, SOLUTION INTRAVENOUS ONCE
Status: COMPLETED | OUTPATIENT
Start: 2020-12-13 | End: 2020-12-13

## 2020-12-13 RX ORDER — INSULIN LISPRO 100 [IU]/ML
0-7 INJECTION, SOLUTION INTRAVENOUS; SUBCUTANEOUS EVERY 6 HOURS SCHEDULED
Status: DISCONTINUED | OUTPATIENT
Start: 2020-12-13 | End: 2020-12-13

## 2020-12-13 RX ORDER — ONDANSETRON 2 MG/ML
4 INJECTION INTRAMUSCULAR; INTRAVENOUS EVERY 6 HOURS PRN
Status: DISCONTINUED | OUTPATIENT
Start: 2020-12-13 | End: 2020-12-20 | Stop reason: HOSPADM

## 2020-12-13 RX ORDER — NICOTINE POLACRILEX 4 MG
15 LOZENGE BUCCAL
Status: DISCONTINUED | OUTPATIENT
Start: 2020-12-13 | End: 2020-12-20 | Stop reason: HOSPADM

## 2020-12-13 RX ORDER — ALBUMIN (HUMAN) 12.5 G/50ML
50 SOLUTION INTRAVENOUS ONCE
Status: COMPLETED | OUTPATIENT
Start: 2020-12-13 | End: 2020-12-13

## 2020-12-13 RX ORDER — NITROGLYCERIN 0.4 MG/1
0.4 TABLET SUBLINGUAL
Status: DISCONTINUED | OUTPATIENT
Start: 2020-12-13 | End: 2020-12-20 | Stop reason: HOSPADM

## 2020-12-13 RX ORDER — SODIUM CHLORIDE 0.9 % (FLUSH) 0.9 %
10 SYRINGE (ML) INJECTION EVERY 12 HOURS SCHEDULED
Status: DISCONTINUED | OUTPATIENT
Start: 2020-12-13 | End: 2020-12-20 | Stop reason: HOSPADM

## 2020-12-13 RX ORDER — HEPARIN SODIUM 5000 [USP'U]/ML
5000 INJECTION, SOLUTION INTRAVENOUS; SUBCUTANEOUS EVERY 8 HOURS SCHEDULED
Status: DISCONTINUED | OUTPATIENT
Start: 2020-12-13 | End: 2020-12-20 | Stop reason: HOSPADM

## 2020-12-13 RX ORDER — BUMETANIDE 0.25 MG/ML
INJECTION INTRAMUSCULAR; INTRAVENOUS
Status: COMPLETED
Start: 2020-12-13 | End: 2020-12-13

## 2020-12-13 RX ORDER — MIDAZOLAM HYDROCHLORIDE 1 MG/ML
INJECTION INTRAMUSCULAR; INTRAVENOUS
Status: COMPLETED
Start: 2020-12-13 | End: 2020-12-13

## 2020-12-13 RX ORDER — AZATHIOPRINE 50 MG/1
50 TABLET ORAL 2 TIMES DAILY
COMMUNITY
End: 2021-07-10

## 2020-12-13 RX ORDER — ALUMINA, MAGNESIA, AND SIMETHICONE 2400; 2400; 240 MG/30ML; MG/30ML; MG/30ML
15 SUSPENSION ORAL EVERY 6 HOURS PRN
Status: DISCONTINUED | OUTPATIENT
Start: 2020-12-13 | End: 2020-12-20 | Stop reason: HOSPADM

## 2020-12-13 RX ORDER — METHYLPREDNISOLONE SODIUM SUCCINATE 40 MG/ML
40 INJECTION, POWDER, LYOPHILIZED, FOR SOLUTION INTRAMUSCULAR; INTRAVENOUS EVERY 8 HOURS
Status: DISCONTINUED | OUTPATIENT
Start: 2020-12-14 | End: 2020-12-17

## 2020-12-13 RX ORDER — METHYLPREDNISOLONE SODIUM SUCCINATE 125 MG/2ML
80 INJECTION, POWDER, LYOPHILIZED, FOR SOLUTION INTRAMUSCULAR; INTRAVENOUS ONCE
Status: COMPLETED | OUTPATIENT
Start: 2020-12-13 | End: 2020-12-13

## 2020-12-13 RX ORDER — MAGNESIUM SULFATE 1 G/100ML
1 INJECTION INTRAVENOUS AS NEEDED
Status: DISCONTINUED | OUTPATIENT
Start: 2020-12-13 | End: 2020-12-20 | Stop reason: HOSPADM

## 2020-12-13 RX ORDER — INSULIN LISPRO 100 [IU]/ML
0-7 INJECTION, SOLUTION INTRAVENOUS; SUBCUTANEOUS AS NEEDED
Status: DISCONTINUED | OUTPATIENT
Start: 2020-12-13 | End: 2020-12-14

## 2020-12-13 RX ORDER — NOREPINEPHRINE BIT/0.9 % NACL 8 MG/250ML
.02-.5 INFUSION BOTTLE (ML) INTRAVENOUS
Status: DISCONTINUED | OUTPATIENT
Start: 2020-12-13 | End: 2020-12-18

## 2020-12-13 RX ORDER — PANTOPRAZOLE SODIUM 40 MG/1
40 TABLET, DELAYED RELEASE ORAL
Status: DISCONTINUED | OUTPATIENT
Start: 2020-12-13 | End: 2020-12-14

## 2020-12-13 RX ORDER — ETOMIDATE 2 MG/ML
INJECTION INTRAVENOUS
Status: COMPLETED
Start: 2020-12-13 | End: 2020-12-13

## 2020-12-13 RX ORDER — POTASSIUM CHLORIDE 7.45 MG/ML
10 INJECTION INTRAVENOUS
Status: DISCONTINUED | OUTPATIENT
Start: 2020-12-13 | End: 2020-12-20 | Stop reason: HOSPADM

## 2020-12-13 RX ORDER — ACETAMINOPHEN 650 MG/1
650 SUPPOSITORY RECTAL EVERY 4 HOURS PRN
Status: DISCONTINUED | OUTPATIENT
Start: 2020-12-13 | End: 2020-12-20 | Stop reason: HOSPADM

## 2020-12-13 RX ORDER — INSULIN LISPRO 100 [IU]/ML
0-7 INJECTION, SOLUTION INTRAVENOUS; SUBCUTANEOUS EVERY 6 HOURS SCHEDULED
Status: DISCONTINUED | OUTPATIENT
Start: 2020-12-14 | End: 2020-12-14

## 2020-12-13 RX ORDER — DEXTROSE AND SODIUM CHLORIDE 5; .45 G/100ML; G/100ML
100 INJECTION, SOLUTION INTRAVENOUS CONTINUOUS
Status: DISCONTINUED | OUTPATIENT
Start: 2020-12-13 | End: 2020-12-14

## 2020-12-13 RX ORDER — ALBUMIN (HUMAN) 12.5 G/50ML
25 SOLUTION INTRAVENOUS ONCE
Status: COMPLETED | OUTPATIENT
Start: 2020-12-13 | End: 2020-12-13

## 2020-12-13 RX ORDER — POTASSIUM CHLORIDE 20 MEQ/1
40 TABLET, EXTENDED RELEASE ORAL AS NEEDED
Status: DISCONTINUED | OUTPATIENT
Start: 2020-12-13 | End: 2020-12-20 | Stop reason: HOSPADM

## 2020-12-13 RX ORDER — POTASSIUM CHLORIDE 1.5 G/1.77G
40 POWDER, FOR SOLUTION ORAL AS NEEDED
Status: DISCONTINUED | OUTPATIENT
Start: 2020-12-13 | End: 2020-12-20 | Stop reason: HOSPADM

## 2020-12-13 RX ORDER — INSULIN LISPRO 100 [IU]/ML
0-7 INJECTION, SOLUTION INTRAVENOUS; SUBCUTANEOUS AS NEEDED
Status: DISCONTINUED | OUTPATIENT
Start: 2020-12-13 | End: 2020-12-13

## 2020-12-13 RX ORDER — INSULIN LISPRO 100 [IU]/ML
0-7 INJECTION, SOLUTION INTRAVENOUS; SUBCUTANEOUS
Status: DISCONTINUED | OUTPATIENT
Start: 2020-12-13 | End: 2020-12-13

## 2020-12-13 RX ORDER — BUMETANIDE 0.25 MG/ML
2 INJECTION INTRAMUSCULAR; INTRAVENOUS ONCE
Status: COMPLETED | OUTPATIENT
Start: 2020-12-13 | End: 2020-12-13

## 2020-12-13 RX ORDER — DEXTROSE MONOHYDRATE 25 G/50ML
25 INJECTION, SOLUTION INTRAVENOUS
Status: DISCONTINUED | OUTPATIENT
Start: 2020-12-13 | End: 2020-12-20 | Stop reason: HOSPADM

## 2020-12-13 RX ORDER — MAGNESIUM SULFATE 1 G/100ML
1 INJECTION INTRAVENOUS ONCE
Status: COMPLETED | OUTPATIENT
Start: 2020-12-13 | End: 2020-12-13

## 2020-12-13 RX ADMIN — ALBUMIN HUMAN 50 G: 0.25 SOLUTION INTRAVENOUS at 11:57

## 2020-12-13 RX ADMIN — METHYLPREDNISOLONE SODIUM SUCCINATE 80 MG: 125 INJECTION, POWDER, FOR SOLUTION INTRAMUSCULAR; INTRAVENOUS at 19:17

## 2020-12-13 RX ADMIN — INSULIN LISPRO 4 UNITS: 100 INJECTION, SOLUTION INTRAVENOUS; SUBCUTANEOUS at 17:28

## 2020-12-13 RX ADMIN — CEFEPIME HYDROCHLORIDE 2 G: 2 INJECTION, POWDER, FOR SOLUTION INTRAVENOUS at 09:42

## 2020-12-13 RX ADMIN — HEPARIN SODIUM 5000 UNITS: 5000 INJECTION INTRAVENOUS; SUBCUTANEOUS at 21:07

## 2020-12-13 RX ADMIN — CEFEPIME HYDROCHLORIDE 2 G: 2 INJECTION, POWDER, FOR SOLUTION INTRAVENOUS at 17:27

## 2020-12-13 RX ADMIN — ETOMIDATE 40 MG: 40 INJECTION, SOLUTION INTRAVENOUS at 18:51

## 2020-12-13 RX ADMIN — METRONIDAZOLE 500 MG: 500 INJECTION, SOLUTION INTRAVENOUS at 17:27

## 2020-12-13 RX ADMIN — DEXTROSE AND SODIUM CHLORIDE 100 ML/HR: 5; 450 INJECTION, SOLUTION INTRAVENOUS at 06:25

## 2020-12-13 RX ADMIN — CALCIUM GLUCONATE 1 G: 20 INJECTION, SOLUTION INTRAVENOUS at 03:20

## 2020-12-13 RX ADMIN — HEPARIN SODIUM 5000 UNITS: 5000 INJECTION INTRAVENOUS; SUBCUTANEOUS at 06:25

## 2020-12-13 RX ADMIN — MIDAZOLAM 2 MG: 1 INJECTION INTRAMUSCULAR; INTRAVENOUS at 18:51

## 2020-12-13 RX ADMIN — PIPERACILLIN SODIUM AND TAZOBACTAM SODIUM 4.5 G: 4; .5 INJECTION, POWDER, LYOPHILIZED, FOR SOLUTION INTRAVENOUS at 21:07

## 2020-12-13 RX ADMIN — BUMETANIDE 2 MG: 0.25 INJECTION INTRAMUSCULAR; INTRAVENOUS at 18:51

## 2020-12-13 RX ADMIN — BUMETANIDE 2 MG: 0.25 INJECTION, SOLUTION INTRAMUSCULAR; INTRAVENOUS at 18:51

## 2020-12-13 RX ADMIN — INSULIN LISPRO 2 UNITS: 100 INJECTION, SOLUTION INTRAVENOUS; SUBCUTANEOUS at 06:37

## 2020-12-13 RX ADMIN — INSULIN LISPRO 2 UNITS: 100 INJECTION, SOLUTION INTRAVENOUS; SUBCUTANEOUS at 11:23

## 2020-12-13 RX ADMIN — ALBUMIN HUMAN 25 G: 0.25 SOLUTION INTRAVENOUS at 05:11

## 2020-12-13 RX ADMIN — HEPARIN SODIUM 5000 UNITS: 5000 INJECTION INTRAVENOUS; SUBCUTANEOUS at 13:02

## 2020-12-13 RX ADMIN — SODIUM CHLORIDE 1000 ML: 9 INJECTION, SOLUTION INTRAVENOUS at 03:57

## 2020-12-13 RX ADMIN — CEFEPIME HYDROCHLORIDE 2 G: 2 INJECTION, POWDER, FOR SOLUTION INTRAVENOUS at 01:56

## 2020-12-13 RX ADMIN — FENTANYL CITRATE 100 MCG: 50 INJECTION, SOLUTION INTRAMUSCULAR; INTRAVENOUS at 18:51

## 2020-12-13 RX ADMIN — PANTOPRAZOLE SODIUM 40 MG: 40 TABLET, DELAYED RELEASE ORAL at 06:25

## 2020-12-13 RX ADMIN — PROPOFOL 20 MCG/KG/MIN: 10 INJECTION, EMULSION INTRAVENOUS at 19:17

## 2020-12-13 RX ADMIN — MAGNESIUM SULFATE HEPTAHYDRATE 1 G: 1 INJECTION, SOLUTION INTRAVENOUS at 05:10

## 2020-12-13 RX ADMIN — BUDESONIDE 0.5 MG: 0.5 INHALANT RESPIRATORY (INHALATION) at 19:53

## 2020-12-13 RX ADMIN — BUDESONIDE 0.5 MG: 0.5 INHALANT RESPIRATORY (INHALATION) at 06:46

## 2020-12-13 RX ADMIN — Medication 10 ML: at 09:42

## 2020-12-13 RX ADMIN — METRONIDAZOLE 500 MG: 500 INJECTION, SOLUTION INTRAVENOUS at 09:42

## 2020-12-13 RX ADMIN — METRONIDAZOLE 500 MG: 500 INJECTION, SOLUTION INTRAVENOUS at 01:56

## 2020-12-13 RX ADMIN — Medication 10 ML: at 21:17

## 2020-12-14 ENCOUNTER — APPOINTMENT (OUTPATIENT)
Dept: CT IMAGING | Facility: HOSPITAL | Age: 70
End: 2020-12-14

## 2020-12-14 ENCOUNTER — APPOINTMENT (OUTPATIENT)
Dept: GENERAL RADIOLOGY | Facility: HOSPITAL | Age: 70
End: 2020-12-14

## 2020-12-14 LAB
ALBUMIN SERPL-MCNC: 4 G/DL (ref 3.5–5.2)
ALBUMIN/GLOB SERPL: 1.7 G/DL
ALP SERPL-CCNC: 88 U/L (ref 39–117)
ALT SERPL W P-5'-P-CCNC: 7 U/L (ref 1–33)
ANION GAP SERPL CALCULATED.3IONS-SCNC: 13 MMOL/L (ref 5–15)
ARTERIAL PATENCY WRIST A: POSITIVE
ARTERIAL PATENCY WRIST A: POSITIVE
AST SERPL-CCNC: 12 U/L (ref 1–32)
ATMOSPHERIC PRESS: ABNORMAL MM[HG]
ATMOSPHERIC PRESS: ABNORMAL MM[HG]
B PARAPERT DNA SPEC QL NAA+PROBE: NOT DETECTED
B PERT DNA SPEC QL NAA+PROBE: NOT DETECTED
BASE EXCESS BLDA CALC-SCNC: -1.9 MMOL/L (ref 0–3)
BASE EXCESS BLDA CALC-SCNC: -2.2 MMOL/L (ref 0–3)
BDY SITE: ABNORMAL
BDY SITE: ABNORMAL
BILIRUB SERPL-MCNC: 0.2 MG/DL (ref 0–1.2)
BUN SERPL-MCNC: 20 MG/DL (ref 8–23)
BUN/CREAT SERPL: 22.7 (ref 7–25)
C PNEUM DNA NPH QL NAA+NON-PROBE: NOT DETECTED
CALCIUM SPEC-SCNC: 9.4 MG/DL (ref 8.6–10.5)
CHLORIDE SERPL-SCNC: 110 MMOL/L (ref 98–107)
CO2 BLDA-SCNC: 25.3 MMOL/L (ref 22–29)
CO2 BLDA-SCNC: 25.6 MMOL/L (ref 22–29)
CO2 SERPL-SCNC: 23 MMOL/L (ref 22–29)
CREAT SERPL-MCNC: 0.88 MG/DL (ref 0.57–1)
D-LACTATE SERPL-SCNC: 1.3 MMOL/L (ref 0.5–2)
D-LACTATE SERPL-SCNC: 2 MMOL/L (ref 0.5–2)
DEPRECATED RDW RBC AUTO: 66.9 FL (ref 37–54)
ERYTHROCYTE [DISTWIDTH] IN BLOOD BY AUTOMATED COUNT: 19.7 % (ref 12.3–15.4)
FLUAV SUBTYP SPEC NAA+PROBE: NOT DETECTED
FLUBV RNA ISLT QL NAA+PROBE: NOT DETECTED
GFR SERPL CREATININE-BSD FRML MDRD: 63 ML/MIN/1.73
GLOBULIN UR ELPH-MCNC: 2.4 GM/DL
GLUCOSE BLDC GLUCOMTR-MCNC: 238 MG/DL (ref 70–105)
GLUCOSE BLDC GLUCOMTR-MCNC: 300 MG/DL (ref 70–105)
GLUCOSE BLDC GLUCOMTR-MCNC: 342 MG/DL (ref 70–105)
GLUCOSE BLDC GLUCOMTR-MCNC: 356 MG/DL (ref 70–105)
GLUCOSE SERPL-MCNC: 349 MG/DL (ref 65–99)
HADV DNA SPEC NAA+PROBE: NOT DETECTED
HCO3 BLDA-SCNC: 23.9 MMOL/L (ref 21–28)
HCO3 BLDA-SCNC: 24.2 MMOL/L (ref 21–28)
HCOV 229E RNA SPEC QL NAA+PROBE: NOT DETECTED
HCOV HKU1 RNA SPEC QL NAA+PROBE: NOT DETECTED
HCOV NL63 RNA SPEC QL NAA+PROBE: NOT DETECTED
HCOV OC43 RNA SPEC QL NAA+PROBE: NOT DETECTED
HCT VFR BLD AUTO: 32.6 % (ref 34–46.6)
HEMODILUTION: NO
HEMODILUTION: NO
HGB BLD-MCNC: 10.3 G/DL (ref 12–15.9)
HMPV RNA NPH QL NAA+NON-PROBE: NOT DETECTED
HPIV1 RNA SPEC QL NAA+PROBE: NOT DETECTED
HPIV2 RNA SPEC QL NAA+PROBE: NOT DETECTED
HPIV3 RNA NPH QL NAA+PROBE: NOT DETECTED
HPIV4 P GENE NPH QL NAA+PROBE: NOT DETECTED
INHALED O2 CONCENTRATION: 40 %
INHALED O2 CONCENTRATION: 60 %
LARGE PLATELETS: ABNORMAL
LYMPHOCYTES # BLD MANUAL: 0.39 10*3/MM3 (ref 0.7–3.1)
LYMPHOCYTES NFR BLD MANUAL: 2 % (ref 5–12)
LYMPHOCYTES NFR BLD MANUAL: 4 % (ref 19.6–45.3)
M PNEUMO IGG SER IA-ACNC: NOT DETECTED
MAGNESIUM SERPL-MCNC: 1.4 MG/DL (ref 1.6–2.4)
MCH RBC QN AUTO: 30.5 PG (ref 26.6–33)
MCHC RBC AUTO-ENTMCNC: 31.5 G/DL (ref 31.5–35.7)
MCV RBC AUTO: 96.7 FL (ref 79–97)
METAMYELOCYTES NFR BLD MANUAL: 1 % (ref 0–0)
MODALITY: ABNORMAL
MODALITY: ABNORMAL
MONOCYTES # BLD AUTO: 0.2 10*3/MM3 (ref 0.1–0.9)
MYELOCYTES NFR BLD MANUAL: 1 % (ref 0–0)
NEUTROPHILS # BLD AUTO: 9.02 10*3/MM3 (ref 1.7–7)
NEUTROPHILS NFR BLD MANUAL: 83 % (ref 42.7–76)
NEUTS BAND NFR BLD MANUAL: 9 % (ref 0–5)
PCO2 BLDA: 44.7 MM HG (ref 35–48)
PCO2 BLDA: 47.1 MM HG (ref 35–48)
PEEP RESPIRATORY: 5 CM[H2O]
PEEP RESPIRATORY: 5 CM[H2O]
PH BLDA: 7.32 PH UNITS (ref 7.35–7.45)
PH BLDA: 7.34 PH UNITS (ref 7.35–7.45)
PHOSPHATE SERPL-MCNC: 4.5 MG/DL (ref 2.5–4.5)
PLATELET # BLD AUTO: 220 10*3/MM3 (ref 140–450)
PMV BLD AUTO: 8.3 FL (ref 6–12)
PO2 BLDA: 110.5 MM HG (ref 83–108)
PO2 BLDA: 63.8 MM HG (ref 83–108)
POIKILOCYTOSIS BLD QL SMEAR: ABNORMAL
POLYCHROMASIA BLD QL SMEAR: ABNORMAL
POTASSIUM SERPL-SCNC: 4.6 MMOL/L (ref 3.5–5.2)
PROT SERPL-MCNC: 6.4 G/DL (ref 6–8.5)
RBC # BLD AUTO: 3.37 10*6/MM3 (ref 3.77–5.28)
RESPIRATORY RATE: 20
RESPIRATORY RATE: 20
RHINOVIRUS RNA SPEC NAA+PROBE: DETECTED
RSV RNA NPH QL NAA+NON-PROBE: NOT DETECTED
SAO2 % BLDCOA: 89.9 % (ref 94–98)
SAO2 % BLDCOA: 98 % (ref 94–98)
SCAN SLIDE: NORMAL
SODIUM SERPL-SCNC: 146 MMOL/L (ref 136–145)
VENTILATOR MODE: ABNORMAL
VENTILATOR MODE: ABNORMAL
VT ON VENT VENT: 500 ML
VT ON VENT VENT: 500 ML
WBC # BLD AUTO: 9.8 10*3/MM3 (ref 3.4–10.8)
WBC MORPH BLD: NORMAL

## 2020-12-14 PROCEDURE — 0100U HC BIOFIRE FILMARRAY RESP PANEL 2: CPT | Performed by: INTERNAL MEDICINE

## 2020-12-14 PROCEDURE — 63710000001 INSULIN GLARGINE PER 5 UNITS: Performed by: NURSE PRACTITIONER

## 2020-12-14 PROCEDURE — 83605 ASSAY OF LACTIC ACID: CPT | Performed by: NURSE PRACTITIONER

## 2020-12-14 PROCEDURE — 84100 ASSAY OF PHOSPHORUS: CPT | Performed by: NURSE PRACTITIONER

## 2020-12-14 PROCEDURE — 94003 VENT MGMT INPAT SUBQ DAY: CPT

## 2020-12-14 PROCEDURE — 80053 COMPREHEN METABOLIC PANEL: CPT | Performed by: NURSE PRACTITIONER

## 2020-12-14 PROCEDURE — 87206 SMEAR FLUORESCENT/ACID STAI: CPT | Performed by: INTERNAL MEDICINE

## 2020-12-14 PROCEDURE — 88108 CYTOPATH CONCENTRATE TECH: CPT | Performed by: INTERNAL MEDICINE

## 2020-12-14 PROCEDURE — 25010000002 FUROSEMIDE PER 20 MG: Performed by: NURSE PRACTITIONER

## 2020-12-14 PROCEDURE — 0B9C8ZX DRAINAGE OF RIGHT UPPER LUNG LOBE, VIA NATURAL OR ARTIFICIAL OPENING ENDOSCOPIC, DIAGNOSTIC: ICD-10-PCS | Performed by: INTERNAL MEDICINE

## 2020-12-14 PROCEDURE — 83735 ASSAY OF MAGNESIUM: CPT | Performed by: NURSE PRACTITIONER

## 2020-12-14 PROCEDURE — 85007 BL SMEAR W/DIFF WBC COUNT: CPT | Performed by: NURSE PRACTITIONER

## 2020-12-14 PROCEDURE — 36410 VNPNXR 3YR/> PHY/QHP DX/THER: CPT

## 2020-12-14 PROCEDURE — 94799 UNLISTED PULMONARY SVC/PX: CPT

## 2020-12-14 PROCEDURE — 82803 BLOOD GASES ANY COMBINATION: CPT

## 2020-12-14 PROCEDURE — 25010000002 METHYLPREDNISOLONE PER 40 MG: Performed by: NURSE PRACTITIONER

## 2020-12-14 PROCEDURE — 87496 CYTOMEG DNA AMP PROBE: CPT | Performed by: INTERNAL MEDICINE

## 2020-12-14 PROCEDURE — 87252 VIRUS INOCULATION TISSUE: CPT | Performed by: INTERNAL MEDICINE

## 2020-12-14 PROCEDURE — 36600 WITHDRAWAL OF ARTERIAL BLOOD: CPT

## 2020-12-14 PROCEDURE — 71250 CT THORAX DX C-: CPT

## 2020-12-14 PROCEDURE — 25010000002 MAGNESIUM SULFATE 2 GM/50ML SOLUTION: Performed by: NURSE PRACTITIONER

## 2020-12-14 PROCEDURE — 87102 FUNGUS ISOLATION CULTURE: CPT | Performed by: INTERNAL MEDICINE

## 2020-12-14 PROCEDURE — 25010000002 HEPARIN (PORCINE) PER 1000 UNITS: Performed by: NURSE PRACTITIONER

## 2020-12-14 PROCEDURE — 87798 DETECT AGENT NOS DNA AMP: CPT | Performed by: INTERNAL MEDICINE

## 2020-12-14 PROCEDURE — 05HY33Z INSERTION OF INFUSION DEVICE INTO UPPER VEIN, PERCUTANEOUS APPROACH: ICD-10-PCS | Performed by: NURSE PRACTITIONER

## 2020-12-14 PROCEDURE — 25010000002 PROPOFOL 10 MG/ML EMULSION: Performed by: NURSE PRACTITIONER

## 2020-12-14 PROCEDURE — 82962 GLUCOSE BLOOD TEST: CPT

## 2020-12-14 PROCEDURE — 71045 X-RAY EXAM CHEST 1 VIEW: CPT

## 2020-12-14 PROCEDURE — C1751 CATH, INF, PER/CENT/MIDLINE: HCPCS

## 2020-12-14 PROCEDURE — 87205 SMEAR GRAM STAIN: CPT | Performed by: INTERNAL MEDICINE

## 2020-12-14 PROCEDURE — 63710000001 INSULIN LISPRO (HUMAN) PER 5 UNITS: Performed by: INTERNAL MEDICINE

## 2020-12-14 PROCEDURE — 87071 CULTURE AEROBIC QUANT OTHER: CPT | Performed by: INTERNAL MEDICINE

## 2020-12-14 PROCEDURE — 25010000002 PIPERACILLIN SOD-TAZOBACTAM PER 1 G: Performed by: NURSE PRACTITIONER

## 2020-12-14 PROCEDURE — 87116 MYCOBACTERIA CULTURE: CPT | Performed by: INTERNAL MEDICINE

## 2020-12-14 PROCEDURE — 94760 N-INVAS EAR/PLS OXIMETRY 1: CPT

## 2020-12-14 PROCEDURE — 63710000001 INSULIN LISPRO (HUMAN) PER 5 UNITS: Performed by: NURSE PRACTITIONER

## 2020-12-14 PROCEDURE — 85025 COMPLETE CBC W/AUTO DIFF WBC: CPT | Performed by: NURSE PRACTITIONER

## 2020-12-14 RX ORDER — SODIUM CHLORIDE 0.9 % (FLUSH) 0.9 %
10 SYRINGE (ML) INJECTION EVERY 12 HOURS SCHEDULED
Status: DISCONTINUED | OUTPATIENT
Start: 2020-12-14 | End: 2020-12-20 | Stop reason: HOSPADM

## 2020-12-14 RX ORDER — MAGNESIUM SULFATE HEPTAHYDRATE 40 MG/ML
2 INJECTION, SOLUTION INTRAVENOUS ONCE
Status: COMPLETED | OUTPATIENT
Start: 2020-12-14 | End: 2020-12-14

## 2020-12-14 RX ORDER — INSULIN GLARGINE 100 [IU]/ML
20 INJECTION, SOLUTION SUBCUTANEOUS EVERY 12 HOURS SCHEDULED
Status: DISCONTINUED | OUTPATIENT
Start: 2020-12-14 | End: 2020-12-16

## 2020-12-14 RX ORDER — LIDOCAINE HYDROCHLORIDE 10 MG/ML
INJECTION, SOLUTION EPIDURAL; INFILTRATION; INTRACAUDAL; PERINEURAL
Status: DISPENSED
Start: 2020-12-14 | End: 2020-12-14

## 2020-12-14 RX ORDER — INSULIN LISPRO 100 [IU]/ML
0-9 INJECTION, SOLUTION INTRAVENOUS; SUBCUTANEOUS EVERY 6 HOURS SCHEDULED
Status: DISCONTINUED | OUTPATIENT
Start: 2020-12-14 | End: 2020-12-14 | Stop reason: DRUGHIGH

## 2020-12-14 RX ORDER — INSULIN LISPRO 100 [IU]/ML
0-24 INJECTION, SOLUTION INTRAVENOUS; SUBCUTANEOUS AS NEEDED
Status: DISCONTINUED | OUTPATIENT
Start: 2020-12-14 | End: 2020-12-18

## 2020-12-14 RX ORDER — DEXMEDETOMIDINE HYDROCHLORIDE 4 UG/ML
.2-1.5 INJECTION, SOLUTION INTRAVENOUS
Status: DISCONTINUED | OUTPATIENT
Start: 2020-12-14 | End: 2020-12-18

## 2020-12-14 RX ORDER — INSULIN LISPRO 100 [IU]/ML
0-9 INJECTION, SOLUTION INTRAVENOUS; SUBCUTANEOUS AS NEEDED
Status: DISCONTINUED | OUTPATIENT
Start: 2020-12-14 | End: 2020-12-14 | Stop reason: DRUGHIGH

## 2020-12-14 RX ORDER — INSULIN LISPRO 100 [IU]/ML
0-24 INJECTION, SOLUTION INTRAVENOUS; SUBCUTANEOUS EVERY 6 HOURS SCHEDULED
Status: DISCONTINUED | OUTPATIENT
Start: 2020-12-14 | End: 2020-12-18

## 2020-12-14 RX ORDER — MORPHINE SULFATE 4 MG/ML
2 INJECTION, SOLUTION INTRAMUSCULAR; INTRAVENOUS
Status: DISCONTINUED | OUTPATIENT
Start: 2020-12-14 | End: 2020-12-20 | Stop reason: HOSPADM

## 2020-12-14 RX ORDER — SODIUM CHLORIDE 0.9 % (FLUSH) 0.9 %
10 SYRINGE (ML) INJECTION AS NEEDED
Status: DISCONTINUED | OUTPATIENT
Start: 2020-12-14 | End: 2020-12-20 | Stop reason: HOSPADM

## 2020-12-14 RX ORDER — LANSOPRAZOLE
30 KIT
Status: DISCONTINUED | OUTPATIENT
Start: 2020-12-14 | End: 2020-12-20 | Stop reason: HOSPADM

## 2020-12-14 RX ORDER — LIDOCAINE 50 MG/G
OINTMENT TOPICAL
Status: DISPENSED
Start: 2020-12-14 | End: 2020-12-14

## 2020-12-14 RX ADMIN — HEPARIN SODIUM 5000 UNITS: 5000 INJECTION INTRAVENOUS; SUBCUTANEOUS at 05:50

## 2020-12-14 RX ADMIN — INSULIN LISPRO 8 UNITS: 100 INJECTION, SOLUTION INTRAVENOUS; SUBCUTANEOUS at 17:19

## 2020-12-14 RX ADMIN — Medication 10 ML: at 20:28

## 2020-12-14 RX ADMIN — SODIUM CHLORIDE 0.6 MCG/KG/HR: 9 INJECTION, SOLUTION INTRAVENOUS at 15:24

## 2020-12-14 RX ADMIN — METRONIDAZOLE 500 MG: 500 INJECTION, SOLUTION INTRAVENOUS at 00:36

## 2020-12-14 RX ADMIN — HEPARIN SODIUM 5000 UNITS: 5000 INJECTION INTRAVENOUS; SUBCUTANEOUS at 22:54

## 2020-12-14 RX ADMIN — PIPERACILLIN SODIUM,TAZOBACTAM SODIUM 4.5 G: 4; .5 INJECTION, POWDER, FOR SOLUTION INTRAVENOUS at 11:02

## 2020-12-14 RX ADMIN — LANSOPRAZOLE 30 MG: KIT at 05:50

## 2020-12-14 RX ADMIN — MAGNESIUM SULFATE HEPTAHYDRATE 2 G: 40 INJECTION, SOLUTION INTRAVENOUS at 09:07

## 2020-12-14 RX ADMIN — SODIUM CHLORIDE 0.6 MCG/KG/HR: 9 INJECTION, SOLUTION INTRAVENOUS at 08:17

## 2020-12-14 RX ADMIN — INSULIN GLARGINE 20 UNITS: 100 INJECTION, SOLUTION SUBCUTANEOUS at 08:16

## 2020-12-14 RX ADMIN — Medication 10 ML: at 20:27

## 2020-12-14 RX ADMIN — PIPERACILLIN SODIUM,TAZOBACTAM SODIUM 4.5 G: 4; .5 INJECTION, POWDER, FOR SOLUTION INTRAVENOUS at 03:04

## 2020-12-14 RX ADMIN — BUDESONIDE 0.5 MG: 0.5 INHALANT RESPIRATORY (INHALATION) at 20:56

## 2020-12-14 RX ADMIN — METHYLPREDNISOLONE SODIUM SUCCINATE 40 MG: 40 INJECTION, POWDER, FOR SOLUTION INTRAMUSCULAR; INTRAVENOUS at 11:02

## 2020-12-14 RX ADMIN — INSULIN GLARGINE 20 UNITS: 100 INJECTION, SOLUTION SUBCUTANEOUS at 20:27

## 2020-12-14 RX ADMIN — INSULIN LISPRO 16 UNITS: 100 INJECTION, SOLUTION INTRAVENOUS; SUBCUTANEOUS at 11:02

## 2020-12-14 RX ADMIN — METHYLPREDNISOLONE SODIUM SUCCINATE 40 MG: 40 INJECTION, POWDER, FOR SOLUTION INTRAMUSCULAR; INTRAVENOUS at 20:27

## 2020-12-14 RX ADMIN — FUROSEMIDE 40 MG: 10 INJECTION, SOLUTION INTRAMUSCULAR; INTRAVENOUS at 05:50

## 2020-12-14 RX ADMIN — FUROSEMIDE 40 MG: 10 INJECTION, SOLUTION INTRAMUSCULAR; INTRAVENOUS at 13:05

## 2020-12-14 RX ADMIN — Medication 10 ML: at 11:03

## 2020-12-14 RX ADMIN — HEPARIN SODIUM 5000 UNITS: 5000 INJECTION INTRAVENOUS; SUBCUTANEOUS at 13:05

## 2020-12-14 RX ADMIN — METRONIDAZOLE 500 MG: 500 INJECTION, SOLUTION INTRAVENOUS at 08:21

## 2020-12-14 RX ADMIN — Medication 10 ML: at 08:17

## 2020-12-14 RX ADMIN — FUROSEMIDE 40 MG: 10 INJECTION, SOLUTION INTRAMUSCULAR; INTRAVENOUS at 20:27

## 2020-12-14 RX ADMIN — PROPOFOL 15 MCG/KG/MIN: 10 INJECTION, EMULSION INTRAVENOUS at 00:36

## 2020-12-14 RX ADMIN — INSULIN LISPRO 6 UNITS: 100 INJECTION, SOLUTION INTRAVENOUS; SUBCUTANEOUS at 00:37

## 2020-12-14 RX ADMIN — PIPERACILLIN SODIUM,TAZOBACTAM SODIUM 4.5 G: 4; .5 INJECTION, POWDER, FOR SOLUTION INTRAVENOUS at 17:19

## 2020-12-14 RX ADMIN — BUDESONIDE 0.5 MG: 0.5 INHALANT RESPIRATORY (INHALATION) at 07:10

## 2020-12-14 RX ADMIN — INSULIN LISPRO 8 UNITS: 100 INJECTION, SOLUTION INTRAVENOUS; SUBCUTANEOUS at 05:50

## 2020-12-14 RX ADMIN — METHYLPREDNISOLONE SODIUM SUCCINATE 40 MG: 40 INJECTION, POWDER, FOR SOLUTION INTRAMUSCULAR; INTRAVENOUS at 05:50

## 2020-12-15 LAB
ALBUMIN SERPL-MCNC: 4 G/DL (ref 3.5–5.2)
ALBUMIN/GLOB SERPL: 2 G/DL
ALP SERPL-CCNC: 75 U/L (ref 39–117)
ALT SERPL W P-5'-P-CCNC: 5 U/L (ref 1–33)
ANION GAP SERPL CALCULATED.3IONS-SCNC: 10 MMOL/L (ref 5–15)
ARTERIAL PATENCY WRIST A: NEGATIVE
AST SERPL-CCNC: 12 U/L (ref 1–32)
ATMOSPHERIC PRESS: ABNORMAL MM[HG]
BASE EXCESS BLDA CALC-SCNC: 4.3 MMOL/L (ref 0–3)
BDY SITE: ABNORMAL
BILIRUB SERPL-MCNC: 0.3 MG/DL (ref 0–1.2)
BUN SERPL-MCNC: 31 MG/DL (ref 8–23)
BUN/CREAT SERPL: 30.7 (ref 7–25)
CALCIUM SPEC-SCNC: 10.2 MG/DL (ref 8.6–10.5)
CHLORIDE SERPL-SCNC: 108 MMOL/L (ref 98–107)
CO2 BLDA-SCNC: 30.4 MMOL/L (ref 22–29)
CO2 SERPL-SCNC: 27 MMOL/L (ref 22–29)
CREAT SERPL-MCNC: 1.01 MG/DL (ref 0.57–1)
DEPRECATED RDW RBC AUTO: 64.3 FL (ref 37–54)
ERYTHROCYTE [DISTWIDTH] IN BLOOD BY AUTOMATED COUNT: 19.2 % (ref 12.3–15.4)
GFR SERPL CREATININE-BSD FRML MDRD: 54 ML/MIN/1.73
GLOBULIN UR ELPH-MCNC: 2 GM/DL
GLUCOSE BLDC GLUCOMTR-MCNC: 215 MG/DL (ref 70–105)
GLUCOSE BLDC GLUCOMTR-MCNC: 236 MG/DL (ref 70–105)
GLUCOSE BLDC GLUCOMTR-MCNC: 274 MG/DL (ref 70–105)
GLUCOSE BLDC GLUCOMTR-MCNC: 281 MG/DL (ref 70–105)
GLUCOSE BLDC GLUCOMTR-MCNC: 294 MG/DL (ref 70–105)
GLUCOSE SERPL-MCNC: 250 MG/DL (ref 65–99)
HCO3 BLDA-SCNC: 29.1 MMOL/L (ref 21–28)
HCT VFR BLD AUTO: 29.8 % (ref 34–46.6)
HEMODILUTION: NO
HGB BLD-MCNC: 9.6 G/DL (ref 12–15.9)
INHALED O2 CONCENTRATION: 50 %
LAB AP CASE REPORT: NORMAL
LAB AP CLINICAL INFORMATION: NORMAL
LARGE PLATELETS: ABNORMAL
LYMPHOCYTES # BLD MANUAL: 0.18 10*3/MM3 (ref 0.7–3.1)
LYMPHOCYTES NFR BLD MANUAL: 2 % (ref 19.6–45.3)
LYMPHOCYTES NFR BLD MANUAL: 6 % (ref 5–12)
MAGNESIUM SERPL-MCNC: 1.7 MG/DL (ref 1.6–2.4)
MCH RBC QN AUTO: 30.7 PG (ref 26.6–33)
MCHC RBC AUTO-ENTMCNC: 32.3 G/DL (ref 31.5–35.7)
MCV RBC AUTO: 95.2 FL (ref 79–97)
MODALITY: ABNORMAL
MONOCYTES # BLD AUTO: 0.55 10*3/MM3 (ref 0.1–0.9)
NEUTROPHILS # BLD AUTO: 8.46 10*3/MM3 (ref 1.7–7)
NEUTROPHILS NFR BLD MANUAL: 79 % (ref 42.7–76)
NEUTS BAND NFR BLD MANUAL: 13 % (ref 0–5)
PATH REPORT.FINAL DX SPEC: NORMAL
PATH REPORT.GROSS SPEC: NORMAL
PCO2 BLDA: 43.7 MM HG (ref 35–48)
PEEP RESPIRATORY: 5 CM[H2O]
PH BLDA: 7.43 PH UNITS (ref 7.35–7.45)
PHOSPHATE SERPL-MCNC: 3.4 MG/DL (ref 2.5–4.5)
PLATELET # BLD AUTO: 223 10*3/MM3 (ref 140–450)
PMV BLD AUTO: 8.4 FL (ref 6–12)
PO2 BLDA: 84.6 MM HG (ref 83–108)
POIKILOCYTOSIS BLD QL SMEAR: ABNORMAL
POLYCHROMASIA BLD QL SMEAR: ABNORMAL
POTASSIUM SERPL-SCNC: 4 MMOL/L (ref 3.5–5.2)
PROT SERPL-MCNC: 6 G/DL (ref 6–8.5)
RBC # BLD AUTO: 3.13 10*6/MM3 (ref 3.77–5.28)
RESPIRATORY RATE: 20
SAO2 % BLDCOA: 96.6 % (ref 94–98)
SCAN SLIDE: NORMAL
SODIUM SERPL-SCNC: 145 MMOL/L (ref 136–145)
VENTILATOR MODE: ABNORMAL
VT ON VENT VENT: 500 ML
WBC # BLD AUTO: 9.2 10*3/MM3 (ref 3.4–10.8)
WBC MORPH BLD: NORMAL

## 2020-12-15 PROCEDURE — 25010000002 PIPERACILLIN SOD-TAZOBACTAM PER 1 G: Performed by: NURSE PRACTITIONER

## 2020-12-15 PROCEDURE — 82803 BLOOD GASES ANY COMBINATION: CPT

## 2020-12-15 PROCEDURE — 63710000001 INSULIN LISPRO (HUMAN) PER 5 UNITS: Performed by: INTERNAL MEDICINE

## 2020-12-15 PROCEDURE — 25010000002 MORPHINE PER 10 MG: Performed by: INTERNAL MEDICINE

## 2020-12-15 PROCEDURE — 85025 COMPLETE CBC W/AUTO DIFF WBC: CPT | Performed by: NURSE PRACTITIONER

## 2020-12-15 PROCEDURE — 94799 UNLISTED PULMONARY SVC/PX: CPT

## 2020-12-15 PROCEDURE — 85007 BL SMEAR W/DIFF WBC COUNT: CPT | Performed by: NURSE PRACTITIONER

## 2020-12-15 PROCEDURE — 80053 COMPREHEN METABOLIC PANEL: CPT | Performed by: NURSE PRACTITIONER

## 2020-12-15 PROCEDURE — 25010000002 HEPARIN (PORCINE) PER 1000 UNITS: Performed by: NURSE PRACTITIONER

## 2020-12-15 PROCEDURE — 25010000002 HYDRALAZINE PER 20 MG: Performed by: INTERNAL MEDICINE

## 2020-12-15 PROCEDURE — 25010000002 FUROSEMIDE PER 20 MG: Performed by: NURSE PRACTITIONER

## 2020-12-15 PROCEDURE — 36600 WITHDRAWAL OF ARTERIAL BLOOD: CPT

## 2020-12-15 PROCEDURE — 83735 ASSAY OF MAGNESIUM: CPT | Performed by: NURSE PRACTITIONER

## 2020-12-15 PROCEDURE — 25010000002 METHYLPREDNISOLONE PER 40 MG: Performed by: NURSE PRACTITIONER

## 2020-12-15 PROCEDURE — 94760 N-INVAS EAR/PLS OXIMETRY 1: CPT

## 2020-12-15 PROCEDURE — 63710000001 INSULIN GLARGINE PER 5 UNITS: Performed by: NURSE PRACTITIONER

## 2020-12-15 PROCEDURE — 82962 GLUCOSE BLOOD TEST: CPT

## 2020-12-15 PROCEDURE — 94003 VENT MGMT INPAT SUBQ DAY: CPT

## 2020-12-15 PROCEDURE — 84100 ASSAY OF PHOSPHORUS: CPT | Performed by: NURSE PRACTITIONER

## 2020-12-15 RX ORDER — INSULIN LISPRO 100 [IU]/ML
5 INJECTION, SOLUTION INTRAVENOUS; SUBCUTANEOUS EVERY 6 HOURS
Status: DISCONTINUED | OUTPATIENT
Start: 2020-12-15 | End: 2020-12-17

## 2020-12-15 RX ORDER — HYDRALAZINE HYDROCHLORIDE 20 MG/ML
20 INJECTION INTRAMUSCULAR; INTRAVENOUS EVERY 6 HOURS PRN
Status: DISCONTINUED | OUTPATIENT
Start: 2020-12-15 | End: 2020-12-20 | Stop reason: HOSPADM

## 2020-12-15 RX ADMIN — MORPHINE SULFATE 2 MG: 4 INJECTION INTRAVENOUS at 07:35

## 2020-12-15 RX ADMIN — INSULIN LISPRO 8 UNITS: 100 INJECTION, SOLUTION INTRAVENOUS; SUBCUTANEOUS at 00:44

## 2020-12-15 RX ADMIN — INSULIN LISPRO 5 UNITS: 100 INJECTION, SOLUTION INTRAVENOUS; SUBCUTANEOUS at 18:17

## 2020-12-15 RX ADMIN — PIPERACILLIN SODIUM,TAZOBACTAM SODIUM 4.5 G: 4; .5 INJECTION, POWDER, FOR SOLUTION INTRAVENOUS at 18:17

## 2020-12-15 RX ADMIN — MORPHINE SULFATE 2 MG: 4 INJECTION INTRAVENOUS at 11:03

## 2020-12-15 RX ADMIN — METHYLPREDNISOLONE SODIUM SUCCINATE 40 MG: 40 INJECTION, POWDER, FOR SOLUTION INTRAMUSCULAR; INTRAVENOUS at 12:57

## 2020-12-15 RX ADMIN — INSULIN LISPRO 5 UNITS: 100 INJECTION, SOLUTION INTRAVENOUS; SUBCUTANEOUS at 13:11

## 2020-12-15 RX ADMIN — LANSOPRAZOLE 30 MG: KIT at 06:27

## 2020-12-15 RX ADMIN — INSULIN LISPRO 5 UNITS: 100 INJECTION, SOLUTION INTRAVENOUS; SUBCUTANEOUS at 23:39

## 2020-12-15 RX ADMIN — INSULIN LISPRO 8 UNITS: 100 INJECTION, SOLUTION INTRAVENOUS; SUBCUTANEOUS at 06:27

## 2020-12-15 RX ADMIN — HEPARIN SODIUM 5000 UNITS: 5000 INJECTION INTRAVENOUS; SUBCUTANEOUS at 21:52

## 2020-12-15 RX ADMIN — INSULIN LISPRO 12 UNITS: 100 INJECTION, SOLUTION INTRAVENOUS; SUBCUTANEOUS at 18:17

## 2020-12-15 RX ADMIN — PIPERACILLIN SODIUM,TAZOBACTAM SODIUM 4.5 G: 4; .5 INJECTION, POWDER, FOR SOLUTION INTRAVENOUS at 03:10

## 2020-12-15 RX ADMIN — BUDESONIDE 0.5 MG: 0.5 INHALANT RESPIRATORY (INHALATION) at 07:00

## 2020-12-15 RX ADMIN — FUROSEMIDE 40 MG: 10 INJECTION, SOLUTION INTRAMUSCULAR; INTRAVENOUS at 12:57

## 2020-12-15 RX ADMIN — HYDRALAZINE HYDROCHLORIDE 20 MG: 20 INJECTION INTRAMUSCULAR; INTRAVENOUS at 18:17

## 2020-12-15 RX ADMIN — INSULIN GLARGINE 20 UNITS: 100 INJECTION, SOLUTION SUBCUTANEOUS at 20:00

## 2020-12-15 RX ADMIN — METHYLPREDNISOLONE SODIUM SUCCINATE 40 MG: 40 INJECTION, POWDER, FOR SOLUTION INTRAMUSCULAR; INTRAVENOUS at 19:58

## 2020-12-15 RX ADMIN — HEPARIN SODIUM 5000 UNITS: 5000 INJECTION INTRAVENOUS; SUBCUTANEOUS at 13:02

## 2020-12-15 RX ADMIN — HEPARIN SODIUM 5000 UNITS: 5000 INJECTION INTRAVENOUS; SUBCUTANEOUS at 06:27

## 2020-12-15 RX ADMIN — Medication 10 ML: at 20:01

## 2020-12-15 RX ADMIN — INSULIN GLARGINE 20 UNITS: 100 INJECTION, SOLUTION SUBCUTANEOUS at 08:30

## 2020-12-15 RX ADMIN — Medication 10 ML: at 20:00

## 2020-12-15 RX ADMIN — MORPHINE SULFATE 2 MG: 4 INJECTION INTRAVENOUS at 00:43

## 2020-12-15 RX ADMIN — INSULIN LISPRO 12 UNITS: 100 INJECTION, SOLUTION INTRAVENOUS; SUBCUTANEOUS at 13:11

## 2020-12-15 RX ADMIN — METHYLPREDNISOLONE SODIUM SUCCINATE 40 MG: 40 INJECTION, POWDER, FOR SOLUTION INTRAMUSCULAR; INTRAVENOUS at 03:10

## 2020-12-15 RX ADMIN — PIPERACILLIN SODIUM,TAZOBACTAM SODIUM 4.5 G: 4; .5 INJECTION, POWDER, FOR SOLUTION INTRAVENOUS at 09:40

## 2020-12-15 RX ADMIN — Medication 10 ML: at 08:34

## 2020-12-15 RX ADMIN — FUROSEMIDE 40 MG: 10 INJECTION, SOLUTION INTRAMUSCULAR; INTRAVENOUS at 20:00

## 2020-12-15 RX ADMIN — BUDESONIDE 0.5 MG: 0.5 INHALANT RESPIRATORY (INHALATION) at 19:20

## 2020-12-15 RX ADMIN — INSULIN LISPRO 12 UNITS: 100 INJECTION, SOLUTION INTRAVENOUS; SUBCUTANEOUS at 23:40

## 2020-12-15 RX ADMIN — FUROSEMIDE 40 MG: 10 INJECTION, SOLUTION INTRAMUSCULAR; INTRAVENOUS at 06:26

## 2020-12-16 LAB
ALBUMIN SERPL-MCNC: 4.1 G/DL (ref 3.5–5.2)
ALBUMIN/GLOB SERPL: 1.7 G/DL
ALP SERPL-CCNC: 91 U/L (ref 39–117)
ALT SERPL W P-5'-P-CCNC: 6 U/L (ref 1–33)
ANION GAP SERPL CALCULATED.3IONS-SCNC: 13 MMOL/L (ref 5–15)
ARTERIAL PATENCY WRIST A: POSITIVE
ARTERIAL PATENCY WRIST A: POSITIVE
AST SERPL-CCNC: 11 U/L (ref 1–32)
ATMOSPHERIC PRESS: ABNORMAL MM[HG]
ATMOSPHERIC PRESS: ABNORMAL MM[HG]
BACTERIA SPEC AEROBE CULT: NO GROWTH
BASE EXCESS BLDA CALC-SCNC: 6.3 MMOL/L (ref 0–3)
BASE EXCESS BLDA CALC-SCNC: 8.1 MMOL/L (ref 0–3)
BDY SITE: ABNORMAL
BDY SITE: ABNORMAL
BILIRUB SERPL-MCNC: 0.3 MG/DL (ref 0–1.2)
BUN SERPL-MCNC: 42 MG/DL (ref 8–23)
BUN/CREAT SERPL: 42.4 (ref 7–25)
CALCIUM SPEC-SCNC: 10.5 MG/DL (ref 8.6–10.5)
CHLORIDE SERPL-SCNC: 99 MMOL/L (ref 98–107)
CO2 BLDA-SCNC: 33.1 MMOL/L (ref 22–29)
CO2 BLDA-SCNC: 33.4 MMOL/L (ref 22–29)
CO2 SERPL-SCNC: 31 MMOL/L (ref 22–29)
CREAT SERPL-MCNC: 0.99 MG/DL (ref 0.57–1)
DEPRECATED RDW RBC AUTO: 62.6 FL (ref 37–54)
ERYTHROCYTE [DISTWIDTH] IN BLOOD BY AUTOMATED COUNT: 19.2 % (ref 12.3–15.4)
GFR SERPL CREATININE-BSD FRML MDRD: 55 ML/MIN/1.73
GLOBULIN UR ELPH-MCNC: 2.4 GM/DL
GLUCOSE BLDC GLUCOMTR-MCNC: 230 MG/DL (ref 70–105)
GLUCOSE BLDC GLUCOMTR-MCNC: 254 MG/DL (ref 70–105)
GLUCOSE BLDC GLUCOMTR-MCNC: 335 MG/DL (ref 70–105)
GLUCOSE SERPL-MCNC: 257 MG/DL (ref 65–99)
GRAM STN SPEC: NORMAL
GRAM STN SPEC: NORMAL
HCO3 BLDA-SCNC: 31.6 MMOL/L (ref 21–28)
HCO3 BLDA-SCNC: 32.1 MMOL/L (ref 21–28)
HCT VFR BLD AUTO: 33.1 % (ref 34–46.6)
HEMODILUTION: NO
HEMODILUTION: NO
HGB BLD-MCNC: 10.8 G/DL (ref 12–15.9)
INHALED O2 CONCENTRATION: 40 %
INHALED O2 CONCENTRATION: 40 %
LYMPHOCYTES # BLD MANUAL: 0.33 10*3/MM3 (ref 0.7–3.1)
LYMPHOCYTES NFR BLD MANUAL: 3 % (ref 19.6–45.3)
LYMPHOCYTES NFR BLD MANUAL: 7 % (ref 5–12)
MAGNESIUM SERPL-MCNC: 1.6 MG/DL (ref 1.6–2.4)
MCH RBC QN AUTO: 30.9 PG (ref 26.6–33)
MCHC RBC AUTO-ENTMCNC: 32.5 G/DL (ref 31.5–35.7)
MCV RBC AUTO: 94.8 FL (ref 79–97)
METAMYELOCYTES NFR BLD MANUAL: 1 % (ref 0–0)
MODALITY: ABNORMAL
MODALITY: ABNORMAL
MONOCYTES # BLD AUTO: 0.78 10*3/MM3 (ref 0.1–0.9)
NEUTROPHILS # BLD AUTO: 9.88 10*3/MM3 (ref 1.7–7)
NEUTROPHILS NFR BLD MANUAL: 88 % (ref 42.7–76)
NEUTS BAND NFR BLD MANUAL: 1 % (ref 0–5)
PCO2 BLDA: 41.4 MM HG (ref 35–48)
PCO2 BLDA: 47.5 MM HG (ref 35–48)
PEEP RESPIRATORY: 0 CM[H2O]
PEEP RESPIRATORY: 5 CM[H2O]
PH BLDA: 7.43 PH UNITS (ref 7.35–7.45)
PH BLDA: 7.5 PH UNITS (ref 7.35–7.45)
PHOSPHATE SERPL-MCNC: 3.5 MG/DL (ref 2.5–4.5)
PLAT MORPH BLD: NORMAL
PLATELET # BLD AUTO: 237 10*3/MM3 (ref 140–450)
PMV BLD AUTO: 8.5 FL (ref 6–12)
PO2 BLDA: 71 MM HG (ref 83–108)
PO2 BLDA: 81.2 MM HG (ref 83–108)
POTASSIUM SERPL-SCNC: 3.4 MMOL/L (ref 3.5–5.2)
POTASSIUM SERPL-SCNC: 3.9 MMOL/L (ref 3.5–5.2)
PROT SERPL-MCNC: 6.5 G/DL (ref 6–8.5)
PSV: 12 CMH2O
RBC # BLD AUTO: 3.49 10*6/MM3 (ref 3.77–5.28)
RBC MORPH BLD: NORMAL
RESPIRATORY RATE: 20
SAO2 % BLDCOA: 94.3 % (ref 94–98)
SAO2 % BLDCOA: 96.8 % (ref 94–98)
SCAN SLIDE: NORMAL
SODIUM SERPL-SCNC: 143 MMOL/L (ref 136–145)
VENTILATOR MODE: ABNORMAL
VENTILATOR MODE: ABNORMAL
VT ON VENT VENT: 500 ML
WBC # BLD AUTO: 11.1 10*3/MM3 (ref 3.4–10.8)
WBC MORPH BLD: NORMAL

## 2020-12-16 PROCEDURE — 94799 UNLISTED PULMONARY SVC/PX: CPT

## 2020-12-16 PROCEDURE — 63710000001 INSULIN GLARGINE PER 5 UNITS: Performed by: INTERNAL MEDICINE

## 2020-12-16 PROCEDURE — 25010000002 HEPARIN (PORCINE) PER 1000 UNITS: Performed by: NURSE PRACTITIONER

## 2020-12-16 PROCEDURE — 85007 BL SMEAR W/DIFF WBC COUNT: CPT | Performed by: NURSE PRACTITIONER

## 2020-12-16 PROCEDURE — 25010000002 FUROSEMIDE PER 20 MG: Performed by: NURSE PRACTITIONER

## 2020-12-16 PROCEDURE — 94003 VENT MGMT INPAT SUBQ DAY: CPT

## 2020-12-16 PROCEDURE — 83735 ASSAY OF MAGNESIUM: CPT | Performed by: NURSE PRACTITIONER

## 2020-12-16 PROCEDURE — 82962 GLUCOSE BLOOD TEST: CPT

## 2020-12-16 PROCEDURE — 82803 BLOOD GASES ANY COMBINATION: CPT

## 2020-12-16 PROCEDURE — 25010000002 METHYLPREDNISOLONE PER 40 MG: Performed by: NURSE PRACTITIONER

## 2020-12-16 PROCEDURE — 63710000001 INSULIN GLARGINE PER 5 UNITS: Performed by: NURSE PRACTITIONER

## 2020-12-16 PROCEDURE — 63710000001 INSULIN LISPRO (HUMAN) PER 5 UNITS: Performed by: INTERNAL MEDICINE

## 2020-12-16 PROCEDURE — 84100 ASSAY OF PHOSPHORUS: CPT | Performed by: NURSE PRACTITIONER

## 2020-12-16 PROCEDURE — 80053 COMPREHEN METABOLIC PANEL: CPT | Performed by: NURSE PRACTITIONER

## 2020-12-16 PROCEDURE — 84132 ASSAY OF SERUM POTASSIUM: CPT | Performed by: INTERNAL MEDICINE

## 2020-12-16 PROCEDURE — 25010000002 MAGNESIUM SULFATE IN D5W 1G/100ML (PREMIX) 1-5 GM/100ML-% SOLUTION: Performed by: INTERNAL MEDICINE

## 2020-12-16 PROCEDURE — 36600 WITHDRAWAL OF ARTERIAL BLOOD: CPT

## 2020-12-16 PROCEDURE — 85025 COMPLETE CBC W/AUTO DIFF WBC: CPT | Performed by: NURSE PRACTITIONER

## 2020-12-16 PROCEDURE — 25010000002 HYDRALAZINE PER 20 MG: Performed by: INTERNAL MEDICINE

## 2020-12-16 PROCEDURE — 94760 N-INVAS EAR/PLS OXIMETRY 1: CPT

## 2020-12-16 PROCEDURE — 25010000002 PIPERACILLIN SOD-TAZOBACTAM PER 1 G: Performed by: NURSE PRACTITIONER

## 2020-12-16 RX ORDER — SENNA PLUS 8.6 MG/1
1 TABLET ORAL NIGHTLY
Status: DISCONTINUED | OUTPATIENT
Start: 2020-12-16 | End: 2020-12-18

## 2020-12-16 RX ORDER — POTASSIUM CHLORIDE 1.5 G/1.77G
40 POWDER, FOR SOLUTION ORAL EVERY 4 HOURS
Status: COMPLETED | OUTPATIENT
Start: 2020-12-16 | End: 2020-12-16

## 2020-12-16 RX ORDER — INSULIN GLARGINE 100 [IU]/ML
30 INJECTION, SOLUTION SUBCUTANEOUS EVERY 12 HOURS SCHEDULED
Status: DISCONTINUED | OUTPATIENT
Start: 2020-12-16 | End: 2020-12-20 | Stop reason: HOSPADM

## 2020-12-16 RX ORDER — INSULIN GLARGINE 100 [IU]/ML
10 INJECTION, SOLUTION SUBCUTANEOUS ONCE
Status: COMPLETED | OUTPATIENT
Start: 2020-12-16 | End: 2020-12-16

## 2020-12-16 RX ORDER — MAGNESIUM SULFATE 1 G/100ML
1 INJECTION INTRAVENOUS ONCE
Status: COMPLETED | OUTPATIENT
Start: 2020-12-16 | End: 2020-12-16

## 2020-12-16 RX ORDER — POLYETHYLENE GLYCOL 3350 17 G/17G
17 POWDER, FOR SOLUTION ORAL DAILY
Status: DISCONTINUED | OUTPATIENT
Start: 2020-12-16 | End: 2020-12-18

## 2020-12-16 RX ORDER — DOCUSATE SODIUM 100 MG/1
100 CAPSULE, LIQUID FILLED ORAL 2 TIMES DAILY
Status: DISCONTINUED | OUTPATIENT
Start: 2020-12-16 | End: 2020-12-16

## 2020-12-16 RX ADMIN — LANSOPRAZOLE 30 MG: KIT at 05:24

## 2020-12-16 RX ADMIN — HYDRALAZINE HYDROCHLORIDE 20 MG: 20 INJECTION INTRAMUSCULAR; INTRAVENOUS at 08:16

## 2020-12-16 RX ADMIN — INSULIN LISPRO 5 UNITS: 100 INJECTION, SOLUTION INTRAVENOUS; SUBCUTANEOUS at 05:24

## 2020-12-16 RX ADMIN — PIPERACILLIN SODIUM,TAZOBACTAM SODIUM 4.5 G: 4; .5 INJECTION, POWDER, FOR SOLUTION INTRAVENOUS at 17:30

## 2020-12-16 RX ADMIN — INSULIN LISPRO 5 UNITS: 100 INJECTION, SOLUTION INTRAVENOUS; SUBCUTANEOUS at 17:30

## 2020-12-16 RX ADMIN — SENNOSIDES 1 TABLET: 8.6 TABLET, FILM COATED ORAL at 20:58

## 2020-12-16 RX ADMIN — Medication 10 ML: at 20:58

## 2020-12-16 RX ADMIN — METHYLPREDNISOLONE SODIUM SUCCINATE 40 MG: 40 INJECTION, POWDER, FOR SOLUTION INTRAMUSCULAR; INTRAVENOUS at 12:16

## 2020-12-16 RX ADMIN — DOCUSATE SODIUM 100 MG: 50 LIQUID ORAL at 10:47

## 2020-12-16 RX ADMIN — INSULIN LISPRO 8 UNITS: 100 INJECTION, SOLUTION INTRAVENOUS; SUBCUTANEOUS at 17:30

## 2020-12-16 RX ADMIN — HEPARIN SODIUM 5000 UNITS: 5000 INJECTION INTRAVENOUS; SUBCUTANEOUS at 21:58

## 2020-12-16 RX ADMIN — FUROSEMIDE 40 MG: 10 INJECTION, SOLUTION INTRAMUSCULAR; INTRAVENOUS at 20:57

## 2020-12-16 RX ADMIN — HEPARIN SODIUM 5000 UNITS: 5000 INJECTION INTRAVENOUS; SUBCUTANEOUS at 14:00

## 2020-12-16 RX ADMIN — INSULIN GLARGINE 30 UNITS: 100 INJECTION, SOLUTION SUBCUTANEOUS at 21:30

## 2020-12-16 RX ADMIN — INSULIN GLARGINE 20 UNITS: 100 INJECTION, SOLUTION SUBCUTANEOUS at 08:17

## 2020-12-16 RX ADMIN — PIPERACILLIN SODIUM,TAZOBACTAM SODIUM 4.5 G: 4; .5 INJECTION, POWDER, FOR SOLUTION INTRAVENOUS at 10:13

## 2020-12-16 RX ADMIN — METHYLPREDNISOLONE SODIUM SUCCINATE 40 MG: 40 INJECTION, POWDER, FOR SOLUTION INTRAMUSCULAR; INTRAVENOUS at 04:28

## 2020-12-16 RX ADMIN — INSULIN LISPRO 5 UNITS: 100 INJECTION, SOLUTION INTRAVENOUS; SUBCUTANEOUS at 12:17

## 2020-12-16 RX ADMIN — DOCUSATE SODIUM 100 MG: 50 LIQUID ORAL at 20:57

## 2020-12-16 RX ADMIN — METHYLPREDNISOLONE SODIUM SUCCINATE 40 MG: 40 INJECTION, POWDER, FOR SOLUTION INTRAMUSCULAR; INTRAVENOUS at 20:57

## 2020-12-16 RX ADMIN — INSULIN LISPRO 12 UNITS: 100 INJECTION, SOLUTION INTRAVENOUS; SUBCUTANEOUS at 05:25

## 2020-12-16 RX ADMIN — Medication 10 ML: at 08:17

## 2020-12-16 RX ADMIN — MAGNESIUM SULFATE HEPTAHYDRATE 1 G: 1 INJECTION, SOLUTION INTRAVENOUS at 08:16

## 2020-12-16 RX ADMIN — FUROSEMIDE 40 MG: 10 INJECTION, SOLUTION INTRAMUSCULAR; INTRAVENOUS at 12:16

## 2020-12-16 RX ADMIN — POTASSIUM CHLORIDE 40 MEQ: 1.5 POWDER, FOR SOLUTION ORAL at 08:16

## 2020-12-16 RX ADMIN — BUDESONIDE 0.5 MG: 0.5 INHALANT RESPIRATORY (INHALATION) at 06:37

## 2020-12-16 RX ADMIN — INSULIN LISPRO 16 UNITS: 100 INJECTION, SOLUTION INTRAVENOUS; SUBCUTANEOUS at 12:17

## 2020-12-16 RX ADMIN — POTASSIUM CHLORIDE 40 MEQ: 1.5 POWDER, FOR SOLUTION ORAL at 12:16

## 2020-12-16 RX ADMIN — FUROSEMIDE 40 MG: 10 INJECTION, SOLUTION INTRAMUSCULAR; INTRAVENOUS at 04:28

## 2020-12-16 RX ADMIN — HEPARIN SODIUM 5000 UNITS: 5000 INJECTION INTRAVENOUS; SUBCUTANEOUS at 05:24

## 2020-12-16 RX ADMIN — BUDESONIDE 0.5 MG: 0.5 INHALANT RESPIRATORY (INHALATION) at 18:45

## 2020-12-16 RX ADMIN — PIPERACILLIN SODIUM,TAZOBACTAM SODIUM 4.5 G: 4; .5 INJECTION, POWDER, FOR SOLUTION INTRAVENOUS at 01:50

## 2020-12-16 RX ADMIN — INSULIN GLARGINE 10 UNITS: 100 INJECTION, SOLUTION SUBCUTANEOUS at 10:28

## 2020-12-16 RX ADMIN — POLYETHYLENE GLYCOL 3350 17 G: 17 POWDER, FOR SOLUTION ORAL at 10:47

## 2020-12-17 ENCOUNTER — APPOINTMENT (OUTPATIENT)
Dept: GENERAL RADIOLOGY | Facility: HOSPITAL | Age: 70
End: 2020-12-17

## 2020-12-17 LAB
ALBUMIN SERPL-MCNC: 4.2 G/DL (ref 3.5–5.2)
ALBUMIN/GLOB SERPL: 1.7 G/DL
ALP SERPL-CCNC: 98 U/L (ref 39–117)
ALT SERPL W P-5'-P-CCNC: 7 U/L (ref 1–33)
ANION GAP SERPL CALCULATED.3IONS-SCNC: 13 MMOL/L (ref 5–15)
ANISOCYTOSIS BLD QL: ABNORMAL
AST SERPL-CCNC: 16 U/L (ref 1–32)
BILIRUB SERPL-MCNC: 0.4 MG/DL (ref 0–1.2)
BUN SERPL-MCNC: 50 MG/DL (ref 8–23)
BUN/CREAT SERPL: 61 (ref 7–25)
CALCIUM SPEC-SCNC: 11 MG/DL (ref 8.6–10.5)
CHLORIDE SERPL-SCNC: 97 MMOL/L (ref 98–107)
CMV DNA SPEC QL NAA+PROBE: NEGATIVE
CO2 SERPL-SCNC: 30 MMOL/L (ref 22–29)
CREAT SERPL-MCNC: 0.82 MG/DL (ref 0.57–1)
DEPRECATED RDW RBC AUTO: 64.8 FL (ref 37–54)
ERYTHROCYTE [DISTWIDTH] IN BLOOD BY AUTOMATED COUNT: 19.7 % (ref 12.3–15.4)
GFR SERPL CREATININE-BSD FRML MDRD: 69 ML/MIN/1.73
GLOBULIN UR ELPH-MCNC: 2.5 GM/DL
GLUCOSE BLDC GLUCOMTR-MCNC: 214 MG/DL (ref 70–105)
GLUCOSE BLDC GLUCOMTR-MCNC: 227 MG/DL (ref 70–105)
GLUCOSE BLDC GLUCOMTR-MCNC: 267 MG/DL (ref 70–105)
GLUCOSE BLDC GLUCOMTR-MCNC: 330 MG/DL (ref 70–105)
GLUCOSE BLDC GLUCOMTR-MCNC: 349 MG/DL (ref 70–105)
GLUCOSE SERPL-MCNC: 285 MG/DL (ref 65–99)
HCT VFR BLD AUTO: 37.4 % (ref 34–46.6)
HGB BLD-MCNC: 12.1 G/DL (ref 12–15.9)
LYMPHOCYTES # BLD MANUAL: 1.12 10*3/MM3 (ref 0.7–3.1)
LYMPHOCYTES NFR BLD MANUAL: 4 % (ref 5–12)
LYMPHOCYTES NFR BLD MANUAL: 7 % (ref 19.6–45.3)
MAGNESIUM SERPL-MCNC: 1.7 MG/DL (ref 1.6–2.4)
MCH RBC QN AUTO: 30.9 PG (ref 26.6–33)
MCHC RBC AUTO-ENTMCNC: 32.4 G/DL (ref 31.5–35.7)
MCV RBC AUTO: 95.2 FL (ref 79–97)
METAMYELOCYTES NFR BLD MANUAL: 1 % (ref 0–0)
MONOCYTES # BLD AUTO: 0.64 10*3/MM3 (ref 0.1–0.9)
NEUTROPHILS # BLD AUTO: 14.08 10*3/MM3 (ref 1.7–7)
NEUTROPHILS NFR BLD MANUAL: 84 % (ref 42.7–76)
NEUTS BAND NFR BLD MANUAL: 4 % (ref 0–5)
NRBC SPEC MANUAL: 1 /100 WBC (ref 0–0.2)
P JIROVECII DNA # SPEC NAA+PROBE: NEGATIVE {COPIES}/ML
PHOSPHATE SERPL-MCNC: 3.7 MG/DL (ref 2.5–4.5)
PLAT MORPH BLD: NORMAL
PLATELET # BLD AUTO: 268 10*3/MM3 (ref 140–450)
PMV BLD AUTO: 8.6 FL (ref 6–12)
POTASSIUM SERPL-SCNC: 3.7 MMOL/L (ref 3.5–5.2)
PROT SERPL-MCNC: 6.7 G/DL (ref 6–8.5)
RBC # BLD AUTO: 3.93 10*6/MM3 (ref 3.77–5.28)
SCAN SLIDE: NORMAL
SODIUM SERPL-SCNC: 140 MMOL/L (ref 136–145)
SPECIMEN SOURCE: NORMAL
SPECIMEN SOURCE: NORMAL
WBC # BLD AUTO: 16 10*3/MM3 (ref 3.4–10.8)
WBC MORPH BLD: NORMAL

## 2020-12-17 PROCEDURE — 94660 CPAP INITIATION&MGMT: CPT

## 2020-12-17 PROCEDURE — 25010000002 HEPARIN (PORCINE) PER 1000 UNITS: Performed by: NURSE PRACTITIONER

## 2020-12-17 PROCEDURE — 84100 ASSAY OF PHOSPHORUS: CPT | Performed by: NURSE PRACTITIONER

## 2020-12-17 PROCEDURE — 85025 COMPLETE CBC W/AUTO DIFF WBC: CPT | Performed by: NURSE PRACTITIONER

## 2020-12-17 PROCEDURE — 92610 EVALUATE SWALLOWING FUNCTION: CPT

## 2020-12-17 PROCEDURE — 85007 BL SMEAR W/DIFF WBC COUNT: CPT | Performed by: NURSE PRACTITIONER

## 2020-12-17 PROCEDURE — 25010000002 FUROSEMIDE PER 20 MG: Performed by: NURSE PRACTITIONER

## 2020-12-17 PROCEDURE — 25010000002 PIPERACILLIN SOD-TAZOBACTAM PER 1 G: Performed by: NURSE PRACTITIONER

## 2020-12-17 PROCEDURE — 25010000002 HYDRALAZINE PER 20 MG: Performed by: INTERNAL MEDICINE

## 2020-12-17 PROCEDURE — 92611 MOTION FLUOROSCOPY/SWALLOW: CPT

## 2020-12-17 PROCEDURE — 82962 GLUCOSE BLOOD TEST: CPT

## 2020-12-17 PROCEDURE — 74230 X-RAY XM SWLNG FUNCJ C+: CPT

## 2020-12-17 PROCEDURE — 80053 COMPREHEN METABOLIC PANEL: CPT | Performed by: NURSE PRACTITIONER

## 2020-12-17 PROCEDURE — 25010000002 METHYLPREDNISOLONE PER 40 MG: Performed by: INTERNAL MEDICINE

## 2020-12-17 PROCEDURE — 25010000002 METHYLPREDNISOLONE PER 40 MG: Performed by: NURSE PRACTITIONER

## 2020-12-17 PROCEDURE — 63710000001 INSULIN GLARGINE PER 5 UNITS: Performed by: INTERNAL MEDICINE

## 2020-12-17 PROCEDURE — 83735 ASSAY OF MAGNESIUM: CPT | Performed by: NURSE PRACTITIONER

## 2020-12-17 PROCEDURE — 63710000001 INSULIN LISPRO (HUMAN) PER 5 UNITS: Performed by: INTERNAL MEDICINE

## 2020-12-17 PROCEDURE — 94799 UNLISTED PULMONARY SVC/PX: CPT

## 2020-12-17 RX ORDER — METHYLPREDNISOLONE SODIUM SUCCINATE 40 MG/ML
20 INJECTION, POWDER, LYOPHILIZED, FOR SOLUTION INTRAMUSCULAR; INTRAVENOUS EVERY 12 HOURS
Status: DISCONTINUED | OUTPATIENT
Start: 2020-12-17 | End: 2020-12-20 | Stop reason: HOSPADM

## 2020-12-17 RX ORDER — INSULIN LISPRO 100 [IU]/ML
10 INJECTION, SOLUTION INTRAVENOUS; SUBCUTANEOUS EVERY 6 HOURS
Status: DISCONTINUED | OUTPATIENT
Start: 2020-12-17 | End: 2020-12-18

## 2020-12-17 RX ADMIN — HEPARIN SODIUM 5000 UNITS: 5000 INJECTION INTRAVENOUS; SUBCUTANEOUS at 21:35

## 2020-12-17 RX ADMIN — HEPARIN SODIUM 5000 UNITS: 5000 INJECTION INTRAVENOUS; SUBCUTANEOUS at 06:37

## 2020-12-17 RX ADMIN — METHYLPREDNISOLONE SODIUM SUCCINATE 20 MG: 40 INJECTION, POWDER, FOR SOLUTION INTRAMUSCULAR; INTRAVENOUS at 18:19

## 2020-12-17 RX ADMIN — FUROSEMIDE 40 MG: 10 INJECTION, SOLUTION INTRAMUSCULAR; INTRAVENOUS at 21:35

## 2020-12-17 RX ADMIN — INSULIN LISPRO 12 UNITS: 100 INJECTION, SOLUTION INTRAVENOUS; SUBCUTANEOUS at 13:16

## 2020-12-17 RX ADMIN — BUDESONIDE 0.5 MG: 0.5 INHALANT RESPIRATORY (INHALATION) at 18:49

## 2020-12-17 RX ADMIN — HYDRALAZINE HYDROCHLORIDE 20 MG: 20 INJECTION INTRAMUSCULAR; INTRAVENOUS at 00:52

## 2020-12-17 RX ADMIN — HEPARIN SODIUM 5000 UNITS: 5000 INJECTION INTRAVENOUS; SUBCUTANEOUS at 13:16

## 2020-12-17 RX ADMIN — INSULIN LISPRO 5 UNITS: 100 INJECTION, SOLUTION INTRAVENOUS; SUBCUTANEOUS at 00:52

## 2020-12-17 RX ADMIN — POLYETHYLENE GLYCOL 3350 17 G: 17 POWDER, FOR SOLUTION ORAL at 09:09

## 2020-12-17 RX ADMIN — INSULIN GLARGINE 30 UNITS: 100 INJECTION, SOLUTION SUBCUTANEOUS at 21:34

## 2020-12-17 RX ADMIN — PIPERACILLIN SODIUM,TAZOBACTAM SODIUM 4.5 G: 4; .5 INJECTION, POWDER, FOR SOLUTION INTRAVENOUS at 18:19

## 2020-12-17 RX ADMIN — FUROSEMIDE 40 MG: 10 INJECTION, SOLUTION INTRAMUSCULAR; INTRAVENOUS at 06:36

## 2020-12-17 RX ADMIN — LANSOPRAZOLE 30 MG: KIT at 06:36

## 2020-12-17 RX ADMIN — INSULIN GLARGINE 30 UNITS: 100 INJECTION, SOLUTION SUBCUTANEOUS at 09:07

## 2020-12-17 RX ADMIN — DOCUSATE SODIUM 100 MG: 50 LIQUID ORAL at 09:09

## 2020-12-17 RX ADMIN — Medication 10 ML: at 21:35

## 2020-12-17 RX ADMIN — INSULIN LISPRO 16 UNITS: 100 INJECTION, SOLUTION INTRAVENOUS; SUBCUTANEOUS at 06:41

## 2020-12-17 RX ADMIN — PIPERACILLIN SODIUM,TAZOBACTAM SODIUM 4.5 G: 4; .5 INJECTION, POWDER, FOR SOLUTION INTRAVENOUS at 01:01

## 2020-12-17 RX ADMIN — Medication 10 ML: at 09:11

## 2020-12-17 RX ADMIN — FUROSEMIDE 40 MG: 10 INJECTION, SOLUTION INTRAMUSCULAR; INTRAVENOUS at 13:16

## 2020-12-17 RX ADMIN — INSULIN LISPRO 10 UNITS: 100 INJECTION, SOLUTION INTRAVENOUS; SUBCUTANEOUS at 13:16

## 2020-12-17 RX ADMIN — PIPERACILLIN SODIUM,TAZOBACTAM SODIUM 4.5 G: 4; .5 INJECTION, POWDER, FOR SOLUTION INTRAVENOUS at 10:01

## 2020-12-17 RX ADMIN — Medication 10 ML: at 21:37

## 2020-12-17 RX ADMIN — INSULIN LISPRO 5 UNITS: 100 INJECTION, SOLUTION INTRAVENOUS; SUBCUTANEOUS at 06:41

## 2020-12-17 RX ADMIN — INSULIN LISPRO 10 UNITS: 100 INJECTION, SOLUTION INTRAVENOUS; SUBCUTANEOUS at 18:20

## 2020-12-17 RX ADMIN — BUDESONIDE 0.5 MG: 0.5 INHALANT RESPIRATORY (INHALATION) at 08:14

## 2020-12-17 RX ADMIN — Medication 10 ML: at 09:10

## 2020-12-17 RX ADMIN — INSULIN LISPRO 16 UNITS: 100 INJECTION, SOLUTION INTRAVENOUS; SUBCUTANEOUS at 18:20

## 2020-12-17 RX ADMIN — METHYLPREDNISOLONE SODIUM SUCCINATE 40 MG: 40 INJECTION, POWDER, FOR SOLUTION INTRAMUSCULAR; INTRAVENOUS at 04:08

## 2020-12-17 RX ADMIN — INSULIN LISPRO 8 UNITS: 100 INJECTION, SOLUTION INTRAVENOUS; SUBCUTANEOUS at 00:52

## 2020-12-18 PROBLEM — M06.9 RHEUMATOID ARTHRITIS (HCC): Chronic | Status: ACTIVE | Noted: 2018-05-02

## 2020-12-18 PROBLEM — E87.6 HYPOKALEMIA: Status: ACTIVE | Noted: 2020-12-18

## 2020-12-18 PROBLEM — A41.9 SEPSIS ASSOCIATED HYPOTENSION (HCC): Status: RESOLVED | Noted: 2020-12-13 | Resolved: 2020-12-18

## 2020-12-18 PROBLEM — J44.9 CHRONIC OBSTRUCTIVE PULMONARY DISEASE (HCC): Chronic | Status: ACTIVE | Noted: 2017-02-23

## 2020-12-18 PROBLEM — R53.81 PHYSICAL DECONDITIONING: Chronic | Status: ACTIVE | Noted: 2020-12-13

## 2020-12-18 PROBLEM — E88.09 HYPOALBUMINEMIA: Status: RESOLVED | Noted: 2020-12-13 | Resolved: 2020-12-18

## 2020-12-18 PROBLEM — I95.9 SEPSIS ASSOCIATED HYPOTENSION (HCC): Status: RESOLVED | Noted: 2020-12-13 | Resolved: 2020-12-18

## 2020-12-18 PROBLEM — G47.33 OBSTRUCTIVE SLEEP APNEA SYNDROME: Chronic | Status: ACTIVE | Noted: 2017-04-28

## 2020-12-18 PROBLEM — E66.9 OBESITY: Chronic | Status: ACTIVE | Noted: 2017-08-15

## 2020-12-18 PROBLEM — J96.21 ACUTE ON CHRONIC RESPIRATORY FAILURE WITH HYPOXIA (HCC): Status: ACTIVE | Noted: 2020-12-18

## 2020-12-18 LAB
ALBUMIN SERPL-MCNC: 3.9 G/DL (ref 3.5–5.2)
ALBUMIN/GLOB SERPL: 1.8 G/DL
ALP SERPL-CCNC: 106 U/L (ref 39–117)
ALT SERPL W P-5'-P-CCNC: 15 U/L (ref 1–33)
ANION GAP SERPL CALCULATED.3IONS-SCNC: 13 MMOL/L (ref 5–15)
AST SERPL-CCNC: 38 U/L (ref 1–32)
BACTERIA SPEC AEROBE CULT: NORMAL
BACTERIA SPEC AEROBE CULT: NORMAL
BILIRUB SERPL-MCNC: 0.3 MG/DL (ref 0–1.2)
BUN SERPL-MCNC: 49 MG/DL (ref 8–23)
BUN/CREAT SERPL: 52.7 (ref 7–25)
CALCIUM SPEC-SCNC: 9.5 MG/DL (ref 8.6–10.5)
CHLORIDE SERPL-SCNC: 98 MMOL/L (ref 98–107)
CO2 SERPL-SCNC: 32 MMOL/L (ref 22–29)
CREAT SERPL-MCNC: 0.93 MG/DL (ref 0.57–1)
DACRYOCYTES BLD QL SMEAR: ABNORMAL
DEPRECATED RDW RBC AUTO: 63 FL (ref 37–54)
EOSINOPHIL # BLD MANUAL: 0.11 10*3/MM3 (ref 0–0.4)
EOSINOPHIL NFR BLD MANUAL: 1 % (ref 0.3–6.2)
ERYTHROCYTE [DISTWIDTH] IN BLOOD BY AUTOMATED COUNT: 19.5 % (ref 12.3–15.4)
GFR SERPL CREATININE-BSD FRML MDRD: 59 ML/MIN/1.73
GLOBULIN UR ELPH-MCNC: 2.2 GM/DL
GLUCOSE BLDC GLUCOMTR-MCNC: 107 MG/DL (ref 70–105)
GLUCOSE BLDC GLUCOMTR-MCNC: 127 MG/DL (ref 70–105)
GLUCOSE BLDC GLUCOMTR-MCNC: 155 MG/DL (ref 70–105)
GLUCOSE BLDC GLUCOMTR-MCNC: 245 MG/DL (ref 70–105)
GLUCOSE BLDC GLUCOMTR-MCNC: 342 MG/DL (ref 70–105)
GLUCOSE BLDC GLUCOMTR-MCNC: 350 MG/DL (ref 70–105)
GLUCOSE BLDC GLUCOMTR-MCNC: 376 MG/DL (ref 70–105)
GLUCOSE SERPL-MCNC: 146 MG/DL (ref 65–99)
HCT VFR BLD AUTO: 35.7 % (ref 34–46.6)
HGB BLD-MCNC: 11.7 G/DL (ref 12–15.9)
LARGE PLATELETS: ABNORMAL
LYMPHOCYTES # BLD MANUAL: 1.23 10*3/MM3 (ref 0.7–3.1)
LYMPHOCYTES NFR BLD MANUAL: 11 % (ref 19.6–45.3)
LYMPHOCYTES NFR BLD MANUAL: 3 % (ref 5–12)
MAGNESIUM SERPL-MCNC: 1.6 MG/DL (ref 1.6–2.4)
MCH RBC QN AUTO: 30.7 PG (ref 26.6–33)
MCHC RBC AUTO-ENTMCNC: 32.8 G/DL (ref 31.5–35.7)
MCV RBC AUTO: 93.7 FL (ref 79–97)
METAMYELOCYTES NFR BLD MANUAL: 2 % (ref 0–0)
MONOCYTES # BLD AUTO: 0.34 10*3/MM3 (ref 0.1–0.9)
NEUTROPHILS # BLD AUTO: 9.07 10*3/MM3 (ref 1.7–7)
NEUTROPHILS NFR BLD MANUAL: 75 % (ref 42.7–76)
NEUTS BAND NFR BLD MANUAL: 6 % (ref 0–5)
PHOSPHATE SERPL-MCNC: 4.2 MG/DL (ref 2.5–4.5)
PLATELET # BLD AUTO: 258 10*3/MM3 (ref 140–450)
PMV BLD AUTO: 7.9 FL (ref 6–12)
POIKILOCYTOSIS BLD QL SMEAR: ABNORMAL
POLYCHROMASIA BLD QL SMEAR: ABNORMAL
POTASSIUM SERPL-SCNC: 3.2 MMOL/L (ref 3.5–5.2)
POTASSIUM SERPL-SCNC: 4.5 MMOL/L (ref 3.5–5.2)
PROT SERPL-MCNC: 6.1 G/DL (ref 6–8.5)
RBC # BLD AUTO: 3.81 10*6/MM3 (ref 3.77–5.28)
SCAN SLIDE: NORMAL
SMALL PLATELETS BLD QL SMEAR: ADEQUATE
SODIUM SERPL-SCNC: 143 MMOL/L (ref 136–145)
VARIANT LYMPHS NFR BLD MANUAL: 2 % (ref 0–5)
WBC # BLD AUTO: 11.2 10*3/MM3 (ref 3.4–10.8)
WBC MORPH BLD: NORMAL

## 2020-12-18 PROCEDURE — 82962 GLUCOSE BLOOD TEST: CPT

## 2020-12-18 PROCEDURE — 84132 ASSAY OF SERUM POTASSIUM: CPT | Performed by: INTERNAL MEDICINE

## 2020-12-18 PROCEDURE — 97530 THERAPEUTIC ACTIVITIES: CPT

## 2020-12-18 PROCEDURE — 83735 ASSAY OF MAGNESIUM: CPT | Performed by: NURSE PRACTITIONER

## 2020-12-18 PROCEDURE — 94799 UNLISTED PULMONARY SVC/PX: CPT

## 2020-12-18 PROCEDURE — 25010000002 HEPARIN (PORCINE) PER 1000 UNITS: Performed by: NURSE PRACTITIONER

## 2020-12-18 PROCEDURE — 25010000002 FUROSEMIDE PER 20 MG: Performed by: NURSE PRACTITIONER

## 2020-12-18 PROCEDURE — 63710000001 INSULIN LISPRO (HUMAN) PER 5 UNITS: Performed by: INTERNAL MEDICINE

## 2020-12-18 PROCEDURE — 99222 1ST HOSP IP/OBS MODERATE 55: CPT | Performed by: NURSE PRACTITIONER

## 2020-12-18 PROCEDURE — 25010000002 METHYLPREDNISOLONE PER 40 MG: Performed by: INTERNAL MEDICINE

## 2020-12-18 PROCEDURE — 85025 COMPLETE CBC W/AUTO DIFF WBC: CPT | Performed by: NURSE PRACTITIONER

## 2020-12-18 PROCEDURE — 94660 CPAP INITIATION&MGMT: CPT

## 2020-12-18 PROCEDURE — 63710000001 INSULIN GLARGINE PER 5 UNITS: Performed by: INTERNAL MEDICINE

## 2020-12-18 PROCEDURE — 25010000002 PIPERACILLIN SOD-TAZOBACTAM PER 1 G: Performed by: NURSE PRACTITIONER

## 2020-12-18 PROCEDURE — 80053 COMPREHEN METABOLIC PANEL: CPT | Performed by: NURSE PRACTITIONER

## 2020-12-18 PROCEDURE — 97163 PT EVAL HIGH COMPLEX 45 MIN: CPT

## 2020-12-18 PROCEDURE — 92526 ORAL FUNCTION THERAPY: CPT

## 2020-12-18 PROCEDURE — 84100 ASSAY OF PHOSPHORUS: CPT | Performed by: NURSE PRACTITIONER

## 2020-12-18 PROCEDURE — 97165 OT EVAL LOW COMPLEX 30 MIN: CPT

## 2020-12-18 PROCEDURE — 85007 BL SMEAR W/DIFF WBC COUNT: CPT | Performed by: NURSE PRACTITIONER

## 2020-12-18 RX ORDER — INSULIN LISPRO 100 [IU]/ML
0-24 INJECTION, SOLUTION INTRAVENOUS; SUBCUTANEOUS AS NEEDED
Status: DISCONTINUED | OUTPATIENT
Start: 2020-12-18 | End: 2020-12-20 | Stop reason: HOSPADM

## 2020-12-18 RX ORDER — INSULIN LISPRO 100 [IU]/ML
0-24 INJECTION, SOLUTION INTRAVENOUS; SUBCUTANEOUS
Status: DISCONTINUED | OUTPATIENT
Start: 2020-12-18 | End: 2020-12-20 | Stop reason: HOSPADM

## 2020-12-18 RX ORDER — POLYETHYLENE GLYCOL 3350 17 G/17G
17 POWDER, FOR SOLUTION ORAL DAILY PRN
Status: DISCONTINUED | OUTPATIENT
Start: 2020-12-18 | End: 2020-12-20 | Stop reason: HOSPADM

## 2020-12-18 RX ORDER — INSULIN LISPRO 100 [IU]/ML
10 INJECTION, SOLUTION INTRAVENOUS; SUBCUTANEOUS
Status: DISCONTINUED | OUTPATIENT
Start: 2020-12-18 | End: 2020-12-20 | Stop reason: HOSPADM

## 2020-12-18 RX ORDER — SENNA PLUS 8.6 MG/1
1 TABLET ORAL NIGHTLY PRN
Status: DISCONTINUED | OUTPATIENT
Start: 2020-12-18 | End: 2020-12-20 | Stop reason: HOSPADM

## 2020-12-18 RX ADMIN — INSULIN GLARGINE 30 UNITS: 100 INJECTION, SOLUTION SUBCUTANEOUS at 20:44

## 2020-12-18 RX ADMIN — FUROSEMIDE 40 MG: 10 INJECTION, SOLUTION INTRAMUSCULAR; INTRAVENOUS at 20:43

## 2020-12-18 RX ADMIN — Medication 10 ML: at 20:43

## 2020-12-18 RX ADMIN — Medication 10 ML: at 09:37

## 2020-12-18 RX ADMIN — BUDESONIDE 0.5 MG: 0.5 INHALANT RESPIRATORY (INHALATION) at 17:22

## 2020-12-18 RX ADMIN — HEPARIN SODIUM 5000 UNITS: 5000 INJECTION INTRAVENOUS; SUBCUTANEOUS at 14:18

## 2020-12-18 RX ADMIN — INSULIN LISPRO 16 UNITS: 100 INJECTION, SOLUTION INTRAVENOUS; SUBCUTANEOUS at 20:43

## 2020-12-18 RX ADMIN — INSULIN LISPRO 20 UNITS: 100 INJECTION, SOLUTION INTRAVENOUS; SUBCUTANEOUS at 17:04

## 2020-12-18 RX ADMIN — FUROSEMIDE 40 MG: 10 INJECTION, SOLUTION INTRAMUSCULAR; INTRAVENOUS at 05:38

## 2020-12-18 RX ADMIN — PIPERACILLIN SODIUM,TAZOBACTAM SODIUM 4.5 G: 4; .5 INJECTION, POWDER, FOR SOLUTION INTRAVENOUS at 17:05

## 2020-12-18 RX ADMIN — Medication 10 ML: at 20:50

## 2020-12-18 RX ADMIN — FUROSEMIDE 40 MG: 10 INJECTION, SOLUTION INTRAMUSCULAR; INTRAVENOUS at 12:28

## 2020-12-18 RX ADMIN — PIPERACILLIN SODIUM,TAZOBACTAM SODIUM 4.5 G: 4; .5 INJECTION, POWDER, FOR SOLUTION INTRAVENOUS at 12:28

## 2020-12-18 RX ADMIN — INSULIN LISPRO 10 UNITS: 100 INJECTION, SOLUTION INTRAVENOUS; SUBCUTANEOUS at 12:28

## 2020-12-18 RX ADMIN — POTASSIUM CHLORIDE 40 MEQ: 1500 TABLET, EXTENDED RELEASE ORAL at 06:57

## 2020-12-18 RX ADMIN — METHYLPREDNISOLONE SODIUM SUCCINATE 20 MG: 40 INJECTION, POWDER, FOR SOLUTION INTRAMUSCULAR; INTRAVENOUS at 15:37

## 2020-12-18 RX ADMIN — BUDESONIDE 0.5 MG: 0.5 INHALANT RESPIRATORY (INHALATION) at 06:58

## 2020-12-18 RX ADMIN — LANSOPRAZOLE 30 MG: KIT at 05:38

## 2020-12-18 RX ADMIN — PIPERACILLIN SODIUM,TAZOBACTAM SODIUM 4.5 G: 4; .5 INJECTION, POWDER, FOR SOLUTION INTRAVENOUS at 03:14

## 2020-12-18 RX ADMIN — INSULIN GLARGINE 30 UNITS: 100 INJECTION, SOLUTION SUBCUTANEOUS at 09:37

## 2020-12-18 RX ADMIN — HEPARIN SODIUM 5000 UNITS: 5000 INJECTION INTRAVENOUS; SUBCUTANEOUS at 05:38

## 2020-12-18 RX ADMIN — METHYLPREDNISOLONE SODIUM SUCCINATE 20 MG: 40 INJECTION, POWDER, FOR SOLUTION INTRAMUSCULAR; INTRAVENOUS at 05:38

## 2020-12-18 RX ADMIN — INSULIN LISPRO 10 UNITS: 100 INJECTION, SOLUTION INTRAVENOUS; SUBCUTANEOUS at 17:04

## 2020-12-18 RX ADMIN — POTASSIUM CHLORIDE 40 MEQ: 1500 TABLET, EXTENDED RELEASE ORAL at 12:28

## 2020-12-18 RX ADMIN — HEPARIN SODIUM 5000 UNITS: 5000 INJECTION INTRAVENOUS; SUBCUTANEOUS at 21:17

## 2020-12-18 RX ADMIN — INSULIN LISPRO 8 UNITS: 100 INJECTION, SOLUTION INTRAVENOUS; SUBCUTANEOUS at 00:35

## 2020-12-18 RX ADMIN — INSULIN LISPRO 20 UNITS: 100 INJECTION, SOLUTION INTRAVENOUS; SUBCUTANEOUS at 12:29

## 2020-12-19 LAB
ALBUMIN SERPL-MCNC: 3.7 G/DL (ref 3.5–5.2)
ALBUMIN/GLOB SERPL: 1.7 G/DL
ALP SERPL-CCNC: 98 U/L (ref 39–117)
ALT SERPL W P-5'-P-CCNC: 24 U/L (ref 1–33)
ANION GAP SERPL CALCULATED.3IONS-SCNC: 12 MMOL/L (ref 5–15)
ANISOCYTOSIS BLD QL: ABNORMAL
AST SERPL-CCNC: 39 U/L (ref 1–32)
BASOPHILS # BLD MANUAL: 0.12 10*3/MM3 (ref 0–0.2)
BASOPHILS NFR BLD AUTO: 1 % (ref 0–1.5)
BILIRUB SERPL-MCNC: 0.3 MG/DL (ref 0–1.2)
BUN SERPL-MCNC: 41 MG/DL (ref 8–23)
BUN/CREAT SERPL: 47.7 (ref 7–25)
CALCIUM SPEC-SCNC: 9.7 MG/DL (ref 8.6–10.5)
CHLORIDE SERPL-SCNC: 100 MMOL/L (ref 98–107)
CO2 SERPL-SCNC: 29 MMOL/L (ref 22–29)
CREAT SERPL-MCNC: 0.86 MG/DL (ref 0.57–1)
DEPRECATED RDW RBC AUTO: 63 FL (ref 37–54)
EOSINOPHIL # BLD MANUAL: 0.12 10*3/MM3 (ref 0–0.4)
EOSINOPHIL NFR BLD MANUAL: 1 % (ref 0.3–6.2)
ERYTHROCYTE [DISTWIDTH] IN BLOOD BY AUTOMATED COUNT: 19.3 % (ref 12.3–15.4)
GFR SERPL CREATININE-BSD FRML MDRD: 65 ML/MIN/1.73
GLOBULIN UR ELPH-MCNC: 2.2 GM/DL
GLUCOSE BLDC GLUCOMTR-MCNC: 132 MG/DL (ref 70–105)
GLUCOSE BLDC GLUCOMTR-MCNC: 174 MG/DL (ref 70–105)
GLUCOSE BLDC GLUCOMTR-MCNC: 198 MG/DL (ref 70–105)
GLUCOSE BLDC GLUCOMTR-MCNC: 318 MG/DL (ref 70–105)
GLUCOSE BLDC GLUCOMTR-MCNC: 369 MG/DL (ref 70–105)
GLUCOSE SERPL-MCNC: 131 MG/DL (ref 65–99)
HCT VFR BLD AUTO: 34.6 % (ref 34–46.6)
HGB BLD-MCNC: 11.3 G/DL (ref 12–15.9)
LARGE PLATELETS: ABNORMAL
LYMPHOCYTES # BLD MANUAL: 0.99 10*3/MM3 (ref 0.7–3.1)
LYMPHOCYTES NFR BLD MANUAL: 4 % (ref 5–12)
LYMPHOCYTES NFR BLD MANUAL: 8 % (ref 19.6–45.3)
MAGNESIUM SERPL-MCNC: 1.6 MG/DL (ref 1.6–2.4)
MCH RBC QN AUTO: 30.6 PG (ref 26.6–33)
MCHC RBC AUTO-ENTMCNC: 32.5 G/DL (ref 31.5–35.7)
MCV RBC AUTO: 94.1 FL (ref 79–97)
MONOCYTES # BLD AUTO: 0.5 10*3/MM3 (ref 0.1–0.9)
NEUTROPHILS # BLD AUTO: 10.66 10*3/MM3 (ref 1.7–7)
NEUTROPHILS NFR BLD MANUAL: 85 % (ref 42.7–76)
NEUTS BAND NFR BLD MANUAL: 1 % (ref 0–5)
PHOSPHATE SERPL-MCNC: 3.4 MG/DL (ref 2.5–4.5)
PLATELET # BLD AUTO: 225 10*3/MM3 (ref 140–450)
PMV BLD AUTO: 8.4 FL (ref 6–12)
POTASSIUM SERPL-SCNC: 4.3 MMOL/L (ref 3.5–5.2)
PROT SERPL-MCNC: 5.9 G/DL (ref 6–8.5)
RBC # BLD AUTO: 3.68 10*6/MM3 (ref 3.77–5.28)
SCAN SLIDE: NORMAL
SODIUM SERPL-SCNC: 141 MMOL/L (ref 136–145)
WBC # BLD AUTO: 12.4 10*3/MM3 (ref 3.4–10.8)
WBC MORPH BLD: NORMAL

## 2020-12-19 PROCEDURE — 63710000001 INSULIN REGULAR HUMAN PER 5 UNITS: Performed by: INTERNAL MEDICINE

## 2020-12-19 PROCEDURE — 80053 COMPREHEN METABOLIC PANEL: CPT | Performed by: NURSE PRACTITIONER

## 2020-12-19 PROCEDURE — 83735 ASSAY OF MAGNESIUM: CPT | Performed by: NURSE PRACTITIONER

## 2020-12-19 PROCEDURE — 84100 ASSAY OF PHOSPHORUS: CPT | Performed by: NURSE PRACTITIONER

## 2020-12-19 PROCEDURE — 25010000002 METHYLPREDNISOLONE PER 40 MG: Performed by: INTERNAL MEDICINE

## 2020-12-19 PROCEDURE — 25010000002 PIPERACILLIN SOD-TAZOBACTAM PER 1 G: Performed by: NURSE PRACTITIONER

## 2020-12-19 PROCEDURE — 85007 BL SMEAR W/DIFF WBC COUNT: CPT | Performed by: NURSE PRACTITIONER

## 2020-12-19 PROCEDURE — 94799 UNLISTED PULMONARY SVC/PX: CPT

## 2020-12-19 PROCEDURE — 25010000002 FUROSEMIDE PER 20 MG: Performed by: NURSE PRACTITIONER

## 2020-12-19 PROCEDURE — 99233 SBSQ HOSP IP/OBS HIGH 50: CPT | Performed by: INTERNAL MEDICINE

## 2020-12-19 PROCEDURE — 82962 GLUCOSE BLOOD TEST: CPT

## 2020-12-19 PROCEDURE — 63710000001 INSULIN LISPRO (HUMAN) PER 5 UNITS: Performed by: INTERNAL MEDICINE

## 2020-12-19 PROCEDURE — 85025 COMPLETE CBC W/AUTO DIFF WBC: CPT | Performed by: NURSE PRACTITIONER

## 2020-12-19 PROCEDURE — 63710000001 INSULIN GLARGINE PER 5 UNITS: Performed by: INTERNAL MEDICINE

## 2020-12-19 PROCEDURE — 25010000002 HEPARIN (PORCINE) PER 1000 UNITS: Performed by: NURSE PRACTITIONER

## 2020-12-19 PROCEDURE — 94660 CPAP INITIATION&MGMT: CPT

## 2020-12-19 RX ADMIN — Medication 10 ML: at 03:03

## 2020-12-19 RX ADMIN — METHYLPREDNISOLONE SODIUM SUCCINATE 20 MG: 40 INJECTION, POWDER, FOR SOLUTION INTRAMUSCULAR; INTRAVENOUS at 17:37

## 2020-12-19 RX ADMIN — LANSOPRAZOLE 30 MG: KIT at 05:38

## 2020-12-19 RX ADMIN — BUDESONIDE 0.5 MG: 0.5 INHALANT RESPIRATORY (INHALATION) at 07:02

## 2020-12-19 RX ADMIN — HEPARIN SODIUM 5000 UNITS: 5000 INJECTION INTRAVENOUS; SUBCUTANEOUS at 05:37

## 2020-12-19 RX ADMIN — INSULIN GLARGINE 30 UNITS: 100 INJECTION, SOLUTION SUBCUTANEOUS at 09:05

## 2020-12-19 RX ADMIN — FUROSEMIDE 40 MG: 10 INJECTION, SOLUTION INTRAMUSCULAR; INTRAVENOUS at 14:35

## 2020-12-19 RX ADMIN — INSULIN LISPRO 20 UNITS: 100 INJECTION, SOLUTION INTRAVENOUS; SUBCUTANEOUS at 20:55

## 2020-12-19 RX ADMIN — Medication 10 ML: at 20:55

## 2020-12-19 RX ADMIN — METHYLPREDNISOLONE SODIUM SUCCINATE 20 MG: 40 INJECTION, POWDER, FOR SOLUTION INTRAMUSCULAR; INTRAVENOUS at 03:02

## 2020-12-19 RX ADMIN — HEPARIN SODIUM 5000 UNITS: 5000 INJECTION INTRAVENOUS; SUBCUTANEOUS at 20:56

## 2020-12-19 RX ADMIN — FUROSEMIDE 40 MG: 10 INJECTION, SOLUTION INTRAMUSCULAR; INTRAVENOUS at 05:37

## 2020-12-19 RX ADMIN — INSULIN LISPRO 10 UNITS: 100 INJECTION, SOLUTION INTRAVENOUS; SUBCUTANEOUS at 17:38

## 2020-12-19 RX ADMIN — HEPARIN SODIUM 5000 UNITS: 5000 INJECTION INTRAVENOUS; SUBCUTANEOUS at 14:35

## 2020-12-19 RX ADMIN — INSULIN LISPRO 16 UNITS: 100 INJECTION, SOLUTION INTRAVENOUS; SUBCUTANEOUS at 11:18

## 2020-12-19 RX ADMIN — INSULIN LISPRO 4 UNITS: 100 INJECTION, SOLUTION INTRAVENOUS; SUBCUTANEOUS at 09:12

## 2020-12-19 RX ADMIN — INSULIN HUMAN 2 UNITS: 100 INJECTION, SOLUTION PARENTERAL at 17:37

## 2020-12-19 RX ADMIN — INSULIN GLARGINE 30 UNITS: 100 INJECTION, SOLUTION SUBCUTANEOUS at 20:56

## 2020-12-19 RX ADMIN — FUROSEMIDE 40 MG: 10 INJECTION, SOLUTION INTRAMUSCULAR; INTRAVENOUS at 20:55

## 2020-12-19 RX ADMIN — INSULIN LISPRO 10 UNITS: 100 INJECTION, SOLUTION INTRAVENOUS; SUBCUTANEOUS at 11:19

## 2020-12-19 RX ADMIN — INSULIN LISPRO 4 UNITS: 100 INJECTION, SOLUTION INTRAVENOUS; SUBCUTANEOUS at 17:37

## 2020-12-19 RX ADMIN — Medication 10 ML: at 09:14

## 2020-12-19 RX ADMIN — PIPERACILLIN SODIUM,TAZOBACTAM SODIUM 4.5 G: 4; .5 INJECTION, POWDER, FOR SOLUTION INTRAVENOUS at 11:19

## 2020-12-19 RX ADMIN — PIPERACILLIN SODIUM,TAZOBACTAM SODIUM 4.5 G: 4; .5 INJECTION, POWDER, FOR SOLUTION INTRAVENOUS at 03:03

## 2020-12-19 RX ADMIN — PIPERACILLIN SODIUM,TAZOBACTAM SODIUM 4.5 G: 4; .5 INJECTION, POWDER, FOR SOLUTION INTRAVENOUS at 17:36

## 2020-12-20 VITALS
TEMPERATURE: 97.3 F | DIASTOLIC BLOOD PRESSURE: 72 MMHG | HEIGHT: 62 IN | RESPIRATION RATE: 20 BRPM | WEIGHT: 167.8 LBS | BODY MASS INDEX: 30.88 KG/M2 | OXYGEN SATURATION: 98 % | SYSTOLIC BLOOD PRESSURE: 129 MMHG | HEART RATE: 73 BPM

## 2020-12-20 PROBLEM — J96.21 ACUTE ON CHRONIC RESPIRATORY FAILURE WITH HYPOXIA (HCC): Status: RESOLVED | Noted: 2020-12-18 | Resolved: 2020-12-20

## 2020-12-20 PROBLEM — A41.9 SEPTIC SHOCK (HCC): Status: RESOLVED | Noted: 2020-12-13 | Resolved: 2020-12-20

## 2020-12-20 PROBLEM — R19.7 DIARRHEA OF PRESUMED INFECTIOUS ORIGIN: Status: RESOLVED | Noted: 2020-12-13 | Resolved: 2020-12-20

## 2020-12-20 PROBLEM — R65.21 SEPTIC SHOCK (HCC): Status: RESOLVED | Noted: 2020-12-13 | Resolved: 2020-12-20

## 2020-12-20 PROBLEM — E87.6 HYPOKALEMIA: Status: RESOLVED | Noted: 2020-12-18 | Resolved: 2020-12-20

## 2020-12-20 LAB
ALBUMIN SERPL-MCNC: 3.7 G/DL (ref 3.5–5.2)
ALBUMIN/GLOB SERPL: 1.5 G/DL
ALP SERPL-CCNC: 125 U/L (ref 39–117)
ALT SERPL W P-5'-P-CCNC: 25 U/L (ref 1–33)
ANION GAP SERPL CALCULATED.3IONS-SCNC: 11 MMOL/L (ref 5–15)
ANISOCYTOSIS BLD QL: ABNORMAL
AST SERPL-CCNC: 35 U/L (ref 1–32)
BASOPHILS # BLD MANUAL: 0.13 10*3/MM3 (ref 0–0.2)
BASOPHILS NFR BLD AUTO: 1 % (ref 0–1.5)
BILIRUB SERPL-MCNC: 0.2 MG/DL (ref 0–1.2)
BUN SERPL-MCNC: 48 MG/DL (ref 8–23)
BUN/CREAT SERPL: 44 (ref 7–25)
CALCIUM SPEC-SCNC: 9.8 MG/DL (ref 8.6–10.5)
CHLORIDE SERPL-SCNC: 96 MMOL/L (ref 98–107)
CO2 SERPL-SCNC: 31 MMOL/L (ref 22–29)
CREAT SERPL-MCNC: 1.09 MG/DL (ref 0.57–1)
DEPRECATED RDW RBC AUTO: 63.4 FL (ref 37–54)
ERYTHROCYTE [DISTWIDTH] IN BLOOD BY AUTOMATED COUNT: 19.4 % (ref 12.3–15.4)
GFR SERPL CREATININE-BSD FRML MDRD: 49 ML/MIN/1.73
GLOBULIN UR ELPH-MCNC: 2.5 GM/DL
GLUCOSE BLDC GLUCOMTR-MCNC: 177 MG/DL (ref 70–105)
GLUCOSE BLDC GLUCOMTR-MCNC: 275 MG/DL (ref 70–105)
GLUCOSE SERPL-MCNC: 246 MG/DL (ref 65–99)
HCT VFR BLD AUTO: 34.7 % (ref 34–46.6)
HGB BLD-MCNC: 11 G/DL (ref 12–15.9)
LARGE PLATELETS: ABNORMAL
LYMPHOCYTES # BLD MANUAL: 1.15 10*3/MM3 (ref 0.7–3.1)
LYMPHOCYTES NFR BLD MANUAL: 4 % (ref 5–12)
LYMPHOCYTES NFR BLD MANUAL: 9 % (ref 19.6–45.3)
MAGNESIUM SERPL-MCNC: 1.6 MG/DL (ref 1.6–2.4)
MCH RBC QN AUTO: 30.2 PG (ref 26.6–33)
MCHC RBC AUTO-ENTMCNC: 31.8 G/DL (ref 31.5–35.7)
MCV RBC AUTO: 94.9 FL (ref 79–97)
MONOCYTES # BLD AUTO: 0.51 10*3/MM3 (ref 0.1–0.9)
NEUTROPHILS # BLD AUTO: 11.01 10*3/MM3 (ref 1.7–7)
NEUTROPHILS NFR BLD MANUAL: 86 % (ref 42.7–76)
PHOSPHATE SERPL-MCNC: 4.2 MG/DL (ref 2.5–4.5)
PLATELET # BLD AUTO: 252 10*3/MM3 (ref 140–450)
PMV BLD AUTO: 9.3 FL (ref 6–12)
POTASSIUM SERPL-SCNC: 4.1 MMOL/L (ref 3.5–5.2)
PROT SERPL-MCNC: 6.2 G/DL (ref 6–8.5)
RBC # BLD AUTO: 3.66 10*6/MM3 (ref 3.77–5.28)
SCAN SLIDE: NORMAL
SODIUM SERPL-SCNC: 138 MMOL/L (ref 136–145)
WBC # BLD AUTO: 12.8 10*3/MM3 (ref 3.4–10.8)
WBC MORPH BLD: NORMAL

## 2020-12-20 PROCEDURE — 25010000002 FUROSEMIDE PER 20 MG: Performed by: NURSE PRACTITIONER

## 2020-12-20 PROCEDURE — 63710000001 INSULIN GLARGINE PER 5 UNITS: Performed by: INTERNAL MEDICINE

## 2020-12-20 PROCEDURE — 97116 GAIT TRAINING THERAPY: CPT

## 2020-12-20 PROCEDURE — 84100 ASSAY OF PHOSPHORUS: CPT | Performed by: NURSE PRACTITIONER

## 2020-12-20 PROCEDURE — 94799 UNLISTED PULMONARY SVC/PX: CPT

## 2020-12-20 PROCEDURE — 25010000002 HEPARIN (PORCINE) PER 1000 UNITS: Performed by: NURSE PRACTITIONER

## 2020-12-20 PROCEDURE — 97110 THERAPEUTIC EXERCISES: CPT

## 2020-12-20 PROCEDURE — 85007 BL SMEAR W/DIFF WBC COUNT: CPT | Performed by: NURSE PRACTITIONER

## 2020-12-20 PROCEDURE — 85025 COMPLETE CBC W/AUTO DIFF WBC: CPT | Performed by: NURSE PRACTITIONER

## 2020-12-20 PROCEDURE — 99239 HOSP IP/OBS DSCHRG MGMT >30: CPT | Performed by: INTERNAL MEDICINE

## 2020-12-20 PROCEDURE — 80053 COMPREHEN METABOLIC PANEL: CPT | Performed by: NURSE PRACTITIONER

## 2020-12-20 PROCEDURE — 25010000002 METHYLPREDNISOLONE PER 40 MG: Performed by: INTERNAL MEDICINE

## 2020-12-20 PROCEDURE — 63710000001 INSULIN LISPRO (HUMAN) PER 5 UNITS: Performed by: INTERNAL MEDICINE

## 2020-12-20 PROCEDURE — 25010000002 PIPERACILLIN SOD-TAZOBACTAM PER 1 G: Performed by: NURSE PRACTITIONER

## 2020-12-20 PROCEDURE — 83735 ASSAY OF MAGNESIUM: CPT | Performed by: NURSE PRACTITIONER

## 2020-12-20 PROCEDURE — 63710000001 INSULIN REGULAR HUMAN PER 5 UNITS: Performed by: INTERNAL MEDICINE

## 2020-12-20 PROCEDURE — 82962 GLUCOSE BLOOD TEST: CPT

## 2020-12-20 RX ORDER — PREDNISONE 10 MG/1
TABLET ORAL
Start: 2020-12-20 | End: 2021-01-01

## 2020-12-20 RX ORDER — POLYETHYLENE GLYCOL 3350 17 G/17G
17 POWDER, FOR SOLUTION ORAL DAILY PRN
Start: 2020-12-20 | End: 2021-07-10

## 2020-12-20 RX ORDER — SENNA PLUS 8.6 MG/1
1 TABLET ORAL NIGHTLY PRN
Start: 2020-12-20 | End: 2021-07-10

## 2020-12-20 RX ADMIN — INSULIN LISPRO 4 UNITS: 100 INJECTION, SOLUTION INTRAVENOUS; SUBCUTANEOUS at 09:00

## 2020-12-20 RX ADMIN — BUDESONIDE 0.5 MG: 0.5 INHALANT RESPIRATORY (INHALATION) at 07:27

## 2020-12-20 RX ADMIN — INSULIN LISPRO 12 UNITS: 100 INJECTION, SOLUTION INTRAVENOUS; SUBCUTANEOUS at 12:17

## 2020-12-20 RX ADMIN — HEPARIN SODIUM 5000 UNITS: 5000 INJECTION INTRAVENOUS; SUBCUTANEOUS at 05:08

## 2020-12-20 RX ADMIN — FUROSEMIDE 40 MG: 10 INJECTION, SOLUTION INTRAMUSCULAR; INTRAVENOUS at 04:11

## 2020-12-20 RX ADMIN — Medication 10 ML: at 04:12

## 2020-12-20 RX ADMIN — Medication 10 ML: at 09:01

## 2020-12-20 RX ADMIN — PIPERACILLIN SODIUM,TAZOBACTAM SODIUM 4.5 G: 4; .5 INJECTION, POWDER, FOR SOLUTION INTRAVENOUS at 09:45

## 2020-12-20 RX ADMIN — INSULIN GLARGINE 30 UNITS: 100 INJECTION, SOLUTION SUBCUTANEOUS at 09:45

## 2020-12-20 RX ADMIN — FUROSEMIDE 40 MG: 10 INJECTION, SOLUTION INTRAMUSCULAR; INTRAVENOUS at 12:16

## 2020-12-20 RX ADMIN — INSULIN LISPRO 10 UNITS: 100 INJECTION, SOLUTION INTRAVENOUS; SUBCUTANEOUS at 09:01

## 2020-12-20 RX ADMIN — INSULIN HUMAN 2 UNITS: 100 INJECTION, SOLUTION PARENTERAL at 04:12

## 2020-12-20 RX ADMIN — INSULIN LISPRO 10 UNITS: 100 INJECTION, SOLUTION INTRAVENOUS; SUBCUTANEOUS at 17:55

## 2020-12-20 RX ADMIN — INSULIN LISPRO 10 UNITS: 100 INJECTION, SOLUTION INTRAVENOUS; SUBCUTANEOUS at 12:17

## 2020-12-20 RX ADMIN — LANSOPRAZOLE 30 MG: KIT at 05:08

## 2020-12-20 RX ADMIN — METHYLPREDNISOLONE SODIUM SUCCINATE 20 MG: 40 INJECTION, POWDER, FOR SOLUTION INTRAMUSCULAR; INTRAVENOUS at 04:12

## 2020-12-20 RX ADMIN — Medication 10 ML: at 09:00

## 2020-12-20 RX ADMIN — PIPERACILLIN SODIUM,TAZOBACTAM SODIUM 4.5 G: 4; .5 INJECTION, POWDER, FOR SOLUTION INTRAVENOUS at 01:39

## 2020-12-21 ENCOUNTER — LAB REQUISITION (OUTPATIENT)
Dept: LAB | Facility: HOSPITAL | Age: 70
End: 2020-12-21

## 2020-12-21 DIAGNOSIS — Z00.00 ENCOUNTER FOR GENERAL ADULT MEDICAL EXAMINATION WITHOUT ABNORMAL FINDINGS: ICD-10-CM

## 2020-12-21 LAB
ALBUMIN SERPL-MCNC: 3.6 G/DL (ref 3.5–5.2)
ALBUMIN/GLOB SERPL: 1.6 G/DL
ALP SERPL-CCNC: 110 U/L (ref 39–117)
ALT SERPL W P-5'-P-CCNC: 29 U/L (ref 1–33)
ANION GAP SERPL CALCULATED.3IONS-SCNC: 12 MMOL/L (ref 5–15)
ANISOCYTOSIS BLD QL: ABNORMAL
AST SERPL-CCNC: 45 U/L (ref 1–32)
BASOPHILS # BLD MANUAL: 0.11 10*3/MM3 (ref 0–0.2)
BASOPHILS NFR BLD AUTO: 1 % (ref 0–1.5)
BILIRUB SERPL-MCNC: 0.3 MG/DL (ref 0–1.2)
BUN SERPL-MCNC: 37 MG/DL (ref 8–23)
BUN/CREAT SERPL: 56.9 (ref 7–25)
CALCIUM SPEC-SCNC: 9.9 MG/DL (ref 8.6–10.5)
CHLORIDE SERPL-SCNC: 95 MMOL/L (ref 98–107)
CO2 SERPL-SCNC: 31 MMOL/L (ref 22–29)
CREAT SERPL-MCNC: 0.65 MG/DL (ref 0.57–1)
DEPRECATED RDW RBC AUTO: 63.9 FL (ref 37–54)
EOSINOPHIL # BLD MANUAL: 0.42 10*3/MM3 (ref 0–0.4)
EOSINOPHIL NFR BLD MANUAL: 4 % (ref 0.3–6.2)
ERYTHROCYTE [DISTWIDTH] IN BLOOD BY AUTOMATED COUNT: 19.5 % (ref 12.3–15.4)
GFR SERPL CREATININE-BSD FRML MDRD: 90 ML/MIN/1.73
GLOBULIN UR ELPH-MCNC: 2.3 GM/DL
GLUCOSE SERPL-MCNC: 106 MG/DL (ref 65–99)
HCT VFR BLD AUTO: 35.6 % (ref 34–46.6)
HGB BLD-MCNC: 11.6 G/DL (ref 12–15.9)
LYMPHOCYTES # BLD MANUAL: 0.95 10*3/MM3 (ref 0.7–3.1)
LYMPHOCYTES NFR BLD MANUAL: 6 % (ref 5–12)
LYMPHOCYTES NFR BLD MANUAL: 9 % (ref 19.6–45.3)
MAGNESIUM SERPL-MCNC: 1.8 MG/DL (ref 1.6–2.4)
MCH RBC QN AUTO: 30.7 PG (ref 26.6–33)
MCHC RBC AUTO-ENTMCNC: 32.6 G/DL (ref 31.5–35.7)
MCV RBC AUTO: 94.1 FL (ref 79–97)
METAMYELOCYTES NFR BLD MANUAL: 1 % (ref 0–0)
MONOCYTES # BLD AUTO: 0.64 10*3/MM3 (ref 0.1–0.9)
NEUTROPHILS # BLD AUTO: 8.27 10*3/MM3 (ref 1.7–7)
NEUTROPHILS NFR BLD MANUAL: 71 % (ref 42.7–76)
NEUTS BAND NFR BLD MANUAL: 7 % (ref 0–5)
PHOSPHATE SERPL-MCNC: 3.8 MG/DL (ref 2.5–4.5)
PLAT MORPH BLD: NORMAL
PLATELET # BLD AUTO: 222 10*3/MM3 (ref 140–450)
PMV BLD AUTO: 9 FL (ref 6–12)
POLYCHROMASIA BLD QL SMEAR: ABNORMAL
POTASSIUM SERPL-SCNC: 3.5 MMOL/L (ref 3.5–5.2)
PROT SERPL-MCNC: 5.9 G/DL (ref 6–8.5)
RBC # BLD AUTO: 3.79 10*6/MM3 (ref 3.77–5.28)
SCAN SLIDE: NORMAL
SODIUM SERPL-SCNC: 138 MMOL/L (ref 136–145)
VARIANT LYMPHS NFR BLD MANUAL: 1 % (ref 0–5)
WBC # BLD AUTO: 10.6 10*3/MM3 (ref 3.4–10.8)
WBC MORPH BLD: NORMAL

## 2020-12-21 PROCEDURE — 84100 ASSAY OF PHOSPHORUS: CPT | Performed by: PHYSICAL MEDICINE & REHABILITATION

## 2020-12-21 PROCEDURE — 80053 COMPREHEN METABOLIC PANEL: CPT | Performed by: PHYSICAL MEDICINE & REHABILITATION

## 2020-12-21 PROCEDURE — 83735 ASSAY OF MAGNESIUM: CPT | Performed by: PHYSICAL MEDICINE & REHABILITATION

## 2020-12-21 PROCEDURE — 85025 COMPLETE CBC W/AUTO DIFF WBC: CPT | Performed by: PHYSICAL MEDICINE & REHABILITATION

## 2020-12-22 ENCOUNTER — LAB REQUISITION (OUTPATIENT)
Dept: LAB | Facility: HOSPITAL | Age: 70
End: 2020-12-22

## 2020-12-22 DIAGNOSIS — Z00.00 ENCOUNTER FOR GENERAL ADULT MEDICAL EXAMINATION WITHOUT ABNORMAL FINDINGS: ICD-10-CM

## 2020-12-22 LAB
ANION GAP SERPL CALCULATED.3IONS-SCNC: 13 MMOL/L (ref 5–15)
ANISOCYTOSIS BLD QL: ABNORMAL
BUN SERPL-MCNC: 43 MG/DL (ref 8–23)
BUN/CREAT SERPL: 45.3 (ref 7–25)
CALCIUM SPEC-SCNC: 9.4 MG/DL (ref 8.6–10.5)
CHLORIDE SERPL-SCNC: 98 MMOL/L (ref 98–107)
CO2 SERPL-SCNC: 26 MMOL/L (ref 22–29)
CREAT SERPL-MCNC: 0.95 MG/DL (ref 0.57–1)
DEPRECATED RDW RBC AUTO: 63.4 FL (ref 37–54)
EOSINOPHIL # BLD MANUAL: 0.7 10*3/MM3 (ref 0–0.4)
EOSINOPHIL NFR BLD MANUAL: 4 % (ref 0.3–6.2)
ERYTHROCYTE [DISTWIDTH] IN BLOOD BY AUTOMATED COUNT: 19 % (ref 12.3–15.4)
GFR SERPL CREATININE-BSD FRML MDRD: 58 ML/MIN/1.73
GLUCOSE SERPL-MCNC: 148 MG/DL (ref 65–99)
HCT VFR BLD AUTO: 37 % (ref 34–46.6)
HGB BLD-MCNC: 11.8 G/DL (ref 12–15.9)
LYMPHOCYTES # BLD MANUAL: 0.88 10*3/MM3 (ref 0.7–3.1)
LYMPHOCYTES NFR BLD MANUAL: 1 % (ref 5–12)
LYMPHOCYTES NFR BLD MANUAL: 5 % (ref 19.6–45.3)
MCH RBC QN AUTO: 30.6 PG (ref 26.6–33)
MCHC RBC AUTO-ENTMCNC: 31.9 G/DL (ref 31.5–35.7)
MCV RBC AUTO: 96.1 FL (ref 79–97)
MONOCYTES # BLD AUTO: 0.18 10*3/MM3 (ref 0.1–0.9)
MYELOCYTES NFR BLD MANUAL: 1 % (ref 0–0)
NEUTROPHILS # BLD AUTO: 15.4 10*3/MM3 (ref 1.7–7)
NEUTROPHILS NFR BLD MANUAL: 71 % (ref 42.7–76)
NEUTS BAND NFR BLD MANUAL: 17 % (ref 0–5)
PLAT MORPH BLD: NORMAL
PLATELET # BLD AUTO: 226 10*3/MM3 (ref 140–450)
PMV BLD AUTO: 9.7 FL (ref 6–12)
POTASSIUM SERPL-SCNC: 3.2 MMOL/L (ref 3.5–5.2)
RBC # BLD AUTO: 3.85 10*6/MM3 (ref 3.77–5.28)
SCAN SLIDE: NORMAL
SODIUM SERPL-SCNC: 137 MMOL/L (ref 136–145)
VARIANT LYMPHS NFR BLD MANUAL: 1 % (ref 0–5)
WBC # BLD AUTO: 17.5 10*3/MM3 (ref 3.4–10.8)
WBC MORPH BLD: NORMAL

## 2020-12-22 PROCEDURE — 80048 BASIC METABOLIC PNL TOTAL CA: CPT

## 2020-12-22 PROCEDURE — 85025 COMPLETE CBC W/AUTO DIFF WBC: CPT

## 2020-12-23 LAB — VIRUS SPEC CULT: NORMAL

## 2020-12-24 ENCOUNTER — LAB REQUISITION (OUTPATIENT)
Dept: LAB | Facility: HOSPITAL | Age: 70
End: 2020-12-24

## 2020-12-24 DIAGNOSIS — Z00.00 ENCOUNTER FOR GENERAL ADULT MEDICAL EXAMINATION WITHOUT ABNORMAL FINDINGS: ICD-10-CM

## 2020-12-24 LAB
ANION GAP SERPL CALCULATED.3IONS-SCNC: 11 MMOL/L (ref 5–15)
BUN SERPL-MCNC: 29 MG/DL (ref 8–23)
BUN/CREAT SERPL: 41.4 (ref 7–25)
CALCIUM SPEC-SCNC: 9.6 MG/DL (ref 8.6–10.5)
CHLORIDE SERPL-SCNC: 99 MMOL/L (ref 98–107)
CO2 SERPL-SCNC: 29 MMOL/L (ref 22–29)
CREAT SERPL-MCNC: 0.7 MG/DL (ref 0.57–1)
GFR SERPL CREATININE-BSD FRML MDRD: 82 ML/MIN/1.73
GLUCOSE SERPL-MCNC: 152 MG/DL (ref 65–99)
POTASSIUM SERPL-SCNC: 4 MMOL/L (ref 3.5–5.2)
SODIUM SERPL-SCNC: 139 MMOL/L (ref 136–145)

## 2020-12-24 PROCEDURE — 80048 BASIC METABOLIC PNL TOTAL CA: CPT

## 2021-01-13 LAB — FUNGUS WND CULT: ABNORMAL

## 2021-01-25 LAB
MYCOBACTERIUM SPEC CULT: NORMAL
NIGHT BLUE STAIN TISS: NORMAL

## 2021-07-10 ENCOUNTER — HOSPITAL ENCOUNTER (INPATIENT)
Facility: HOSPITAL | Age: 71
LOS: 3 days | Discharge: HOME HEALTH | End: 2021-07-13
Attending: INTERNAL MEDICINE | Admitting: INTERNAL MEDICINE

## 2021-07-10 ENCOUNTER — APPOINTMENT (OUTPATIENT)
Dept: GENERAL RADIOLOGY | Facility: HOSPITAL | Age: 71
End: 2021-07-10

## 2021-07-10 ENCOUNTER — APPOINTMENT (OUTPATIENT)
Dept: CT IMAGING | Facility: HOSPITAL | Age: 71
End: 2021-07-10

## 2021-07-10 DIAGNOSIS — E13.65 UNCONTROLLED OTHER SPECIFIED DIABETES MELLITUS WITH HYPERGLYCEMIA (HCC): ICD-10-CM

## 2021-07-10 DIAGNOSIS — E87.20 METABOLIC ACIDOSIS: ICD-10-CM

## 2021-07-10 DIAGNOSIS — K85.90 ACUTE PANCREATITIS, UNSPECIFIED COMPLICATION STATUS, UNSPECIFIED PANCREATITIS TYPE: ICD-10-CM

## 2021-07-10 DIAGNOSIS — R53.1 GENERAL WEAKNESS: ICD-10-CM

## 2021-07-10 DIAGNOSIS — E87.5 HYPERKALEMIA: ICD-10-CM

## 2021-07-10 DIAGNOSIS — N17.9 AKI (ACUTE KIDNEY INJURY) (HCC): Primary | ICD-10-CM

## 2021-07-10 LAB
ACETONE BLD QL: NEGATIVE
ALBUMIN SERPL-MCNC: 4 G/DL (ref 3.5–5.2)
ALBUMIN/GLOB SERPL: 1.4 G/DL
ALP SERPL-CCNC: 132 U/L (ref 39–117)
ALT SERPL W P-5'-P-CCNC: 9 U/L (ref 1–33)
AMORPH URATE CRY URNS QL MICRO: ABNORMAL /HPF
ANION GAP SERPL CALCULATED.3IONS-SCNC: 21 MMOL/L (ref 5–15)
ANION GAP SERPL CALCULATED.3IONS-SCNC: 22 MMOL/L (ref 5–15)
ANISOCYTOSIS BLD QL: ABNORMAL
AST SERPL-CCNC: 11 U/L (ref 1–32)
ATMOSPHERIC PRESS: ABNORMAL MM[HG]
B PARAPERT DNA SPEC QL NAA+PROBE: NOT DETECTED
B PERT DNA SPEC QL NAA+PROBE: NOT DETECTED
BACTERIA UR QL AUTO: ABNORMAL /HPF
BASE EXCESS BLDV CALC-SCNC: -23.1 MMOL/L (ref -2–2)
BDY SITE: ABNORMAL
BILIRUB SERPL-MCNC: 0.3 MG/DL (ref 0–1.2)
BILIRUB UR QL STRIP: ABNORMAL
BUN SERPL-MCNC: 102 MG/DL (ref 8–23)
BUN SERPL-MCNC: 103 MG/DL (ref 8–23)
BUN/CREAT SERPL: 17.2 (ref 7–25)
BUN/CREAT SERPL: 18.3 (ref 7–25)
BURR CELLS BLD QL SMEAR: ABNORMAL
C PNEUM DNA NPH QL NAA+NON-PROBE: NOT DETECTED
CALCIUM SPEC-SCNC: 8.5 MG/DL (ref 8.6–10.5)
CALCIUM SPEC-SCNC: 8.9 MG/DL (ref 8.6–10.5)
CHLORIDE SERPL-SCNC: 106 MMOL/L (ref 98–107)
CHLORIDE SERPL-SCNC: 108 MMOL/L (ref 98–107)
CK SERPL-CCNC: 47 U/L (ref 20–180)
CLARITY UR: ABNORMAL
CO2 BLDA-SCNC: 9.2 MMOL/L (ref 22–29)
CO2 SERPL-SCNC: 10 MMOL/L (ref 22–29)
CO2 SERPL-SCNC: 8 MMOL/L (ref 22–29)
COLOR UR: ABNORMAL
CREAT SERPL-MCNC: 5.58 MG/DL (ref 0.57–1)
CREAT SERPL-MCNC: 5.99 MG/DL (ref 0.57–1)
DEPRECATED RDW RBC AUTO: 57.3 FL (ref 37–54)
ERYTHROCYTE [DISTWIDTH] IN BLOOD BY AUTOMATED COUNT: 18.7 % (ref 12.3–15.4)
FLUAV SUBTYP SPEC NAA+PROBE: NOT DETECTED
FLUBV RNA ISLT QL NAA+PROBE: NOT DETECTED
GFR SERPL CREATININE-BSD FRML MDRD: 7 ML/MIN/1.73
GFR SERPL CREATININE-BSD FRML MDRD: 8 ML/MIN/1.73
GFR SERPL CREATININE-BSD FRML MDRD: ABNORMAL ML/MIN/{1.73_M2}
GFR SERPL CREATININE-BSD FRML MDRD: ABNORMAL ML/MIN/{1.73_M2}
GLOBULIN UR ELPH-MCNC: 2.9 GM/DL
GLUCOSE BLDC GLUCOMTR-MCNC: 149 MG/DL (ref 70–105)
GLUCOSE BLDC GLUCOMTR-MCNC: 274 MG/DL (ref 74–100)
GLUCOSE BLDC GLUCOMTR-MCNC: 81 MG/DL (ref 70–105)
GLUCOSE SERPL-MCNC: 203 MG/DL (ref 65–99)
GLUCOSE SERPL-MCNC: 234 MG/DL (ref 65–99)
GLUCOSE UR STRIP-MCNC: NEGATIVE MG/DL
HADV DNA SPEC NAA+PROBE: NOT DETECTED
HCO3 BLDV-SCNC: 8.2 MMOL/L (ref 22–26)
HCOV 229E RNA SPEC QL NAA+PROBE: NOT DETECTED
HCOV HKU1 RNA SPEC QL NAA+PROBE: NOT DETECTED
HCOV NL63 RNA SPEC QL NAA+PROBE: NOT DETECTED
HCOV OC43 RNA SPEC QL NAA+PROBE: NOT DETECTED
HCT VFR BLD AUTO: 41.2 % (ref 34–46.6)
HGB BLD-MCNC: 12.9 G/DL (ref 12–15.9)
HGB UR QL STRIP.AUTO: NEGATIVE
HMPV RNA NPH QL NAA+NON-PROBE: NOT DETECTED
HOLD SPECIMEN: NORMAL
HPIV1 RNA SPEC QL NAA+PROBE: NOT DETECTED
HPIV2 RNA SPEC QL NAA+PROBE: NOT DETECTED
HPIV3 RNA NPH QL NAA+PROBE: NOT DETECTED
HPIV4 P GENE NPH QL NAA+PROBE: NOT DETECTED
HYALINE CASTS UR QL AUTO: ABNORMAL /LPF
INHALED O2 CONCENTRATION: 21 %
KETONES UR QL STRIP: ABNORMAL
LARGE PLATELETS: ABNORMAL
LEUKOCYTE ESTERASE UR QL STRIP.AUTO: NEGATIVE
LIPASE SERPL-CCNC: 391 U/L (ref 13–60)
LYMPHOCYTES # BLD MANUAL: 1.55 10*3/MM3 (ref 0.7–3.1)
LYMPHOCYTES NFR BLD MANUAL: 14 % (ref 19.6–45.3)
LYMPHOCYTES NFR BLD MANUAL: 6 % (ref 5–12)
M PNEUMO IGG SER IA-ACNC: NOT DETECTED
MCH RBC QN AUTO: 27.1 PG (ref 26.6–33)
MCHC RBC AUTO-ENTMCNC: 31.4 G/DL (ref 31.5–35.7)
MCV RBC AUTO: 86.3 FL (ref 79–97)
MODALITY: ABNORMAL
MONOCYTES # BLD AUTO: 0.67 10*3/MM3 (ref 0.1–0.9)
MYELOCYTES NFR BLD MANUAL: 1 % (ref 0–0)
NEUTROPHILS # BLD AUTO: 8.77 10*3/MM3 (ref 1.7–7)
NEUTROPHILS NFR BLD MANUAL: 79 % (ref 42.7–76)
NITRITE UR QL STRIP: NEGATIVE
NRBC SPEC MANUAL: 1 /100 WBC (ref 0–0.2)
PCO2 BLDV: 35.7 MM HG (ref 42–51)
PH BLDV: 6.97 PH UNITS (ref 7.32–7.43)
PH UR STRIP.AUTO: <=5 [PH] (ref 5–8)
PLATELET # BLD AUTO: 318 10*3/MM3 (ref 140–450)
PMV BLD AUTO: 8.7 FL (ref 6–12)
PO2 BLDV: 48 MM HG (ref 40–42)
POTASSIUM SERPL-SCNC: 5.9 MMOL/L (ref 3.5–5.2)
POTASSIUM SERPL-SCNC: 6.9 MMOL/L (ref 3.5–5.2)
PROT SERPL-MCNC: 6.9 G/DL (ref 6–8.5)
PROT UR QL STRIP: ABNORMAL
RBC # BLD AUTO: 4.78 10*6/MM3 (ref 3.77–5.28)
RBC # UR: ABNORMAL /HPF
REF LAB TEST METHOD: ABNORMAL
RENAL EPI CELLS #/AREA URNS HPF: ABNORMAL /HPF
RHINOVIRUS RNA SPEC NAA+PROBE: NOT DETECTED
RSV RNA NPH QL NAA+NON-PROBE: NOT DETECTED
SAO2 % BLDCOV: 60.1 % (ref 95–99)
SARS-COV-2 RNA NPH QL NAA+NON-PROBE: NOT DETECTED
SCAN SLIDE: NORMAL
SMALL PLATELETS BLD QL SMEAR: ADEQUATE
SODIUM SERPL-SCNC: 136 MMOL/L (ref 136–145)
SODIUM SERPL-SCNC: 139 MMOL/L (ref 136–145)
SP GR UR STRIP: 1.02 (ref 1–1.03)
SQUAMOUS #/AREA URNS HPF: ABNORMAL /HPF
TRANS CELLS #/AREA URNS HPF: ABNORMAL /HPF
UROBILINOGEN UR QL STRIP: ABNORMAL
WBC # BLD AUTO: 11.1 10*3/MM3 (ref 3.4–10.8)
WBC MORPH BLD: NORMAL
WBC UR QL AUTO: ABNORMAL /HPF

## 2021-07-10 PROCEDURE — 25010000002 PHENYLEPHRINE 10 MG/ML SOLUTION 5 ML VIAL: Performed by: STUDENT IN AN ORGANIZED HEALTH CARE EDUCATION/TRAINING PROGRAM

## 2021-07-10 PROCEDURE — 25010000002 ONDANSETRON PER 1 MG: Performed by: NURSE PRACTITIONER

## 2021-07-10 PROCEDURE — 82803 BLOOD GASES ANY COMBINATION: CPT

## 2021-07-10 PROCEDURE — G0378 HOSPITAL OBSERVATION PER HR: HCPCS

## 2021-07-10 PROCEDURE — 0202U NFCT DS 22 TRGT SARS-COV-2: CPT | Performed by: NURSE PRACTITIONER

## 2021-07-10 PROCEDURE — 81001 URINALYSIS AUTO W/SCOPE: CPT | Performed by: NURSE PRACTITIONER

## 2021-07-10 PROCEDURE — 83690 ASSAY OF LIPASE: CPT | Performed by: NURSE PRACTITIONER

## 2021-07-10 PROCEDURE — 71045 X-RAY EXAM CHEST 1 VIEW: CPT

## 2021-07-10 PROCEDURE — 82962 GLUCOSE BLOOD TEST: CPT

## 2021-07-10 PROCEDURE — 82009 KETONE BODYS QUAL: CPT | Performed by: NURSE PRACTITIONER

## 2021-07-10 PROCEDURE — P9612 CATHETERIZE FOR URINE SPEC: HCPCS

## 2021-07-10 PROCEDURE — 63710000001 INSULIN REGULAR HUMAN PER 5 UNITS: Performed by: NURSE PRACTITIONER

## 2021-07-10 PROCEDURE — 25010000002 CALCIUM GLUCONATE-NACL 1-0.675 GM/50ML-% SOLUTION: Performed by: NURSE PRACTITIONER

## 2021-07-10 PROCEDURE — 93005 ELECTROCARDIOGRAM TRACING: CPT | Performed by: NURSE PRACTITIONER

## 2021-07-10 PROCEDURE — 85007 BL SMEAR W/DIFF WBC COUNT: CPT | Performed by: NURSE PRACTITIONER

## 2021-07-10 PROCEDURE — 99285 EMERGENCY DEPT VISIT HI MDM: CPT

## 2021-07-10 PROCEDURE — 25010000002 MORPHINE PER 10 MG: Performed by: NURSE PRACTITIONER

## 2021-07-10 PROCEDURE — 82550 ASSAY OF CK (CPK): CPT | Performed by: NURSE PRACTITIONER

## 2021-07-10 PROCEDURE — 87086 URINE CULTURE/COLONY COUNT: CPT | Performed by: NURSE PRACTITIONER

## 2021-07-10 PROCEDURE — 80053 COMPREHEN METABOLIC PANEL: CPT | Performed by: NURSE PRACTITIONER

## 2021-07-10 PROCEDURE — 36415 COLL VENOUS BLD VENIPUNCTURE: CPT | Performed by: NURSE PRACTITIONER

## 2021-07-10 PROCEDURE — 74176 CT ABD & PELVIS W/O CONTRAST: CPT

## 2021-07-10 PROCEDURE — 83036 HEMOGLOBIN GLYCOSYLATED A1C: CPT | Performed by: STUDENT IN AN ORGANIZED HEALTH CARE EDUCATION/TRAINING PROGRAM

## 2021-07-10 PROCEDURE — 85025 COMPLETE CBC W/AUTO DIFF WBC: CPT | Performed by: NURSE PRACTITIONER

## 2021-07-10 RX ORDER — SODIUM CHLORIDE 0.9 % (FLUSH) 0.9 %
10 SYRINGE (ML) INJECTION AS NEEDED
Status: DISCONTINUED | OUTPATIENT
Start: 2021-07-10 | End: 2021-07-13 | Stop reason: HOSPADM

## 2021-07-10 RX ORDER — AMOXICILLIN 250 MG
2 CAPSULE ORAL 2 TIMES DAILY
Status: DISCONTINUED | OUTPATIENT
Start: 2021-07-10 | End: 2021-07-13 | Stop reason: HOSPADM

## 2021-07-10 RX ORDER — ALBUTEROL SULFATE 90 UG/1
2 AEROSOL, METERED RESPIRATORY (INHALATION) EVERY 4 HOURS PRN
COMMUNITY

## 2021-07-10 RX ORDER — ONDANSETRON 2 MG/ML
4 INJECTION INTRAMUSCULAR; INTRAVENOUS ONCE
Status: COMPLETED | OUTPATIENT
Start: 2021-07-10 | End: 2021-07-10

## 2021-07-10 RX ORDER — NICOTINE POLACRILEX 4 MG
15 LOZENGE BUCCAL
Status: DISCONTINUED | OUTPATIENT
Start: 2021-07-10 | End: 2021-07-13 | Stop reason: HOSPADM

## 2021-07-10 RX ORDER — INSULIN GLARGINE 100 [IU]/ML
100 INJECTION, SOLUTION SUBCUTANEOUS 2 TIMES DAILY
COMMUNITY
End: 2021-07-10

## 2021-07-10 RX ORDER — INSULIN LISPRO 100 [IU]/ML
0-7 INJECTION, SOLUTION INTRAVENOUS; SUBCUTANEOUS EVERY 6 HOURS SCHEDULED
Status: DISCONTINUED | OUTPATIENT
Start: 2021-07-11 | End: 2021-07-13 | Stop reason: HOSPADM

## 2021-07-10 RX ORDER — SODIUM CHLORIDE 0.9 % (FLUSH) 0.9 %
10 SYRINGE (ML) INJECTION EVERY 12 HOURS SCHEDULED
Status: DISCONTINUED | OUTPATIENT
Start: 2021-07-10 | End: 2021-07-13 | Stop reason: HOSPADM

## 2021-07-10 RX ORDER — ACETAMINOPHEN 650 MG/1
650 SUPPOSITORY RECTAL EVERY 4 HOURS PRN
Status: DISCONTINUED | OUTPATIENT
Start: 2021-07-10 | End: 2021-07-13 | Stop reason: HOSPADM

## 2021-07-10 RX ORDER — BISACODYL 10 MG
10 SUPPOSITORY, RECTAL RECTAL DAILY PRN
Status: DISCONTINUED | OUTPATIENT
Start: 2021-07-10 | End: 2021-07-13 | Stop reason: HOSPADM

## 2021-07-10 RX ORDER — INSULIN LISPRO 100 [IU]/ML
0-7 INJECTION, SOLUTION INTRAVENOUS; SUBCUTANEOUS AS NEEDED
Status: DISCONTINUED | OUTPATIENT
Start: 2021-07-10 | End: 2021-07-13 | Stop reason: HOSPADM

## 2021-07-10 RX ORDER — GEMFIBROZIL 600 MG/1
600 TABLET, FILM COATED ORAL
COMMUNITY
End: 2021-07-13 | Stop reason: HOSPADM

## 2021-07-10 RX ORDER — ALUMINA, MAGNESIA, AND SIMETHICONE 2400; 2400; 240 MG/30ML; MG/30ML; MG/30ML
15 SUSPENSION ORAL EVERY 6 HOURS PRN
Status: DISCONTINUED | OUTPATIENT
Start: 2021-07-10 | End: 2021-07-13 | Stop reason: HOSPADM

## 2021-07-10 RX ORDER — ONDANSETRON 4 MG/1
4 TABLET, FILM COATED ORAL EVERY 6 HOURS PRN
Status: DISCONTINUED | OUTPATIENT
Start: 2021-07-10 | End: 2021-07-13 | Stop reason: HOSPADM

## 2021-07-10 RX ORDER — INSULIN GLARGINE 100 [IU]/ML
20 INJECTION, SOLUTION SUBCUTANEOUS 2 TIMES DAILY
COMMUNITY

## 2021-07-10 RX ORDER — OLMESARTAN MEDOXOMIL 20 MG/1
40 TABLET ORAL DAILY
COMMUNITY

## 2021-07-10 RX ORDER — DEXTROSE MONOHYDRATE 25 G/50ML
50 INJECTION, SOLUTION INTRAVENOUS ONCE
Status: COMPLETED | OUTPATIENT
Start: 2021-07-10 | End: 2021-07-10

## 2021-07-10 RX ORDER — ACETAMINOPHEN 325 MG/1
650 TABLET ORAL EVERY 4 HOURS PRN
Status: DISCONTINUED | OUTPATIENT
Start: 2021-07-10 | End: 2021-07-13 | Stop reason: HOSPADM

## 2021-07-10 RX ORDER — CALCIUM GLUCONATE 20 MG/ML
1 INJECTION, SOLUTION INTRAVENOUS ONCE
Status: COMPLETED | OUTPATIENT
Start: 2021-07-10 | End: 2021-07-10

## 2021-07-10 RX ORDER — DEXTROSE MONOHYDRATE 25 G/50ML
25 INJECTION, SOLUTION INTRAVENOUS
Status: DISCONTINUED | OUTPATIENT
Start: 2021-07-10 | End: 2021-07-13 | Stop reason: HOSPADM

## 2021-07-10 RX ORDER — SODIUM POLYSTYRENE SULFONATE 15 G/60ML
30 SUSPENSION ORAL; RECTAL ONCE
Status: COMPLETED | OUTPATIENT
Start: 2021-07-10 | End: 2021-07-10

## 2021-07-10 RX ORDER — ROSUVASTATIN CALCIUM 10 MG/1
10 TABLET, COATED ORAL DAILY
COMMUNITY

## 2021-07-10 RX ORDER — MORPHINE SULFATE 4 MG/ML
4 INJECTION, SOLUTION INTRAMUSCULAR; INTRAVENOUS ONCE
Status: COMPLETED | OUTPATIENT
Start: 2021-07-10 | End: 2021-07-10

## 2021-07-10 RX ORDER — BISACODYL 5 MG/1
5 TABLET, DELAYED RELEASE ORAL DAILY PRN
Status: DISCONTINUED | OUTPATIENT
Start: 2021-07-10 | End: 2021-07-13 | Stop reason: HOSPADM

## 2021-07-10 RX ORDER — GLIPIZIDE 10 MG/1
10 TABLET, FILM COATED, EXTENDED RELEASE ORAL DAILY
COMMUNITY
End: 2021-07-13 | Stop reason: HOSPADM

## 2021-07-10 RX ORDER — HYDRALAZINE HYDROCHLORIDE 50 MG/1
50 TABLET, FILM COATED ORAL 3 TIMES DAILY
COMMUNITY

## 2021-07-10 RX ORDER — METOPROLOL TARTRATE 50 MG/1
50 TABLET, FILM COATED ORAL 2 TIMES DAILY
COMMUNITY

## 2021-07-10 RX ORDER — POLYETHYLENE GLYCOL 3350 17 G/17G
17 POWDER, FOR SOLUTION ORAL DAILY PRN
Status: DISCONTINUED | OUTPATIENT
Start: 2021-07-10 | End: 2021-07-13 | Stop reason: HOSPADM

## 2021-07-10 RX ORDER — SODIUM CHLORIDE 0.9 % (FLUSH) 0.9 %
20 SYRINGE (ML) INJECTION AS NEEDED
Status: DISCONTINUED | OUTPATIENT
Start: 2021-07-10 | End: 2021-07-13 | Stop reason: HOSPADM

## 2021-07-10 RX ORDER — ONDANSETRON 2 MG/ML
4 INJECTION INTRAMUSCULAR; INTRAVENOUS EVERY 6 HOURS PRN
Status: DISCONTINUED | OUTPATIENT
Start: 2021-07-10 | End: 2021-07-13 | Stop reason: HOSPADM

## 2021-07-10 RX ADMIN — CALCIUM GLUCONATE 1 G: 20 INJECTION, SOLUTION INTRAVENOUS at 18:09

## 2021-07-10 RX ADMIN — DEXTROSE MONOHYDRATE 50 ML: 500 INJECTION PARENTERAL at 18:09

## 2021-07-10 RX ADMIN — ONDANSETRON 4 MG: 2 INJECTION INTRAMUSCULAR; INTRAVENOUS at 16:46

## 2021-07-10 RX ADMIN — Medication 10 ML: at 23:45

## 2021-07-10 RX ADMIN — SODIUM BICARBONATE: 84 INJECTION, SOLUTION INTRAVENOUS at 19:04

## 2021-07-10 RX ADMIN — INSULIN HUMAN 10 UNITS: 100 INJECTION, SOLUTION PARENTERAL at 18:12

## 2021-07-10 RX ADMIN — SODIUM BICARBONATE 50 MEQ: 84 INJECTION, SOLUTION INTRAVENOUS at 18:09

## 2021-07-10 RX ADMIN — MORPHINE SULFATE 4 MG: 4 INJECTION INTRAVENOUS at 16:46

## 2021-07-10 RX ADMIN — SODIUM CHLORIDE 1000 ML: 9 INJECTION, SOLUTION INTRAVENOUS at 15:16

## 2021-07-10 RX ADMIN — Medication 10 ML: at 23:48

## 2021-07-10 RX ADMIN — PHENYLEPHRINE HYDROCHLORIDE 0.5 MCG/KG/MIN: 10 INJECTION INTRAVENOUS at 23:50

## 2021-07-10 RX ADMIN — Medication 10 ML: at 23:47

## 2021-07-10 RX ADMIN — SODIUM POLYSTYRENE SULFONATE 30 G: 15 SUSPENSION ORAL; RECTAL at 18:09

## 2021-07-10 NOTE — ED PROVIDER NOTES
"Subjective   History: Patient is a 70-year-old female comes from home for low glucose.  When asked patient what brought her to the ER she reports \"I really do not know that my blood sugar dropped but I do not know why.  My legs are so we can hardly stand up and it hurts when I take a deep breath.\"  Reported by staff via report patient was given liquids and her glucose came up to the 80s.  She was never unconscious.  Patient is extremely hard of hearing is difficult to get through the HPI.  She reported when her family helped her to get up to use the restroom she was hardly unable to ambulate due to bilateral leg weakness. She says she has symptoms her stomach started hurting sometime in the middle of the night reports she felt fine yesterday but it has been constant today.  Patient denies fever, reports she has been cold all day, denies cough congestion chest pain short of breath.      Onset: Today  Location:   Duration: Episodic  Character: Hypoglycemia   Aggravating/Alleviating factors: Improved with something to drink  Radiation not applicable  Severity: Mild            Review of Systems   Constitutional: Negative for chills, fatigue and fever.   HENT: Negative for congestion, sore throat, tinnitus and trouble swallowing.    Eyes: Negative for photophobia, discharge and visual disturbance.   Respiratory: Negative for cough and shortness of breath.    Cardiovascular: Negative for chest pain, palpitations and leg swelling.   Gastrointestinal: Positive for abdominal pain. Negative for diarrhea, nausea and vomiting.   Genitourinary: Negative for dysuria, frequency and urgency.   Musculoskeletal: Positive for myalgias. Negative for back pain.   Skin: Negative for rash.   Neurological: Positive for weakness. Negative for dizziness, seizures, facial asymmetry, speech difficulty, light-headedness, numbness and headaches.   Psychiatric/Behavioral: Negative for confusion.       Past Medical History:   Diagnosis Date   • " Arthritis    • CKD (chronic kidney disease) stage 3, GFR 30-59 ml/min (CMS/[facility])     per [facility]   • Diabetes mellitus, type 2 (CMS/[facility])    • Essential hypertension 11/6/2015   • Hearing loss     hearing aid right side   • Hyperlipidemia    • Hypertension    • Leukocytoclastic vasculitis (CMS/[facility])    • Obesity    • Osteoarthritis    • Ovarian cyst     removed; benign   • Peripheral arterial disease (CMS/[facility])    • Sleep apnea        Allergies   Allergen Reactions   • Lisinopril Hives   • Ipratropium-Albuterol Itching and Rash       Past Surgical History:   Procedure Laterality Date   • BRONCHOSCOPY Bilateral 10/13/2020    Procedure: BRONCHOSCOPY AT BEDSIDE with right lung washing and elective intubation;  Surgeon: Cristhian Hodge MD;  Location: [facility] ENDOSCOPY;  Service: Pulmonary;  Laterality: Bilateral;  Post: pneumonia   • BRONCHOSCOPY Bilateral 10/20/2020    Procedure: BRONCHOSCOPY WITH BRONCHOALVEOLAR LAVAGE  AT BEDSIDE;  Surgeon: Cristhian Hodge MD;  Location: [facility] ENDOSCOPY;  Service: Pulmonary;  Laterality: Bilateral;  POST- PNEUMONIA   • BRONCHOSCOPY N/A 10/27/2020    Procedure: BRONCHOSCOPY with bronchial washing and intubation;  Surgeon: Cristhian Hodge MD;  Location: [facility] ENDOSCOPY;  Service: Pulmonary;  Laterality: N/A;  post op:pneumonia   • BRONCHOSCOPY N/A 12/14/2020    Procedure: BRONCHOSCOPY AT BEDSIDE WITH BRONCHOALVEOLAR LAVAGE;  Surgeon: Cristhian Hodge MD;  Location: [facility] ENDOSCOPY;  Service: Pulmonary;  Laterality: N/A;  PNEUMONIA   • CHOLECYSTECTOMY     • ENDOSCOPY N/A 10/13/2020    Procedure: ESOPHAGOGASTRODUODENOSCOPY AT BEDSIDE;  Surgeon: Omi Rivera MD;  Location: [facility] ENDOSCOPY;  Service: Gastroenterology;  Laterality: N/A;   • LIVER BIOPSY     • TUBAL ABDOMINAL LIGATION         Family History   Problem Relation Age of Onset   • Heart disease Mother    • Cancer Father        Social History     Socioeconomic History   • Marital status:      Spouse name: Not on file  "  • Number of children: Not on file   • Years of education: Not on file   • Highest education level: Not on file   Tobacco Use   • Smoking status: Former Smoker     Packs/day: 0.50     Types: Cigarettes   • Smokeless tobacco: Never Used   • Tobacco comment: per pt, \"I quit in October\"   Vaping Use   • Vaping Use: Never used   • Passive vaping exposure Yes   Substance and Sexual Activity   • Alcohol use: No   • Drug use: No   • Sexual activity: Defer           Objective   Physical Exam  Vitals reviewed.   Constitutional:       General: She is not in acute distress.     Appearance: She is obese. She is not toxic-appearing.      Comments: Appears chronically ill   HENT:      Head: Normocephalic and atraumatic.      Comments: Extremely hard of hearing     Right Ear: Tympanic membrane normal.      Left Ear: Tympanic membrane normal.      Nose: Nose normal.      Mouth/Throat:      Mouth: Mucous membranes are moist.      Pharynx: Oropharynx is clear.      Comments: Upper dentures present  Eyes:      General: No scleral icterus.     Pupils: Pupils are equal, round, and reactive to light.   Cardiovascular:      Rate and Rhythm: Normal rate.      Pulses: Normal pulses.      Heart sounds: Normal heart sounds. No murmur heard.     Pulmonary:      Effort: Pulmonary effort is normal.      Breath sounds: Normal breath sounds. No wheezing, rhonchi or rales.   Chest:      Chest wall: No tenderness.   Abdominal:      General: Abdomen is flat.      Palpations: Abdomen is soft.      Tenderness: There is abdominal tenderness in the right upper quadrant, epigastric area and left upper quadrant. There is guarding.   Musculoskeletal:         General: No swelling or deformity. Normal range of motion.      Cervical back: Normal range of motion and neck supple. No rigidity.      Comments: Bilateral lower extremity tenderness on palpation.  Symmetrical extremities no erythema negative bilateral Homans   Skin:     General: Skin is warm and dry. " "     Findings: No rash.   Neurological:      Mental Status: She is alert and oriented to person, place, and time.   Psychiatric:         Mood and Affect: Mood normal.         Behavior: Behavior normal.         Thought Content: Thought content normal.         Judgment: Judgment normal.         Procedures           ED Course    /82   Pulse 58   Temp 97.7 °F (36.5 °C) (Oral)   Resp 14   Ht 157.5 cm (62\")   Wt 76.1 kg (167 lb 12.3 oz)   SpO2 93%   BMI 30.69 kg/m²   Labs Reviewed   COMPREHENSIVE METABOLIC PANEL - Abnormal; Notable for the following components:       Result Value    Glucose 203 (*)      (*)     Creatinine 5.99 (*)     Potassium 6.9 (*)     CO2 8.0 (*)     Alkaline Phosphatase 132 (*)     eGFR Non  Amer 7 (*)     Anion Gap 22.0 (*)     All other components within normal limits    Narrative:     GFR Normal >60  Chronic Kidney Disease <60  Kidney Failure <15     LIPASE - Abnormal; Notable for the following components:    Lipase 391 (*)     All other components within normal limits   URINALYSIS W/ CULTURE IF INDICATED - Abnormal; Notable for the following components:    Color, UA Dark Yellow (*)     Appearance, UA Cloudy (*)     Ketones, UA Trace (*)     Bilirubin, UA Small (1+) (*)     Protein,  mg/dL (2+) (*)     All other components within normal limits   CBC WITH AUTO DIFFERENTIAL - Abnormal; Notable for the following components:    WBC 11.10 (*)     MCHC 31.4 (*)     RDW 18.7 (*)     RDW-SD 57.3 (*)     All other components within normal limits    Narrative:     The previously reported component NRBC is no longer being reported. Previous result was 0.0 /100 WBC (Reference Range: 0.0-0.2 /100 WBC) on 7/10/2021 at 1531 EDT.   URINALYSIS, MICROSCOPIC ONLY - Abnormal; Notable for the following components:    RBC, UA 0-2 (*)     WBC, UA 21-30 (*)     Bacteria, UA Trace (*)     Squamous Epithelial Cells, UA 3-6 (*)     All other components within normal limits   MANUAL " DIFFERENTIAL - Abnormal; Notable for the following components:    Neutrophil % 79.0 (*)     Lymphocyte % 14.0 (*)     Myelocyte % 1.0 (*)     Neutrophils Absolute 8.77 (*)     nRBC 1.0 (*)     All other components within normal limits   BLOOD GAS, VENOUS - Abnormal; Notable for the following components:    pH, Venous 6.966 (*)     pCO2, Venous 35.7 (*)     pO2, Venous 48.0 (*)     HCO3, Venous 8.2 (*)     Base Excess, Venous -23.1 (*)     O2 Saturation, Venous 60.1 (*)     CO2 Content 9.2 (*)     All other components within normal limits   POCT GLUCOSE FINGERSTICK - Abnormal; Notable for the following components:    Glucose 274 (*)     All other components within normal limits   RESPIRATORY PANEL PCR W/ COVID-19 (SARS-COV-2) ERA/[facility]/MELISSA/[facility] IN-HOUSE, NP [facility] IN [facility], 3-4 HR TAT - Normal    Narrative:     In the setting of a positive respiratory panel with a viral infection PLUS a negative procalcitonin without other underlying concern for bacterial infection, consider observing off antibiotics or discontinuation of antibiotics and continue supportive care. If the respiratory panel is positive for atypical bacterial infection (Bordetella pertussis, Chlamydophila pneumoniae, or Mycoplasma pneumoniae), consider antibiotic de-escalation to target atypical bacterial infection.   CK - Normal   ACETONE - Normal   POCT GLUCOSE FINGERSTICK - Normal   URINE CULTURE   SCAN SLIDE   BLOOD GAS, VENOUS   BASIC METABOLIC PANEL   BASIC METABOLIC PANEL   CBC AND DIFFERENTIAL    Narrative:     The following orders were created for panel order CBC & Differential.  Procedure                               Abnormality         Status                     ---------                               -----------         ------                     Scan Slide[[id number]]                                       Final result               CBC Auto Differential[[id number]]        Abnormal            Final result                 Please view  results for these tests on the individual orders.   EXTRA TUBES    Narrative:     The following orders were created for panel order Extra Tubes.  Procedure                               Abnormality         Status                     ---------                               -----------         ------                     Green Top (Gel)[[id number]]                                  Final result                 Please view results for these tests on the individual orders.   GREEN TOP     Medications   sodium chloride 0.9 % flush 10 mL (has no administration in time range)   sterile water 850 mL with sodium bicarbonate 8.4 % 150 mEq infusion ( Intravenous New Bag 7/10/21 1904)   sodium chloride 0.9 % bolus 1,000 mL (1,000 mL Intravenous New Bag 7/10/21 1516)   Morphine sulfate (PF) injection 4 mg (4 mg Intravenous Given 7/10/21 1646)   ondansetron (ZOFRAN) injection 4 mg (4 mg Intravenous Given 7/10/21 1646)   dextrose (D50W) 25 g/ 50mL Intravenous Solution 50 mL (50 mL Intravenous Given 7/10/21 1809)   insulin regular (humuLIN R,novoLIN R) injection 10 Units (10 Units Intravenous Given 7/10/21 1812)   sodium bicarbonate injection 8.4% 50 mEq (50 mEq Intravenous Given 7/10/21 1809)   calcium gluconate 1g/50ml 0.675% NaCl IV SOLN (1 g Intravenous New Bag 7/10/21 1809)   sodium polystyrene (KAYEXALATE) 15 GM/60ML suspension 30 g (30 g Oral Given 7/10/21 1809)     CT Abdomen Pelvis Without Contrast    Result Date: 7/10/2021  1.No acute process identified within abdomen/pelivs. 2.Stable left adrenal nodule, likely an adenoma. 3.Small infraumbilical hernia containing a loop of small bowel, but no evidence of obstruction. 4.Calcification within left uterine fundus, likely a partially calcified fibroid.    Electronically Signed By-Onofre Nicholas MD On:7/10/2021 6:11 PM This report was finalized on [id number] by  Onofre Nicholas MD.    XR Chest 1 View    Result Date: 7/10/2021  1.Lungs are clear. 2.Cardiomegaly.   Electronically Signed By-Onofre Nicholas MD On:7/10/2021 3:45 PM This report was finalized on [id number] by  Onofre Nicholas MD.      ED Course as of Jul 10 1904   Sat Jul 10, 2021   1740 Spoke with nephrology on-call, Dr. Hendrickson.  Agreeable to consult will place orders in computer and come see patient.    [KJ]      ED Course User Index  [KJ] Maggi Paredes, APRN                                           MDM     I examined the patient using the appropriate personal protective equipment.      DISPOSITION:   Chart Review: 12/13/2020 septic shock  Comorbidity:  has a past medical history of Arthritis, CKD (chronic kidney disease) stage 3, GFR 30-59 ml/min (CMS/HCC), Diabetes mellitus, type 2 (CMS/HCC), Essential hypertension (11/6/2015), Hearing loss, Hyperlipidemia, Hypertension, Leukocytoclastic vasculitis (CMS/HCC), Obesity, Osteoarthritis, Ovarian cyst, Peripheral arterial disease (CMS/[facility]), and Sleep apnea.  Differentials:this list is not all inclusive and does not constitute the entirety of considered causes --> UTI pancreatitis gastritis cholecystitis obstruction rhabdomyolysis electrolyte imbalance dehydration  ECG: interpreted by ER physician Rusty Trevizo and reviewed by myself: Sinus rhythm rate 71 no ST elevation or ectopy.  Compared to previous EKG on 10/10/2020 A. fib rate 157  Labs: CBC WBC 11.  CMP glucose 203  creatinine 5.99 potassium 6.9 CO2 8 alk phos 132 GFR 7 anion gap 22.  Lipase 391.  CK 47 PCR panel negative.  Acetone negative.  Urinalysis appears contaminated dark cloudy yellow trace of ketones 1+ bili 2+ protein negative for leukocytes and nitrites WBCs 21-30 trace of bacteria squamous 3-6    Imaging: Was interpreted by physician and reviewed by myself:  CT Abdomen Pelvis Without Contrast    Result Date: 7/10/2021  1.No acute process identified within abdomen/pelivs. 2.Stable left adrenal nodule, likely an adenoma. 3.Small infraumbilical hernia containing a loop of small bowel, but  no evidence of obstruction. 4.Calcification within left uterine fundus, likely a partially calcified fibroid.    Electronically Signed By-Onofre Nicholas MD On:7/10/2021 6:11 PM This report was finalized on [id number] by  Onofre Nicholas MD.    XR Chest 1 View    Result Date: 7/10/2021  1.Lungs are clear. 2.Cardiomegaly.  Electronically Signed By-Onofre Nicholas MD On:7/10/2021 3:45 PM This report was finalized on [id number] by  Onofre Nicholas MD.      Disposition/Treatment:  Patient had IV established blood drawn urine collected CT abdomen pelvis chest x-ray.  Initially given 1 L normal saline morphine Zofran.  Patient is hard of hearing physical exam was fairly unremarkable except for upper abdominal tenderness.  White count is 11.  Metabolic panel was significant for elevated potassium 6.9 this was after redraw as patient BUN/creatinine were normal 6 months ago and patient does not have a history of dialysis.  Glucose 203  creatinine 5.99 CO2 8 GFR 7 anion gap 22.  VBG completed pH 6.96 acetone was negative.  Patient does have metabolic acidosis.  DKA unlikely acidosis likely from acute kidney failure.  Urinalysis is dark cloudy does appear to be contaminated she has trace of ketones negative for leukocytes and nitrites 3-6 squamous would not treat with antibiotics at this time.  Would benefit from recollect at a later time.  Focus at this time is patient acute kidney injury.  EKG completed sinus rhythm rate 71 no ST elevation or ectopy.  Patient was given bicarb calcium dextrose insulin and Kayexalate to shift potassium.  Patient has not had any vomiting while in ER.  Spoke with Dr. Hendrickson, nephrology on-call he did come down to see patient in person, states plan is to place a [facility] and do emergent dialysis.  Has a bicarb drip ordered but has not been started yet, awaiting pharmacy to send.  Chest x-ray lungs are clear does show cardiomegaly..  CT abdomen pelvis No acute process identified  within abdomen/pelivs.Stable left adrenal nodule, likely an adenoma.Small infraumbilical hernia containing a loop of small bowel, but no  evidence of obstruction.Calcification within left uterine fundus, likely a partially calcified fibroid.  Spoke with NP intensivist patient will go to ICU for admission.  Spoke with patient and family at bedside they verbalized understanding agreeable plan of care.      Final diagnoses:   ANAMARIA (acute kidney injury) (CMS/[facility])   Acute pancreatitis, unspecified complication status, unspecified pancreatitis type   Uncontrolled other specified diabetes mellitus with hyperglycemia (CMS/[facility])   Metabolic acidosis   Hyperkalemia   General weakness       ED Disposition  ED Disposition     ED Disposition Condition Comment    Decision to Admit            No follow-up provider specified.       Medication List      No changes were made to your prescriptions during this visit.          Maggi Paredes, APRN  07/10/21 4748

## 2021-07-10 NOTE — CONSULTS
Nephrology Consult Note                                                [facility]      Patient Identification:  Name: Lesa Julio  Age: 70 y.o.  Sex: female  :  1950  MRN: [id number]               Requesting Physician: No admitting provider for patient encounter.  Reason for Consultation: management recommendations      History of Present Illness:    Patient is a 70-year-old white female patient with history of diabetes no previous kidney disease who was brought in by family because of low blood sugar on further evaluation she was found to have a elevated creatinine of excess of 5 with a BUN of Axis  with a potassium of 6.9 and a pH of 6.9 she was given aggressive medical therapy with hyperkalemia protocol D50 insulin sodium bicarb boluses IV fluid bolus and calcium gluconate and we were consulted for management of acute kidney injury with metabolic acidosis CO2 8 and hyperkalemia  Patient has been on Metformin  She is confused  Further history could not be obtained from the patient's blood from the system  Problem List:  Patient Active Problem List   Diagnosis   • Cardiac murmur   • Chest discomfort   • Chronic obstructive pulmonary disease (CMS/HCC)   • Chronic renal insufficiency, stage III (moderate) (CMS/HCC)   • Diabetes mellitus, type 2 (CMS/HCC)   • Elevated LFTs   • Essential hypertension   • Osteoporosis   • Pain of hand   • Polyarthritis, inflammatory (CMS/HCC)   • Rheumatoid arthritis (CMS/HCC)   • Shortness of breath   • Sinus tachycardia   • Tobacco abuse   • Leukocytoclastic vasculitis (CMS/HCC)   • Vasculitis (CMS/HCC)   • Allergic rhinitis   • Central obesity   • Cyst of right ovary   • Fatigue   • Grade 4 out of 6 intensity murmur   • Hearing loss   • Mass of ovary   • Mixed hyperlipidemia   • Nonalcoholic fatty liver disease   • Obesity   • Obstructive sleep apnea syndrome   • Otorrhea   • Prurigo nodularis   • Secondary polycythemia   • Tobacco  dependence syndrome   • Tubular adenoma of colon   • Uncontrolled type 2 diabetes mellitus (CMS/HCC)   • Vitamin D deficiency   • Well adult health check   • Hypersensitivity angiitis (CMS/HCC)   • Vascular insufficiency   • Hyperkalemia   • Localized edema   • Other proteinuria   • Secondary hyperparathyroidism of renal origin (CMS/HCC)   • Pneumonia of right upper lobe due to infectious organism   • Paroxysmal atrial fibrillation (CMS/HCC)   • Anemia   • Oral thrush   • Upper GI bleed   • Hyperkalemia   • Acute respiratory failure with hypoxia and hypercapnia (CMS/HCC)   • GERD (gastroesophageal reflux disease)   • Chronic respiratory failure with hypoxia   • Musculoskeletal immobility   • Physical deconditioning   • Former smoker     Past Medical History:  Past Medical History:   Diagnosis Date   • Arthritis    • CKD (chronic kidney disease) stage 3, GFR 30-59 ml/min (CMS/HCC)     per [facility]   • Diabetes mellitus, type 2 (CMS/HCC)    • Essential hypertension 11/6/2015   • Hearing loss     hearing aid right side   • Hyperlipidemia    • Hypertension    • Leukocytoclastic vasculitis (CMS/HCC)    • Obesity    • Osteoarthritis    • Ovarian cyst     removed; benign   • Peripheral arterial disease (CMS/HCC)    • Sleep apnea      Past Surgical History:  Past Surgical History:   Procedure Laterality Date   • BRONCHOSCOPY Bilateral 10/13/2020    Procedure: BRONCHOSCOPY AT BEDSIDE with right lung washing and elective intubation;  Surgeon: Cristhian Hodge MD;  Location: [facility] ENDOSCOPY;  Service: Pulmonary;  Laterality: Bilateral;  Post: pneumonia   • BRONCHOSCOPY Bilateral 10/20/2020    Procedure: BRONCHOSCOPY WITH BRONCHOALVEOLAR LAVAGE  AT BEDSIDE;  Surgeon: Cristhian Hodge MD;  Location: [facility] ENDOSCOPY;  Service: Pulmonary;  Laterality: Bilateral;  POST- PNEUMONIA   • BRONCHOSCOPY N/A 10/27/2020    Procedure: BRONCHOSCOPY with bronchial washing and intubation;  Surgeon: Cristhian Hodge MD;  Location: [facility]  ENDOSCOPY;  Service: Pulmonary;  Laterality: N/A;  post op:pneumonia   • BRONCHOSCOPY N/A 12/14/2020    Procedure: BRONCHOSCOPY AT BEDSIDE WITH BRONCHOALVEOLAR LAVAGE;  Surgeon: Cristhian Hodge MD;  Location: [facility] ENDOSCOPY;  Service: Pulmonary;  Laterality: N/A;  PNEUMONIA   • CHOLECYSTECTOMY     • ENDOSCOPY N/A 10/13/2020    Procedure: ESOPHAGOGASTRODUODENOSCOPY AT BEDSIDE;  Surgeon: Omi Rivera MD;  Location: [facility] ENDOSCOPY;  Service: Gastroenterology;  Laterality: N/A;   • LIVER BIOPSY     • TUBAL ABDOMINAL LIGATION        Home Meds:  (Not in a hospital admission)    Current Meds:     Current Facility-Administered Medications:   •  calcium gluconate 1g/50ml 0.675% NaCl IV SOLN, 1 g, Intravenous, Once, Mark Paredesa B, APRN  •  dextrose (D50W) 25 g/ 50mL Intravenous Solution 50 mL, 50 mL, Intravenous, Once, Mark Paredesa B, APRN  •  insulin regular (humuLIN R,novoLIN R) injection 10 Units, 10 Units, Intravenous, Once, Mark Paredesa B, APRN  •  sodium bicarbonate injection 8.4% 50 mEq, 50 mEq, Intravenous, Once, Mark Paredesa B, APRN  •  [COMPLETED] Insert peripheral IV, , , Once **AND** sodium chloride 0.9 % flush 10 mL, 10 mL, Intravenous, PRN, Jessica Paredesyla B, APRN  •  sodium polystyrene (KAYEXALATE) 15 GM/60ML suspension 30 g, 30 g, Oral, Once, Mark Paredesa B, APRN    Current Outpatient Medications:   •  aspirin (ADULT ASPIRIN EC LOW STRENGTH) 81 MG EC tablet, Take 81 mg by mouth Daily., Disp: , Rfl:   •  atorvastatin (LIPITOR) 40 MG tablet, Take 40 mg by mouth Daily., Disp: , Rfl:   •  azaTHIOprine (IMURAN) 50 MG tablet, Take 50 mg by mouth 2 (two) times a day., Disp: , Rfl:   •  budesonide (PULMICORT) 0.5 MG/2ML nebulizer solution, Take 2 mL by nebulization 2 (Two) Times a Day., Disp:  , Rfl:   •  bumetanide (BUMEX) 1 MG tablet, Take 1 tablet by mouth Daily., Disp:  , Rfl:   •  insulin glargine (LANTUS) 100 UNIT/ML injection, Inject 20 Units under the skin into the appropriate area as directed  "Every 12 (Twelve) Hours., Disp: , Rfl: 12  •  metFORMIN (GLUCOPHAGE) 1000 MG tablet, Take 1,000 mg by mouth Daily With Breakfast., Disp: , Rfl:   •  metFORMIN (GLUCOPHAGE) 500 MG tablet, Take 500 mg by mouth Every Night., Disp: , Rfl:   •  pantoprazole (PROTONIX) 40 MG EC tablet, Take 1 tablet by mouth Every Morning Before Breakfast., Disp:  , Rfl:   •  polyethylene glycol (MIRALAX) 17 g packet, Take 17 g by mouth Daily As Needed (constipation)., Disp: , Rfl:   •  senna (SENOKOT) 8.6 MG tablet, Take 1 tablet by mouth At Night As Needed for Constipation., Disp: , Rfl:   •  vitamin D (ERGOCALCIFEROL) 1.25 MG (16328 UT) capsule capsule, Take 50,000 Units by mouth 2 (Two) Times a Week., Disp: , Rfl:     Allergies:  Allergies   Allergen Reactions   • Lisinopril Hives   • Ipratropium-Albuterol Itching and Rash     Social History:   Social History     Tobacco Use   • Smoking status: Former Smoker     Packs/day: 0.50     Types: Cigarettes   • Smokeless tobacco: Never Used   • Tobacco comment: per pt, \"I quit in October\"   Substance Use Topics   • Alcohol use: No      Family History:  Family History   Problem Relation Age of Onset   • Heart disease Mother    • Cancer Father         Review of Systems  Unobtainable    Objective:  Vitals:   /82   Pulse 58   Temp 97.7 °F (36.5 °C) (Oral)   Resp 14   Ht 157.5 cm (62\")   Wt 76.1 kg (167 lb 12.3 oz)   SpO2 93%   BMI 30.69 kg/m²   I/O:   No intake or output data in the 24 hours ending 07/10/21 1750    Exam:  General Appearance: Lethargic obese  Head:  Normocephalic, without obvious abnormality, atraumatic  Eyes:  PERRL, conjunctiva/corneas clear     Neck:  Supple,  no adenopathy;      Lungs:  Decreased BS occasion ronchi  Heart:  Regular rate and rhythm, S1 and S2 normal  Abdomen:  Soft, non-tender, bowel sounds active   Extremities: trace edema  Pulses: 2+ and symmetric all extremities  Skin:  No rashes or lesions  Data Review:  All labs (24hrs):   Recent Results (from " the past 24 hour(s))   POC Glucose Once    Collection Time: 07/10/21  2:42 PM    Specimen: Blood   Result Value Ref Range    Glucose 81 70 - 105 mg/dL   Urinalysis With Culture If Indicated - Urine, Catheter    Collection Time: 07/10/21  3:11 PM    Specimen: Urine, Catheter   Result Value Ref Range    Color, UA Dark Yellow (A) Yellow, Straw    Appearance, UA Cloudy (A) Clear    pH, UA <=5.0 5.0 - 8.0    Specific Gravity, UA 1.022 1.005 - 1.030    Glucose, UA Negative Negative    Ketones, UA Trace (A) Negative    Bilirubin, UA Small (1+) (A) Negative    Blood, UA Negative Negative    Protein,  mg/dL (2+) (A) Negative    Leuk Esterase, UA Negative Negative    Nitrite, UA Negative Negative    Urobilinogen, UA 0.2 E.U./dL 0.2 - 1.0 E.U./dL   CBC Auto Differential    Collection Time: 07/10/21  3:11 PM    Specimen: Blood   Result Value Ref Range    WBC 11.10 (H) 3.40 - 10.80 10*3/mm3    RBC 4.78 3.77 - 5.28 10*6/mm3    Hemoglobin 12.9 12.0 - 15.9 g/dL    Hematocrit 41.2 34.0 - 46.6 %    MCV 86.3 79.0 - 97.0 fL    MCH 27.1 26.6 - 33.0 pg    MCHC 31.4 (L) 31.5 - 35.7 g/dL    RDW 18.7 (H) 12.3 - 15.4 %    RDW-SD 57.3 (H) 37.0 - 54.0 fl    MPV 8.7 6.0 - 12.0 fL    Platelets 318 140 - 450 10*3/mm3   Respiratory Panel PCR w/COVID-19(SARS-CoV-2) ERA/SOURAV/MELISSA/PAD/COR/MAD/ALISA In-House, NP Swab in [facility], 3-4 HR TAT - Swab, Nasopharynx    Collection Time: 07/10/21  3:11 PM    Specimen: Nasopharynx; Swab   Result Value Ref Range    ADENOVIRUS, PCR Not Detected Not Detected    Coronavirus 229E Not Detected Not Detected    Coronavirus HKU1 Not Detected Not Detected    Coronavirus NL63 Not Detected Not Detected    Coronavirus OC43 Not Detected Not Detected    COVID19 Not Detected Not Detected - Ref. Range    Human Metapneumovirus Not Detected Not Detected    Human Rhinovirus/Enterovirus Not Detected Not Detected    Influenza A PCR Not Detected Not Detected    Influenza B PCR Not Detected Not Detected    Parainfluenza Virus 1 Not  Detected Not Detected    Parainfluenza Virus 2 Not Detected Not Detected    Parainfluenza Virus 3 Not Detected Not Detected    Parainfluenza Virus 4 Not Detected Not Detected    RSV, PCR Not Detected Not Detected    Bordetella pertussis pcr Not Detected Not Detected    Bordetella parapertussis PCR Not Detected Not Detected    Chlamydophila pneumoniae PCR Not Detected Not Detected    Mycoplasma pneumo by PCR Not Detected Not Detected   Green Top (Gel)    Collection Time: 07/10/21  3:11 PM   Result Value Ref Range    Extra Tube Hold for add-ons.    Scan Slide    Collection Time: 07/10/21  3:11 PM    Specimen: Blood   Result Value Ref Range    Scan Slide     Urinalysis, Microscopic Only - Urine, Catheter    Collection Time: 07/10/21  3:11 PM    Specimen: Urine, Catheter   Result Value Ref Range    RBC, UA 0-2 (A) None Seen /HPF    WBC, UA 21-30 (A) None Seen /HPF    Bacteria, UA Trace (A) None Seen /HPF    Squamous Epithelial Cells, UA 3-6 (A) None Seen, 0-2 /HPF    Transitional Epithelial Cells, UA 0-2 0 - 2 /HPF    Renal Epithelial Cells, UA 0-2 0 - 2 /HPF    Hyaline Casts, UA None Seen None Seen /LPF    Amorphous Crystals, UA Small/1+ None Seen /HPF    Methodology Manual Light Microscopy    Manual Differential    Collection Time: 07/10/21  3:11 PM    Specimen: Blood   Result Value Ref Range    Neutrophil % 79.0 (H) 42.7 - 76.0 %    Lymphocyte % 14.0 (L) 19.6 - 45.3 %    Monocyte % 6.0 5.0 - 12.0 %    Myelocyte % 1.0 (H) 0.0 - 0.0 %    Neutrophils Absolute 8.77 (H) 1.70 - 7.00 10*3/mm3    Lymphocytes Absolute 1.55 0.70 - 3.10 10*3/mm3    Monocytes Absolute 0.67 0.10 - 0.90 10*3/mm3    nRBC 1.0 (H) 0.0 - 0.2 /100 WBC    Anisocytosis Slight/1+ None Seen    Crenated RBC's Mod/2+ None Seen    WBC Morphology Normal Normal    Platelet Estimate Adequate Normal    Large Platelets Slight/1+ None Seen   Comprehensive Metabolic Panel    Collection Time: 07/10/21  4:40 PM    Specimen: Blood   Result Value Ref Range    Glucose  203 (H) 65 - 99 mg/dL     (H) 8 - 23 mg/dL    Creatinine 5.99 (H) 0.57 - 1.00 mg/dL    Sodium 136 136 - 145 mmol/L    Potassium 6.9 (C) 3.5 - 5.2 mmol/L    Chloride 106 98 - 107 mmol/L    CO2 8.0 (L) 22.0 - 29.0 mmol/L    Calcium 8.9 8.6 - 10.5 mg/dL    Total Protein 6.9 6.0 - 8.5 g/dL    Albumin 4.00 3.50 - 5.20 g/dL    ALT (SGPT) 9 1 - 33 U/L    AST (SGOT) 11 1 - 32 U/L    Alkaline Phosphatase 132 (H) 39 - 117 U/L    Total Bilirubin 0.3 0.0 - 1.2 mg/dL    eGFR Non African Amer 7 (L) >60 mL/min/1.73    eGFR  African Amer      Globulin 2.9 gm/dL    A/G Ratio 1.4 g/dL    BUN/Creatinine Ratio 17.2 7.0 - 25.0    Anion Gap 22.0 (H) 5.0 - 15.0 mmol/L   Lipase    Collection Time: 07/10/21  4:40 PM    Specimen: Blood   Result Value Ref Range    Lipase 391 (H) 13 - 60 U/L   ECG 12 Lead    Collection Time: 07/10/21  5:40 PM   Result Value Ref Range    QT Interval 389 ms       Current Facility-Administered Medications:   •  calcium gluconate 1g/50ml 0.675% NaCl IV SOLN, 1 g, Intravenous, Once, Maggi Paredes, APRTYE  •  dextrose (D50W) 25 g/ 50mL Intravenous Solution 50 mL, 50 mL, Intravenous, Once, Maggi Paredes, APRN  •  insulin regular (humuLIN R,novoLIN R) injection 10 Units, 10 Units, Intravenous, Once, Maggi Paredes, APRN  •  sodium bicarbonate injection 8.4% 50 mEq, 50 mEq, Intravenous, Once, Maggi Paredes APRN  •  [COMPLETED] Insert peripheral IV, , , Once **AND** sodium chloride 0.9 % flush 10 mL, 10 mL, Intravenous, PRN, Maggi Paredes, APRN  •  sodium polystyrene (KAYEXALATE) 15 GM/60ML suspension 30 g, 30 g, Oral, Once, Maggi Paredes, APRN    Current Outpatient Medications:   •  aspirin (ADULT ASPIRIN EC LOW STRENGTH) 81 MG EC tablet, Take 81 mg by mouth Daily., Disp: , Rfl:   •  atorvastatin (LIPITOR) 40 MG tablet, Take 40 mg by mouth Daily., Disp: , Rfl:   •  azaTHIOprine (IMURAN) 50 MG tablet, Take 50 mg by mouth 2 (two) times a day., Disp: , Rfl:   •  budesonide (PULMICORT) 0.5 MG/2ML  nebulizer solution, Take 2 mL by nebulization 2 (Two) Times a Day., Disp:  , Rfl:   •  bumetanide (BUMEX) 1 MG tablet, Take 1 tablet by mouth Daily., Disp:  , Rfl:   •  insulin glargine (LANTUS) 100 UNIT/ML injection, Inject 20 Units under the skin into the appropriate area as directed Every 12 (Twelve) Hours., Disp: , Rfl: 12  •  metFORMIN (GLUCOPHAGE) 1000 MG tablet, Take 1,000 mg by mouth Daily With Breakfast., Disp: , Rfl:   •  metFORMIN (GLUCOPHAGE) 500 MG tablet, Take 500 mg by mouth Every Night., Disp: , Rfl:   •  pantoprazole (PROTONIX) 40 MG EC tablet, Take 1 tablet by mouth Every Morning Before Breakfast., Disp:  , Rfl:   •  polyethylene glycol (MIRALAX) 17 g packet, Take 17 g by mouth Daily As Needed (constipation)., Disp: , Rfl:   •  senna (SENOKOT) 8.6 MG tablet, Take 1 tablet by mouth At Night As Needed for Constipation., Disp: , Rfl:   •  vitamin D (ERGOCALCIFEROL) 1.25 MG (04558 UT) capsule capsule, Take 50,000 Units by mouth 2 (Two) Times a Week., Disp: , Rfl:     Assessment:  Severe metabolic acidosis  Hyperkalemia  Acute kidney injury  Hypoglycemia  History of diabetes  Obesity  Elevated liver function test  Diabetes  Obstructive sleep apnea  Vitamin D deficiency    Recommendations:  Aggressive medical therapy for hyperkalemia  Start bicarb drip  Metformin toxicity  We will do dialysis because of the anticipation of the resistance of the metabolic acidosis due to metformin  Discussed with the sister at bedside  Discussed with the ER  Patient is critically ill  Spent 31 minutes critical time  Proceed with dialysis possible need to switch to CRRT if her blood pressure drops  Bicarb drip  Recheck labs.  Replete  Thank you for the consult      Karley Hendrickson MD  7/10/2021  17:50 EDT

## 2021-07-10 NOTE — H&P
PULMONARY/CRITICAL CARE HISTORY & PHYSICAL       PATIENT NAME:     Lesa Julio  :     1950    MRN:     [id number]       ROOM:     [facility] 2316/1     PRIMARY CARE PHYSICIAN:  Manuel Sanders MD    SUBJECTIVE     CHIEF COMPLAINT:   Hypoglycemia    HISTORY OF PRESENT ILLNESS:  Lesa Julio is a 70 y.o. 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   [facility] with complaint of hypoglycemia. Patient is extremely hard of hearing making HPI difficult to obtain, information obtained from chart review. The patient had a low blood sugar at home. She takes metformin, glipizide, and Glargine insulin at home. The patient states she has had some generalized abdominal pain since 2021. She denies emesis, diarrhea, or constipation. Additionally, the patient states she has had some difficulty ambulating due to bilateral leg weakness. She denies exacerbating or relieving factors.    In the ED, labs were obtained with abnormalities as follows: Glucose 203, potassium 6.9, CO2 8, anion gap 22, , creatinine 5.99, alkaline phosphatase 132, lipase 391, WBCs 11.1.  Urinalysis shows trace ketones, 2+ protein, 1+ bilirubin, 21-30 white blood cells, and trace bacteria; urine culture pending.     Pulmonary/Intensivist service was contacted for admission to ICU and further evaluation and treatment.      REVIEW OF SYSTEMS:  Review of systems could not be obtained due to   patient  confusion.      ASSESSMENT & PLAN     Principal Problem:    ANAMARIA (acute kidney injury) (CMS/[facility])  Active Problems:    Elevated lipase    Abdominal pain    Generalized weakness    Cardiac murmur    Chronic obstructive pulmonary disease (CMS/HCC)    Chronic renal insufficiency, stage III (moderate) (CMS/[facility])    Diabetes mellitus, type 2 (CMS/HCC)    Essential hypertension    Rheumatoid arthritis (CMS/[facility])    Tobacco abuse    Allergic rhinitis    Hearing loss    Mixed hyperlipidemia    Nonalcoholic fatty liver disease    Obesity    Obstructive sleep apnea syndrome    Vitamin D deficiency    Secondary hyperparathyroidism of renal origin (CMS/[facility])    Paroxysmal atrial fibrillation (CMS/[facility])    GERD (gastroesophageal reflux disease)       Kidney injury, acute on chronic stage III, with metabolic acidosis    --likely metformin toxicity  -UA reviewed  -Renal ultrasound  -Monitor and trend labs  -Avoid nephrotoxic medications, hypotension, and NSAIDS  -Renally dose medications  -Monitor urine output  -Nephrology following    Elevated lipase  -Ultrasound gallbladder  -Repeat amylase lipase in a.m.    Diabetes mellitus type 2, chronic  -A1C  -Hold home diabetes medications  -Start SSI  -Accu-checks Q 6 hours    COPD, not in exacerbation  -Continue home inhalers  -Incentive spirometry  -Titrate O2 for sats > 90%    Essential Hypertension, Chronic; hyperlipidemia  -Continue home medications as appropriate   - Monitor with routine vital signs     RAMONE  -CPAP at night  -Titrate O2 to keep SATS >90%        Code Status: Full  VTE Prophylaxis: SCDs  PUD Prophylaxis: PPI      HOSPITAL MEDICATIONS     SCHEDULED MEDICATIONS:  insulin lispro, 0-7 Units, Subcutaneous, Q6H  pantoprazole, 40 mg, Intravenous, Q AM  senna-docusate sodium, 2 tablet, Oral, BID  sodium chloride, 10 mL, Intravenous, Q12H  sodium chloride, 10 mL, Intravenous, Q12H  sodium chloride, 10 mL, Intravenous, Q12H  sodium chloride, 10 mL, Intravenous, Q12H      "    CONTINUOUS INFUSIONS:    phenylephrine, 0.5-6 mcg/kg/min, Last Rate: 2 mcg/kg/min (07/11/21 0107)  custom IV infusion builder, , Last Rate: 150 mL/hr at 07/11/21 0143         PRN MEDICATIONS:   •  acetaminophen **OR** acetaminophen  •  albumin human  •  aluminum-magnesium hydroxide-simethicone  •  senna-docusate sodium **AND** polyethylene glycol **AND** bisacodyl **AND** bisacodyl  •  dextrose  •  dextrose  •  glucagon (human recombinant)  •  heparin (porcine)  •  insulin lispro **AND** insulin lispro  •  ondansetron **OR** ondansetron  •  [COMPLETED] Insert peripheral IV **AND** sodium chloride  •  sodium chloride  •  sodium chloride  •  sodium chloride       OBJECTIVE     VITAL SIGNS:  BP (!) 96/37   Pulse 68   Temp 99 °F (37.2 °C) (Oral)   Resp 18   Ht 160 cm (63\")   Wt 85 kg (187 lb 6.3 oz)   SpO2 96%   BMI 33.19 kg/m²     Wt Readings from Last 3 Encounters:   07/10/21 85 kg (187 lb 6.3 oz)   12/20/20 76.1 kg (167 lb 12.8 oz)   11/06/20 80.8 kg (178 lb 2.1 oz)       INTAKE/OUTPUT:    Intake/Output Summary (Last 24 hours) at 7/11/2021 0308  Last data filed at 7/11/2021 0306  Gross per 24 hour   Intake 100 ml   Output 400 ml   Net -300 ml       PHYSICAL EXAM:   Constitutional:  Well developed, well nourished, no acute distress, non-toxic appearance   Eyes:  PERRL, conjunctiva normal, EOMI   HENT:  Atraumatic, hard of hearing  Respiratory:  Clear throughout, non-labored respirations without accessory muscle use  Cardiovascular:  Normal rate, normal rhythm, no murmurs, no gallops, no rubs   GI:  Soft, nondistended, normal bowel sounds, nontender, no organomegaly, no mass, no rebound, no guarding   :  No costovertebral angle tenderness   Musculoskeletal:  No edema, tenderness noted to legs, no deformities  Integument:  Well hydrated, no rash   Lymphatic:  No lymphadenopathy noted   Neurologic:  Alert & oriented x 1, CN 2-12 normal, normal motor function, normal sensory function, no focal deficits noted "   Psychiatric:  Speech and behavior appropriate       HISTORY     HISTORY:  Past Medical History:   Diagnosis Date   • Arthritis    • CKD (chronic kidney disease) stage 3, GFR 30-59 ml/min (CMS/[facility])     per [facility]   • Diabetes mellitus, type 2 (CMS/[facility])    • Essential hypertension 11/6/2015   • Grade 4 out of 6 intensity murmur 6/22/2020   • Hearing loss     hearing aid right side   • Hyperlipidemia    • Hypersensitivity angiitis (CMS/[facility]) 6/22/2020   • Hypertension    • Leukocytoclastic vasculitis (CMS/[facility])    • Obesity    • Osteoarthritis    • Ovarian cyst     removed; benign   • Peripheral arterial disease (CMS/[facility])    • Sleep apnea    • Tubular adenoma of colon 10/30/2018   • Vascular insufficiency 6/22/2020     Past Surgical History:   Procedure Laterality Date   • BRONCHOSCOPY Bilateral 10/13/2020    Procedure: BRONCHOSCOPY AT BEDSIDE with right lung washing and elective intubation;  Surgeon: Cristhian Hodge MD;  Location: [facility] ENDOSCOPY;  Service: Pulmonary;  Laterality: Bilateral;  Post: pneumonia   • BRONCHOSCOPY Bilateral 10/20/2020    Procedure: BRONCHOSCOPY WITH BRONCHOALVEOLAR LAVAGE  AT BEDSIDE;  Surgeon: Cristhian Hodge MD;  Location: [facility] ENDOSCOPY;  Service: Pulmonary;  Laterality: Bilateral;  POST- PNEUMONIA   • BRONCHOSCOPY N/A 10/27/2020    Procedure: BRONCHOSCOPY with bronchial washing and intubation;  Surgeon: Cristhian Hodge MD;  Location: [facility] ENDOSCOPY;  Service: Pulmonary;  Laterality: N/A;  post op:pneumonia   • BRONCHOSCOPY N/A 12/14/2020    Procedure: BRONCHOSCOPY AT BEDSIDE WITH BRONCHOALVEOLAR LAVAGE;  Surgeon: Cristhian Hodge MD;  Location: [facility] ENDOSCOPY;  Service: Pulmonary;  Laterality: N/A;  PNEUMONIA   • CHOLECYSTECTOMY     • ENDOSCOPY N/A 10/13/2020    Procedure: ESOPHAGOGASTRODUODENOSCOPY AT BEDSIDE;  Surgeon: Omi Rivera MD;  Location: [facility] ENDOSCOPY;  Service: Gastroenterology;  Laterality: N/A;   • LIVER BIOPSY     • TUBAL ABDOMINAL LIGATION       Family  "History   Problem Relation Age of Onset   • Heart disease Mother    • Cancer Father      Social History     Socioeconomic History   • Marital status:      Spouse name: Not on file   • Number of children: Not on file   • Years of education: Not on file   • Highest education level: Not on file   Tobacco Use   • Smoking status: Former Smoker     Packs/day: 0.50     Types: Cigarettes   • Smokeless tobacco: Never Used   • Tobacco comment: per pt, \"I quit in October\"   Vaping Use   • Vaping Use: Never used   • Passive vaping exposure Yes   Substance and Sexual Activity   • Alcohol use: No   • Drug use: No   • Sexual activity: Defer        HOME MEDICATIONS:   Prior to Admission medications    Medication Sig Start Date End Date Taking? Authorizing Provider   albuterol sulfate  (90 Base) MCG/ACT inhaler Inhale 2 puffs Every 4 (Four) Hours As Needed for Wheezing.   Yes Lise Carlos MD   aspirin (ADULT ASPIRIN EC LOW STRENGTH) 81 MG EC tablet Take 81 mg by mouth Daily. 11/6/15  Yes Lise Carlos MD   gemfibrozil (LOPID) 600 MG tablet Take 600 mg by mouth 2 (Two) Times a Day Before Meals.   Yes Lise Carlos MD   glipizide (GLUCOTROL XL) 10 MG 24 hr tablet Take 10 mg by mouth Daily.   Yes Lise Carlos MD   hydrALAZINE (APRESOLINE) 50 MG tablet Take 50 mg by mouth 3 (Three) Times a Day.   Yes Lise Carlos MD   Insulin Glargine (BASAGLAR KWIKPEN) 100 UNIT/ML injection pen Inject 100 Units under the skin into the appropriate area as directed 2 (Two) Times a Day.   Yes Lise Carlos MD   metFORMIN (GLUCOPHAGE) 1000 MG tablet Take 1,000 mg by mouth Daily With Breakfast.   Yes Lise Carlos MD   metFORMIN (GLUCOPHAGE) 500 MG tablet Take 500 mg by mouth Every Night.   Yes Lise Carlos MD   metoprolol tartrate (LOPRESSOR) 50 MG tablet Take 50 mg by mouth 2 (Two) Times a Day.   Yes Lise Carlos MD   olmesartan (BENICAR) 20 MG tablet Take 40 mg by " mouth Daily.   Yes Lise Carlos MD   rosuvastatin (CRESTOR) 10 MG tablet Take 10 mg by mouth Daily.   Yes Lise Carlos MD   theophylline (MEGHAN-24) 300 MG 24 hr capsule Take 300 mg by mouth Daily.   Yes Lise Carlos MD   vitamin D (ERGOCALCIFEROL) 1.25 MG (40015 UT) capsule capsule Take 50,000 Units by mouth 2 (Two) Times a Week. Monday and Thursday   Yes Lise Carlos MD   atorvastatin (LIPITOR) 40 MG tablet Take 40 mg by mouth Daily. 5/13/19 7/10/21  Lise Carlos MD   azaTHIOprine (IMURAN) 50 MG tablet Take 50 mg by mouth 2 (two) times a day.  7/10/21  Lise Carlos MD   budesonide (PULMICORT) 0.5 MG/2ML nebulizer solution Take 2 mL by nebulization 2 (Two) Times a Day. 11/6/20 7/10/21  Marianna Figueroa MD   bumetanide (BUMEX) 1 MG tablet Take 1 tablet by mouth Daily. 11/7/20 7/10/21  Marianna Figueroa MD   insulin glargine (LANTUS) 100 UNIT/ML injection Inject 20 Units under the skin into the appropriate area as directed Every 12 (Twelve) Hours. 11/6/20 7/10/21  Marianna Figueroa MD   pantoprazole (PROTONIX) 40 MG EC tablet Take 1 tablet by mouth Every Morning Before Breakfast. 11/7/20 7/10/21  Marianna Figueroa MD   polyethylene glycol (MIRALAX) 17 g packet Take 17 g by mouth Daily As Needed (constipation). 12/20/20 7/10/21  Rebecca Dubois MD   senna (SENOKOT) 8.6 MG tablet Take 1 tablet by mouth At Night As Needed for Constipation. 12/20/20 7/10/21  Rebecca Dubois MD     Home medications cannot be reconciled at this time, patient is confused. Nursing to follow up in am    IMMUNIZATIONS:  Immunization History   Administered Date(s) Administered   • Flu Vaccine Quad PF 6-35MO 10/30/2019   • Flu Vaccine Quad PF >36MO 10/30/2019   • Flulaval/Fluarix/Fluzone Quad 11/06/2020   • Influenza, Unspecified 10/30/2018   • Pneumococcal Conjugate 13-Valent (PCV13) 02/15/2017   • Pneumococcal Polysaccharide (PPSV23) 06/15/2015, 09/17/2015   • Tdap 09/04/2015         ALLERGIES:  Lisinopril and Ipratropium-albuterol      RESULTS     LABS:  Lab Results (last 24 hours)     Procedure Component Value Units Date/Time    POC Glucose Once [[id number]]  (Normal) Collected: 07/10/21 1442    Specimen: Blood Updated: 07/10/21 1444     Glucose 81 mg/dL      Comment: Serial Number: [id number]:  [id number]       CBC & Differential [[id number]]  (Abnormal) Collected: 07/10/21 1511    Specimen: Blood Updated: 07/10/21 1601    Narrative:      The following orders were created for panel order CBC & Differential.  Procedure                               Abnormality         Status                     ---------                               -----------         ------                     Scan Slide[[id number]]                                       Final result               CBC Auto Differential[[id number]]        Abnormal            Final result                 Please view results for these tests on the individual orders.    Urinalysis With Culture If Indicated - Urine, Catheter [[id number]]  (Abnormal) Collected: 07/10/21 1511    Specimen: Urine, Catheter Updated: 07/10/21 1540     Color, UA Dark Yellow     Comment: Result checked         Appearance, UA Cloudy     Comment: Result checked         pH, UA <=5.0     Specific Gravity, UA 1.022     Glucose, UA Negative     Ketones, UA Trace     Bilirubin, UA Small (1+)     Comment: Confirmation testing is unavailable.  A serum bilirubin is recommended for further assessment.        Blood, UA Negative     Protein,  mg/dL (2+)     Leuk Esterase, UA Negative     Nitrite, UA Negative     Urobilinogen, UA 0.2 E.U./dL    CBC Auto Differential [[id number]]  (Abnormal) Collected: 07/10/21 1511    Specimen: Blood Updated: 07/10/21 1601     WBC 11.10 10*3/mm3      RBC 4.78 10*6/mm3      Hemoglobin 12.9 g/dL      Hematocrit 41.2 %      MCV 86.3 fL      MCH 27.1 pg      MCHC 31.4 g/dL      RDW 18.7 %      RDW-SD 57.3 fl      MPV 8.7 fL      Platelets 318  10*3/mm3     Narrative:      The previously reported component NRBC is no longer being reported. Previous result was 0.0 /100 WBC (Reference Range: 0.0-0.2 /100 WBC) on 7/10/2021 at 1531 EDT.    Respiratory Panel PCR w/COVID-19(SARS-CoV-2) ERA/SOURAV/MELISSA/PAD/COR/MAD/ALIAS In-House, NP Swab in UTM/VTM, 3-4 HR TAT - Swab, Nasopharynx [[id number]]  (Normal) Collected: 07/10/21 1511    Specimen: Swab from Nasopharynx Updated: 07/10/21 1610     ADENOVIRUS, PCR Not Detected     Coronavirus 229E Not Detected     Coronavirus HKU1 Not Detected     Coronavirus NL63 Not Detected     Coronavirus OC43 Not Detected     COVID19 Not Detected     Human Metapneumovirus Not Detected     Human Rhinovirus/Enterovirus Not Detected     Influenza A PCR Not Detected     Influenza B PCR Not Detected     Parainfluenza Virus 1 Not Detected     Parainfluenza Virus 2 Not Detected     Parainfluenza Virus 3 Not Detected     Parainfluenza Virus 4 Not Detected     RSV, PCR Not Detected     Bordetella pertussis pcr Not Detected     Bordetella parapertussis PCR Not Detected     Chlamydophila pneumoniae PCR Not Detected     Mycoplasma pneumo by PCR Not Detected    Narrative:      In the setting of a positive respiratory panel with a viral infection PLUS a negative procalcitonin without other underlying concern for bacterial infection, consider observing off antibiotics or discontinuation of antibiotics and continue supportive care. If the respiratory panel is positive for atypical bacterial infection (Bordetella pertussis, Chlamydophila pneumoniae, or Mycoplasma pneumoniae), consider antibiotic de-escalation to target atypical bacterial infection.    Scan Slide [[id number]] Collected: 07/10/21 1511    Specimen: Blood Updated: 07/10/21 1601     Scan Slide --     Comment: See Manual Differential Results       Urinalysis, Microscopic Only - Urine, Catheter [[id number]]  (Abnormal) Collected: 07/10/21 1511    Specimen: Urine, Catheter Updated: 07/10/21 2723      RBC, UA 0-2 /HPF      WBC, UA 21-30 /HPF      Bacteria, UA Trace /HPF      Squamous Epithelial Cells, UA 3-6 /HPF      Transitional Epithelial Cells, UA 0-2 /HPF      Renal Epithelial Cells, UA 0-2 /HPF      Hyaline Casts, UA None Seen /LPF      Amorphous Crystals, UA Small/1+ /HPF      Methodology Manual Light Microscopy    Manual Differential [[id number]]  (Abnormal) Collected: 07/10/21 1511    Specimen: Blood Updated: 07/10/21 1601     Neutrophil % 79.0 %      Lymphocyte % 14.0 %      Monocyte % 6.0 %      Myelocyte % 1.0 %      Neutrophils Absolute 8.77 10*3/mm3      Lymphocytes Absolute 1.55 10*3/mm3      Monocytes Absolute 0.67 10*3/mm3      nRBC 1.0 /100 WBC      Anisocytosis Slight/1+     Crenated RBC's Mod/2+     WBC Morphology Normal     Platelet Estimate Adequate     Large Platelets Slight/1+    Urine Culture - Urine, Urine, Catheter [[id number]] Collected: 07/10/21 1511    Specimen: Urine, Catheter Updated: 07/10/21 1556    CK [[id number]]  (Normal) Collected: 07/10/21 1511    Specimen: Blood Updated: 07/10/21 1805     Creatine Kinase 47 U/L     Hemoglobin A1c [[id number]] Collected: 07/10/21 1511    Specimen: Blood Updated: 07/10/21 2101    Comprehensive Metabolic Panel [[id number]]  (Abnormal) Collected: 07/10/21 1640    Specimen: Blood Updated: 07/10/21 1727     Glucose 203 mg/dL       mg/dL      Creatinine 5.99 mg/dL      Sodium 136 mmol/L      Potassium 6.9 mmol/L      Comment: Slight hemolysis detected by analyzer. Results may be affected.        Chloride 106 mmol/L      CO2 8.0 mmol/L      Calcium 8.9 mg/dL      Total Protein 6.9 g/dL      Albumin 4.00 g/dL      ALT (SGPT) 9 U/L      AST (SGOT) 11 U/L      Alkaline Phosphatase 132 U/L      Total Bilirubin 0.3 mg/dL      eGFR Non African Amer 7 mL/min/1.73      Comment: <15 Indicative of kidney failure.        eGFR   Amer --     Comment: <15 Indicative of kidney failure.        Globulin 2.9 gm/dL      A/G Ratio 1.4 g/dL      BUN/Creatinine  Ratio 17.2     Anion Gap 22.0 mmol/L     Narrative:      GFR Normal >60  Chronic Kidney Disease <60  Kidney Failure <15      Lipase [[id number]]  (Abnormal) Collected: 07/10/21 1640    Specimen: Blood Updated: 07/10/21 1730     Lipase 391 U/L     Acetone [[id number]]  (Normal) Collected: 07/10/21 1640    Specimen: Blood Updated: 07/10/21 1801     Acetone Negative    Blood Gas, Venous - [[id number]]  (Abnormal) Collected: 07/10/21 1808    Specimen: Venous Blood Updated: 07/10/21 1833     Site [facility]     pH, Venous 6.966 pH Units      pCO2, Venous 35.7 mm Hg      pO2, Venous 48.0 mm Hg      HCO3, Venous 8.2 mmol/L      Base Excess, Venous -23.1 mmol/L      Comment: Serial Number: [id number]:  [id number]        O2 Saturation, Venous 60.1 %      CO2 Content 9.2 mmol/L      Barometric Pressure for Blood Gas --     Comment: N/A        Modality Room Air     FIO2 21 %     POC Glucose Once [[id number]]  (Abnormal) Collected: 07/10/21 1808    Specimen: Blood Updated: 07/10/21 1826     Glucose 274 mg/dL      Comment: Serial Number: [id number]:  [id number]       Basic Metabolic Panel [[id number]]  (Abnormal) Collected: 07/10/21 2045    Specimen: Blood Updated: 07/10/21 2125     Glucose 234 mg/dL       mg/dL      Creatinine 5.58 mg/dL      Sodium 139 mmol/L      Potassium 5.9 mmol/L      Comment: Slight hemolysis detected by analyzer. Results may be affected.        Chloride 108 mmol/L      CO2 10.0 mmol/L      Calcium 8.5 mg/dL      eGFR   Amer --     Comment: <15 Indicative of kidney failure.        eGFR Non African Amer 8 mL/min/1.73      Comment: <15 Indicative of kidney failure.        BUN/Creatinine Ratio 18.3     Anion Gap 21.0 mmol/L     Narrative:      GFR Normal >60  Chronic Kidney Disease <60  Kidney Failure <15      POC Glucose Once [[id number]]  (Abnormal) Collected: 07/10/21 2358    Specimen: Blood Updated: 07/10/21 2359     Glucose 149 mg/dL      Comment: Serial Number: [id number]:  [id number]        Hepatitis B Surface Antigen [[id number]]  (Normal) Collected: 07/11/21 0142    Specimen: Blood Updated: 07/11/21 0212     Hepatitis B Surface Ag Non-Reactive            MICRO:  Microbiology Results (last 10 days)     Procedure Component Value - Date/Time    Respiratory Panel PCR w/COVID-19(SARS-CoV-2) ERA/SOURAV/MELISSA/PAD/COR/MAD/ALISA In-House, NP Swab in UTM/VTM, 3-4 HR TAT - Swab, Nasopharynx [[id number]]  (Normal) Collected: 07/10/21 1511    Lab Status: Final result Specimen: Swab from Nasopharynx Updated: 07/10/21 1610     ADENOVIRUS, PCR Not Detected     Coronavirus 229E Not Detected     Coronavirus HKU1 Not Detected     Coronavirus NL63 Not Detected     Coronavirus OC43 Not Detected     COVID19 Not Detected     Human Metapneumovirus Not Detected     Human Rhinovirus/Enterovirus Not Detected     Influenza A PCR Not Detected     Influenza B PCR Not Detected     Parainfluenza Virus 1 Not Detected     Parainfluenza Virus 2 Not Detected     Parainfluenza Virus 3 Not Detected     Parainfluenza Virus 4 Not Detected     RSV, PCR Not Detected     Bordetella pertussis pcr Not Detected     Bordetella parapertussis PCR Not Detected     Chlamydophila pneumoniae PCR Not Detected     Mycoplasma pneumo by PCR Not Detected    Narrative:      In the setting of a positive respiratory panel with a viral infection PLUS a negative procalcitonin without other underlying concern for bacterial infection, consider observing off antibiotics or discontinuation of antibiotics and continue supportive care. If the respiratory panel is positive for atypical bacterial infection (Bordetella pertussis, Chlamydophila pneumoniae, or Mycoplasma pneumoniae), consider antibiotic de-escalation to target atypical bacterial infection.            RADIOLOGY STUDIES:  Imaging Results (Last 72 Hours)     Procedure Component Value Units Date/Time    XR Chest 1 View [[id number]] Collected: 07/10/21 2305     Updated: 07/10/21 2308    Narrative:      Frontal chest  radiograph    Indication:  Line placement    Comparison:  July 10, 2021    Findings:    Left approach vascular catheter overlies the SVC. There is mild pulmonary vascular prominence. No focal consolidation, pneumothorax, or pleural fluid collection seen. Large cardiac silhouette redemonstrated. No acute focal bony abnormality seen.      Impression:      Impression:    Line placement as above.    Mild pulmonary vascular prominence/congestion.    Redemonstrated large cardiac silhouette.    Electronically signed by:  Sheng Harris M.D.    7/10/2021 9:06 PM    CT Abdomen Pelvis Without Contrast [[id number]] Collected: 07/10/21 1805     Updated: 07/10/21 1846    Narrative:      CT ABDOMEN PELVIS WO CONTRAST-     Date of Exam: 7/10/2021 5:48 PM     Indication: upper abd pain.     Comparison: October 12, 2020     Technique: CT scan of the abdomen and pelvis without IV contrast.  Automated exposure control and iterative reconstruction methods were  used.     FINDINGS:  The lung bases are clear.     The unenhanced liver, right adrenal glands, kidneys, spleen, and  pancreas are unremarkable. The gallbladder is surgically absent. There  is a stable left adrenal nodule, likely an adenoma.     The stomach appears normal. The small bowel appears normal in caliber.  The colon appears normal. The appendix is not well-visualized. There is  no ascites or loculated collection. No abnormally enlarged lymph nodes  are identified. There is a small infraumbilical hernia containing a loop  of small bowel, but no evidence of obstruction.     The rectum and urinary bladder are unremarkable. There is a  calcification within the left uterine fundus, likely a partially  calcified fibroid.     No aggressive osseous lesions are identified.       Impression:      1.No acute process identified within abdomen/pelivs.  2.Stable left adrenal nodule, likely an adenoma.  3.Small infraumbilical hernia containing a loop of small bowel, but no  evidence  of obstruction.  4.Calcification within left uterine fundus, likely a partially calcified  fibroid.           Electronically Signed By-Onofre Nicholas MD On:7/10/2021 6:11 PM  This report was finalized on [id number] by  Onofre Nicholas MD.    XR Chest 1 View [[id number]] Collected: 07/10/21 1544     Updated: 07/10/21 1547    Narrative:      XR CHEST 1 VW-     Date of Exam: 7/10/2021 3:00 PM     Indication: weakness.     Comparison Exams: December 14, 2020     Technique: Single AP chest radiograph     FINDINGS:  The lungs are clear. The heart is enlarged. The pulmonary vasculature  appears normal. The osseous structures appear intact.       Impression:      1.Lungs are clear.  2.Cardiomegaly.     Electronically Signed By-Onofre Nicholas MD On:7/10/2021 3:45 PM  This report was finalized on [id number] by  Onofre Nicholas MD.                  ECHOCARDIOGRAM:  Results for orders placed during the hospital encounter of 10/08/20    Adult Transthoracic Echo Complete W/ Cont if Necessary Per Protocol    Interpretation Summary  · Estimated left ventricular EF = 65% Left ventricular systolic function is normal.  · Trace to mild mitral valve regurgitation is present.  · There is a small (<1cm) pericardial effusion.         I reviewed the patient's new clinical results.    I discussed the patient's findings and my recommendations with patient and nursing staff.  I have discussed plan with on-call attending physician, who agrees with this plan of care.    Appropriate PPE worn during assessment of patient per established guidelines.      Electronically signed by AMELIA Reed, 07/10/21, 7:42 PM EDT.

## 2021-07-11 ENCOUNTER — APPOINTMENT (OUTPATIENT)
Dept: ULTRASOUND IMAGING | Facility: HOSPITAL | Age: 71
End: 2021-07-11

## 2021-07-11 PROBLEM — J96.02 ACUTE RESPIRATORY FAILURE WITH HYPOXIA AND HYPERCAPNIA (HCC): Status: RESOLVED | Noted: 2020-11-01 | Resolved: 2021-07-11

## 2021-07-11 PROBLEM — R01.1: Status: RESOLVED | Noted: 2020-06-22 | Resolved: 2021-07-11

## 2021-07-11 PROBLEM — R53.83 FATIGUE: Status: RESOLVED | Noted: 2020-06-22 | Resolved: 2021-07-11

## 2021-07-11 PROBLEM — R00.0 SINUS TACHYCARDIA: Status: RESOLVED | Noted: 2017-05-15 | Resolved: 2021-07-11

## 2021-07-11 PROBLEM — R10.9 ABDOMINAL PAIN: Status: ACTIVE | Noted: 2021-07-11

## 2021-07-11 PROBLEM — I99.8 VASCULAR INSUFFICIENCY: Status: RESOLVED | Noted: 2020-06-22 | Resolved: 2021-07-11

## 2021-07-11 PROBLEM — R53.81 PHYSICAL DECONDITIONING: Status: RESOLVED | Noted: 2020-12-13 | Resolved: 2021-07-11

## 2021-07-11 PROBLEM — R79.89 ELEVATED LFTS: Status: RESOLVED | Noted: 2017-08-09 | Resolved: 2021-07-11

## 2021-07-11 PROBLEM — B37.0 ORAL THRUSH: Status: RESOLVED | Noted: 2020-11-01 | Resolved: 2021-07-11

## 2021-07-11 PROBLEM — Z00.00 WELL ADULT HEALTH CHECK: Status: RESOLVED | Noted: 2017-08-15 | Resolved: 2021-07-11

## 2021-07-11 PROBLEM — E65 CENTRAL OBESITY: Status: RESOLVED | Noted: 2020-06-22 | Resolved: 2021-07-11

## 2021-07-11 PROBLEM — K92.2 UPPER GI BLEED: Status: RESOLVED | Noted: 2020-11-01 | Resolved: 2021-07-11

## 2021-07-11 PROBLEM — D12.6 TUBULAR ADENOMA OF COLON: Status: RESOLVED | Noted: 2018-10-30 | Resolved: 2021-07-11

## 2021-07-11 PROBLEM — N25.81 SECONDARY HYPERPARATHYROIDISM OF RENAL ORIGIN (HCC): Chronic | Status: ACTIVE | Noted: 2017-02-01

## 2021-07-11 PROBLEM — R60.0 LOCALIZED EDEMA: Status: RESOLVED | Noted: 2017-02-01 | Resolved: 2021-07-11

## 2021-07-11 PROBLEM — IMO0002 UNCONTROLLED TYPE 2 DIABETES MELLITUS: Status: RESOLVED | Noted: 2020-06-22 | Resolved: 2021-07-11

## 2021-07-11 PROBLEM — M31.0 HYPERSENSITIVITY ANGIITIS (HCC): Status: RESOLVED | Noted: 2020-06-22 | Resolved: 2021-07-11

## 2021-07-11 PROBLEM — J18.9 PNEUMONIA OF RIGHT UPPER LOBE DUE TO INFECTIOUS ORGANISM: Status: RESOLVED | Noted: 2020-10-08 | Resolved: 2021-07-11

## 2021-07-11 PROBLEM — N83.8 MASS OF OVARY: Status: RESOLVED | Noted: 2020-01-30 | Resolved: 2021-07-11

## 2021-07-11 PROBLEM — R80.8 OTHER PROTEINURIA: Status: RESOLVED | Noted: 2017-02-01 | Resolved: 2021-07-11

## 2021-07-11 PROBLEM — F17.200 TOBACCO DEPENDENCE SYNDROME: Status: RESOLVED | Noted: 2020-06-22 | Resolved: 2021-07-11

## 2021-07-11 PROBLEM — D75.1 SECONDARY POLYCYTHEMIA: Status: RESOLVED | Noted: 2020-06-22 | Resolved: 2021-07-11

## 2021-07-11 PROBLEM — R53.1 GENERALIZED WEAKNESS: Status: ACTIVE | Noted: 2020-06-22

## 2021-07-11 PROBLEM — N83.201 CYST OF RIGHT OVARY: Status: RESOLVED | Noted: 2017-02-15 | Resolved: 2021-07-11

## 2021-07-11 PROBLEM — D64.9 ANEMIA: Status: RESOLVED | Noted: 2020-10-08 | Resolved: 2021-07-11

## 2021-07-11 PROBLEM — E78.2 MIXED HYPERLIPIDEMIA: Chronic | Status: ACTIVE | Noted: 2020-06-22

## 2021-07-11 PROBLEM — Z74.09 MUSCULOSKELETAL IMMOBILITY: Status: RESOLVED | Noted: 2020-12-13 | Resolved: 2021-07-11

## 2021-07-11 PROBLEM — E87.5 HYPERKALEMIA: Status: RESOLVED | Noted: 2020-11-01 | Resolved: 2021-07-11

## 2021-07-11 PROBLEM — E55.9 VITAMIN D DEFICIENCY: Chronic | Status: ACTIVE | Noted: 2017-08-15

## 2021-07-11 PROBLEM — M79.643 PAIN OF HAND: Status: RESOLVED | Noted: 2017-11-30 | Resolved: 2021-07-11

## 2021-07-11 PROBLEM — J96.01 ACUTE RESPIRATORY FAILURE WITH HYPOXIA AND HYPERCAPNIA (HCC): Status: RESOLVED | Noted: 2020-11-01 | Resolved: 2021-07-11

## 2021-07-11 PROBLEM — J30.9 ALLERGIC RHINITIS: Chronic | Status: ACTIVE | Noted: 2020-06-22

## 2021-07-11 PROBLEM — R74.8 ELEVATED LIPASE: Status: ACTIVE | Noted: 2021-07-11

## 2021-07-11 PROBLEM — L28.1 PRURIGO NODULARIS: Status: RESOLVED | Noted: 2020-06-22 | Resolved: 2021-07-11

## 2021-07-11 PROBLEM — K76.0 NONALCOHOLIC FATTY LIVER DISEASE: Chronic | Status: ACTIVE | Noted: 2020-06-22

## 2021-07-11 PROBLEM — M81.0 OSTEOPOROSIS: Status: RESOLVED | Noted: 2018-08-20 | Resolved: 2021-07-11

## 2021-07-11 LAB
ALBUMIN SERPL-MCNC: 3.9 G/DL (ref 3.5–5.2)
ALBUMIN/GLOB SERPL: 2 G/DL
ALP SERPL-CCNC: 105 U/L (ref 39–117)
ALT SERPL W P-5'-P-CCNC: 5 U/L (ref 1–33)
AMYLASE SERPL-CCNC: 75 U/L (ref 28–100)
ANION GAP SERPL CALCULATED.3IONS-SCNC: 10 MMOL/L (ref 5–15)
ANION GAP SERPL CALCULATED.3IONS-SCNC: 12 MMOL/L (ref 5–15)
ANION GAP SERPL CALCULATED.3IONS-SCNC: 16 MMOL/L (ref 5–15)
AST SERPL-CCNC: 10 U/L (ref 1–32)
BACTERIA SPEC AEROBE CULT: NO GROWTH
BASOPHILS # BLD AUTO: 0 10*3/MM3 (ref 0–0.2)
BASOPHILS NFR BLD AUTO: 0.5 % (ref 0–1.5)
BILIRUB SERPL-MCNC: 0.3 MG/DL (ref 0–1.2)
BUN SERPL-MCNC: 37 MG/DL (ref 8–23)
BUN SERPL-MCNC: 39 MG/DL (ref 8–23)
BUN SERPL-MCNC: 39 MG/DL (ref 8–23)
BUN/CREAT SERPL: 17 (ref 7–25)
BUN/CREAT SERPL: 18.8 (ref 7–25)
BUN/CREAT SERPL: 19.5 (ref 7–25)
CALCIUM SPEC-SCNC: 7.9 MG/DL (ref 8.6–10.5)
CALCIUM SPEC-SCNC: 8.7 MG/DL (ref 8.6–10.5)
CALCIUM SPEC-SCNC: 9 MG/DL (ref 8.6–10.5)
CHLORIDE SERPL-SCNC: 100 MMOL/L (ref 98–107)
CHLORIDE SERPL-SCNC: 95 MMOL/L (ref 98–107)
CHLORIDE SERPL-SCNC: 98 MMOL/L (ref 98–107)
CK SERPL-CCNC: 37 U/L (ref 20–180)
CO2 SERPL-SCNC: 26 MMOL/L (ref 22–29)
CO2 SERPL-SCNC: 28 MMOL/L (ref 22–29)
CO2 SERPL-SCNC: 30 MMOL/L (ref 22–29)
CREAT SERPL-MCNC: 2 MG/DL (ref 0.57–1)
CREAT SERPL-MCNC: 2.08 MG/DL (ref 0.57–1)
CREAT SERPL-MCNC: 2.18 MG/DL (ref 0.57–1)
DEPRECATED RDW RBC AUTO: 49 FL (ref 37–54)
EOSINOPHIL # BLD AUTO: 0 10*3/MM3 (ref 0–0.4)
EOSINOPHIL NFR BLD AUTO: 0.6 % (ref 0.3–6.2)
ERYTHROCYTE [DISTWIDTH] IN BLOOD BY AUTOMATED COUNT: 17.4 % (ref 12.3–15.4)
GFR SERPL CREATININE-BSD FRML MDRD: 22 ML/MIN/1.73
GFR SERPL CREATININE-BSD FRML MDRD: 23 ML/MIN/1.73
GFR SERPL CREATININE-BSD FRML MDRD: 25 ML/MIN/1.73
GLOBULIN UR ELPH-MCNC: 2 GM/DL
GLUCOSE BLDC GLUCOMTR-MCNC: 109 MG/DL (ref 70–105)
GLUCOSE BLDC GLUCOMTR-MCNC: 137 MG/DL (ref 70–105)
GLUCOSE BLDC GLUCOMTR-MCNC: 211 MG/DL (ref 70–105)
GLUCOSE BLDC GLUCOMTR-MCNC: 87 MG/DL (ref 70–105)
GLUCOSE SERPL-MCNC: 182 MG/DL (ref 65–99)
GLUCOSE SERPL-MCNC: 185 MG/DL (ref 65–99)
GLUCOSE SERPL-MCNC: 83 MG/DL (ref 65–99)
HBV SURFACE AG SERPL QL IA: NORMAL
HCT VFR BLD AUTO: 31.6 % (ref 34–46.6)
HGB BLD-MCNC: 10.7 G/DL (ref 12–15.9)
LIPASE SERPL-CCNC: 149 U/L (ref 13–60)
LYMPHOCYTES # BLD AUTO: 0.5 10*3/MM3 (ref 0.7–3.1)
LYMPHOCYTES NFR BLD AUTO: 7.5 % (ref 19.6–45.3)
MAGNESIUM SERPL-MCNC: 1.4 MG/DL (ref 1.6–2.4)
MCH RBC QN AUTO: 27.3 PG (ref 26.6–33)
MCHC RBC AUTO-ENTMCNC: 33.7 G/DL (ref 31.5–35.7)
MCV RBC AUTO: 81 FL (ref 79–97)
MONOCYTES # BLD AUTO: 0.6 10*3/MM3 (ref 0.1–0.9)
MONOCYTES NFR BLD AUTO: 8.5 % (ref 5–12)
NEUTROPHILS NFR BLD AUTO: 5.9 10*3/MM3 (ref 1.7–7)
NEUTROPHILS NFR BLD AUTO: 82.9 % (ref 42.7–76)
NRBC BLD AUTO-RTO: 0.1 /100 WBC (ref 0–0.2)
PHOSPHATE SERPL-MCNC: 2.7 MG/DL (ref 2.5–4.5)
PLATELET # BLD AUTO: 178 10*3/MM3 (ref 140–450)
PMV BLD AUTO: 7.9 FL (ref 6–12)
POTASSIUM SERPL-SCNC: 3.1 MMOL/L (ref 3.5–5.2)
POTASSIUM SERPL-SCNC: 3.9 MMOL/L (ref 3.5–5.2)
POTASSIUM SERPL-SCNC: 4 MMOL/L (ref 3.5–5.2)
PROT SERPL-MCNC: 5.9 G/DL (ref 6–8.5)
PTH-INTACT SERPL-MCNC: 448.3 PG/ML (ref 15–65)
RBC # BLD AUTO: 3.9 10*6/MM3 (ref 3.77–5.28)
SODIUM SERPL-SCNC: 137 MMOL/L (ref 136–145)
SODIUM SERPL-SCNC: 138 MMOL/L (ref 136–145)
SODIUM SERPL-SCNC: 140 MMOL/L (ref 136–145)
WBC # BLD AUTO: 7.1 10*3/MM3 (ref 3.4–10.8)

## 2021-07-11 PROCEDURE — 25010000002 FENTANYL CITRATE (PF) 50 MCG/ML SOLUTION: Performed by: STUDENT IN AN ORGANIZED HEALTH CARE EDUCATION/TRAINING PROGRAM

## 2021-07-11 PROCEDURE — 82150 ASSAY OF AMYLASE: CPT | Performed by: STUDENT IN AN ORGANIZED HEALTH CARE EDUCATION/TRAINING PROGRAM

## 2021-07-11 PROCEDURE — 83690 ASSAY OF LIPASE: CPT | Performed by: STUDENT IN AN ORGANIZED HEALTH CARE EDUCATION/TRAINING PROGRAM

## 2021-07-11 PROCEDURE — 76775 US EXAM ABDO BACK WALL LIM: CPT

## 2021-07-11 PROCEDURE — B548ZZA ULTRASONOGRAPHY OF SUPERIOR VENA CAVA, GUIDANCE: ICD-10-PCS | Performed by: STUDENT IN AN ORGANIZED HEALTH CARE EDUCATION/TRAINING PROGRAM

## 2021-07-11 PROCEDURE — 87340 HEPATITIS B SURFACE AG IA: CPT | Performed by: INTERNAL MEDICINE

## 2021-07-11 PROCEDURE — 82550 ASSAY OF CK (CPK): CPT | Performed by: STUDENT IN AN ORGANIZED HEALTH CARE EDUCATION/TRAINING PROGRAM

## 2021-07-11 PROCEDURE — 84100 ASSAY OF PHOSPHORUS: CPT | Performed by: STUDENT IN AN ORGANIZED HEALTH CARE EDUCATION/TRAINING PROGRAM

## 2021-07-11 PROCEDURE — 82962 GLUCOSE BLOOD TEST: CPT

## 2021-07-11 PROCEDURE — 83735 ASSAY OF MAGNESIUM: CPT | Performed by: STUDENT IN AN ORGANIZED HEALTH CARE EDUCATION/TRAINING PROGRAM

## 2021-07-11 PROCEDURE — 76705 ECHO EXAM OF ABDOMEN: CPT

## 2021-07-11 PROCEDURE — 25010000002 ALBUMIN HUMAN 25% PER 50 ML: Performed by: INTERNAL MEDICINE

## 2021-07-11 PROCEDURE — 85025 COMPLETE CBC W/AUTO DIFF WBC: CPT | Performed by: STUDENT IN AN ORGANIZED HEALTH CARE EDUCATION/TRAINING PROGRAM

## 2021-07-11 PROCEDURE — 02HV33Z INSERTION OF INFUSION DEVICE INTO SUPERIOR VENA CAVA, PERCUTANEOUS APPROACH: ICD-10-PCS | Performed by: STUDENT IN AN ORGANIZED HEALTH CARE EDUCATION/TRAINING PROGRAM

## 2021-07-11 PROCEDURE — 25010000002 CALCIUM GLUCONATE 2-0.675 GM/100ML-% SOLUTION: Performed by: NURSE PRACTITIONER

## 2021-07-11 PROCEDURE — 25010000002 MAGNESIUM SULFATE 2 GM/50ML SOLUTION: Performed by: NURSE PRACTITIONER

## 2021-07-11 PROCEDURE — 63710000001 INSULIN LISPRO (HUMAN) PER 5 UNITS: Performed by: STUDENT IN AN ORGANIZED HEALTH CARE EDUCATION/TRAINING PROGRAM

## 2021-07-11 PROCEDURE — P9047 ALBUMIN (HUMAN), 25%, 50ML: HCPCS | Performed by: INTERNAL MEDICINE

## 2021-07-11 PROCEDURE — 5A1D70Z PERFORMANCE OF URINARY FILTRATION, INTERMITTENT, LESS THAN 6 HOURS PER DAY: ICD-10-PCS | Performed by: STUDENT IN AN ORGANIZED HEALTH CARE EDUCATION/TRAINING PROGRAM

## 2021-07-11 PROCEDURE — 83970 ASSAY OF PARATHORMONE: CPT | Performed by: STUDENT IN AN ORGANIZED HEALTH CARE EDUCATION/TRAINING PROGRAM

## 2021-07-11 PROCEDURE — 80053 COMPREHEN METABOLIC PANEL: CPT | Performed by: STUDENT IN AN ORGANIZED HEALTH CARE EDUCATION/TRAINING PROGRAM

## 2021-07-11 PROCEDURE — 25010000003 POTASSIUM CHLORIDE 10 MEQ/100ML SOLUTION: Performed by: NURSE PRACTITIONER

## 2021-07-11 RX ORDER — FENTANYL CITRATE 50 UG/ML
12.5 INJECTION, SOLUTION INTRAMUSCULAR; INTRAVENOUS
Status: DISCONTINUED | OUTPATIENT
Start: 2021-07-11 | End: 2021-07-13 | Stop reason: HOSPADM

## 2021-07-11 RX ORDER — ALBUTEROL SULFATE 2.5 MG/3ML
2.5 SOLUTION RESPIRATORY (INHALATION) EVERY 6 HOURS PRN
Status: DISCONTINUED | OUTPATIENT
Start: 2021-07-11 | End: 2021-07-13 | Stop reason: HOSPADM

## 2021-07-11 RX ORDER — POTASSIUM CHLORIDE 7.45 MG/ML
10 INJECTION INTRAVENOUS
Status: COMPLETED | OUTPATIENT
Start: 2021-07-11 | End: 2021-07-11

## 2021-07-11 RX ORDER — LANSOPRAZOLE
15 KIT EVERY 12 HOURS
Status: DISCONTINUED | OUTPATIENT
Start: 2021-07-11 | End: 2021-07-13 | Stop reason: HOSPADM

## 2021-07-11 RX ORDER — ASPIRIN 81 MG/1
81 TABLET ORAL DAILY
Status: DISCONTINUED | OUTPATIENT
Start: 2021-07-11 | End: 2021-07-13 | Stop reason: HOSPADM

## 2021-07-11 RX ORDER — ALBUMIN (HUMAN) 12.5 G/50ML
12.5 SOLUTION INTRAVENOUS AS NEEDED
Status: DISPENSED | OUTPATIENT
Start: 2021-07-11 | End: 2021-07-12

## 2021-07-11 RX ORDER — HYDRALAZINE HYDROCHLORIDE 25 MG/1
50 TABLET, FILM COATED ORAL 3 TIMES DAILY
Status: DISCONTINUED | OUTPATIENT
Start: 2021-07-11 | End: 2021-07-11

## 2021-07-11 RX ORDER — CALCIUM GLUCONATE 20 MG/ML
2 INJECTION, SOLUTION INTRAVENOUS ONCE
Status: COMPLETED | OUTPATIENT
Start: 2021-07-11 | End: 2021-07-11

## 2021-07-11 RX ORDER — MAGNESIUM SULFATE HEPTAHYDRATE 40 MG/ML
2 INJECTION, SOLUTION INTRAVENOUS ONCE
Status: COMPLETED | OUTPATIENT
Start: 2021-07-11 | End: 2021-07-11

## 2021-07-11 RX ORDER — SODIUM CHLORIDE 9 MG/ML
75 INJECTION, SOLUTION INTRAVENOUS CONTINUOUS
Status: DISCONTINUED | OUTPATIENT
Start: 2021-07-11 | End: 2021-07-12

## 2021-07-11 RX ORDER — PANTOPRAZOLE SODIUM 40 MG/10ML
40 INJECTION, POWDER, LYOPHILIZED, FOR SOLUTION INTRAVENOUS
Status: DISCONTINUED | OUTPATIENT
Start: 2021-07-11 | End: 2021-07-11

## 2021-07-11 RX ORDER — HEPARIN SODIUM 1000 [USP'U]/ML
4000 INJECTION, SOLUTION INTRAVENOUS; SUBCUTANEOUS AS NEEDED
Status: DISCONTINUED | OUTPATIENT
Start: 2021-07-11 | End: 2021-07-13 | Stop reason: HOSPADM

## 2021-07-11 RX ADMIN — MAGNESIUM SULFATE HEPTAHYDRATE 2 G: 2 INJECTION, SOLUTION INTRAVENOUS at 11:17

## 2021-07-11 RX ADMIN — Medication 10 ML: at 12:48

## 2021-07-11 RX ADMIN — ALBUMIN HUMAN 12.5 G: 0.25 SOLUTION INTRAVENOUS at 03:06

## 2021-07-11 RX ADMIN — INSULIN LISPRO 3 UNITS: 100 INJECTION, SOLUTION INTRAVENOUS; SUBCUTANEOUS at 17:27

## 2021-07-11 RX ADMIN — SODIUM CHLORIDE 75 ML/HR: 9 INJECTION, SOLUTION INTRAVENOUS at 12:50

## 2021-07-11 RX ADMIN — PANTOPRAZOLE SODIUM 40 MG: 40 INJECTION, POWDER, FOR SOLUTION INTRAVENOUS at 05:37

## 2021-07-11 RX ADMIN — CALCIUM GLUCONATE 2 G: 20 INJECTION, SOLUTION INTRAVENOUS at 11:18

## 2021-07-11 RX ADMIN — Medication 10 ML: at 21:46

## 2021-07-11 RX ADMIN — FENTANYL CITRATE 12.5 MCG: 50 INJECTION, SOLUTION INTRAMUSCULAR; INTRAVENOUS at 04:18

## 2021-07-11 RX ADMIN — Medication 10 ML: at 21:45

## 2021-07-11 RX ADMIN — SODIUM BICARBONATE: 84 INJECTION, SOLUTION INTRAVENOUS at 01:43

## 2021-07-11 RX ADMIN — SODIUM CHLORIDE 75 ML/HR: 9 INJECTION, SOLUTION INTRAVENOUS at 23:54

## 2021-07-11 RX ADMIN — ASPIRIN 81 MG: 81 TABLET, COATED ORAL at 12:49

## 2021-07-11 RX ADMIN — POTASSIUM CHLORIDE 10 MEQ: 7.46 INJECTION, SOLUTION INTRAVENOUS at 12:47

## 2021-07-11 RX ADMIN — LANSOPRAZOLE 15 MG: KIT at 21:45

## 2021-07-11 RX ADMIN — DOCUSATE SODIUM 50 MG AND SENNOSIDES 8.6 MG 2 TABLET: 8.6; 5 TABLET, FILM COATED ORAL at 21:45

## 2021-07-11 RX ADMIN — ALBUMIN HUMAN 12.5 G: 0.25 SOLUTION INTRAVENOUS at 01:52

## 2021-07-11 RX ADMIN — POTASSIUM CHLORIDE 10 MEQ: 7.46 INJECTION, SOLUTION INTRAVENOUS at 11:18

## 2021-07-11 NOTE — PROCEDURES
"Non-Tunneled Catheter    Date/Time: 7/11/2021 1:00 AM  Performed by: Marcelina Woodard APRN  Authorized by: Marcelina Woodard APRN   Consent: Written consent obtained.  Risks and benefits: risks, benefits and alternatives were discussed  Consent given by: Next of kin.  Patient understanding: patient states understanding of the procedure being performed  Patient consent: the patient's understanding of the procedure matches consent given  Procedure consent: procedure consent matches procedure scheduled  Relevant documents: relevant documents present and verified  Test results: test results available and properly labeled  Site marked: the operative site was marked  Imaging studies: imaging studies available  Required items: required blood products, implants, devices, and special equipment available  Patient identity confirmed: arm band, hospital-assigned identification number, provided demographic data and anonymous protocol, patient vented/unresponsive  Time out: Immediately prior to procedure a \"time out\" was called to verify the correct patient, procedure, equipment, support staff and site/side marked as required.  Indications: vascular access  Anesthesia: local infiltration    Anesthesia:  Local Anesthetic: lidocaine 1% without epinephrine  Anesthetic total: 5 mL  Preparation: skin prepped with ChloraPrep  Skin prep agent dried: skin prep agent completely dried prior to procedure  Sterile barriers: all five maximum sterile barriers used - cap, mask, sterile gown, sterile gloves, and large sterile sheet  Hand hygiene: hand hygiene performed prior to central venous catheter insertion  Location details: left internal jugular  Patient position: flat  Catheter type: triple lumen  Catheter size: 12X16.  Pre-procedure: landmarks identified  Ultrasound guidance: yes  Number of attempts: 2  Successful placement: yes  Post-procedure: line sutured and dressing applied  Assessment: blood return through all ports,  free fluid " flow and placement verified by x-ray  Patient tolerance: patient tolerated the procedure well with no immediate complications      Electronically signed by AMELIA Reed, 07/11/21, 1:02 AM EDT.

## 2021-07-11 NOTE — PROCEDURES
Indication: Hypotension     A time-out was completed verifying correct patient, procedure, positioning.  The patient was placed in a flat position. Central line placement was attempted X2 in right femoral vein without success. The patient tolerated the procedure well and there were no immediate complications.     Electronically signed by AMELIA Reed, 07/11/21, 1:08 AM EDT.

## 2021-07-11 NOTE — NURSING NOTE
Transferred patient to [facility] via bed with transporter, 2L nc. And tolerated well. VSS

## 2021-07-11 NOTE — CONSULTS
[facility] Medicine Services      Patient Name: Lesa Julio  : 1950  MRN: [id number]  Primary Care Physician: Manuel Sanders MD  Date of admission: 7/10/2021    Patient Care Team:  Manuel Sanders MD as PCP - General          Subjective   History Present Illness     Chief Complaint:   Chief Complaint   Patient presents with   • Hypoglycemia        History of present illness:  Ms. Julio is a 70 y.o. 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  In the emergency room the patient was found to have severe metabolic acidosis and acute renal failure with hyperkalemia as well as evidence of urinary tract infection.  She was admitted to the intensive care unit where temporary dialysis catheter was placed and the patient underwent hemodialysis.  Patient was subsequently felt to be stable enough to transfer out to the medical [place] and the hospitalist group has been consulted to take over medical management.    The patient has significant hearing loss but is able to hear with her right ear with a hearing aid from close up.  She reports no specific complaints except for mild diffuse abdominal pain persisting.  The 3 sisters at the bedside  report that she had had significantly more pain when she first came into the hospital.    ROS   12 point review of systems was reviewed and was negative except as above.        Personal History     Past Medical History:   Past Medical History:   Diagnosis Date   • Arthritis    • CKD (chronic kidney disease) stage 3, GFR 30-59 ml/min (CMS/[facility])     per [facility]   • Diabetes mellitus, type 2 (CMS/[facility])    • Essential hypertension 11/6/2015   • Grade 4 out of 6 intensity murmur 6/22/2020   • Hearing loss     hearing aid right side   • Hyperlipidemia    • Hypersensitivity angiitis (CMS/[facility]) 6/22/2020   • Hypertension    • Leukocytoclastic vasculitis (CMS/[facility])    • Obesity    • Osteoarthritis    • Ovarian cyst     removed; benign   • Peripheral arterial disease (CMS/[facility])    • Sleep apnea    • Tubular adenoma of colon 10/30/2018   • Vascular insufficiency 6/22/2020       Surgical History:      Past Surgical History:   Procedure Laterality Date   • BRONCHOSCOPY Bilateral 10/13/2020    Procedure: BRONCHOSCOPY AT BEDSIDE with right lung washing and elective intubation;  Surgeon: Cristhian Hodge MD;  Location: [facility] ENDOSCOPY;  Service: Pulmonary;  Laterality: Bilateral;  Post: pneumonia   • BRONCHOSCOPY Bilateral 10/20/2020    Procedure: BRONCHOSCOPY WITH BRONCHOALVEOLAR LAVAGE  AT BEDSIDE;  Surgeon: Cristhian Hodge MD;  Location: [facility] ENDOSCOPY;  Service: Pulmonary;  Laterality: Bilateral;  POST- PNEUMONIA   • BRONCHOSCOPY N/A 10/27/2020    Procedure: BRONCHOSCOPY with bronchial washing and intubation;  Surgeon: Cristhian Hodge MD;  Location: [facility] ENDOSCOPY;  Service: Pulmonary;  Laterality: N/A;  post op:pneumonia   • BRONCHOSCOPY N/A 12/14/2020    Procedure: BRONCHOSCOPY AT BEDSIDE WITH BRONCHOALVEOLAR LAVAGE;  Surgeon: Cristhian Hodge MD;  Location: [facility] ENDOSCOPY;  Service: Pulmonary;  Laterality: N/A;  PNEUMONIA   • CHOLECYSTECTOMY     • ENDOSCOPY N/A 10/13/2020    Procedure: ESOPHAGOGASTRODUODENOSCOPY AT  BEDSIDE;  Surgeon: Omi Rivera MD;  Location: [facility] ENDOSCOPY;  Service: Gastroenterology;  Laterality: N/A;   • LIVER BIOPSY     • TUBAL ABDOMINAL LIGATION             Family History: family history includes Cancer in her father; Heart disease in her mother. Family History was reviewed.     Social History:  reports that she has quit smoking. Her smoking use included cigarettes. She smoked 0.50 packs per day. She has never used smokeless tobacco. She reports that she does not drink alcohol and does not use drugs.      Medications:  Prior to Admission medications    Medication Sig Start Date End Date Taking? Authorizing Provider   albuterol sulfate  (90 Base) MCG/ACT inhaler Inhale 2 puffs Every 4 (Four) Hours As Needed for Wheezing.    Lise Carlos MD   aspirin (ADULT ASPIRIN EC LOW STRENGTH) 81 MG EC tablet Take 81 mg by mouth Daily. 11/6/15   Lise Carlos MD   gemfibrozil (LOPID) 600 MG tablet Take 600 mg by mouth 2 (Two) Times a Day Before Meals.    Lise Carlos MD   glipizide (GLUCOTROL XL) 10 MG 24 hr tablet Take 10 mg by mouth Daily.    Lise Carlos MD   hydrALAZINE (APRESOLINE) 50 MG tablet Take 50 mg by mouth 3 (Three) Times a Day.    Lise Carlos MD   Insulin Glargine (BASAGLAR KWIKPEN) 100 UNIT/ML injection pen Inject 20 Units under the skin into the appropriate area as directed 2 (Two) Times a Day.    Lise Carlos MD   metFORMIN (GLUCOPHAGE) 1000 MG tablet Take 1,000 mg by mouth Daily With Breakfast.    Lise Carlos MD   metFORMIN (GLUCOPHAGE) 500 MG tablet Take 500 mg by mouth Every Night.    Lise Carlos MD   metoprolol tartrate (LOPRESSOR) 50 MG tablet Take 50 mg by mouth 2 (Two) Times a Day.    Lise Carlos MD   olmesartan (BENICAR) 20 MG tablet Take 40 mg by mouth Daily.    Lise Carlos MD   rosuvastatin (CRESTOR) 10 MG tablet Take 10 mg by mouth Daily.    Lise Carlos MD   theophylline  (MEGHAN-24) 300 MG 24 hr capsule Take 300 mg by mouth Daily.    ProviderLise MD   vitamin D (ERGOCALCIFEROL) 1.25 MG (54418 UT) capsule capsule Take 50,000 Units by mouth 2 (Two) Times a Week. Monday and Thursday    ProviderLise MD       Allergies:    Allergies   Allergen Reactions   • Lisinopril Hives   • Ipratropium-Albuterol Itching and Rash       Objective   Objective     Vital Signs  Temp:  [97.3 °F (36.3 °C)-99.5 °F (37.5 °C)] 99.1 °F (37.3 °C)  Heart Rate:  [] 84  Resp:  [14-22] 22  BP: ()/() 123/103  SpO2:  [70 %-100 %] 95 %  on  Flow (L/min):  [2-4] 2;   Device (Oxygen Therapy): nasal cannula  Body mass index is 33.19 kg/m².    Physical Exam  Vital signs and nurses notes reviewed.  Well-developed over-nourished female in no acute distress sitting up in bed awake and alert eating; mucous membranes moist; sclerae anicteric;  lungs clear to auscultation bilaterally; CV regular rate and rhythm; abdomen soft nontender nondistended with active bowel sounds; extremities with no edema, cyanosis or calf tenderness; palpable pedal pulses bilaterally; neurologic exam grossly nonfocal; skin is warm and dry; Moreno catheter is present to bedside drain with samaria urine.    Results Review:  I have personally reviewed most recent cardiac tracings, lab results and radiology images and interpretations .    Results from last 7 days   Lab Units 07/11/21  0537   WBC 10*3/mm3 7.10   HEMOGLOBIN g/dL 10.7*   HEMATOCRIT % 31.6*   PLATELETS 10*3/mm3 178     Results from last 7 days   Lab Units 07/11/21  0537   SODIUM mmol/L 137   POTASSIUM mmol/L 3.1*   CHLORIDE mmol/L 95*   CO2 mmol/L 26.0   BUN mg/dL 39*   CREATININE mg/dL 2.08*   GLUCOSE mg/dL 83   CALCIUM mg/dL 7.9*   ALT (SGPT) U/L 5   AST (SGOT) U/L 10     Estimated Creatinine Clearance: 25.6 mL/min (A) (by C-G formula based on SCr of 2.08 mg/dL (H)).  Brief Urine Lab Results  (Last result in the past 365 days)      Color   Clarity   Blood    Leuk Est   Nitrite   Protein   CREAT   Urine HCG        07/10/21 1511 Dark Yellow  Comment:  Result checked  Cloudy  Comment:  Result checked  Negative Negative Negative 100 mg/dL (2+)               Microbiology Results (last 10 days)     Procedure Component Value - Date/Time    Respiratory Panel PCR w/COVID-19(SARS-CoV-2) ERA/SOURAV/MELISSA/PAD/COR/MAD/ALISA In-House, NP Swab in UTM/VTM, 3-4 HR TAT - Swab, Nasopharynx [[id number]]  (Normal) Collected: 07/10/21 1511    Lab Status: Final result Specimen: Swab from Nasopharynx Updated: 07/10/21 1610     ADENOVIRUS, PCR Not Detected     Coronavirus 229E Not Detected     Coronavirus HKU1 Not Detected     Coronavirus NL63 Not Detected     Coronavirus OC43 Not Detected     COVID19 Not Detected     Human Metapneumovirus Not Detected     Human Rhinovirus/Enterovirus Not Detected     Influenza A PCR Not Detected     Influenza B PCR Not Detected     Parainfluenza Virus 1 Not Detected     Parainfluenza Virus 2 Not Detected     Parainfluenza Virus 3 Not Detected     Parainfluenza Virus 4 Not Detected     RSV, PCR Not Detected     Bordetella pertussis pcr Not Detected     Bordetella parapertussis PCR Not Detected     Chlamydophila pneumoniae PCR Not Detected     Mycoplasma pneumo by PCR Not Detected    Narrative:      In the setting of a positive respiratory panel with a viral infection PLUS a negative procalcitonin without other underlying concern for bacterial infection, consider observing off antibiotics or discontinuation of antibiotics and continue supportive care. If the respiratory panel is positive for atypical bacterial infection (Bordetella pertussis, Chlamydophila pneumoniae, or Mycoplasma pneumoniae), consider antibiotic de-escalation to target atypical bacterial infection.          ECG/EMG Results (most recent)     Procedure Component Value Units Date/Time    ECG 12 Lead [[id number]] Collected: 07/10/21 1740     Updated: 07/10/21 1741     QT Interval 389 ms     Narrative:       HEART RATE= 71  bpm  RR Interval= 844  ms  [place] Interval= 206  ms  P Horizontal Axis= 17  deg  P Front Axis= 38  deg  QRSD Interval= 120  ms  QT Interval= 389  ms  QRS Axis= 3  deg  T Wave Axis= 131  deg  - ABNORMAL ECG -  Sinus rhythm  Nonspecific intraventricular conduction delay  Probable anterior infarct, age indeterminate  Abnormal T, consider ischemia, lateral leads  When compared with ECG of 10-Oct-2020 12:05:21,  Significant change in rhythm: previously atrial fibrillation  New or worsened ischemia or infarction  New conduction abnormality  Electronically Signed By:   Date and Time of Study: 2021-07-10 17:40:10              Results for orders placed during the hospital encounter of 10/08/20    Adult Transthoracic Echo Complete W/ Cont if Necessary Per Protocol    Interpretation Summary  · Estimated left ventricular EF = 65% Left ventricular systolic function is normal.  · Trace to mild mitral valve regurgitation is present.  · There is a small (<1cm) pericardial effusion.      CT Abdomen Pelvis Without Contrast    Result Date: 7/10/2021  1.No acute process identified within abdomen/pelivs. 2.Stable left adrenal nodule, likely an adenoma. 3.Small infraumbilical hernia containing a loop of small bowel, but no evidence of obstruction. 4.Calcification within left uterine fundus, likely a partially calcified fibroid.    Electronically Signed By-Onofre Nicholas MD On:7/10/2021 6:11 PM This report was finalized on [id number] by  Onofre Nicholas MD.    US Renal Limited    Result Date: 7/11/2021  Unremarkable renal ultrasound with no evidence of hydronephrosis.  Electronically Signed By-Day James MD On:7/11/2021 9:22 AM This report was finalized on [id number] by  Day James MD.    XR Chest 1 View    Result Date: 7/10/2021  Impression: Line placement as above. Mild pulmonary vascular prominence/congestion. Redemonstrated large cardiac silhouette. Electronically signed by:  Sheng Harris M.D.  7/10/2021  9:06 PM    XR Chest 1 View    Result Date: 7/10/2021  1.Lungs are clear. 2.Cardiomegaly.  Electronically Signed By-Onofre Nicholas MD On:7/10/2021 3:45 PM This report was finalized on [id number] by  Onofre Nicholas MD.    US Abdomen Limited    Result Date: 7/11/2021   1. The pancreas is not well visualized. 2. Heterogeneous liver parenchyma likely related to steatosis. 3. Status post cholecystectomy. No evidence of biliary ductal dilatation.  Electronically Signed By-Day James MD On:7/11/2021 9:17 AM This report was finalized on [id number] by  Day James MD.        Estimated Creatinine Clearance: 25.6 mL/min (A) (by C-G formula based on SCr of 2.08 mg/dL (H)).    Assessment/Plan   Assessment/Plan       Active Hospital Problems    Diagnosis  POA   • **ANAMARIA (acute kidney injury) (CMS/HCC) [N17.9]  Yes   • Elevated lipase [R74.8]  Yes   • Abdominal pain [R10.9]  Yes   • GERD (gastroesophageal reflux disease) [K21.9]  Yes   • Paroxysmal atrial fibrillation (CMS/HCC) [I48.0]  Yes   • Mixed hyperlipidemia [E78.2]  Yes   • Nonalcoholic fatty liver disease [K76.0]  Yes   • Allergic rhinitis [J30.9]  Yes   • Generalized weakness [R53.1]  Yes   • Rheumatoid arthritis (CMS/HCC) [M06.9]  Yes   • Obesity [E66.9]  Yes   • Vitamin D deficiency [E55.9]  Yes   • Obstructive sleep apnea syndrome [G47.33]  Yes   • Chronic obstructive pulmonary disease (CMS/HCC) [J44.9]  Yes   • Secondary hyperparathyroidism of renal origin (CMS/HCC) [N25.81]  Yes   • Chronic renal insufficiency, stage III (moderate) (CMS/HCC) [N18.30]  Yes   • Tobacco abuse [Z72.0]  Yes   • Cardiac murmur [R01.1]  Yes   • Diabetes mellitus, type 2 (CMS/HCC) [E11.9]  Yes   • Essential hypertension [I10]  Yes   • Hearing loss [H91.90]  Yes      Resolved Hospital Problems   No resolved problems to display.       Assessment and plan:    Acute renal failure with severe metabolic acidosis and hyperkalemia due to Metformin toxicity  -Nephrology consulting and  patient underwent hemodialysis and received a bicarb drip  -Continue present management and monitor    Chronic kidney disease stage IIIa with secondary hyperparathyroidism  -Baseline creatinine 0.95  -Creatinine on admission 5.99 and improved to 2.08 after dialysis    Hyperkalemia  -Patient received calcium gluconate, D50 W, regular insulin IV, Kayexalate, sodium bicarbonate and magnesium sulfate on 7/10/2021 with improvement of potassium from 6.9 on admission to 3.1 and potassium was replaced per nephrology    Insulin-dependent type 2 diabetes mellitus  -Discontinue Metformin  -Hold glipizide and glargine insulin  -Hemoglobin A1c 9.6  -SSI    Essential hypertension, chronic and controlled with relative hypotension  -Hold metoprolol, Benicar and hydralazine    Hyperlipidemia  -Hold Crestor and Lopid    Paroxysmal atrial fibrillation  -Metoprolol currently on hold due to hypotension  -No preadmission anticoagulants secondary to prior GI bleed, November 2020  -Currently rate controlled   -Monitor    Peripheral artery disease  -aspirin currently on hold    Elevated liver tests  -Hepatic steatosis noted on ultrasound    COPD not in exacerbation  -Hold theophylline  -Chart lists allergy to duo nebs    Obstructive sleep apnea  -Encourage CPAP compliance or follow-up with sleep medicine if she does not have onr    Rheumatoid arthritis  -No current home medications    Vitamin D deficiency  -Hold high-dose vitamin D while inpatient    GERD    History of cholecystectomy    Obesity  -Lifestyle modification counseling      VTE Prophylaxis -   Mechanical Order History:      Ordered        07/10/21 2055  Place Sequential Compression Device  Once         07/10/21 2055  Maintain Sequential Compression Device  Continuous                 Pharmalogical Order History:      Ordered     Dose Route Frequency Stop    07/11/21 0125  heparin (porcine) injection 4,000 Units      4,000 Units IK As Needed --                CODE STATUS:    Code  Status and Medical Interventions:   Ordered at: 07/10/21 2055     Code Status:    CPR     Medical Interventions (Level of Support Prior to Arrest):    Full       This patient has been examined wearing appropriate Personal Protective Equipment. 07/11/21      I discussed the patient's findings and my recommendations with patient.      Signature: Electronically signed by Fani Montana MD, 07/11/21, 2:00 PM EDT.      [facility], [place], [facility]

## 2021-07-11 NOTE — CONSULTS
Nutrition Services    Patient Name:  Lesa Julio  YOB: 1950  MRN: [id number]  Admit Date:  7/10/2021    Received consult for TF assessment. Upon speaking with RN via secure message, pt will actually be starting on a PO diet and will not require enteral nutrition support at this time.     RD remains available, as needed.     Electronically signed by:  Renee Fisher RD  07/11/21 14:55 EDT

## 2021-07-11 NOTE — PLAN OF CARE
Goal Outcome Evaluation:           Progress: improving   Received pt from ICU per bed.  Alert and oriented x3.  Confused to time but easily reoriented.  Family at bedside.  Assists with turns.  Hard of hearing.  Pt can hear staff if spoken to into right ear.  No c/o pain or shortness of breath.  Urine output 500ml since received to room 341.  Sister at bedside.  Fall precautions in place.  In no apparent distress.

## 2021-07-11 NOTE — PROGRESS NOTES
"                                                                                                                                      Nephrology  Progress Note                                        [facility]    Patient Identification    Name: Lesa Julio  Age: 70 y.o.  Sex: female  :  1950  MRN: [id number]      DATE OF SERVICE:  2021        Subective    Patient resting  No distress  Status post dialysis yesterday     Objective   Scheduled Meds:insulin lispro, 0-7 Units, Subcutaneous, Q6H  pantoprazole, 40 mg, Intravenous, Q AM  senna-docusate sodium, 2 tablet, Oral, BID  sodium chloride, 10 mL, Intravenous, Q12H  sodium chloride, 10 mL, Intravenous, Q12H  sodium chloride, 10 mL, Intravenous, Q12H  sodium chloride, 10 mL, Intravenous, Q12H          Continuous Infusions:phenylephrine, 0.5-6 mcg/kg/min, Last Rate: Stopped (21 0405)  custom IV infusion builder, , Last Rate: 150 mL/hr at 21 0543        PRN Meds:•  acetaminophen **OR** acetaminophen  •  albumin human  •  albuterol  •  aluminum-magnesium hydroxide-simethicone  •  senna-docusate sodium **AND** polyethylene glycol **AND** bisacodyl **AND** bisacodyl  •  dextrose  •  dextrose  •  fentanyl  •  glucagon (human recombinant)  •  heparin (porcine)  •  insulin lispro **AND** insulin lispro  •  ondansetron **OR** ondansetron  •  [COMPLETED] Insert peripheral IV **AND** sodium chloride  •  sodium chloride  •  sodium chloride  •  sodium chloride     Exam:  /67   Pulse 93   Temp 97.7 °F (36.5 °C)   Resp 22   Ht 160 cm (63\")   Wt 85 kg (187 lb 6.3 oz)   SpO2 95%   BMI 33.19 kg/m²     Intake/Output last 3 shifts:  I/O last 3 completed shifts:  In: 2300 [I.V.:2200; IV Piggyback:100]  Out: 1145 [Urine:545; Other:600]    Intake/Output this shift:  No intake/output data recorded.    Physical exam:  General Appearance: Resting  Head:  Normocephalic, without obvious abnormality, atraumatic  Eyes:  PERRL, " conjunctiva/corneas clear     Neck:  Supple,  no adenopathy;      Lungs:  Decreased BS occasion ronchi  Heart:  Regular rate and rhythm, S1 and S2 normal  Abdomen:  Soft, non-tender, bowel sounds active   Extremities: trace edema  Pulses: 2+ and symmetric all extremities  Skin:  No rashes or lesions       Data Review:  All labs (24hrs):   Recent Results (from the past 24 hour(s))   POC Glucose Once    Collection Time: 07/10/21  2:42 PM    Specimen: Blood   Result Value Ref Range    Glucose 81 70 - 105 mg/dL   Urinalysis With Culture If Indicated - Urine, Catheter    Collection Time: 07/10/21  3:11 PM    Specimen: Urine, Catheter   Result Value Ref Range    Color, UA Dark Yellow (A) Yellow, Straw    Appearance, UA Cloudy (A) Clear    pH, UA <=5.0 5.0 - 8.0    Specific Gravity, UA 1.022 1.005 - 1.030    Glucose, UA Negative Negative    Ketones, UA Trace (A) Negative    Bilirubin, UA Small (1+) (A) Negative    Blood, UA Negative Negative    Protein,  mg/dL (2+) (A) Negative    Leuk Esterase, UA Negative Negative    Nitrite, UA Negative Negative    Urobilinogen, UA 0.2 E.U./dL 0.2 - 1.0 E.U./dL   CBC Auto Differential    Collection Time: 07/10/21  3:11 PM    Specimen: Blood   Result Value Ref Range    WBC 11.10 (H) 3.40 - 10.80 10*3/mm3    RBC 4.78 3.77 - 5.28 10*6/mm3    Hemoglobin 12.9 12.0 - 15.9 g/dL    Hematocrit 41.2 34.0 - 46.6 %    MCV 86.3 79.0 - 97.0 fL    MCH 27.1 26.6 - 33.0 pg    MCHC 31.4 (L) 31.5 - 35.7 g/dL    RDW 18.7 (H) 12.3 - 15.4 %    RDW-SD 57.3 (H) 37.0 - 54.0 fl    MPV 8.7 6.0 - 12.0 fL    Platelets 318 140 - 450 10*3/mm3   Respiratory Panel PCR w/COVID-19(SARS-CoV-2) ERA/SOURAV/MELISSA/PAD/COR/MAD/ALISA In-House, NP Swab in UTM/VTM, 3-4 HR TAT - Swab, Nasopharynx    Collection Time: 07/10/21  3:11 PM    Specimen: Nasopharynx; Swab   Result Value Ref Range    ADENOVIRUS, PCR Not Detected Not Detected    Coronavirus 229E Not Detected Not Detected    Coronavirus HKU1 Not Detected Not Detected     Coronavirus NL63 Not Detected Not Detected    Coronavirus OC43 Not Detected Not Detected    COVID19 Not Detected Not Detected - Ref. Range    Human Metapneumovirus Not Detected Not Detected    Human Rhinovirus/Enterovirus Not Detected Not Detected    Influenza A PCR Not Detected Not Detected    Influenza B PCR Not Detected Not Detected    Parainfluenza Virus 1 Not Detected Not Detected    Parainfluenza Virus 2 Not Detected Not Detected    Parainfluenza Virus 3 Not Detected Not Detected    Parainfluenza Virus 4 Not Detected Not Detected    RSV, PCR Not Detected Not Detected    Bordetella pertussis pcr Not Detected Not Detected    Bordetella parapertussis PCR Not Detected Not Detected    Chlamydophila pneumoniae PCR Not Detected Not Detected    Mycoplasma pneumo by PCR Not Detected Not Detected   Green Top (Gel)    Collection Time: 07/10/21  3:11 PM   Result Value Ref Range    Extra Tube Hold for add-ons.    Scan Slide    Collection Time: 07/10/21  3:11 PM    Specimen: Blood   Result Value Ref Range    Scan Slide     Urinalysis, Microscopic Only - Urine, Catheter    Collection Time: 07/10/21  3:11 PM    Specimen: Urine, Catheter   Result Value Ref Range    RBC, UA 0-2 (A) None Seen /HPF    WBC, UA 21-30 (A) None Seen /HPF    Bacteria, UA Trace (A) None Seen /HPF    Squamous Epithelial Cells, UA 3-6 (A) None Seen, 0-2 /HPF    Transitional Epithelial Cells, UA 0-2 0 - 2 /HPF    Renal Epithelial Cells, UA 0-2 0 - 2 /HPF    Hyaline Casts, UA None Seen None Seen /LPF    Amorphous Crystals, UA Small/1+ None Seen /HPF    Methodology Manual Light Microscopy    Manual Differential    Collection Time: 07/10/21  3:11 PM    Specimen: Blood   Result Value Ref Range    Neutrophil % 79.0 (H) 42.7 - 76.0 %    Lymphocyte % 14.0 (L) 19.6 - 45.3 %    Monocyte % 6.0 5.0 - 12.0 %    Myelocyte % 1.0 (H) 0.0 - 0.0 %    Neutrophils Absolute 8.77 (H) 1.70 - 7.00 10*3/mm3    Lymphocytes Absolute 1.55 0.70 - 3.10 10*3/mm3    Monocytes Absolute  0.67 0.10 - 0.90 10*3/mm3    nRBC 1.0 (H) 0.0 - 0.2 /100 WBC    Anisocytosis Slight/1+ None Seen    Crenated RBC's Mod/2+ None Seen    WBC Morphology Normal Normal    Platelet Estimate Adequate Normal    Large Platelets Slight/1+ None Seen   CK    Collection Time: 07/10/21  3:11 PM    Specimen: Blood   Result Value Ref Range    Creatine Kinase 47 20 - 180 U/L   Comprehensive Metabolic Panel    Collection Time: 07/10/21  4:40 PM    Specimen: Blood   Result Value Ref Range    Glucose 203 (H) 65 - 99 mg/dL     (H) 8 - 23 mg/dL    Creatinine 5.99 (H) 0.57 - 1.00 mg/dL    Sodium 136 136 - 145 mmol/L    Potassium 6.9 (C) 3.5 - 5.2 mmol/L    Chloride 106 98 - 107 mmol/L    CO2 8.0 (L) 22.0 - 29.0 mmol/L    Calcium 8.9 8.6 - 10.5 mg/dL    Total Protein 6.9 6.0 - 8.5 g/dL    Albumin 4.00 3.50 - 5.20 g/dL    ALT (SGPT) 9 1 - 33 U/L    AST (SGOT) 11 1 - 32 U/L    Alkaline Phosphatase 132 (H) 39 - 117 U/L    Total Bilirubin 0.3 0.0 - 1.2 mg/dL    eGFR Non African Amer 7 (L) >60 mL/min/1.73    eGFR  African Amer      Globulin 2.9 gm/dL    A/G Ratio 1.4 g/dL    BUN/Creatinine Ratio 17.2 7.0 - 25.0    Anion Gap 22.0 (H) 5.0 - 15.0 mmol/L   Lipase    Collection Time: 07/10/21  4:40 PM    Specimen: Blood   Result Value Ref Range    Lipase 391 (H) 13 - 60 U/L   Acetone    Collection Time: 07/10/21  4:40 PM    Specimen: Blood   Result Value Ref Range    Acetone Negative Negative   ECG 12 Lead    Collection Time: 07/10/21  5:40 PM   Result Value Ref Range    QT Interval 389 ms   Blood Gas, Venous -    Collection Time: 07/10/21  6:08 PM    Specimen: Venous Blood   Result Value Ref Range    Site CentralLine     pH, Venous 6.966 (C) 7.320 - 7.430 pH Units    pCO2, Venous 35.7 (L) 42.0 - 51.0 mm Hg    pO2, Venous 48.0 (H) 40.0 - 42.0 mm Hg    HCO3, Venous 8.2 (L) 22.0 - 26.0 mmol/L    Base Excess, Venous -23.1 (L) -2.0 - 2.0 mmol/L    O2 Saturation, Venous 60.1 (L) 95.0 - 99.0 %    CO2 Content 9.2 (L) 22 - 29 mmol/L    Barometric  Pressure for Blood Gas      Modality Room Air     FIO2 21 %   POC Glucose Once    Collection Time: 07/10/21  6:08 PM    Specimen: Blood   Result Value Ref Range    Glucose 274 (H) 74 - 100 mg/dL   Basic Metabolic Panel    Collection Time: 07/10/21  8:45 PM    Specimen: Blood   Result Value Ref Range    Glucose 234 (H) 65 - 99 mg/dL     (H) 8 - 23 mg/dL    Creatinine 5.58 (H) 0.57 - 1.00 mg/dL    Sodium 139 136 - 145 mmol/L    Potassium 5.9 (H) 3.5 - 5.2 mmol/L    Chloride 108 (H) 98 - 107 mmol/L    CO2 10.0 (L) 22.0 - 29.0 mmol/L    Calcium 8.5 (L) 8.6 - 10.5 mg/dL    eGFR  African Amer      eGFR Non African Amer 8 (L) >60 mL/min/1.73    BUN/Creatinine Ratio 18.3 7.0 - 25.0    Anion Gap 21.0 (H) 5.0 - 15.0 mmol/L   POC Glucose Once    Collection Time: 07/10/21 11:58 PM    Specimen: Blood   Result Value Ref Range    Glucose 149 (H) 70 - 105 mg/dL   Hepatitis B Surface Antigen    Collection Time: 07/11/21  1:42 AM    Specimen: Blood   Result Value Ref Range    Hepatitis B Surface Ag Non-Reactive Non-Reactive   Magnesium    Collection Time: 07/11/21  5:37 AM    Specimen: Blood   Result Value Ref Range    Magnesium 1.4 (L) 1.6 - 2.4 mg/dL   Phosphorus    Collection Time: 07/11/21  5:37 AM    Specimen: Blood   Result Value Ref Range    Phosphorus 2.7 2.5 - 4.5 mg/dL   Comprehensive Metabolic Panel    Collection Time: 07/11/21  5:37 AM    Specimen: Blood   Result Value Ref Range    Glucose 83 65 - 99 mg/dL    BUN 39 (H) 8 - 23 mg/dL    Creatinine 2.08 (H) 0.57 - 1.00 mg/dL    Sodium 137 136 - 145 mmol/L    Potassium 3.1 (L) 3.5 - 5.2 mmol/L    Chloride 95 (L) 98 - 107 mmol/L    CO2 26.0 22.0 - 29.0 mmol/L    Calcium 7.9 (L) 8.6 - 10.5 mg/dL    Total Protein 5.9 (L) 6.0 - 8.5 g/dL    Albumin 3.90 3.50 - 5.20 g/dL    ALT (SGPT) 5 1 - 33 U/L    AST (SGOT) 10 1 - 32 U/L    Alkaline Phosphatase 105 39 - 117 U/L    Total Bilirubin 0.3 0.0 - 1.2 mg/dL    eGFR Non African Amer 23 (L) >60 mL/min/1.73    Globulin 2.0 gm/dL     A/G Ratio 2.0 g/dL    BUN/Creatinine Ratio 18.8 7.0 - 25.0    Anion Gap 16.0 (H) 5.0 - 15.0 mmol/L   CBC Auto Differential    Collection Time: 07/11/21  5:37 AM    Specimen: Blood   Result Value Ref Range    WBC 7.10 3.40 - 10.80 10*3/mm3    RBC 3.90 3.77 - 5.28 10*6/mm3    Hemoglobin 10.7 (L) 12.0 - 15.9 g/dL    Hematocrit 31.6 (L) 34.0 - 46.6 %    MCV 81.0 79.0 - 97.0 fL    MCH 27.3 26.6 - 33.0 pg    MCHC 33.7 31.5 - 35.7 g/dL    RDW 17.4 (H) 12.3 - 15.4 %    RDW-SD 49.0 37.0 - 54.0 fl    MPV 7.9 6.0 - 12.0 fL    Platelets 178 140 - 450 10*3/mm3    Neutrophil % 82.9 (H) 42.7 - 76.0 %    Lymphocyte % 7.5 (L) 19.6 - 45.3 %    Monocyte % 8.5 5.0 - 12.0 %    Eosinophil % 0.6 0.3 - 6.2 %    Basophil % 0.5 0.0 - 1.5 %    Neutrophils, Absolute 5.90 1.70 - 7.00 10*3/mm3    Lymphocytes, Absolute 0.50 (L) 0.70 - 3.10 10*3/mm3    Monocytes, Absolute 0.60 0.10 - 0.90 10*3/mm3    Eosinophils, Absolute 0.00 0.00 - 0.40 10*3/mm3    Basophils, Absolute 0.00 0.00 - 0.20 10*3/mm3    nRBC 0.1 0.0 - 0.2 /100 WBC   Amylase    Collection Time: 07/11/21  5:37 AM    Specimen: Blood   Result Value Ref Range    Amylase 75 28 - 100 U/L   Lipase    Collection Time: 07/11/21  5:37 AM    Specimen: Blood   Result Value Ref Range    Lipase 149 (H) 13 - 60 U/L   CK    Collection Time: 07/11/21  5:37 AM    Specimen: Blood   Result Value Ref Range    Creatine Kinase 37 20 - 180 U/L   PTH, Intact    Collection Time: 07/11/21  5:37 AM    Specimen: Blood   Result Value Ref Range    PTH, Intact 448.3 (H) 15.0 - 65.0 pg/mL   POC Glucose Once    Collection Time: 07/11/21  5:41 AM    Specimen: Blood   Result Value Ref Range    Glucose 87 70 - 105 mg/dL          Imaging:  [unfilled]    Assessment/Plan:     ANAMARIA (acute kidney injury) (CMS/[facility])    Cardiac murmur    Chronic obstructive pulmonary disease (CMS/[facility])    Chronic renal insufficiency, stage III (moderate) (CMS/[facility])    Diabetes mellitus, type 2 (CMS/[facility])    Essential hypertension    Rheumatoid  arthritis (CMS/HCC)    Tobacco abuse    Allergic rhinitis    Generalized weakness    Hearing loss    Mixed hyperlipidemia    Nonalcoholic fatty liver disease    Obesity    Obstructive sleep apnea syndrome    Vitamin D deficiency    Secondary hyperparathyroidism of renal origin (CMS/HCC)    Paroxysmal atrial fibrillation (CMS/HCC)    GERD (gastroesophageal reflux disease)    Elevated lipase    Abdominal pain       Severe metabolic acidosis  Hyperkalemia  Acute kidney injury  Hypoglycemia  History of diabetes  Obesity  Elevated liver function test  Diabetes  Obstructive sleep apnea  Vitamin D deficiency      Status post dialysis yesterday  DC bicarb drip  Critical care time 31 minutes  Normal saline at 75 cc an hour  Urine output better  Recheck labs this afternoon  Elevated PTH suspecting secondary hyperparathyroidism  We may need to do parathyroid sestamibi scan to rule out primary hyperparathyroidism  Replace magnesium

## 2021-07-11 NOTE — PROGRESS NOTES
PULMONARY/CRITICAL CARE PROGRESS NOTE       NAME:     Lesa Julio   AGE:     70 y.o.   SEX:  female   : 1950       MRN:       [id number]          RM: [facility] ICU      ASSESSMENT & PLAN     F/U: Acute kidney injury, critical care management    Principal Problem:    ANAMARIA (acute kidney injury) (CMS/[facility])  Active Problems:    Cardiac murmur    Chronic obstructive pulmonary disease (CMS/[facility])    Chronic renal insufficiency, stage III (moderate) (CMS/[facility])    Diabetes mellitus, type 2 (CMS/[facility])    Essential hypertension    Rheumatoid arthritis (CMS/[facility])    Tobacco abuse    Allergic rhinitis    Generalized weakness    Hearing loss    Mixed hyperlipidemia    Nonalcoholic fatty liver disease    Obesity    Obstructive sleep apnea syndrome    Vitamin D deficiency    Secondary hyperparathyroidism of renal origin (CMS/[facility])    Paroxysmal atrial fibrillation (CMS/[facility])    GERD (gastroesophageal reflux disease)    Elevated lipase    Abdominal pain       Multidisciplinary Rounds:  -No acute events overnight  -Confused however more awake and alert this morning  -Received dialysis yesterday evening  -Bicarb drip discontinued per nephrology    Assessment and Plan:  Kidney injury, acute on chronic stage III, with metabolic acidosis    --likely metformin toxicity  -UA reviewed  -Renal ultrasound reviewed, unremarkable with no evidence of hydronephrosis  -Monitor and trend labs  -Avoid nephrotoxic medications, hypotension, and NSAIDS  -Renally dose medications  -Monitor urine output  -Nephrology following     Elevated lipase  -Ultrasound abdomen-findings consistent with steatosis  -Lipase elevated, improving  -Amylase not elevated     Diabetes mellitus type 2, chronic  -A1C pending  -Hold home diabetes medications  -Start SSI  -Accu-checks Q 6 hours     COPD, not in exacerbation  -Continue home inhalers  -Incentive spirometry  -Titrate O2 for sats > 90%     Essential Hypertension, Chronic; hyperlipidemia  -Home medications  held due to marginal blood pressure  - Monitor with routine vital signs     COPD, not in acute exacerbation  -Hold theophylline for now     RAMONE  -CPAP at night, instructed to have family bring home machine.  Otherwise use hospital BiPAP  -Titrate O2 to keep SATS >90%           Code Status: Full  VTE Prophylaxis: SCDs  PUD Prophylaxis: PPI      [place] & SUBJECTIVE   Lesa Julio is a 70 y.o. 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   Patient presented to [facility] on 7/10/2021 with complaint of hypoglycemia. Patient is extremely hard of hearing making HPI difficult to obtain, information obtained from chart review. The patient had a low blood sugar at home. She takes metformin, glipizide, and Glargine insulin at home. The patient states she has had some generalized abdominal pain since 7/9/2021. She denies emesis, diarrhea, or constipation. Additionally, the patient states she has had some difficulty ambulating due to bilateral leg weakness. She denies exacerbating or relieving factors.     In the ED, labs were obtained with abnormalities as follows: Glucose 203, potassium 6.9, CO2 8, anion gap 22, , creatinine 5.99, alkaline phosphatase 132, lipase 391, WBCs 11.1.  Urinalysis shows trace ketones, 2+ protein, 1+ bilirubin, 21-30 white blood cells, and trace bacteria; urine culture pending.      REVIEW OF SYSTEMS:  7/11: No acute events overnight.  S/p dialysis.  More awake although remains confused.  No  "shortness of air    MEDICATIONS     SCHEDULED MEDICATIONS:   calcium gluconate, 2 g, Intravenous, Once  insulin lispro, 0-7 Units, Subcutaneous, Q6H  magnesium sulfate, 2 g, Intravenous, Once  pantoprazole, 40 mg, Intravenous, Q AM  potassium chloride, 10 mEq, Intravenous, Q1H  senna-docusate sodium, 2 tablet, Oral, BID  sodium chloride, 10 mL, Intravenous, Q12H  sodium chloride, 10 mL, Intravenous, Q12H  sodium chloride, 10 mL, Intravenous, Q12H  sodium chloride, 10 mL, Intravenous, Q12H        CONTINUOUS INFUSIONS:   phenylephrine, 0.5-6 mcg/kg/min, Last Rate: Stopped (07/11/21 0405)  custom IV infusion builder, , Last Rate: 150 mL/hr at 07/11/21 0543        PRN MEDS:    acetaminophen **OR** acetaminophen    albumin human    albuterol    aluminum-magnesium hydroxide-simethicone    senna-docusate sodium **AND** polyethylene glycol **AND** bisacodyl **AND** bisacodyl    dextrose    dextrose    fentanyl    glucagon (human recombinant)    heparin (porcine)    insulin lispro **AND** insulin lispro    ondansetron **OR** ondansetron    [COMPLETED] Insert peripheral IV **AND** sodium chloride    sodium chloride    sodium chloride    sodium chloride    OBJECTIVE     VITAL SIGNS:  /67   Pulse 93   Temp 97.7 °F (36.5 °C)   Resp 22   Ht 160 cm (63\")   Wt 85 kg (187 lb 6.3 oz)   SpO2 95%   BMI 33.19 kg/m²     I/O FROM PREVIOUS 3 SHIFTS:  I/O last 3 completed shifts:  In: 2300 [I.V.:2200; IV Piggyback:100]  Out: 1145 [Urine:545; Other:600]    I/O THIS SHIFT:  No intake/output data recorded.     PHYSICAL EXAM:  General Appearance:  Alert, cooperative, no distress, appears chronically ill  Head:  Normocephalic, without obvious abnormality, atraumatic  Eyes:  PERRL, conjunctiva/corneas clear, EOM not assessed     Neck:  Supple,  no JVD, trachea midline  Lungs:   Breath sounds coarse with scattered rhonchi and mild wheezing at times,, respirations unlabored without accessory muscle use  Chest wall:  Symmetrical chest " wall movement  Heart:  Regular rate and rhythm, S1 and S2 normal, +EDDIE, no rub or gallop  Abdomen:  Soft, non-tender, bowel sounds active all four quadrants, no rebound or guarding   Extremities:  no clubbing, no cyanosis or edema  Skin:  No rashes or lesions  Neurologic:   Alert and oriented, no lateralizing deficits      RESULTS     LABS:  Lab Results (last 24 hours)       Procedure Component Value Units Date/Time    Comprehensive Metabolic Panel [[id number]]  (Abnormal) Collected: 07/11/21 0537    Specimen: Blood Updated: 07/11/21 0616     Glucose 83 mg/dL      BUN 39 mg/dL      Creatinine 2.08 mg/dL      Sodium 137 mmol/L      Potassium 3.1 mmol/L      Chloride 95 mmol/L      CO2 26.0 mmol/L      Calcium 7.9 mg/dL      Total Protein 5.9 g/dL      Albumin 3.90 g/dL      ALT (SGPT) 5 U/L      AST (SGOT) 10 U/L      Alkaline Phosphatase 105 U/L      Total Bilirubin 0.3 mg/dL      eGFR Non African Amer 23 mL/min/1.73      Globulin 2.0 gm/dL      A/G Ratio 2.0 g/dL      BUN/Creatinine Ratio 18.8     Anion Gap 16.0 mmol/L     Narrative:      GFR Normal >60  Chronic Kidney Disease <60  Kidney Failure <15      CBC & Differential [[id number]]  (Abnormal) Collected: 07/11/21 0537    Specimen: Blood Updated: 07/11/21 0615    Narrative:      The following orders were created for panel order CBC & Differential.  Procedure                               Abnormality         Status                     ---------                               -----------         ------                     CBC Auto Differential[[id number]]        Abnormal            Final result                 Please view results for these tests on the individual orders.    CBC Auto Differential [[id number]]  (Abnormal) Collected: 07/11/21 0537    Specimen: Blood Updated: 07/11/21 0615     WBC 7.10 10*3/mm3      RBC 3.90 10*6/mm3      Hemoglobin 10.7 g/dL      Comment: Result checked         Hematocrit 31.6 %      Comment: Result checked         MCV 81.0 fL       Comment: Result checked         MCH 27.3 pg      MCHC 33.7 g/dL      Comment: Result checked         RDW 17.4 %      RDW-SD 49.0 fl      MPV 7.9 fL      Platelets 178 10*3/mm3      Neutrophil % 82.9 %      Lymphocyte % 7.5 %      Monocyte % 8.5 %      Eosinophil % 0.6 %      Basophil % 0.5 %      Neutrophils, Absolute 5.90 10*3/mm3      Lymphocytes, Absolute 0.50 10*3/mm3      Monocytes, Absolute 0.60 10*3/mm3      Eosinophils, Absolute 0.00 10*3/mm3      Basophils, Absolute 0.00 10*3/mm3      nRBC 0.1 /100 WBC     Lipase [[id number]]  (Abnormal) Collected: 07/11/21 0537    Specimen: Blood Updated: 07/11/21 0613     Lipase 149 U/L     CK [[id number]]  (Normal) Collected: 07/11/21 0537    Specimen: Blood Updated: 07/11/21 0613     Creatine Kinase 37 U/L     Amylase [[id number]]  (Normal) Collected: 07/11/21 0537    Specimen: Blood Updated: 07/11/21 0613     Amylase 75 U/L     Magnesium [[id number]]  (Abnormal) Collected: 07/11/21 0537    Specimen: Blood Updated: 07/11/21 0613     Magnesium 1.4 mg/dL     Phosphorus [[id number]]  (Normal) Collected: 07/11/21 0537    Specimen: Blood Updated: 07/11/21 0613     Phosphorus 2.7 mg/dL     PTH, Intact [[id number]]  (Abnormal) Collected: 07/11/21 0537    Specimen: Blood Updated: 07/11/21 0612     PTH, Intact 448.3 pg/mL     Narrative:      Results may be falsely decreased if patient taking Biotin.      POC Glucose Once [[id number]]  (Normal) Collected: 07/11/21 0541    Specimen: Blood Updated: 07/11/21 0547     Glucose 87 mg/dL      Comment: Serial Number: [id number]:  [id number]       Hepatitis B Surface Antigen [[id number]]  (Normal) Collected: 07/11/21 0142    Specimen: Blood Updated: 07/11/21 0212     Hepatitis B Surface Ag Non-Reactive    POC Glucose Once [[id number]]  (Abnormal) Collected: 07/10/21 2358    Specimen: Blood Updated: 07/10/21 2359     Glucose 149 mg/dL      Comment: Serial Number: [id number]:  [id number]       Basic Metabolic Panel [[id number]]   (Abnormal) Collected: 07/10/21 2045    Specimen: Blood Updated: 07/10/21 2125     Glucose 234 mg/dL       mg/dL      Creatinine 5.58 mg/dL      Sodium 139 mmol/L      Potassium 5.9 mmol/L      Comment: Slight hemolysis detected by analyzer. Results may be affected.        Chloride 108 mmol/L      CO2 10.0 mmol/L      Calcium 8.5 mg/dL      eGFR   Amer --     Comment: <15 Indicative of kidney failure.        eGFR Non African Amer 8 mL/min/1.73      Comment: <15 Indicative of kidney failure.        BUN/Creatinine Ratio 18.3     Anion Gap 21.0 mmol/L     Narrative:      GFR Normal >60  Chronic Kidney Disease <60  Kidney Failure <15      Hemoglobin A1c [[id number]] Collected: 07/10/21 1511    Specimen: Blood Updated: 07/10/21 2101    Blood Gas, Venous - [[id number]]  (Abnormal) Collected: 07/10/21 1808    Specimen: Venous Blood Updated: 07/10/21 1833     Site [facility]     pH, Venous 6.966 pH Units      pCO2, Venous 35.7 mm Hg      pO2, Venous 48.0 mm Hg      HCO3, Venous 8.2 mmol/L      Base Excess, Venous -23.1 mmol/L      Comment: Serial Number: [id number]:  [id number]        O2 Saturation, Venous 60.1 %      CO2 Content 9.2 mmol/L      Barometric Pressure for Blood Gas --     Comment: N/A        Modality Room Air     FIO2 21 %     POC Glucose Once [[id number]]  (Abnormal) Collected: 07/10/21 1808    Specimen: Blood Updated: 07/10/21 1826     Glucose 274 mg/dL      Comment: Serial Number: [id number]:  [id number]       CK [[id number]]  (Normal) Collected: 07/10/21 1511    Specimen: Blood Updated: 07/10/21 1805     Creatine Kinase 47 U/L     Acetone [[id number]]  (Normal) Collected: 07/10/21 1640    Specimen: Blood Updated: 07/10/21 1801     Acetone Negative    Lipase [[id number]]  (Abnormal) Collected: 07/10/21 1640    Specimen: Blood Updated: 07/10/21 1730     Lipase 391 U/L     Comprehensive Metabolic Panel [[id number]]  (Abnormal) Collected: 07/10/21 1640    Specimen: Blood Updated: 07/10/21 1727     Glucose  203 mg/dL       mg/dL      Creatinine 5.99 mg/dL      Sodium 136 mmol/L      Potassium 6.9 mmol/L      Comment: Slight hemolysis detected by analyzer. Results may be affected.        Chloride 106 mmol/L      CO2 8.0 mmol/L      Calcium 8.9 mg/dL      Total Protein 6.9 g/dL      Albumin 4.00 g/dL      ALT (SGPT) 9 U/L      AST (SGOT) 11 U/L      Alkaline Phosphatase 132 U/L      Total Bilirubin 0.3 mg/dL      eGFR Non African Amer 7 mL/min/1.73      Comment: <15 Indicative of kidney failure.        eGFR   Amer --     Comment: <15 Indicative of kidney failure.        Globulin 2.9 gm/dL      A/G Ratio 1.4 g/dL      BUN/Creatinine Ratio 17.2     Anion Gap 22.0 mmol/L     Narrative:      GFR Normal >60  Chronic Kidney Disease <60  Kidney Failure <15      Extra Tubes [[id number]] Collected: 07/10/21 1511    Specimen: Blood Updated: 07/10/21 1615    Narrative:      The following orders were created for panel order Extra Tubes.  Procedure                               Abnormality         Status                     ---------                               -----------         ------                     Green Top (Gel)[[id number]]                                  Final result                 Please view results for these tests on the individual orders.    Green Top (Gel) [[id number]] Collected: 07/10/21 1511    Specimen: Blood Updated: 07/10/21 1615     Extra Tube Hold for add-ons.     Comment: Auto resulted.       Respiratory Panel PCR w/COVID-19(SARS-CoV-2) ERA/SOURAV/MELISSA/PAD/COR/MAD/ALISA In-House, NP Swab in UTM/VTM, 3-4 HR TAT - Swab, Nasopharynx [[id number]]  (Normal) Collected: 07/10/21 1511    Specimen: Swab from Nasopharynx Updated: 07/10/21 1610     ADENOVIRUS, PCR Not Detected     Coronavirus 229E Not Detected     Coronavirus HKU1 Not Detected     Coronavirus NL63 Not Detected     Coronavirus OC43 Not Detected     COVID19 Not Detected     Human Metapneumovirus Not Detected     Human Rhinovirus/Enterovirus Not  Detected     Influenza A PCR Not Detected     Influenza B PCR Not Detected     Parainfluenza Virus 1 Not Detected     Parainfluenza Virus 2 Not Detected     Parainfluenza Virus 3 Not Detected     Parainfluenza Virus 4 Not Detected     RSV, PCR Not Detected     Bordetella pertussis pcr Not Detected     Bordetella parapertussis PCR Not Detected     Chlamydophila pneumoniae PCR Not Detected     Mycoplasma pneumo by PCR Not Detected    Narrative:      In the setting of a positive respiratory panel with a viral infection PLUS a negative procalcitonin without other underlying concern for bacterial infection, consider observing off antibiotics or discontinuation of antibiotics and continue supportive care. If the respiratory panel is positive for atypical bacterial infection (Bordetella pertussis, Chlamydophila pneumoniae, or Mycoplasma pneumoniae), consider antibiotic de-escalation to target atypical bacterial infection.    CBC & Differential [[id number]]  (Abnormal) Collected: 07/10/21 1511    Specimen: Blood Updated: 07/10/21 1601    Narrative:      The following orders were created for panel order CBC & Differential.  Procedure                               Abnormality         Status                     ---------                               -----------         ------                     Scan Slide[[id number]]                                       Final result               CBC Auto Differential[[id number]]        Abnormal            Final result                 Please view results for these tests on the individual orders.    Scan Slide [[id number]] Collected: 07/10/21 1511    Specimen: Blood Updated: 07/10/21 1601     Scan Slide --     Comment: See Manual Differential Results       Manual Differential [[id number]]  (Abnormal) Collected: 07/10/21 1511    Specimen: Blood Updated: 07/10/21 1601     Neutrophil % 79.0 %      Lymphocyte % 14.0 %      Monocyte % 6.0 %      Myelocyte % 1.0 %      Neutrophils Absolute 8.77  10*3/mm3      Lymphocytes Absolute 1.55 10*3/mm3      Monocytes Absolute 0.67 10*3/mm3      nRBC 1.0 /100 WBC      Anisocytosis Slight/1+     Crenated RBC's Mod/2+     WBC Morphology Normal     Platelet Estimate Adequate     Large Platelets Slight/1+    CBC Auto Differential [[id number]]  (Abnormal) Collected: 07/10/21 1511    Specimen: Blood Updated: 07/10/21 1601     WBC 11.10 10*3/mm3      RBC 4.78 10*6/mm3      Hemoglobin 12.9 g/dL      Hematocrit 41.2 %      MCV 86.3 fL      MCH 27.1 pg      MCHC 31.4 g/dL      RDW 18.7 %      RDW-SD 57.3 fl      MPV 8.7 fL      Platelets 318 10*3/mm3     Narrative:      The previously reported component NRBC is no longer being reported. Previous result was 0.0 /100 WBC (Reference Range: 0.0-0.2 /100 WBC) on 7/10/2021 at 1531 EDT.    Urinalysis, Microscopic Only - Urine, Catheter [[id number]]  (Abnormal) Collected: 07/10/21 1511    Specimen: Urine, Catheter Updated: 07/10/21 1556     RBC, UA 0-2 /HPF      WBC, UA 21-30 /HPF      Bacteria, UA Trace /HPF      Squamous Epithelial Cells, UA 3-6 /HPF      Transitional Epithelial Cells, UA 0-2 /HPF      Renal Epithelial Cells, UA 0-2 /HPF      Hyaline Casts, UA None Seen /LPF      Amorphous Crystals, UA Small/1+ /HPF      Methodology Manual Light Microscopy    Urine Culture - Urine, Urine, Catheter [[id number]] Collected: 07/10/21 1511    Specimen: Urine, Catheter Updated: 07/10/21 1556    Urinalysis With Culture If Indicated - Urine, Catheter [[id number]]  (Abnormal) Collected: 07/10/21 1511    Specimen: Urine, Catheter Updated: 07/10/21 1540     Color, UA Dark Yellow     Comment: Result checked         Appearance, UA Cloudy     Comment: Result checked         pH, UA <=5.0     Specific Gravity, UA 1.022     Glucose, UA Negative     Ketones, UA Trace     Bilirubin, UA Small (1+)     Comment: Confirmation testing is unavailable.  A serum bilirubin is recommended for further assessment.        Blood, UA Negative     Protein,  mg/dL  (2+)     Leuk Esterase, UA Negative     Nitrite, UA Negative     Urobilinogen, UA 0.2 E.U./dL    POC Glucose Once [[id number]]  (Normal) Collected: 07/10/21 1442    Specimen: Blood Updated: 07/10/21 1444     Glucose 81 mg/dL      Comment: Serial Number: [id number]:  [id number]                RADIOLOGY:  CT Abdomen Pelvis Without Contrast    Result Date: 7/10/2021  CT ABDOMEN PELVIS WO CONTRAST-  Date of Exam: 7/10/2021 5:48 PM  Indication: upper abd pain.  Comparison: October 12, 2020  Technique: CT scan of the abdomen and pelvis without IV contrast. Automated exposure control and iterative reconstruction methods were used.  FINDINGS: The lung bases are clear.  The unenhanced liver, right adrenal glands, kidneys, spleen, and pancreas are unremarkable. The gallbladder is surgically absent. There is a stable left adrenal nodule, likely an adenoma.  The stomach appears normal. The small bowel appears normal in caliber. The colon appears normal. The appendix is not well-visualized. There is no ascites or loculated collection. No abnormally enlarged lymph nodes are identified. There is a small infraumbilical hernia containing a loop of small bowel, but no evidence of obstruction.  The rectum and urinary bladder are unremarkable. There is a calcification within the left uterine fundus, likely a partially calcified fibroid.  No aggressive osseous lesions are identified.      1.No acute process identified within abdomen/pelivs. 2.Stable left adrenal nodule, likely an adenoma. 3.Small infraumbilical hernia containing a loop of small bowel, but no evidence of obstruction. 4.Calcification within left uterine fundus, likely a partially calcified fibroid.    Electronically Signed By-Onofre Nicholas MD On:7/10/2021 6:11 PM This report was finalized on [id number] by  Onofre Nicholas MD.    XR Chest 1 View    Result Date: 7/10/2021  Frontal chest radiograph Indication:  Line placement Comparison:  July 10, 2021 Findings:  Left approach vascular catheter overlies the SVC. There is mild pulmonary vascular prominence. No focal consolidation, pneumothorax, or pleural fluid collection seen. Large cardiac silhouette redemonstrated. No acute focal bony abnormality seen.     Impression: Line placement as above. Mild pulmonary vascular prominence/congestion. Redemonstrated large cardiac silhouette. Electronically signed by:  Sheng Harris M.D.  7/10/2021 9:06 PM    XR Chest 1 View    Result Date: 7/10/2021  XR CHEST 1 VW-  Date of Exam: 7/10/2021 3:00 PM  Indication: weakness.  Comparison Exams: December 14, 2020  Technique: Single AP chest radiograph  FINDINGS: The lungs are clear. The heart is enlarged. The pulmonary vasculature appears normal. The osseous structures appear intact.      1.Lungs are clear. 2.Cardiomegaly.  Electronically Signed By-Onofre Nicholas MD On:7/10/2021 3:45 PM This report was finalized on [id number] by  Onofre Nicholas MD.      ECHOCARDIOGRAM:  Results for orders placed during the hospital encounter of 10/08/20    Adult Transthoracic Echo Complete W/ Cont if Necessary Per Protocol    Interpretation Summary  · Estimated left ventricular EF = 65% Left ventricular systolic function is normal.  · Trace to mild mitral valve regurgitation is present.  · There is a small (<1cm) pericardial effusion.       I reviewed the patient's new clinical results.    This note has been scribed by me for pulmonary attending physician.    Electronically signed by AMELIA Carpio, 07/11/21 at 08:07 EDT.     I examined the patient , reviewed the data and discussed the plan with the NP.  Daniel Maldonado MD

## 2021-07-12 LAB
ALBUMIN SERPL-MCNC: 3.5 G/DL (ref 3.5–5.2)
ALBUMIN/GLOB SERPL: 1.5 G/DL
ALP SERPL-CCNC: 95 U/L (ref 39–117)
ALT SERPL W P-5'-P-CCNC: 6 U/L (ref 1–33)
ANION GAP SERPL CALCULATED.3IONS-SCNC: 9 MMOL/L (ref 5–15)
AST SERPL-CCNC: 9 U/L (ref 1–32)
BASOPHILS # BLD AUTO: 0 10*3/MM3 (ref 0–0.2)
BASOPHILS NFR BLD AUTO: 0.6 % (ref 0–1.5)
BILIRUB SERPL-MCNC: 0.2 MG/DL (ref 0–1.2)
BUN SERPL-MCNC: 37 MG/DL (ref 8–23)
BUN/CREAT SERPL: 22.3 (ref 7–25)
CA-I SERPL ISE-MCNC: 1.28 MMOL/L (ref 1.2–1.3)
CALCIUM SPEC-SCNC: 9.2 MG/DL (ref 8.6–10.5)
CHLORIDE SERPL-SCNC: 107 MMOL/L (ref 98–107)
CO2 SERPL-SCNC: 31 MMOL/L (ref 22–29)
CREAT SERPL-MCNC: 1.66 MG/DL (ref 0.57–1)
DEPRECATED RDW RBC AUTO: 49 FL (ref 37–54)
EOSINOPHIL # BLD AUTO: 0 10*3/MM3 (ref 0–0.4)
EOSINOPHIL NFR BLD AUTO: 0.8 % (ref 0.3–6.2)
ERYTHROCYTE [DISTWIDTH] IN BLOOD BY AUTOMATED COUNT: 17.2 % (ref 12.3–15.4)
GFR SERPL CREATININE-BSD FRML MDRD: 30 ML/MIN/1.73
GLOBULIN UR ELPH-MCNC: 2.4 GM/DL
GLUCOSE BLDC GLUCOMTR-MCNC: 118 MG/DL (ref 70–105)
GLUCOSE BLDC GLUCOMTR-MCNC: 123 MG/DL (ref 70–105)
GLUCOSE BLDC GLUCOMTR-MCNC: 140 MG/DL (ref 70–105)
GLUCOSE BLDC GLUCOMTR-MCNC: 158 MG/DL (ref 70–105)
GLUCOSE BLDC GLUCOMTR-MCNC: 161 MG/DL (ref 70–105)
GLUCOSE BLDC GLUCOMTR-MCNC: 214 MG/DL (ref 70–105)
GLUCOSE SERPL-MCNC: 140 MG/DL (ref 65–99)
HBA1C MFR BLD: 6.7 % (ref 3.5–5.6)
HCT VFR BLD AUTO: 31.6 % (ref 34–46.6)
HGB BLD-MCNC: 10.7 G/DL (ref 12–15.9)
LYMPHOCYTES # BLD AUTO: 0.9 10*3/MM3 (ref 0.7–3.1)
LYMPHOCYTES NFR BLD AUTO: 18.8 % (ref 19.6–45.3)
MAGNESIUM SERPL-MCNC: 1.9 MG/DL (ref 1.6–2.4)
MCH RBC QN AUTO: 27.4 PG (ref 26.6–33)
MCHC RBC AUTO-ENTMCNC: 33.8 G/DL (ref 31.5–35.7)
MCV RBC AUTO: 81.2 FL (ref 79–97)
MONOCYTES # BLD AUTO: 0.7 10*3/MM3 (ref 0.1–0.9)
MONOCYTES NFR BLD AUTO: 14.7 % (ref 5–12)
NEUTROPHILS NFR BLD AUTO: 3 10*3/MM3 (ref 1.7–7)
NEUTROPHILS NFR BLD AUTO: 65.1 % (ref 42.7–76)
NRBC BLD AUTO-RTO: 0.1 /100 WBC (ref 0–0.2)
PHOSPHATE SERPL-MCNC: 3.2 MG/DL (ref 2.5–4.5)
PLATELET # BLD AUTO: 160 10*3/MM3 (ref 140–450)
PMV BLD AUTO: 7.8 FL (ref 6–12)
POTASSIUM SERPL-SCNC: 3.7 MMOL/L (ref 3.5–5.2)
PROT SERPL-MCNC: 5.9 G/DL (ref 6–8.5)
QT INTERVAL: 389 MS
RBC # BLD AUTO: 3.89 10*6/MM3 (ref 3.77–5.28)
SODIUM SERPL-SCNC: 147 MMOL/L (ref 136–145)
WBC # BLD AUTO: 4.7 10*3/MM3 (ref 3.4–10.8)

## 2021-07-12 PROCEDURE — 85025 COMPLETE CBC W/AUTO DIFF WBC: CPT | Performed by: STUDENT IN AN ORGANIZED HEALTH CARE EDUCATION/TRAINING PROGRAM

## 2021-07-12 PROCEDURE — 97165 OT EVAL LOW COMPLEX 30 MIN: CPT

## 2021-07-12 PROCEDURE — 84100 ASSAY OF PHOSPHORUS: CPT | Performed by: STUDENT IN AN ORGANIZED HEALTH CARE EDUCATION/TRAINING PROGRAM

## 2021-07-12 PROCEDURE — 97535 SELF CARE MNGMENT TRAINING: CPT

## 2021-07-12 PROCEDURE — 97110 THERAPEUTIC EXERCISES: CPT

## 2021-07-12 PROCEDURE — 83735 ASSAY OF MAGNESIUM: CPT | Performed by: STUDENT IN AN ORGANIZED HEALTH CARE EDUCATION/TRAINING PROGRAM

## 2021-07-12 PROCEDURE — 80053 COMPREHEN METABOLIC PANEL: CPT | Performed by: STUDENT IN AN ORGANIZED HEALTH CARE EDUCATION/TRAINING PROGRAM

## 2021-07-12 PROCEDURE — 63710000001 INSULIN LISPRO (HUMAN) PER 5 UNITS: Performed by: STUDENT IN AN ORGANIZED HEALTH CARE EDUCATION/TRAINING PROGRAM

## 2021-07-12 PROCEDURE — 82962 GLUCOSE BLOOD TEST: CPT

## 2021-07-12 PROCEDURE — 82330 ASSAY OF CALCIUM: CPT | Performed by: NURSE PRACTITIONER

## 2021-07-12 PROCEDURE — 97162 PT EVAL MOD COMPLEX 30 MIN: CPT

## 2021-07-12 PROCEDURE — 99232 SBSQ HOSP IP/OBS MODERATE 35: CPT | Performed by: INTERNAL MEDICINE

## 2021-07-12 RX ORDER — METOPROLOL TARTRATE 50 MG/1
50 TABLET, FILM COATED ORAL EVERY 12 HOURS SCHEDULED
Status: DISCONTINUED | OUTPATIENT
Start: 2021-07-12 | End: 2021-07-13 | Stop reason: HOSPADM

## 2021-07-12 RX ORDER — LOSARTAN POTASSIUM 50 MG/1
50 TABLET ORAL
Status: DISCONTINUED | OUTPATIENT
Start: 2021-07-12 | End: 2021-07-13 | Stop reason: HOSPADM

## 2021-07-12 RX ADMIN — ASPIRIN 81 MG: 81 TABLET, COATED ORAL at 09:12

## 2021-07-12 RX ADMIN — INSULIN LISPRO 3 UNITS: 100 INJECTION, SOLUTION INTRAVENOUS; SUBCUTANEOUS at 17:37

## 2021-07-12 RX ADMIN — INSULIN LISPRO 2 UNITS: 100 INJECTION, SOLUTION INTRAVENOUS; SUBCUTANEOUS at 11:59

## 2021-07-12 RX ADMIN — LANSOPRAZOLE 15 MG: KIT at 21:00

## 2021-07-12 RX ADMIN — METOPROLOL TARTRATE 50 MG: 50 TABLET, FILM COATED ORAL at 15:51

## 2021-07-12 RX ADMIN — DOCUSATE SODIUM 50 MG AND SENNOSIDES 8.6 MG 2 TABLET: 8.6; 5 TABLET, FILM COATED ORAL at 21:00

## 2021-07-12 RX ADMIN — DOCUSATE SODIUM 50 MG AND SENNOSIDES 8.6 MG 2 TABLET: 8.6; 5 TABLET, FILM COATED ORAL at 09:12

## 2021-07-12 RX ADMIN — LOSARTAN POTASSIUM 50 MG: 50 TABLET, FILM COATED ORAL at 15:51

## 2021-07-12 RX ADMIN — LANSOPRAZOLE 15 MG: KIT at 09:11

## 2021-07-12 RX ADMIN — Medication 10 ML: at 09:12

## 2021-07-12 RX ADMIN — Medication 10 ML: at 09:13

## 2021-07-12 RX ADMIN — Medication 10 ML: at 21:00

## 2021-07-12 NOTE — CASE MANAGEMENT/SOCIAL WORK
Discharge Planning Assessment  [facility]     Patient Name: Lesa Julio  MRN: [id number]  Today's Date: 7/12/2021    Admit Date: 7/10/2021    Discharge Needs Assessment     Row Name 07/12/21 1501       Living Environment    Lives With  child(leticia), adult;grandchild(leticia)    Current Living Arrangements  home/apartment/condo    Primary Care Provided by  self    Provides Primary Care For  no one, unable/limited ability to care for self    Family Caregiver if Needed  child(leticia), adult    Quality of Family Relationships  helpful    Able to Return to Prior Arrangements  yes       Resource/Environmental Concerns    Resource/Environmental Concerns  none    Transportation Concerns  car, none       Transition Planning    Patient/Family Anticipates Transition to  home with family    Patient/Family Anticipated Services at Transition      Transportation Anticipated  family or friend will provide       Discharge Needs Assessment    Readmission Within the Last 30 Days  no previous admission in last 30 days    Concerns to be Addressed  discharge planning    Anticipated Changes Related to Illness  none    Equipment Needed After Discharge  none    Provided Post Acute Provider List?  Refused    Current Discharge Risk  physical impairment        Discharge Plan     Row Name 07/12/21 5154       Plan    Plan  PT/OT eval ordered. From home with daughter.    Patient/Family in Agreement with Plan  yes    Plan Comments  Met with patient at bedside, reports she lives at home with daughter and granddaughter. Does not drive, daughter to transport home on d/c. PCP and pharmacy confirmed. No issues affording food or medications. No services currently assist in the home. Has been to [facility] in the past. Discussed IP rehab vs HH and she wants to see how she does with therapy prior to making any decisions. DC barriers: renal following, criss removed today          Expected Discharge Date and Time     Expected Discharge Date Expected  Discharge Time    Jul 15, 2021         Demographic Summary     Row Name 07/12/21 1501       General Information    Admission Type  inpatient    Required Notices Provided  Important Message from Medicare    Referral Source  admission list    Reason for Consult  discharge planning    Preferred Language  English        Functional Status     Row Name 07/12/21 1501       Functional Status    Usual Activity Tolerance  moderate    Current Activity Tolerance  moderate       Functional Status, IADL    Medications  assistive person    Meal Preparation  assistive person    Housekeeping  assistive person    Laundry  assistive person    Shopping  assistive person          Patient Forms     Row Name 07/12/21 1504       Patient Forms    Important Message from Medicare (IMM)  Delivered    Delivered to  Patient    Method of delivery  In person        Met with patient in room wearing PPE: mask    Maintained distance greater than six feet and spent less than 15 minutes in the room.          Chantell Shook RN

## 2021-07-12 NOTE — THERAPY EVALUATION
Patient Name: Lesa Julio  : 1950    MRN: [id number]                              Today's Date: 2021       Admit Date: 7/10/2021    Visit Dx:     ICD-10-CM ICD-9-CM   1. ANAMARIA (acute kidney injury) (CMS/[facility])  N17.9 584.9   2. Acute pancreatitis, unspecified complication status, unspecified pancreatitis type  K85.90 577.0   3. Uncontrolled other specified diabetes mellitus with hyperglycemia (CMS/[facility])  E13.65 250.02   4. Metabolic acidosis  E87.2 276.2   5. Hyperkalemia  E87.5 276.7   6. General weakness  R53.1 780.79     Patient Active Problem List   Diagnosis   • Cardiac murmur   • Chronic obstructive pulmonary disease (CMS/[facility])   • Chronic renal insufficiency, stage III (moderate) (CMS/[facility])   • Diabetes mellitus, type 2 (CMS/[facility])   • Essential hypertension   • Rheumatoid arthritis (CMS/[facility])   • Tobacco abuse   • Allergic rhinitis   • Generalized weakness   • Hearing loss   • Mixed hyperlipidemia   • Nonalcoholic fatty liver disease   • Obesity   • Obstructive sleep apnea syndrome   • Vitamin D deficiency   • Secondary hyperparathyroidism of renal origin (CMS/[facility])   • Paroxysmal atrial fibrillation (CMS/[facility])   • GERD (gastroesophageal reflux disease)   • ANAMARIA (acute kidney injury) (CMS/[facility])   • Elevated lipase   • Abdominal pain     Past Medical History:   Diagnosis Date   • Arthritis    • CKD (chronic kidney disease) stage 3, GFR 30-59 ml/min (CMS/[facility])     per [facility]   • Diabetes mellitus, type 2 (CMS/[facility])    • Essential hypertension 2015   • Grade 4 out of 6 intensity murmur 2020   • Hearing loss     hearing aid right side   • Hyperlipidemia    • Hypersensitivity angiitis (CMS/[facility]) 2020   • Hypertension    • Leukocytoclastic vasculitis (CMS/[facility])    • Obesity    • Osteoarthritis    • Ovarian cyst     removed; benign   • Peripheral arterial disease (CMS/[facility])    • Sleep apnea    • Tubular adenoma of colon 10/30/2018   • Vascular insufficiency 2020     Past  Surgical History:   Procedure Laterality Date   • BRONCHOSCOPY Bilateral 10/13/2020    Procedure: BRONCHOSCOPY AT BEDSIDE with right lung washing and elective intubation;  Surgeon: Cristhian Hodge MD;  Location: [facility] ENDOSCOPY;  Service: Pulmonary;  Laterality: Bilateral;  Post: pneumonia   • BRONCHOSCOPY Bilateral 10/20/2020    Procedure: BRONCHOSCOPY WITH BRONCHOALVEOLAR LAVAGE  AT BEDSIDE;  Surgeon: Cristhian Hodge MD;  Location: [facility] ENDOSCOPY;  Service: Pulmonary;  Laterality: Bilateral;  POST- PNEUMONIA   • BRONCHOSCOPY N/A 10/27/2020    Procedure: BRONCHOSCOPY with bronchial washing and intubation;  Surgeon: Cristhian Hodge MD;  Location: [facility] ENDOSCOPY;  Service: Pulmonary;  Laterality: N/A;  post op:pneumonia   • BRONCHOSCOPY N/A 12/14/2020    Procedure: BRONCHOSCOPY AT BEDSIDE WITH BRONCHOALVEOLAR LAVAGE;  Surgeon: Cristhian Hodge MD;  Location: [facility] ENDOSCOPY;  Service: Pulmonary;  Laterality: N/A;  PNEUMONIA   • CHOLECYSTECTOMY     • ENDOSCOPY N/A 10/13/2020    Procedure: ESOPHAGOGASTRODUODENOSCOPY AT BEDSIDE;  Surgeon: Omi Rivera MD;  Location: [facility] ENDOSCOPY;  Service: Gastroenterology;  Laterality: N/A;   • LIVER BIOPSY     • TUBAL ABDOMINAL LIGATION       General Information     Row Name 07/12/21 1537          Physical Therapy Time and Intention    Document Type  evaluation  -EL     Mode of Treatment  physical therapy;co-treatment;occupational therapy  -EL     Row Name 07/12/21 1537          General Information    Patient Profile Reviewed  yes  -EL     Prior Level of Function  independent:;ADL's;all household mobility  -EL     Row Name 07/12/21 1537          Living Environment    Lives With  child(leticia), adult  -EL     Row Name 07/12/21 1537          Home Main Entrance    Number of Stairs, Main Entrance  four  -EL     Row Name 07/12/21 1537          Cognition    Orientation Status (Cognition)  oriented x 4  -EL     Row Name 07/12/21 1537          Safety Issues, Functional Mobility    Impairments  Affecting Function (Mobility)  balance;endurance/activity tolerance;shortness of breath;strength;pain  -EL       User Key  (r) = Recorded By, (t) = Taken By, (c) = Cosigned By    Initials Name Provider Type    Vladimir Persaud, VIGNESH Physical Therapist        Mobility     Row Name 07/12/21 1538          Bed Mobility    Bed Mobility  supine-sit  -EL     Supine-Sit [place] (Bed Mobility)  contact guard  -EL     Assistive Device (Bed Mobility)  bed rails;head of bed elevated  -EL     Row Name 07/12/21 1538          Sit-Stand Transfer    Sit-Stand [place] (Transfers)  contact guard  -EL     Assistive Device (Sit-Stand Transfers)  walker, front-wheeled  -EL     Row Name 07/12/21 1538          Gait/Stairs (Locomotion)    [place] Level (Gait)  contact guard  -EL     Assistive Device (Gait)  walker, front-wheeled  -EL     Distance in Feet (Gait)  60  -EL     Deviations/Abnormal Patterns (Gait)  gait speed decreased  -EL     Comment (Gait/Stairs)  c/o SOA following ambulation  -EL       User Key  (r) = Recorded By, (t) = Taken By, (c) = Cosigned By    Initials Name Provider Type    Vladimir Persaud, PT Physical Therapist        Obj/Interventions     Row Name 07/12/21 1538          Range of Motion Comprehensive    General Range of Motion  bilateral lower extremity ROM WFL  -EL     Row Name 07/12/21 1538          Strength Comprehensive (MMT)    General Manual Muscle Testing (MMT) Assessment  lower extremity strength deficits identified  -EL     Comment, General Manual Muscle Testing (MMT) Assessment  BLE 4-/5 gross  -EL     Row Name 07/12/21 1538          Balance    Balance Assessment  sitting static balance;standing static balance;standing dynamic balance  -EL     Static Sitting Balance  WFL  -EL     Static Standing Balance  WFL  -EL     Dynamic Standing Balance  mild impairment;supported  -EL     Row Name 07/12/21 1538          Sensory Assessment (Somatosensory)    Sensory Assessment (Somatosensory)  sensation intact   -EL       User Key  (r) = Recorded By, (t) = Taken By, (c) = Cosigned By    Initials Name Provider Type    Vladimir Persaud PT Physical Therapist        Goals/Plan     Row Name 07/12/21 1543          Bed Mobility Goal 1 (PT)    Activity/Assistive Device (Bed Mobility Goal 1, PT)  bed mobility activities, all  -EL     [place] Level/Cues Needed (Bed Mobility Goal 1, PT)  modified independence  -EL     Time Frame (Bed Mobility Goal 1, PT)  long term goal (LTG);2 weeks  -EL     Row Name 07/12/21 1543          Transfer Goal 1 (PT)    Activity/Assistive Device (Transfer Goal 1, PT)  transfers, all;walker, rolling  -EL     [place] Level/Cues Needed (Transfer Goal 1, PT)  modified independence  -EL     Time Frame (Transfer Goal 1, PT)  long term goal (LTG);2 weeks  -EL     Row Name 07/12/21 1543          Gait Training Goal 1 (PT)    Activity/Assistive Device (Gait Training Goal 1, PT)  gait (walking locomotion);walker, rolling  -EL     [place] Level (Gait Training Goal 1, PT)  modified independence  -EL     Distance (Gait Training Goal 1, PT)  115  -EL     Time Frame (Gait Training Goal 1, PT)  long term goal (LTG);2 weeks  -EL       User Key  (r) = Recorded By, (t) = Taken By, (c) = Cosigned By    Initials Name Provider Type    Vladimir Persaud PT Physical Therapist        Clinical Impression     Row Name 07/12/21 1539          Pain Scale: FACES Pre/Post-Treatment    Pain: FACES Scale, Pretreatment  0-->no hurt  -EL     Posttreatment Pain Rating  0-->no hurt  -EL     Row Name 07/12/21 1532          Plan of Care Review    Plan of Care Reviewed With  patient  -EL     Outcome Summary  Pt is a 69 YO F admitted with hypoglycemia, abdominal pain. Pt reports living at home with daughter in a trailer with 4 steps (handrail) to enter. Pt reports typically being independent with all mobility and performing ADLs for self. Pt owns RWx that she uses for increased distances such as store etc. This date pt demonstrates decent  functional mobility reqiuring CGA for bed mobility, transfers and ambulation with RWX. Pt also instructed and assisted with LE ther ex. Pt with minor reports of 'wooziness' with standing. BP assessed and WFL. Pt with impaired stength and endurance. Recommendation is continued therapy while admitted and HHPT following d/c.  -EL     Row Name 07/12/21 1539          Therapy Assessment/Plan (PT)    Rehab Potential (PT)  good, to achieve stated therapy goals  -EL     Criteria for Skilled Interventions Met (PT)  yes  -EL     Predicted Duration of Therapy Intervention (PT)  Until d/c  -EL     Row Name 07/12/21 1539          Vital Signs    O2 Delivery Pre Treatment  room air  -EL     O2 Delivery Intra Treatment  room air  -EL     O2 Delivery Post Treatment  room air  -EL     Pre Patient Position  Supine  -EL     Intra Patient Position  Standing  -EL     Post Patient Position  Sitting  -EL     Row Name 07/12/21 1539          Positioning and Restraints    Pre-Treatment Position  in bed  -EL     Post Treatment Position  chair  -EL     In Chair  notified nsg;sitting;call light within reach;encouraged to call for assist;exit alarm on  -EL       User Key  (r) = Recorded By, (t) = Taken By, (c) = Cosigned By    Initials Name Provider Type    EL Vladimir Hernandez, PT Physical Therapist        Outcome Measures     Row Name 07/12/21 1543          How much help from another person do you currently need...    Turning from your back to your side while in flat bed without using bedrails?  3  -EL     Moving from lying on back to sitting on the side of a flat bed without bedrails?  3  -EL     Moving to and from a bed to a chair (including a wheelchair)?  3  -EL     Standing up from a chair using your arms (e.g., wheelchair, bedside chair)?  3  -EL     Climbing 3-5 steps with a railing?  3  -EL     To walk in hospital room?  3  -EL     AM-PAC 6 Clicks Score (PT)  18  -EL     Row Name 07/12/21 1543 07/12/21 1534       Functional Assessment     Outcome Measure Options  AM-PAC 6 Clicks Basic Mobility (PT)  -EL  AM-PAC 6 Clicks Daily Activity (OT)  -LIYAH      User Key  (r) = Recorded By, (t) = Taken By, (c) = Cosigned By    Initials Name Provider Type    EL Vladimir Hernandez, VIGNESH Physical Therapist    Jackie Marinelli OT Occupational Therapist        Physical Therapy Education                 Title: PT OT SLP Therapies (Done)     Topic: Physical Therapy (Done)     Point: Mobility training (Done)     Learning Progress Summary           Patient Acceptance, E,TB, VU by  at 7/12/2021 1544                   Point: Precautions (Done)     Learning Progress Summary           Patient Acceptance, E,TB, VU by  at 7/12/2021 1544                               User Key     Initials Effective Dates Name Provider Type Discipline     06/23/20 -  Vladimir Hernandez, PT Physical Therapist PT              PT Recommendation and Plan  Planned Therapy Interventions (PT): balance training, neuromuscular re-education, bed mobility training, transfer training, gait training, patient/family education, strengthening  Plan of Care Reviewed With: patient  Outcome Summary: Pt is a 69 YO F admitted with hypoglycemia, abdominal pain. Pt reports living at home with daughter in a trailer with 4 steps (handrail) to enter. Pt reports typically being independent with all mobility and performing ADLs for self. Pt owns RWx that she uses for increased distances such as store etc. This date pt demonstrates decent functional mobility reqiuring CGA for bed mobility, transfers and ambulation with RWX. Pt also instructed and assisted with LE ther ex. Pt with minor reports of 'wooziness' with standing. BP assessed and WFL. Pt with impaired stength and endurance. Recommendation is continued therapy while admitted and HHPT following d/c.     Time Calculation:   PT Charges     Row Name 07/12/21 1544             Time Calculation    Start Time  1315  -EL      Stop Time  1339  -EL      Time Calculation (min)  24  min  -EL      PT Received On  07/12/21  -EL      PT - Next Appointment  07/14/21  -EL      PT Goal Re-Cert Due Date  07/26/21  -EL         Time Calculation- PT    Total Timed Code Minutes- PT  15 minute(s)  -EL        User Key  (r) = Recorded By, (t) = Taken By, (c) = Cosigned By    Initials Name Provider Type    Vladimir Persaud, PT Physical Therapist        Therapy Charges for Today     Code Description Service Date Service Provider Modifiers Qty    63044581923 HC PT EVAL MOD COMPLEXITY 3 7/12/2021 Vladimir Hernandez, PT GP 1    52536221317 HC PT THER PROC EA 15 MIN 7/12/2021 Vladimir Hernandez, PT GP 1          PT G-Codes  Outcome Measure Options: AM-PAC 6 Clicks Basic Mobility (PT)  AM-PAC 6 Clicks Score (PT): 18  AM-PAC 6 Clicks Score (OT): 17    Vladimir Hernandez PT  7/12/2021

## 2021-07-12 NOTE — PROGRESS NOTES
[facility]   Progress Note    Patient Name: Lesa Julio  : 1950  MRN: [id number]  Primary Care Physician:  Manuel Sanders MD  Date of admission: 7/10/2021    Subjective   Subjective     Chief Complaint: hypoglycemia    History of present illness:  Ms. Julio is a 70 y.o. 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  In the emergency room the patient was found to have severe metabolic acidosis and acute renal failure with hyperkalemia as well as evidence of urinary tract infection.  She was admitted to the intensive care unit where temporary dialysis catheter was placed and the patient underwent hemodialysis.  Patient was subsequently felt to be stable enough to transfer out to the medical [facility] and the hospitalist group has been consulted to take over medical management.     The patient has significant hearing loss but is able to hear with her right ear with a hearing aid from close up.  She reports no specific complaints except for mild diffuse abdominal pain persisting.  The 3 sisters at the bedside report that she had had significantly more pain when she first came into the hospital.     ROS   12 point review of systems was reviewed and was negative  except as above.    Review of Systems   Constitutional: Negative for chills and fever.   HENT: Negative for ear pain and hearing loss.    Respiratory: Negative for apnea and shortness of breath.    Cardiovascular: Negative for chest pain and palpitations.   Gastrointestinal: Negative for diarrhea, nausea and vomiting.   Genitourinary: Negative for dysuria.   Musculoskeletal: Negative for back pain and joint swelling.   Neurological: Negative for seizures, light-headedness and headaches.   Psychiatric/Behavioral: Negative for confusion and hallucinations.       Objective   Objective     Vitals:   Temp:  [98 °F (36.7 °C)-99.1 °F (37.3 °C)] 98 °F (36.7 °C)  Heart Rate:  [61-94] 80  Resp:  [16-20] 20  BP: (106-186)/(60-79) 186/60  Flow (L/min):  [2] 2    Physical Exam  Vitals and nursing note reviewed.   Constitutional:       General: She is not in acute distress.     Appearance: She is well-developed. She is obese. She is not ill-appearing.   HENT:      Head: Normocephalic and atraumatic.      Left Ear: Ear canal normal.      Nose: Nose normal.      Mouth/Throat:      Mouth: Mucous membranes are moist.   Eyes:      Extraocular Movements: Extraocular movements intact.      Conjunctiva/sclera: Conjunctivae normal.      Pupils: Pupils are equal, round, and reactive to light.   Neck:      Thyroid: No thyromegaly.      Vascular: No JVD.      Trachea: No tracheal deviation.   Cardiovascular:      Rate and Rhythm: Normal rate and regular rhythm.      Pulses: Normal pulses.      Heart sounds: Normal heart sounds. No murmur heard.   No friction rub. No gallop.    Pulmonary:      Effort: Pulmonary effort is normal. No respiratory distress.      Breath sounds: Normal breath sounds. No stridor.   Abdominal:      General: Bowel sounds are normal. There is no distension.      Palpations: Abdomen is soft. There is no mass.   Musculoskeletal:         General: No swelling or tenderness. Normal range of motion.      Cervical back:  Normal range of motion and neck supple. No rigidity. No muscular tenderness.   Skin:     General: Skin is warm and dry.      Coloration: Skin is pale. Skin is not jaundiced.      Findings: No bruising.   Neurological:      General: No focal deficit present.      Mental Status: She is alert and oriented to person, place, and time. Mental status is at baseline.      Cranial Nerves: No cranial nerve deficit.      Sensory: No sensory deficit.      Motor: Weakness present. No abnormal muscle tone.      Coordination: Coordination normal.   Psychiatric:         Mood and Affect: Mood normal.         Thought Content: Thought content normal.          Result Review    Result Review:  I have personally reviewed the results from the time of this admission to 7/12/2021 14:59 EDT and agree with these findings:  [x]  Laboratory  [x]  Microbiology  [x]  Radiology  [x]  EKG/Telemetry   []  Cardiology/Vascular   []  Pathology  []  Old records  []  Other:  Most notable findings include: creat 1.6    Assessment/Plan   Assessment / Plan     Brief Patient Summary:  Lesa Julio is a 70 y.o. female who presented to [facility] on 7/10/2021 with complaint of hypoglycemia. Patient is extremely hard of hearing making HPI difficult to obtain, information obtained from chart review. The patient had a low blood sugar at home. She takes metformin, glipizide, and Glargine insulin at home. The patient states she has had some generalized abdominal pain since 7/9/2021. She denies emesis, diarrhea, or constipation. Additionally, the patient states she has had some difficulty ambulating due to bilateral leg weakness. She denies exacerbating or relieving factors      Acute renal failure with severe metabolic acidosis and hyperkalemia due to Metformin toxicity  -Nephrology consulting and patient underwent hemodialysis and received a bicarb drip  --UA reviewed  -Renal ultrasound reviewed, unremarkable with no evidence of  hydronephrosis  -Monitor and trend labs  -Avoid nephrotoxic medications, hypotension, and NSAIDS  -Renally dose medications  -Monitor urine output     Chronic kidney disease stage IIIa with secondary hyperparathyroidism  -Baseline creatinine 0.95  -Creatinine on admission 5.99 and improved to 2.08 after dialysis     Hyperkalemia  -Patient received calcium gluconate, D50 W, regular insulin IV, Kayexalate, sodium bicarbonate and magnesium sulfate on 7/10/2021 with improvement of potassium from 6.9 on admission to 3.1 and potassium was replaced per nephrology     Insulin-dependent type 2 diabetes mellitus  -Discontinue Metformin  -Hold glipizide and glargine insulin  -Hemoglobin A1c 9.6  -SSI     Essential hypertension, chronic and controlled with relative hypotension  -Hold metoprolol, Benicar and hydralazine     Hyperlipidemia  -Hold Crestor and Lopid     Paroxysmal atrial fibrillation  -Metoprolol currently on hold due to hypotension  -No preadmission anticoagulants secondary to prior GI bleed, November 2020  -Currently rate controlled   -Monitor     Peripheral artery disease  -aspirin currently on hold     Elevated liver tests  -Hepatic steatosis noted on ultrasound     COPD not in exacerbation  -Hold theophylline  -Chart lists allergy to duo nebs     Obstructive sleep apnea  -Encourage CPAP compliance or follow-up with sleep medicine if she does not have onr     Rheumatoid arthritis  -No current home medications     Vitamin D deficiency  -Hold high-dose vitamin D while inpatient     GERD     History of cholecystectomy     Obesity  -Lifestyle modification counseling        Active Hospital Problems:  Active Hospital Problems    Diagnosis    • **ANAMARIA (acute kidney injury) (CMS/HCC)    • Elevated lipase    • Abdominal pain    • GERD (gastroesophageal reflux disease)    • Paroxysmal atrial fibrillation (CMS/HCC)    • Mixed hyperlipidemia    • Nonalcoholic fatty liver disease    • Allergic rhinitis    • Generalized  weakness    • Rheumatoid arthritis (CMS/HCC)    • Obesity    • Vitamin D deficiency    • Obstructive sleep apnea syndrome    • Chronic obstructive pulmonary disease (CMS/HCC)    • Secondary hyperparathyroidism of renal origin (CMS/HCC)    • Chronic renal insufficiency, stage III (moderate) (CMS/HCC)    • Tobacco abuse    • Cardiac murmur    • Diabetes mellitus, type 2 (CMS/HCC)    • Essential hypertension    • Hearing loss      Plan:       DVT prophylaxis:  Medical and mechanical DVT prophylaxis orders are present.    CODE STATUS:    Code Status: CPR  Medical Interventions (Level of Support Prior to Arrest): Full    Disposition:  I expect patient to be discharged per clinical course.    Electronically signed by Ghulam Glasgow MD, 07/12/21, 2:59 PM EDT.

## 2021-07-12 NOTE — PROGRESS NOTES
"                                                                                                                                      Nephrology  Progress Note                                        [facility]    Patient Identification    Name: Lesa Julio  Age: 70 y.o.  Sex: female  :  1950  MRN: [id number]      DATE OF SERVICE:  2021        Subective    Patient resting  No distress       Objective   Scheduled Meds:aspirin, 81 mg, Oral, Daily  insulin lispro, 0-7 Units, Subcutaneous, Q6H  lansoprazole, 15 mg, Oral, Q12H  senna-docusate sodium, 2 tablet, Oral, BID  sodium chloride, 10 mL, Intravenous, Q12H  sodium chloride, 10 mL, Intravenous, Q12H  sodium chloride, 10 mL, Intravenous, Q12H  sodium chloride, 10 mL, Intravenous, Q12H          Continuous Infusions:sodium chloride, 75 mL/hr, Last Rate: 75 mL/hr (21 9574)        PRN Meds:•  acetaminophen **OR** acetaminophen  •  albumin human  •  albuterol  •  aluminum-magnesium hydroxide-simethicone  •  senna-docusate sodium **AND** polyethylene glycol **AND** bisacodyl **AND** bisacodyl  •  dextrose  •  dextrose  •  fentanyl  •  glucagon (human recombinant)  •  heparin (porcine)  •  insulin lispro **AND** insulin lispro  •  ondansetron **OR** ondansetron  •  [COMPLETED] Insert peripheral IV **AND** sodium chloride  •  sodium chloride  •  sodium chloride  •  sodium chloride     Exam:  /79 (BP Location: Left arm, Patient Position: Lying)   Pulse 61   Temp 98.2 °F (36.8 °C) (Axillary)   Resp 16   Ht 160 cm (63\")   Wt 83.8 kg (184 lb 11.9 oz)   SpO2 92%   BMI 32.73 kg/m²     Intake/Output last 3 shifts:  I/O last 3 completed shifts:  In: 4490 [P.O.:240; I.V.:4150; IV Piggyback:100]  Out: 4215 [Urine:3615; Other:600]    Intake/Output this shift:  No intake/output data recorded.    Physical exam:  General Appearance: Resting  Head:  Normocephalic, without obvious abnormality, atraumatic  Eyes:  PERRL, " conjunctiva/corneas clear     Neck:  Supple,  no adenopathy;      Lungs:  Decreased BS occasion ronchi  Heart:  Regular rate and rhythm, S1 and S2 normal  Abdomen:  Soft, non-tender, bowel sounds active   Extremities: trace edema  Pulses: 2+ and symmetric all extremities  Skin:  No rashes or lesions       Data Review:  All labs (24hrs):   Recent Results (from the past 24 hour(s))   POC Glucose Once    Collection Time: 07/11/21 12:10 PM    Specimen: Blood   Result Value Ref Range    Glucose 109 (H) 70 - 105 mg/dL   Basic Metabolic Panel    Collection Time: 07/11/21  4:23 PM    Specimen: Blood   Result Value Ref Range    Glucose 182 (H) 65 - 99 mg/dL    BUN 37 (H) 8 - 23 mg/dL    Creatinine 2.18 (H) 0.57 - 1.00 mg/dL    Sodium 138 136 - 145 mmol/L    Potassium 3.9 3.5 - 5.2 mmol/L    Chloride 98 98 - 107 mmol/L    CO2 28.0 22.0 - 29.0 mmol/L    Calcium 8.7 8.6 - 10.5 mg/dL    eGFR Non African Amer 22 (L) >60 mL/min/1.73    BUN/Creatinine Ratio 17.0 7.0 - 25.0    Anion Gap 12.0 5.0 - 15.0 mmol/L   POC Glucose Once    Collection Time: 07/11/21  4:48 PM    Specimen: Blood   Result Value Ref Range    Glucose 211 (H) 70 - 105 mg/dL   Basic Metabolic Panel    Collection Time: 07/11/21  7:53 PM    Specimen: Blood   Result Value Ref Range    Glucose 185 (H) 65 - 99 mg/dL    BUN 39 (H) 8 - 23 mg/dL    Creatinine 2.00 (H) 0.57 - 1.00 mg/dL    Sodium 140 136 - 145 mmol/L    Potassium 4.0 3.5 - 5.2 mmol/L    Chloride 100 98 - 107 mmol/L    CO2 30.0 (H) 22.0 - 29.0 mmol/L    Calcium 9.0 8.6 - 10.5 mg/dL    eGFR Non African Amer 25 (L) >60 mL/min/1.73    BUN/Creatinine Ratio 19.5 7.0 - 25.0    Anion Gap 10.0 5.0 - 15.0 mmol/L   POC Glucose Once    Collection Time: 07/11/21 11:28 PM    Specimen: Blood   Result Value Ref Range    Glucose 137 (H) 70 - 105 mg/dL   POC Glucose Once    Collection Time: 07/12/21  5:38 AM    Specimen: Blood   Result Value Ref Range    Glucose 123 (H) 70 - 105 mg/dL   Magnesium    Collection Time: 07/12/21   5:41 AM    Specimen: Blood   Result Value Ref Range    Magnesium 1.9 1.6 - 2.4 mg/dL   Phosphorus    Collection Time: 07/12/21  5:41 AM    Specimen: Blood   Result Value Ref Range    Phosphorus 3.2 2.5 - 4.5 mg/dL   Comprehensive Metabolic Panel    Collection Time: 07/12/21  5:41 AM    Specimen: Blood   Result Value Ref Range    Glucose 140 (H) 65 - 99 mg/dL    BUN 37 (H) 8 - 23 mg/dL    Creatinine 1.66 (H) 0.57 - 1.00 mg/dL    Sodium 147 (H) 136 - 145 mmol/L    Potassium 3.7 3.5 - 5.2 mmol/L    Chloride 107 98 - 107 mmol/L    CO2 31.0 (H) 22.0 - 29.0 mmol/L    Calcium 9.2 8.6 - 10.5 mg/dL    Total Protein 5.9 (L) 6.0 - 8.5 g/dL    Albumin 3.50 3.50 - 5.20 g/dL    ALT (SGPT) 6 1 - 33 U/L    AST (SGOT) 9 1 - 32 U/L    Alkaline Phosphatase 95 39 - 117 U/L    Total Bilirubin 0.2 0.0 - 1.2 mg/dL    eGFR Non African Amer 30 (L) >60 mL/min/1.73    Globulin 2.4 gm/dL    A/G Ratio 1.5 g/dL    BUN/Creatinine Ratio 22.3 7.0 - 25.0    Anion Gap 9.0 5.0 - 15.0 mmol/L   Calcium, Ionized    Collection Time: 07/12/21  5:41 AM    Specimen: Blood   Result Value Ref Range    Ionized Calcium 1.28 1.20 - 1.30 mmol/L   CBC Auto Differential    Collection Time: 07/12/21  5:41 AM    Specimen: Blood   Result Value Ref Range    WBC 4.70 3.40 - 10.80 10*3/mm3    RBC 3.89 3.77 - 5.28 10*6/mm3    Hemoglobin 10.7 (L) 12.0 - 15.9 g/dL    Hematocrit 31.6 (L) 34.0 - 46.6 %    MCV 81.2 79.0 - 97.0 fL    MCH 27.4 26.6 - 33.0 pg    MCHC 33.8 31.5 - 35.7 g/dL    RDW 17.2 (H) 12.3 - 15.4 %    RDW-SD 49.0 37.0 - 54.0 fl    MPV 7.8 6.0 - 12.0 fL    Platelets 160 140 - 450 10*3/mm3    Neutrophil % 65.1 42.7 - 76.0 %    Lymphocyte % 18.8 (L) 19.6 - 45.3 %    Monocyte % 14.7 (H) 5.0 - 12.0 %    Eosinophil % 0.8 0.3 - 6.2 %    Basophil % 0.6 0.0 - 1.5 %    Neutrophils, Absolute 3.00 1.70 - 7.00 10*3/mm3    Lymphocytes, Absolute 0.90 0.70 - 3.10 10*3/mm3    Monocytes, Absolute 0.70 0.10 - 0.90 10*3/mm3    Eosinophils, Absolute 0.00 0.00 - 0.40 10*3/mm3     Basophils, Absolute 0.00 0.00 - 0.20 10*3/mm3    nRBC 0.1 0.0 - 0.2 /100 WBC   POC Glucose Once    Collection Time: 07/12/21  7:15 AM    Specimen: Blood   Result Value Ref Range    Glucose 140 (H) 70 - 105 mg/dL          Imaging:  [unfilled]    Assessment/Plan:     ANAMARIA (acute kidney injury) (CMS/HCC)    Cardiac murmur    Chronic obstructive pulmonary disease (CMS/HCC)    Chronic renal insufficiency, stage III (moderate) (CMS/HCC)    Diabetes mellitus, type 2 (CMS/HCC)    Essential hypertension    Rheumatoid arthritis (CMS/HCC)    Tobacco abuse    Allergic rhinitis    Generalized weakness    Hearing loss    Mixed hyperlipidemia    Nonalcoholic fatty liver disease    Obesity    Obstructive sleep apnea syndrome    Vitamin D deficiency    Secondary hyperparathyroidism of renal origin (CMS/HCC)    Paroxysmal atrial fibrillation (CMS/HCC)    GERD (gastroesophageal reflux disease)    Elevated lipase    Abdominal pain       Severe metabolic acidosis  Hyperkalemia  Acute kidney injury  Hypoglycemia  History of diabetes  Obesity  Elevated liver function test  Diabetes  Obstructive sleep apnea  Vitamin D deficiency        Cr down to 1.6  Reduce IVfs  Lukas kahn

## 2021-07-12 NOTE — PLAN OF CARE
Goal Outcome Evaluation:  Plan of Care Reviewed With: patient           Outcome Summary: Pt is a 69 YO F admitted with hypoglycemia, abdominal pain. Pt reports living at home with daughter in a trailer with 4 steps (handrail) to enter. Pt reports typically being independent with all mobility and performing ADLs for self. Pt owns RWx that she uses for increased distances such as store etc. This date pt demonstrates decent functional mobility reqiuring CGA for bed mobility, transfers and ambulation with RWX. Pt also instructed and assisted with LE ther ex. Pt with minor reports of 'wooziness' with standing. BP assessed and WFL. Pt with impaired stength and endurance. Recommendation is continued therapy while admitted and HHPT following d/c.

## 2021-07-12 NOTE — PLAN OF CARE
Goal Outcome Evaluation:  Plan of Care Reviewed With: patient, son           Outcome Summary: Pt is a 71 y/o F admit for c/o hypoglycemia and generalized abdominal pain. Work up showed ANAMARIA with metabolic acidosis and hyperkalemia. PMH includes COPD, CKD stage 3, Afib, RA, RAMONE, DM 2, [place] (use her R ear) and HLD. Pt resides with her daughter in a trailer with 4 KIRILL. Pt reports indep with ADLs and mobility at baseline. Pt ambulates with CGA using RW, ambulating short distance before c/o fatigue and BLE weakness. Pt requires assist for ADLs due to endurance impairments only. Pt appears below ADL baseline, limited by impaired endurance, impaired balance and acuity of illness. OT recommends continued treatment at 5x/week and d/c home with HH OT vs family assist pending availability.

## 2021-07-12 NOTE — PLAN OF CARE
Goal Outcome Evaluation:              Outcome Summary: Pt continues with fluids. No issues voiced.  Catheter care completed. Will continue to monitor.

## 2021-07-12 NOTE — CONSULTS
"Diabetes Education  Assessment/Teaching    Patient Name:  Lesa Julio  YOB: 1950  MRN: [id number]  Admit Date:  7/10/2021      Assessment Date:  7/12/2021    Most Recent Value   General Information    Referral From:  -- [Pt seen due to coming to hospital for hypoglycemia]   Height  160 cm (63\")   Height Method  Stated   Weight  83.8 kg (184 lb 11.9 oz)   Weight Method  Bed scale   Pregnancy Assessment   Diabetes History   What type of diabetes do you have?  Type 2   Have you had diabetes education/teaching in the past?  yes   When and where was your diabetes education?  Educator worked with daughter, Munira, when pt was an inpatient in 10/2020   Do you test your blood sugar at home?  yes   Frequency of checks  bid (am and hs)   Meter type  newer meter (3 weeks old), daughter unsure of name of meter   Who performs the test?  self, daughter or granddaugther may help   Have you had low blood sugar? (<70mg/dl)  yes   How often do you have low blood sugar?  -- [Pt had low bs day of admission.]   Education Preferences   What areas of diabetes would you like to learn about?  avoiding high blood sugar, avoiding low blood sugar, diabetes complications, medications for diabetes, testing my blood sugar at home   Nutrition Information   Assessment Topics   Taking Medication - Assessment  Needs education   Problem Solving - Assessment  Needs education   Reducing Risk - Assessment  Needs education   Monitoring - Assessment  Needs education   DM Goals   Taking Medication - Goal  Today   Problem Solving - Goal  Today   Reducing Risk - Goal  Today   Monitoring - Goal  Today            Most Recent Value   DM Education Needs   Meter  Has own   Frequency of Testing  2 times a day   Blood Glucose Target Range  Discussed A1c result of 6.7%. Reviewed healthy bs range and healthy bs target. Discussed importance of bs control.   Medication  Oral, Insulin [Per home med list pt taking glipizide ER 10 mg daily, Basaglar 20 " units bid and metformin 1000 mg bid]   Problem Solving  Hypoglycemia, Hyperglycemia, Signs, Symptoms, Treatment   Reducing Risks  A1C testing   Healthy Eating  -- [Discussed importance of pt eating 3 meals/day]   Motivation  Engaged   Teaching Method  Explanation, Discussion   Patient Response  Verbalized understanding            Other Comments:  Attempted assessment/education with pt. Pt [place] and recommended educator contact daughter, Munira. Pt lives with daughter and granddaughter. Munira called. Munira states pt was visiting her mother on 7/10/21 and had low bs of 45. She then came to the ER. Daughter states pt has been checking her own bs am and hs. She has not been routinely taking Basaglar bid. Pt will only take it if bs >300. Pt's mother gave her a sliding scale to follow for the Basaglar. Discussed with daughter that sliding scales are used for rapid-acting insulins and not routinely used for long-acting insulins. Discussed Munira should contact MD office and report bs and discuss medication doses. Munira not sure if taking glipizide ER 10 mg daily. She states pt does take metformin bid. Munira agrees to contact MD regarding meds/doses pt should be taking. Discussed importance of overseeing pt's bs checks. Munira states either she or granddaughter gives the insulin injections when pt allows them to (bs >300). Reviewed current A1c result of 6.7%. Discussed the A1c may be in healthy range if pt having frequent lows. Daughter verbalized understanding of all info. She does not have additional questions at this time.       Electronically signed by:  Елена Sr RN  07/12/21 16:02 EDT

## 2021-07-12 NOTE — THERAPY EVALUATION
Patient Name: Lesa Julio  : 1950    MRN: [id number]                              Today's Date: 2021       Admit Date: 7/10/2021    Visit Dx:     ICD-10-CM ICD-9-CM   1. ANAMARIA (acute kidney injury) (CMS/formerly [facility])  N17.9 584.9   2. Acute pancreatitis, unspecified complication status, unspecified pancreatitis type  K85.90 577.0   3. Uncontrolled other specified diabetes mellitus with hyperglycemia (CMS/formerly [facility])  E13.65 250.02   4. Metabolic acidosis  E87.2 276.2   5. Hyperkalemia  E87.5 276.7   6. General weakness  R53.1 780.79     Patient Active Problem List   Diagnosis   • Cardiac murmur   • Chronic obstructive pulmonary disease (CMS/formerly [facility])   • Chronic renal insufficiency, stage III (moderate) (CMS/formerly [facility])   • Diabetes mellitus, type 2 (CMS/formerly [facility])   • Essential hypertension   • Rheumatoid arthritis (CMS/formerly [facility])   • Tobacco abuse   • Allergic rhinitis   • Generalized weakness   • Hearing loss   • Mixed hyperlipidemia   • Nonalcoholic fatty liver disease   • Obesity   • Obstructive sleep apnea syndrome   • Vitamin D deficiency   • Secondary hyperparathyroidism of renal origin (CMS/formerly [facility])   • Paroxysmal atrial fibrillation (CMS/formerly [facility])   • GERD (gastroesophageal reflux disease)   • ANAMARIA (acute kidney injury) (CMS/formerly [facility])   • Elevated lipase   • Abdominal pain     Past Medical History:   Diagnosis Date   • Arthritis    • CKD (chronic kidney disease) stage 3, GFR 30-59 ml/min (CMS/formerly [facility])     per [facility]   • Diabetes mellitus, type 2 (CMS/formerly [facility])    • Essential hypertension 2015   • Grade 4 out of 6 intensity murmur 2020   • Hearing loss     hearing aid right side   • Hyperlipidemia    • Hypersensitivity angiitis (CMS/HCC) 2020   • Hypertension    • Leukocytoclastic vasculitis (CMS/formerly [facility])    • Obesity    • Osteoarthritis    • Ovarian cyst     removed; benign   • Peripheral arterial disease (CMS/formerly [facility])    • Sleep apnea    • Tubular adenoma of colon 10/30/2018   • Vascular insufficiency 2020     Past  Surgical History:   Procedure Laterality Date   • BRONCHOSCOPY Bilateral 10/13/2020    Procedure: BRONCHOSCOPY AT BEDSIDE with right lung washing and elective intubation;  Surgeon: Cristhian Hodge MD;  Location: [facility] ENDOSCOPY;  Service: Pulmonary;  Laterality: Bilateral;  Post: pneumonia   • BRONCHOSCOPY Bilateral 10/20/2020    Procedure: BRONCHOSCOPY WITH BRONCHOALVEOLAR LAVAGE  AT BEDSIDE;  Surgeon: Cristhian Hodge MD;  Location: [facility] ENDOSCOPY;  Service: Pulmonary;  Laterality: Bilateral;  POST- PNEUMONIA   • BRONCHOSCOPY N/A 10/27/2020    Procedure: BRONCHOSCOPY with bronchial washing and intubation;  Surgeon: Cristhian Hodge MD;  Location: [facility] ENDOSCOPY;  Service: Pulmonary;  Laterality: N/A;  post op:pneumonia   • BRONCHOSCOPY N/A 12/14/2020    Procedure: BRONCHOSCOPY AT BEDSIDE WITH BRONCHOALVEOLAR LAVAGE;  Surgeon: Cristhian Hodge MD;  Location: [facility] ENDOSCOPY;  Service: Pulmonary;  Laterality: N/A;  PNEUMONIA   • CHOLECYSTECTOMY     • ENDOSCOPY N/A 10/13/2020    Procedure: ESOPHAGOGASTRODUODENOSCOPY AT BEDSIDE;  Surgeon: Omi Rivera MD;  Location: [facility] ENDOSCOPY;  Service: Gastroenterology;  Laterality: N/A;   • LIVER BIOPSY     • TUBAL ABDOMINAL LIGATION       General Information     Row Name 07/12/21 1523          OT Time and Intention    Document Type  evaluation  -LIYAH     Mode of Treatment  occupational therapy;co-treatment;physical therapy  -LIYAH     Row Name 07/12/21 1523          General Information    Patient Profile Reviewed  yes  -LIYAH     Prior Level of Function  independent:;ADL's;all household mobility  -LIYAH     Existing Precautions/Restrictions  fall  -LIYAH     Row Name 07/12/21 1523          Occupational Profile    Reason for Services/Referral (Occupational Profile)  Pt is a 71 y/o F admit for c/o hypoglycemia and generalized abdominal pain. Work up showed ANAMARIA with metabolic acidosis and hyperkalemia. PMH includes COPD, CKD stage 3, Afib, RA, RAMONE, DM 2, [place] (use her R ear) and HLD. Pt resides with  her daughter in a trailer with 4 KIRILL. Pt reports indep with ADLs and mobility at baseline.  -     Row Name 07/12/21 1523          Living Environment    Lives With  child(leticia), adult;grandchild(leticia)  -[facility] Name 07/12/21 1523          Home Main Entrance    Number of Stairs, Main Entrance  four  -     Row Name 07/12/21 1523          Cognition    Orientation Status (Cognition)  oriented x 4  -[facility] Name 07/12/21 1523          Safety Issues, Functional Mobility    Impairments Affecting Function (Mobility)  balance;endurance/activity tolerance;shortness of breath;strength;pain  -     Comment, Safety Issues/Impairments (Mobility)  gait belt and non skid socks used for safety  -       User Key  (r) = Recorded By, (t) = Taken By, (c) = Cosigned By    Initials Name Provider Type    Jackie Marinelli, OT Occupational Therapist          Mobility/ADL's     [facility] Name 07/12/21 1527          Bed Mobility    Bed Mobility  supine-sit  -     Supine-Sit [place] (Bed Mobility)  contact guard  -     Assistive Device (Bed Mobility)  bed rails;head of bed elevated  -[facility] Name 07/12/21 1527          Transfers    Transfers  sit-stand transfer  -     Sit-Stand [place] (Transfers)  contact guard  -[facility] Name 07/12/21 1527          Functional Mobility    Functional Mobility- Ind. Level  contact guard assist;1 person  -     Functional Mobility- Device  rolling walker  -     Functional Mobility-Distance (Feet)  20  -     Functional Mobility- Comment  Pt reports c/o dizziness with mobility and sit to stands as well as BLE weakness. BP taken and WFL.  -[facility] Name 07/12/21 1527          Activities of Daily Living    BADL Assessment/Intervention  lower body dressing;grooming  -[facility] Name 07/12/21 1527          Lower Body Dressing Assessment/Training    [place] Level (Lower Body Dressing)  don;doff;shoes/slippers;moderate assist (50% patient effort);maximum assist (25% patient  effort)  -LIYAH     Position (Lower Body Dressing)  edge of bed sitting  -     Row Name 07/12/21 1527          Grooming Assessment/Training    [place] Level (Grooming)  hair care, combing/brushing;minimum assist (75% patient effort);moderate assist (50% patient effort)  -LIYAH     Position (Grooming)  supported sitting  -LIYAH     Comment (Grooming)  Pt requires assist 2/2 prolonged positioning of BUEs for task completion.  -LIYAH       User Key  (r) = Recorded By, (t) = Taken By, (c) = Cosigned By    Initials Name Provider Type    Jackie Marinelli OT Occupational Therapist        Obj/Interventions     Row Name 07/12/21 1528          Sensory Assessment (Somatosensory)    Sensory Assessment (Somatosensory)  sensation intact;other (see comments) Reports baseline sensation issues, but light touch is intact.  -     Row Name 07/12/21 1528          Vision Assessment/Intervention    Visual Impairment/Limitations  WFL;corrective lenses for reading  -[facility] Name 07/12/21 1528          Range of Motion Comprehensive    General Range of Motion  no range of motion deficits identified  -[facility] Name 07/12/21 1528          Strength Comprehensive (MMT)    General Manual Muscle Testing (MMT) Assessment  no strength deficits identified  -[facility] Name 07/12/21 1528          Balance    Balance Assessment  sitting static balance;standing static balance;standing dynamic balance  -LIYAH     Static Sitting Balance  WFL;unsupported;sitting, edge of bed  -LIYAH     Static Standing Balance  WFL;unsupported;standing  -LIYAH     Dynamic Standing Balance  mild impairment;supported;standing  -LIYAH     Comment, Balance  Pt requires increased assist with ambulation due to c/o fatigue and weakness.  -LIYAH       User Key  (r) = Recorded By, (t) = Taken By, (c) = Cosigned By    Initials Name Provider Type    Jackie Marinelli OT Occupational Therapist        Goals/Plan     Row Name 07/12/21 1534          Bed Mobility Goal 1 (OT)     Activity/Assistive Device (Bed Mobility Goal 1, OT)  bed mobility activities, all  -LIYAH     [place] Level/Cues Needed (Bed Mobility Goal 1, OT)  independent  -LIYAH     Time Frame (Bed Mobility Goal 1, OT)  long term goal (LTG);2 weeks  -LIYAH     Row Name 07/12/21 1534          Transfer Goal 1 (OT)    Activity/Assistive Device (Transfer Goal 1, OT)  transfers, all  -LIYAH     [place] Level/Cues Needed (Transfer Goal 1, OT)  modified independence  -LIYAH     Time Frame (Transfer Goal 1, OT)  long term goal (LTG);2 weeks  -LIYAH     Row Name 07/12/21 1534          Dressing Goal 1 (OT)    Activity/Device (Dressing Goal 1, OT)  dressing skills, all  -LIYAH     [place]/Cues Needed (Dressing Goal 1, OT)  modified independence  -LIYAH     Time Frame (Dressing Goal 1, OT)  long term goal (LTG);2 weeks  -LIYAH     Row Name 07/12/21 1534          Therapy Assessment/Plan (OT)    Planned Therapy Interventions (OT)  activity tolerance training;functional balance retraining;occupation/activity based interventions;ROM/therapeutic exercise;strengthening exercise;transfer/mobility retraining;patient/caregiver education/training;neuromuscular control/coordination retraining;cognitive/visual perception retraining;edema control/reduction;BADL retraining;adaptive equipment training  -       User Key  (r) = Recorded By, (t) = Taken By, (c) = Cosigned By    Initials Name Provider Type    Jackie Marinelli OT Occupational Therapist        Clinical Impression     Row Name 07/12/21 1529          Pain Assessment    Additional Documentation  Pain Scale: FACES Pre/Post-Treatment (Group)  -LIYAH     Row Name 07/12/21 1529          Pain Scale: Numbers Pre/Post-Treatment    Pain Intervention(s)  Repositioned;Ambulation/increased activity  -LIYAH     Row Name 07/12/21 1521          Pain Scale: FACES Pre/Post-Treatment    Pain: FACES Scale, Pretreatment  0-->no hurt  -     Posttreatment Pain Rating  0-->no hurt  -LIYAH     Row Name 07/12/21 1527           Plan of Care Review    Plan of Care Reviewed With  patient;son  -LIYAH     Outcome Summary  Pt is a 71 y/o F admit for c/o hypoglycemia and generalized abdominal pain. Work up showed ANAMRAIA with metabolic acidosis and hyperkalemia. PMH includes COPD, CKD stage 3, Afib, RA, RAMONE, DM 2, [place] (use her R ear) and HLD. Pt resides with her daughter in a trailer with 4 KIRILL. Pt reports indep with ADLs and mobility at baseline. Pt ambulates with CGA using RW, ambulating short distance before c/o fatigue and BLE weakness. Pt requires assist for ADLs due to endurance impairments only. Pt appears below ADL baseline, limited by impaired endurance, impaired balance and acuity of illness. OT recommends continued treatment at 5x/week and d/c home with HH OT vs family assist pending availability.  -LIYAH     Row Name 07/12/21 1529          Therapy Assessment/Plan (OT)    Rehab Potential (OT)  good, to achieve stated therapy goals  -LIYAH     Criteria for Skilled Therapeutic Interventions Met (OT)  yes;skilled treatment is necessary  -LIYAH     Therapy Frequency (OT)  5 times/wk  -LIYAH     Row Name 07/12/21 1529          Therapy Plan Review/Discharge Plan (OT)    Anticipated Discharge Disposition (OT)  home with assist;home with home health  -LIYAH     Row Name 07/12/21 1529          Positioning and Restraints    Pre-Treatment Position  in bed  -LIYAH     Post Treatment Position  chair  -LIYAH     In Chair  notified nsg;sitting;call light within reach;encouraged to call for assist;exit alarm on  -LIYAH       User Key  (r) = Recorded By, (t) = Taken By, (c) = Cosigned By    Initials Name Provider Type    Jackie Marinelli, OT Occupational Therapist        Outcome Measures     Row Name 07/12/21 1530          How much help from another is currently needed...    Putting on and taking off regular lower body clothing?  2  -LIYAH     Bathing (including washing, rinsing, and drying)  2  -LIYAH     Toileting (which includes using toilet bed pan or urinal)  3  -LIYAH      Putting on and taking off regular upper body clothing  3  -LIYAH     Taking care of personal grooming (such as brushing teeth)  3  -LIYAH     Eating meals  4  -LIYAH     AM-PAC 6 Clicks Score (OT)  17  -LIYAH     Row Name 07/12/21 1534          Functional Assessment    Outcome Measure Options  AM-PAC 6 Clicks Daily Activity (OT)  -LIYAH       User Key  (r) = Recorded By, (t) = Taken By, (c) = Cosigned By    Initials Name Provider Type    LIYAH Jackie Barksdale OT Occupational Therapist        Occupational Therapy Education                 Title: PT OT SLP Therapies (Done)     Topic: Occupational Therapy (Done)     Point: ADL training (Done)     Description:   Instruct learner(s) on proper safety adaptation and remediation techniques during self care or transfers.   Instruct in proper use of assistive devices.              Learning Progress Summary           Patient Acceptance, E,TB, VU by LIYAH at 7/12/2021 1535                               User Key     Initials Effective Dates Name Provider Type Discipline     06/16/21 -  Jackie Barksdale OT Occupational Therapist OT              OT Recommendation and Plan  Planned Therapy Interventions (OT): activity tolerance training, functional balance retraining, occupation/activity based interventions, ROM/therapeutic exercise, strengthening exercise, transfer/mobility retraining, patient/caregiver education/training, neuromuscular control/coordination retraining, cognitive/visual perception retraining, edema control/reduction, BADL retraining, adaptive equipment training  Therapy Frequency (OT): 5 times/wk  Plan of Care Review  Plan of Care Reviewed With: patient, son  Outcome Summary: Pt is a 69 y/o F admit for c/o hypoglycemia and generalized abdominal pain. Work up showed ANAMARIA with metabolic acidosis and hyperkalemia. PMH includes COPD, CKD stage 3, Afib, RA, RAMONE, DM 2, [place] (use her R ear) and HLD. Pt resides with her daughter in a trailer with 4 KIRILL. Pt reports indep with ADLs  and mobility at baseline. Pt ambulates with CGA using RW, ambulating short distance before c/o fatigue and BLE weakness. Pt requires assist for ADLs due to endurance impairments only. Pt appears below ADL baseline, limited by impaired endurance, impaired balance and acuity of illness. OT recommends continued treatment at 5x/week and d/c home with HH OT vs family assist pending availability.     Time Calculation:   Time Calculation- OT     Row Name 07/12/21 1535             Time Calculation- OT    OT Start Time  1315  -LIYAH      OT Stop Time  1342  -LIYAH      OT Time Calculation (min)  27 min  -LIYAH      Total Timed Code Minutes- OT  10 minute(s)  -LIYAH      OT Received On  07/12/21  -LIYAH      OT - Next Appointment  07/13/21  -LIYAH      OT Goal Re-Cert Due Date  07/26/21  -LIYAH         Timed Charges    08362 - OT Self Care/Mgmt Minutes  10  -LIYAH         Untimed Charges    OT Eval/Re-eval Minutes  17  -LIYAH         Total Minutes    Timed Charges Total Minutes  10  -LIYAH      Untimed Charges Total Minutes  17  -LIYAH       Total Minutes  27  -LIYAH        User Key  (r) = Recorded By, (t) = Taken By, (c) = Cosigned By    Initials Name Provider Type    Jackie Marinelli OT Occupational Therapist        Therapy Charges for Today     Code Description Service Date Service Provider Modifiers Qty    04223640696 HC OT SELF CARE/MGMT/TRAIN EA 15 MIN 7/12/2021 Jackie Barksdale OT GO 1    [id number] HC OT EVAL LOW COMPLEXITY 3 7/12/2021 Jackie Barksdale OT GO 1               Jackie Barksdale OT  7/12/2021

## 2021-07-13 VITALS
SYSTOLIC BLOOD PRESSURE: 173 MMHG | BODY MASS INDEX: 32.58 KG/M2 | WEIGHT: 183.86 LBS | DIASTOLIC BLOOD PRESSURE: 65 MMHG | OXYGEN SATURATION: 93 % | HEART RATE: 59 BPM | RESPIRATION RATE: 16 BRPM | HEIGHT: 63 IN | TEMPERATURE: 97.5 F

## 2021-07-13 LAB
ALBUMIN SERPL-MCNC: 3.6 G/DL (ref 3.5–5.2)
ALBUMIN/GLOB SERPL: 1.6 G/DL
ALP SERPL-CCNC: 88 U/L (ref 39–117)
ALT SERPL W P-5'-P-CCNC: 7 U/L (ref 1–33)
ANION GAP SERPL CALCULATED.3IONS-SCNC: 10 MMOL/L (ref 5–15)
ANISOCYTOSIS BLD QL: NORMAL
AST SERPL-CCNC: 10 U/L (ref 1–32)
BASOPHILS # BLD AUTO: 0.1 10*3/MM3 (ref 0–0.2)
BASOPHILS NFR BLD AUTO: 0.8 % (ref 0–1.5)
BILIRUB SERPL-MCNC: 0.3 MG/DL (ref 0–1.2)
BUN SERPL-MCNC: 34 MG/DL (ref 8–23)
BUN/CREAT SERPL: 23.8 (ref 7–25)
CA-I SERPL ISE-MCNC: 1.28 MMOL/L (ref 1.2–1.3)
CALCIUM SPEC-SCNC: 9.5 MG/DL (ref 8.6–10.5)
CHLORIDE SERPL-SCNC: 103 MMOL/L (ref 98–107)
CO2 SERPL-SCNC: 30 MMOL/L (ref 22–29)
CREAT SERPL-MCNC: 1.43 MG/DL (ref 0.57–1)
DACRYOCYTES BLD QL SMEAR: NORMAL
DEPRECATED RDW RBC AUTO: 48.6 FL (ref 37–54)
EOSINOPHIL # BLD AUTO: 0.1 10*3/MM3 (ref 0–0.4)
EOSINOPHIL NFR BLD AUTO: 1.8 % (ref 0.3–6.2)
ERYTHROCYTE [DISTWIDTH] IN BLOOD BY AUTOMATED COUNT: 17 % (ref 12.3–15.4)
GFR SERPL CREATININE-BSD FRML MDRD: 36 ML/MIN/1.73
GIANT PLATELETS: NORMAL
GLOBULIN UR ELPH-MCNC: 2.3 GM/DL
GLUCOSE BLDC GLUCOMTR-MCNC: 102 MG/DL (ref 70–105)
GLUCOSE BLDC GLUCOMTR-MCNC: 116 MG/DL (ref 70–105)
GLUCOSE BLDC GLUCOMTR-MCNC: 186 MG/DL (ref 70–105)
GLUCOSE SERPL-MCNC: 112 MG/DL (ref 65–99)
HCT VFR BLD AUTO: 32.7 % (ref 34–46.6)
HGB BLD-MCNC: 10.9 G/DL (ref 12–15.9)
LARGE PLATELETS: NORMAL
LYMPHOCYTES # BLD AUTO: 1.2 10*3/MM3 (ref 0.7–3.1)
LYMPHOCYTES NFR BLD AUTO: 16.9 % (ref 19.6–45.3)
MAGNESIUM SERPL-MCNC: 1.5 MG/DL (ref 1.6–2.4)
MCH RBC QN AUTO: 26.8 PG (ref 26.6–33)
MCHC RBC AUTO-ENTMCNC: 33.3 G/DL (ref 31.5–35.7)
MCV RBC AUTO: 80.4 FL (ref 79–97)
MICROCYTES BLD QL: NORMAL
MONOCYTES # BLD AUTO: 0.8 10*3/MM3 (ref 0.1–0.9)
MONOCYTES NFR BLD AUTO: 11 % (ref 5–12)
NEUTROPHILS NFR BLD AUTO: 4.9 10*3/MM3 (ref 1.7–7)
NEUTROPHILS NFR BLD AUTO: 69.5 % (ref 42.7–76)
NRBC BLD AUTO-RTO: 0.2 /100 WBC (ref 0–0.2)
PHOSPHATE SERPL-MCNC: 2.8 MG/DL (ref 2.5–4.5)
PLATELET # BLD AUTO: 171 10*3/MM3 (ref 140–450)
PMV BLD AUTO: 8.2 FL (ref 6–12)
POIKILOCYTOSIS BLD QL SMEAR: NORMAL
POTASSIUM SERPL-SCNC: 3.9 MMOL/L (ref 3.5–5.2)
PROT SERPL-MCNC: 5.9 G/DL (ref 6–8.5)
RBC # BLD AUTO: 4.07 10*6/MM3 (ref 3.77–5.28)
SMALL PLATELETS BLD QL SMEAR: ADEQUATE
SODIUM SERPL-SCNC: 143 MMOL/L (ref 136–145)
WBC # BLD AUTO: 7.1 10*3/MM3 (ref 3.4–10.8)
WBC MORPH BLD: NORMAL

## 2021-07-13 PROCEDURE — 63710000001 INSULIN LISPRO (HUMAN) PER 5 UNITS: Performed by: STUDENT IN AN ORGANIZED HEALTH CARE EDUCATION/TRAINING PROGRAM

## 2021-07-13 PROCEDURE — 83735 ASSAY OF MAGNESIUM: CPT | Performed by: STUDENT IN AN ORGANIZED HEALTH CARE EDUCATION/TRAINING PROGRAM

## 2021-07-13 PROCEDURE — 82962 GLUCOSE BLOOD TEST: CPT

## 2021-07-13 PROCEDURE — 80053 COMPREHEN METABOLIC PANEL: CPT | Performed by: STUDENT IN AN ORGANIZED HEALTH CARE EDUCATION/TRAINING PROGRAM

## 2021-07-13 PROCEDURE — 85025 COMPLETE CBC W/AUTO DIFF WBC: CPT | Performed by: STUDENT IN AN ORGANIZED HEALTH CARE EDUCATION/TRAINING PROGRAM

## 2021-07-13 PROCEDURE — 99239 HOSP IP/OBS DSCHRG MGMT >30: CPT | Performed by: INTERNAL MEDICINE

## 2021-07-13 PROCEDURE — 84100 ASSAY OF PHOSPHORUS: CPT | Performed by: STUDENT IN AN ORGANIZED HEALTH CARE EDUCATION/TRAINING PROGRAM

## 2021-07-13 PROCEDURE — 85007 BL SMEAR W/DIFF WBC COUNT: CPT | Performed by: STUDENT IN AN ORGANIZED HEALTH CARE EDUCATION/TRAINING PROGRAM

## 2021-07-13 PROCEDURE — 82330 ASSAY OF CALCIUM: CPT | Performed by: NURSE PRACTITIONER

## 2021-07-13 RX ORDER — POLYETHYLENE GLYCOL 3350 17 G/17G
17 POWDER, FOR SOLUTION ORAL DAILY PRN
Qty: 510 G | Refills: 0 | Status: SHIPPED | OUTPATIENT
Start: 2021-07-13

## 2021-07-13 RX ORDER — AMOXICILLIN 250 MG
2 CAPSULE ORAL 2 TIMES DAILY
Qty: 30 TABLET | Refills: 0 | Status: SHIPPED | OUTPATIENT
Start: 2021-07-13

## 2021-07-13 RX ORDER — HYDRALAZINE HYDROCHLORIDE 25 MG/1
50 TABLET, FILM COATED ORAL 3 TIMES DAILY
Status: DISCONTINUED | OUTPATIENT
Start: 2021-07-13 | End: 2021-07-13 | Stop reason: HOSPADM

## 2021-07-13 RX ADMIN — DOCUSATE SODIUM 50 MG AND SENNOSIDES 8.6 MG 2 TABLET: 8.6; 5 TABLET, FILM COATED ORAL at 09:06

## 2021-07-13 RX ADMIN — INSULIN LISPRO 2 UNITS: 100 INJECTION, SOLUTION INTRAVENOUS; SUBCUTANEOUS at 13:00

## 2021-07-13 RX ADMIN — LANSOPRAZOLE 15 MG: KIT at 09:06

## 2021-07-13 RX ADMIN — METOPROLOL TARTRATE 50 MG: 50 TABLET, FILM COATED ORAL at 09:06

## 2021-07-13 RX ADMIN — Medication 10 ML: at 09:07

## 2021-07-13 RX ADMIN — HYDRALAZINE HYDROCHLORIDE 50 MG: 25 TABLET, FILM COATED ORAL at 13:00

## 2021-07-13 RX ADMIN — Medication 10 ML: at 09:08

## 2021-07-13 RX ADMIN — ASPIRIN 81 MG: 81 TABLET, COATED ORAL at 09:06

## 2021-07-13 RX ADMIN — LOSARTAN POTASSIUM 50 MG: 50 TABLET, FILM COATED ORAL at 09:06

## 2021-07-13 RX ADMIN — Medication 10 ML: at 09:06

## 2021-07-13 NOTE — CASE MANAGEMENT/SOCIAL WORK
Continued Stay Note  [facility] Bryant     Patient Name: Lesa Julio  MRN: [id number]  Today's Date: 7/13/2021    Admit Date: 7/10/2021    Discharge Plan     Row Name 07/13/21 1327       Plan    Plan  Anticipate routine home with son, referral to [facility] , ordered and pending acceptance.    Provided Post Acute Provider List?  Yes    Post Acute Provider List  Home Health    Delivered To  Support Person    Support Person  ligia Arjun    Method of Delivery  Telephone    Patient/Family in Agreement with Plan  yes    Plan Comments  Received phone call from ligia Díaz, he informed that patient will now d/c home to his house (address [address]), and they are agreeable to home health. List discussed over the phone, choose [facility]. Referral made and patient declined due to staffing. 2nd choice, referral to [facility], faxed through [facility] and pending acceptance.     Phone communication or documentation only - no physical contact with patient or family.      Chantell Shook RN

## 2021-07-13 NOTE — CASE MANAGEMENT/SOCIAL WORK
Continued Stay Note  EDG[facility] Bryant     Patient Name: Lesa Julio  MRN: [id number]  Today's Date: 7/13/2021    Admit Date: 7/10/2021    Discharge Plan     Row Name 07/13/21 1453       Plan    Plan  Anticipate routine home with son, [facility] , ordered and accepted.    Plan Comments  Called and spoke to [facility] at [facility] and patient is accepted. DC summary already faxed.    Final Discharge Disposition Code  06 - home with home health care    Final Note  [facility] Home [facility]     Phone communication or documentation only - no physical contact with patient or family.      Chantell Shook RN

## 2021-07-13 NOTE — CASE MANAGEMENT/SOCIAL WORK
Continued Stay Note  EDGAR Bryant     Patient Name: Lesa Julio  MRN: [id number]  Today's Date: 7/13/2021    Admit Date: 7/10/2021    Discharge Plan     Row Name 07/13/21 1136       Plan    Plan  Declined home health. Anticipate routine home with daughter.    Plan Comments  Met with patient at bedside, discussed home health. She currently is declining. Discharge orders noted.    Update 1250--- Received notice from RN that patients family wants patient to go to IP rehab. Called and spoke to son Arjun, informed PT/OT recommends HH and insurance will not approve an inpt rehab stay. Offered for patient/family to private pay for rehab and he declines. Offered home health and he declines. Called and informed patient of this information, she remains agreeable to d/c home with family.     Final Discharge Disposition Code  01 - home or self-care    Final Note  Home, declined home health        Met with patient in room wearing PPE: mask    Maintained distance greater than six feet and spent less than 15 minutes in the room.            Chantell Shook RN

## 2021-07-13 NOTE — DISCHARGE PLACEMENT REQUEST
"New home health referral.    Will discharge to son's address, name Bri, address [address] , [place], [place]  Bri ph# [phone]    Chantell PALOMARES, RN    Care Coordination   [facility]  [address] [place] [address] [phone] office  [phone] cell  [phone] fax  [email or web address]  [facility]      Emily Mcclelland (70 y.o. Female)     Date of Birth Social Security Number Address Home Phone MRN    1950  [address] [facility] [place] [address] [phone] [id number]    Mosque Marital Status          None        Admission Date Admission Type Admitting Provider Attending Provider Department, Room/Bed    7/10/21 Emergency  Ghulam Glasgow MD [facility] 3A MEDICAL INPATIENT, 341/1    Discharge Date Discharge Disposition Discharge Destination         Home-Health Care [facility]              Attending Provider: Ghulam Glasgow MD    Allergies: Lisinopril, Ipratropium-albuterol    Isolation: None   Infection: None   Code Status: CPR    Ht: 160 cm (63\")   Wt: 83.4 kg (183 lb 13.8 oz)    Admission Cmt: None   Principal Problem: ANAMARIA (acute kidney injury) (CMS/[facility]) [N17.9]                 Active Insurance as of 7/10/2021     Primary Coverage     Payor Plan Insurance Group Employer/Plan Group    ANTHEM MEDICARE REPLACEMENT ANTHEM MEDICARE ADVANTAGE [id number]     Payor Plan Address Payor Plan Phone Number Payor Plan Fax Number Effective Dates    [address] [phone]  1/1/2021 - None Entered    [facility] [address]       Subscriber Name Subscriber Birth Date Member ID       EMILY MCCLELLAND 1950 [id number]                 Emergency Contacts      (Rel.) Home Phone Work Phone Mobile Phone    BRI MCCLELLAND (Son) [phone] -- [phone]        [facility] 3A MEDICAL INPATIENT  [address] [facility] [place] [address]  Phone:  [phone]  Fax:  [phone] Date: Jul 13, 2021      Ambulatory " Referral to Home Health     Patient:  Lesa Julio MRN:  [id number]   [address] [place] [address] :  1950  SSN:    Phone: [phone] Sex:  F      INSURANCE PAYOR PLAN GROUP # SUBSCRIBER ID   Primary:    IRENE MEDICARE REPLACEMENT [id number]      Referring Provider Information:  KAELA MADERA Phone: [phone] Fax: [phone]      Referral Information:   # Visits:  999 Referral Type: Home Health [42]   Urgency:  Routine Referral Reason: Patient Preference   Start Date: 2021 End Date:  To be determined by Insurer   Diagnosis: ANAMARIA (acute kidney injury) (CMS/[facility]) (N17.9 [ICD-10-CM] 584.9 [ICD-9-CM])      Refer to Dept:   Refer to Provider:   Refer to Facility:       Face to Face Visit Date: 2021  Follow-up provider for Plan of Care? I treated the patient in an acute care facility and will not continue treatment after discharge.  Follow-up provider: ANJALI CHERY [[id number]]  Reason/Clinical Findings: post hospital evaluation  Describe mobility limitations that make leaving home difficult: weakness  Nursing/Therapeutic Services Requested: Other ([facility] to evaluate)  Frequency: 1 Week 1     This document serves as a request of services and does not constitute Insurance authorization or approval of services.  To determine eligibility, please contact the members Insurance carrier to verify and review coverage.     If you have medical questions regarding this request for services. Please contact [address] MEDICAL INPATIENT at [phone] during normal business hours.       Verbal Order Mode: Telephone with readback   Authorizing Provider: Kaela Madera MD  Authorizing Provider's NPI: [id number]     Order Entered By: Chantell Shook RN 2021  1:20 PM     Electronically signed by:                 History & Physical      Marcelina Woodard APRN at 07/10/21 1942     Attestation signed by Daniel Maldonado MD at 21 1124     I examined the patient, reviewed the data and the above note and agree with it.                    PULMONARY/CRITICAL CARE HISTORY & PHYSICAL       PATIENT NAME:     Lesa Julio  :     1950    MRN:     [id number]       ROOM:     [facility] 2316/1     PRIMARY CARE PHYSICIAN:  Manuel Sanders MD    SUBJECTIVE     CHIEF COMPLAINT:   Hypoglycemia    HISTORY OF PRESENT ILLNESS:  Lesa Julio is a 70 y.o. 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   Patient presented to [facility] on 7/10/2021 with complaint of hypoglycemia. Patient is extremely hard of hearing making HPI difficult to obtain, information obtained from chart review. The patient had a low blood sugar at home. She takes metformin, glipizide, and Glargine insulin at home. The patient states she has had some generalized abdominal pain since 2021. She denies emesis, diarrhea, or constipation. Additionally, the patient states she has had some difficulty ambulating due to bilateral leg weakness. She denies exacerbating or relieving factors.    In the ED, labs were obtained with abnormalities as follows: Glucose 203, potassium 6.9, CO2 8, anion gap 22, , creatinine 5.99, alkaline phosphatase 132, lipase 391, WBCs 11.1.  Urinalysis shows trace ketones, 2+ protein, 1+ bilirubin, 21-30 white blood cells, and trace bacteria; urine culture pending.     Pulmonary/Intensivist service was contacted for admission to ICU and further evaluation  and treatment.      REVIEW OF SYSTEMS:  Review of systems could not be obtained due to   patient confusion.      ASSESSMENT & PLAN     Principal Problem:    ANAMARIA (acute kidney injury) (CMS/HCC)  Active Problems:    Elevated lipase    Abdominal pain    Generalized weakness    Cardiac murmur    Chronic obstructive pulmonary disease (CMS/HCC)    Chronic renal insufficiency, stage III (moderate) (CMS/HCC)    Diabetes mellitus, type 2 (CMS/HCC)    Essential hypertension    Rheumatoid arthritis (CMS/HCC)    Tobacco abuse    Allergic rhinitis    Hearing loss    Mixed hyperlipidemia    Nonalcoholic fatty liver disease    Obesity    Obstructive sleep apnea syndrome    Vitamin D deficiency    Secondary hyperparathyroidism of renal origin (CMS/HCC)    Paroxysmal atrial fibrillation (CMS/HCC)    GERD (gastroesophageal reflux disease)       Kidney injury, acute on chronic stage III, with metabolic acidosis    --likely metformin toxicity  -UA reviewed  -Renal ultrasound  -Monitor and trend labs  -Avoid nephrotoxic medications, hypotension, and NSAIDS  -Renally dose medications  -Monitor urine output  -Nephrology following    Elevated lipase  -Ultrasound gallbladder  -Repeat amylase lipase in a.m.    Diabetes mellitus type 2, chronic  -A1C  -Hold home diabetes medications  -Start SSI  -Accu-checks Q 6 hours    COPD, not in exacerbation  -Continue home inhalers  -Incentive spirometry  -Titrate O2 for sats > 90%    Essential Hypertension, Chronic; hyperlipidemia  -Continue home medications as appropriate   - Monitor with routine vital signs     RAMONE  -CPAP at night  -Titrate O2 to keep SATS >90%        Code Status: Full  VTE Prophylaxis: SCDs  PUD Prophylaxis: PPI      HOSPITAL MEDICATIONS     SCHEDULED MEDICATIONS:  insulin lispro, 0-7 Units, Subcutaneous, Q6H  pantoprazole, 40 mg, Intravenous, Q AM  senna-docusate sodium, 2 tablet, Oral, BID  sodium chloride, 10 mL, Intravenous, Q12H  sodium chloride, 10 mL, Intravenous,  "Q12H  sodium chloride, 10 mL, Intravenous, Q12H  sodium chloride, 10 mL, Intravenous, Q12H         CONTINUOUS INFUSIONS:    phenylephrine, 0.5-6 mcg/kg/min, Last Rate: 2 mcg/kg/min (07/11/21 0107)  custom IV infusion builder, , Last Rate: 150 mL/hr at 07/11/21 0143         PRN MEDICATIONS:   •  acetaminophen **OR** acetaminophen  •  albumin human  •  aluminum-magnesium hydroxide-simethicone  •  senna-docusate sodium **AND** polyethylene glycol **AND** bisacodyl **AND** bisacodyl  •  dextrose  •  dextrose  •  glucagon (human recombinant)  •  heparin (porcine)  •  insulin lispro **AND** insulin lispro  •  ondansetron **OR** ondansetron  •  [COMPLETED] Insert peripheral IV **AND** sodium chloride  •  sodium chloride  •  sodium chloride  •  sodium chloride       OBJECTIVE     VITAL SIGNS:  BP (!) 96/37   Pulse 68   Temp 99 °F (37.2 °C) (Oral)   Resp 18   Ht 160 cm (63\")   Wt 85 kg (187 lb 6.3 oz)   SpO2 96%   BMI 33.19 kg/m²     Wt Readings from Last 3 Encounters:   07/10/21 85 kg (187 lb 6.3 oz)   12/20/20 76.1 kg (167 lb 12.8 oz)   11/06/20 80.8 kg (178 lb 2.1 oz)       INTAKE/OUTPUT:    Intake/Output Summary (Last 24 hours) at 7/11/2021 0308  Last data filed at 7/11/2021 0306  Gross per 24 hour   Intake 100 ml   Output 400 ml   Net -300 ml       PHYSICAL EXAM:   Constitutional:  Well developed, well nourished, no acute distress, non-toxic appearance   Eyes:  PERRL, conjunctiva normal, EOMI   HENT:  Atraumatic, hard of hearing  Respiratory:  Clear throughout, non-labored respirations without accessory muscle use  Cardiovascular:  Normal rate, normal rhythm, no murmurs, no gallops, no rubs   GI:  Soft, nondistended, normal bowel sounds, nontender, no organomegaly, no mass, no rebound, no guarding   :  No costovertebral angle tenderness   Musculoskeletal:  No edema, tenderness noted to legs, no deformities  Integument:  Well hydrated, no rash   Lymphatic:  No lymphadenopathy noted   Neurologic:  Alert & " oriented x 1, CN 2-12 normal, normal motor function, normal sensory function, no focal deficits noted   Psychiatric:  Speech and behavior appropriate       HISTORY     HISTORY:  Past Medical History:   Diagnosis Date   • Arthritis    • CKD (chronic kidney disease) stage 3, GFR 30-59 ml/min (CMS/[facility])     per [facility]   • Diabetes mellitus, type 2 (CMS/[facility])    • Essential hypertension 11/6/2015   • Grade 4 out of 6 intensity murmur 6/22/2020   • Hearing loss     hearing aid right side   • Hyperlipidemia    • Hypersensitivity angiitis (CMS/[facility]) 6/22/2020   • Hypertension    • Leukocytoclastic vasculitis (CMS/[facility])    • Obesity    • Osteoarthritis    • Ovarian cyst     removed; benign   • Peripheral arterial disease (CMS/[facility])    • Sleep apnea    • Tubular adenoma of colon 10/30/2018   • Vascular insufficiency 6/22/2020     Past Surgical History:   Procedure Laterality Date   • BRONCHOSCOPY Bilateral 10/13/2020    Procedure: BRONCHOSCOPY AT BEDSIDE with right lung washing and elective intubation;  Surgeon: Cristhian Hodge MD;  Location: [facility] ENDOSCOPY;  Service: Pulmonary;  Laterality: Bilateral;  Post: pneumonia   • BRONCHOSCOPY Bilateral 10/20/2020    Procedure: BRONCHOSCOPY WITH BRONCHOALVEOLAR LAVAGE  AT BEDSIDE;  Surgeon: Cristhian Hodge MD;  Location: [facility] ENDOSCOPY;  Service: Pulmonary;  Laterality: Bilateral;  POST- PNEUMONIA   • BRONCHOSCOPY N/A 10/27/2020    Procedure: BRONCHOSCOPY with bronchial washing and intubation;  Surgeon: Cristhian Hodge MD;  Location: [facility] ENDOSCOPY;  Service: Pulmonary;  Laterality: N/A;  post op:pneumonia   • BRONCHOSCOPY N/A 12/14/2020    Procedure: BRONCHOSCOPY AT BEDSIDE WITH BRONCHOALVEOLAR LAVAGE;  Surgeon: Cristhian Hodge MD;  Location: [facility] ENDOSCOPY;  Service: Pulmonary;  Laterality: N/A;  PNEUMONIA   • CHOLECYSTECTOMY     • ENDOSCOPY N/A 10/13/2020    Procedure: ESOPHAGOGASTRODUODENOSCOPY AT BEDSIDE;  Surgeon: Omi Rivera MD;  Location: [facility] ENDOSCOPY;   "Service: Gastroenterology;  Laterality: N/A;   • LIVER BIOPSY     • TUBAL ABDOMINAL LIGATION       Family History   Problem Relation Age of Onset   • Heart disease Mother    • Cancer Father      Social History     Socioeconomic History   • Marital status:      Spouse name: Not on file   • Number of children: Not on file   • Years of education: Not on file   • Highest education level: Not on file   Tobacco Use   • Smoking status: Former Smoker     Packs/day: 0.50     Types: Cigarettes   • Smokeless tobacco: Never Used   • Tobacco comment: per pt, \"I quit in October\"   Vaping Use   • Vaping Use: Never used   • Passive vaping exposure Yes   Substance and Sexual Activity   • Alcohol use: No   • Drug use: No   • Sexual activity: Defer        HOME MEDICATIONS:   Prior to Admission medications    Medication Sig Start Date End Date Taking? Authorizing Provider   albuterol sulfate  (90 Base) MCG/ACT inhaler Inhale 2 puffs Every 4 (Four) Hours As Needed for Wheezing.   Yes Lise Carlos MD   aspirin (ADULT ASPIRIN EC LOW STRENGTH) 81 MG EC tablet Take 81 mg by mouth Daily. 11/6/15  Yes Lise Carlos MD   gemfibrozil (LOPID) 600 MG tablet Take 600 mg by mouth 2 (Two) Times a Day Before Meals.   Yes Lise Carlos MD   glipizide (GLUCOTROL XL) 10 MG 24 hr tablet Take 10 mg by mouth Daily.   Yes Lise Carlos MD   hydrALAZINE (APRESOLINE) 50 MG tablet Take 50 mg by mouth 3 (Three) Times a Day.   Yes Lise Carlos MD   Insulin Glargine (BASAGLAR KWIKPEN) 100 UNIT/ML injection pen Inject 100 Units under the skin into the appropriate area as directed 2 (Two) Times a Day.   Yes Lise Carlos MD   metFORMIN (GLUCOPHAGE) 1000 MG tablet Take 1,000 mg by mouth Daily With Breakfast.   Yes Lise Carlos MD   metFORMIN (GLUCOPHAGE) 500 MG tablet Take 500 mg by mouth Every Night.   Yes Lise Carlos MD   metoprolol tartrate (LOPRESSOR) 50 MG tablet Take 50 mg by " mouth 2 (Two) Times a Day.   Yes ProviderLise MD   olmesartan (BENICAR) 20 MG tablet Take 40 mg by mouth Daily.   Yes Lise Carlos MD   rosuvastatin (CRESTOR) 10 MG tablet Take 10 mg by mouth Daily.   Yes ProviderLise MD   theophylline (MEGHAN-24) 300 MG 24 hr capsule Take 300 mg by mouth Daily.   Yes Lise Carlos MD   vitamin D (ERGOCALCIFEROL) 1.25 MG (19713 UT) capsule capsule Take 50,000 Units by mouth 2 (Two) Times a Week. Monday and Thursday   Yes Lise Carlos MD   atorvastatin (LIPITOR) 40 MG tablet Take 40 mg by mouth Daily. 5/13/19 7/10/21  Lise Carlos MD   azaTHIOprine (IMURAN) 50 MG tablet Take 50 mg by mouth 2 (two) times a day.  7/10/21  Lise Carlos MD   budesonide (PULMICORT) 0.5 MG/2ML nebulizer solution Take 2 mL by nebulization 2 (Two) Times a Day. 11/6/20 7/10/21  Marianna Figueroa MD   bumetanide (BUMEX) 1 MG tablet Take 1 tablet by mouth Daily. 11/7/20 7/10/21  Marianna Figueroa MD   insulin glargine (LANTUS) 100 UNIT/ML injection Inject 20 Units under the skin into the appropriate area as directed Every 12 (Twelve) Hours. 11/6/20 7/10/21  Marianna Figueroa MD   pantoprazole (PROTONIX) 40 MG EC tablet Take 1 tablet by mouth Every Morning Before Breakfast. 11/7/20 7/10/21  Marianna Figueroa MD   polyethylene glycol (MIRALAX) 17 g packet Take 17 g by mouth Daily As Needed (constipation). 12/20/20 7/10/21  Rebecca uDbois MD   senna (SENOKOT) 8.6 MG tablet Take 1 tablet by mouth At Night As Needed for Constipation. 12/20/20 7/10/21  Rebecca Dubois MD     Home medications cannot be reconciled at this time, patient is confused. Nursing to follow up in am    IMMUNIZATIONS:  Immunization History   Administered Date(s) Administered   • Flu Vaccine Quad PF 6-35MO 10/30/2019   • Flu Vaccine Quad PF >36MO 10/30/2019   • Flulaval/Fluarix/Fluzone Quad 11/06/2020   • Influenza, Unspecified 10/30/2018   • Pneumococcal Conjugate 13-Valent (PCV13)  02/15/2017   • Pneumococcal Polysaccharide (PPSV23) 06/15/2015, 09/17/2015   • Tdap 09/04/2015        ALLERGIES:  Lisinopril and Ipratropium-albuterol      RESULTS     LABS:  Lab Results (last 24 hours)     Procedure Component Value Units Date/Time    POC Glucose Once [[id number]]  (Normal) Collected: 07/10/21 1442    Specimen: Blood Updated: 07/10/21 1444     Glucose 81 mg/dL      Comment: Serial Number: [id number]:  [id number]       CBC & Differential [[id number]]  (Abnormal) Collected: 07/10/21 1511    Specimen: Blood Updated: 07/10/21 1601    Narrative:      The following orders were created for panel order CBC & Differential.  Procedure                               Abnormality         Status                     ---------                               -----------         ------                     Scan Slide[[id number]]                                       Final result               CBC Auto Differential[[id number]]        Abnormal            Final result                 Please view results for these tests on the individual orders.    Urinalysis With Culture If Indicated - Urine, Catheter [[id number]]  (Abnormal) Collected: 07/10/21 1511    Specimen: Urine, Catheter Updated: 07/10/21 1540     Color, UA Dark Yellow     Comment: Result checked         Appearance, UA Cloudy     Comment: Result checked         pH, UA <=5.0     Specific Gravity, UA 1.022     Glucose, UA Negative     Ketones, UA Trace     Bilirubin, UA Small (1+)     Comment: Confirmation testing is unavailable.  A serum bilirubin is recommended for further assessment.        Blood, UA Negative     Protein,  mg/dL (2+)     Leuk Esterase, UA Negative     Nitrite, UA Negative     Urobilinogen, UA 0.2 E.U./dL    CBC Auto Differential [[id number]]  (Abnormal) Collected: 07/10/21 1511    Specimen: Blood Updated: 07/10/21 1601     WBC 11.10 10*3/mm3      RBC 4.78 10*6/mm3      Hemoglobin 12.9 g/dL      Hematocrit 41.2 %      MCV 86.3 fL      MCH  27.1 pg      MCHC 31.4 g/dL      RDW 18.7 %      RDW-SD 57.3 fl      MPV 8.7 fL      Platelets 318 10*3/mm3     Narrative:      The previously reported component NRBC is no longer being reported. Previous result was 0.0 /100 WBC (Reference Range: 0.0-0.2 /100 WBC) on 7/10/2021 at 1531 EDT.    Respiratory Panel PCR w/COVID-19(SARS-CoV-2) ERA/SOURAV/MELISSA/PAD/COR/MAD/ALISA In-House, NP Swab in UTM/VTM, 3-4 HR TAT - Swab, Nasopharynx [[id number]]  (Normal) Collected: 07/10/21 1511    Specimen: Swab from Nasopharynx Updated: 07/10/21 1610     ADENOVIRUS, PCR Not Detected     Coronavirus 229E Not Detected     Coronavirus HKU1 Not Detected     Coronavirus NL63 Not Detected     Coronavirus OC43 Not Detected     COVID19 Not Detected     Human Metapneumovirus Not Detected     Human Rhinovirus/Enterovirus Not Detected     Influenza A PCR Not Detected     Influenza B PCR Not Detected     Parainfluenza Virus 1 Not Detected     Parainfluenza Virus 2 Not Detected     Parainfluenza Virus 3 Not Detected     Parainfluenza Virus 4 Not Detected     RSV, PCR Not Detected     Bordetella pertussis pcr Not Detected     Bordetella parapertussis PCR Not Detected     Chlamydophila pneumoniae PCR Not Detected     Mycoplasma pneumo by PCR Not Detected    Narrative:      In the setting of a positive respiratory panel with a viral infection PLUS a negative procalcitonin without other underlying concern for bacterial infection, consider observing off antibiotics or discontinuation of antibiotics and continue supportive care. If the respiratory panel is positive for atypical bacterial infection (Bordetella pertussis, Chlamydophila pneumoniae, or Mycoplasma pneumoniae), consider antibiotic de-escalation to target atypical bacterial infection.    Scan Slide [[id number]] Collected: 07/10/21 1511    Specimen: Blood Updated: 07/10/21 1601     Scan Slide --     Comment: See Manual Differential Results       Urinalysis, Microscopic Only - Urine, Catheter  [[id number]]  (Abnormal) Collected: 07/10/21 1511    Specimen: Urine, Catheter Updated: 07/10/21 1556     RBC, UA 0-2 /HPF      WBC, UA 21-30 /HPF      Bacteria, UA Trace /HPF      Squamous Epithelial Cells, UA 3-6 /HPF      Transitional Epithelial Cells, UA 0-2 /HPF      Renal Epithelial Cells, UA 0-2 /HPF      Hyaline Casts, UA None Seen /LPF      Amorphous Crystals, UA Small/1+ /HPF      Methodology Manual Light Microscopy    Manual Differential [[id number]]  (Abnormal) Collected: 07/10/21 1511    Specimen: Blood Updated: 07/10/21 1601     Neutrophil % 79.0 %      Lymphocyte % 14.0 %      Monocyte % 6.0 %      Myelocyte % 1.0 %      Neutrophils Absolute 8.77 10*3/mm3      Lymphocytes Absolute 1.55 10*3/mm3      Monocytes Absolute 0.67 10*3/mm3      nRBC 1.0 /100 WBC      Anisocytosis Slight/1+     Crenated RBC's Mod/2+     WBC Morphology Normal     Platelet Estimate Adequate     Large Platelets Slight/1+    Urine Culture - Urine, Urine, Catheter [[id number]] Collected: 07/10/21 1511    Specimen: Urine, Catheter Updated: 07/10/21 1556    CK [[id number]]  (Normal) Collected: 07/10/21 1511    Specimen: Blood Updated: 07/10/21 1805     Creatine Kinase 47 U/L     Hemoglobin A1c [[id number]] Collected: 07/10/21 1511    Specimen: Blood Updated: 07/10/21 2101    Comprehensive Metabolic Panel [[id number]]  (Abnormal) Collected: 07/10/21 1640    Specimen: Blood Updated: 07/10/21 1727     Glucose 203 mg/dL       mg/dL      Creatinine 5.99 mg/dL      Sodium 136 mmol/L      Potassium 6.9 mmol/L      Comment: Slight hemolysis detected by analyzer. Results may be affected.        Chloride 106 mmol/L      CO2 8.0 mmol/L      Calcium 8.9 mg/dL      Total Protein 6.9 g/dL      Albumin 4.00 g/dL      ALT (SGPT) 9 U/L      AST (SGOT) 11 U/L      Alkaline Phosphatase 132 U/L      Total Bilirubin 0.3 mg/dL      eGFR Non African Amer 7 mL/min/1.73      Comment: <15 Indicative of kidney failure.        eGFR   Amer --      Comment: <15 Indicative of kidney failure.        Globulin 2.9 gm/dL      A/G Ratio 1.4 g/dL      BUN/Creatinine Ratio 17.2     Anion Gap 22.0 mmol/L     Narrative:      GFR Normal >60  Chronic Kidney Disease <60  Kidney Failure <15      Lipase [[id number]]  (Abnormal) Collected: 07/10/21 1640    Specimen: Blood Updated: 07/10/21 1730     Lipase 391 U/L     Acetone [[id number]]  (Normal) Collected: 07/10/21 1640    Specimen: Blood Updated: 07/10/21 1801     Acetone Negative    Blood Gas, Venous - [[id number]]  (Abnormal) Collected: 07/10/21 1808    Specimen: Venous Blood Updated: 07/10/21 1833     Site [facility]     pH, Venous 6.966 pH Units      pCO2, Venous 35.7 mm Hg      pO2, Venous 48.0 mm Hg      HCO3, Venous 8.2 mmol/L      Base Excess, Venous -23.1 mmol/L      Comment: Serial Number: [id number]:  [id number]        O2 Saturation, Venous 60.1 %      CO2 Content 9.2 mmol/L      Barometric Pressure for Blood Gas --     Comment: N/A        Modality Room Air     FIO2 21 %     POC Glucose Once [[id number]]  (Abnormal) Collected: 07/10/21 1808    Specimen: Blood Updated: 07/10/21 1826     Glucose 274 mg/dL      Comment: Serial Number: [id number]:  [id number]       Basic Metabolic Panel [[id number]]  (Abnormal) Collected: 07/10/21 2045    Specimen: Blood Updated: 07/10/21 2125     Glucose 234 mg/dL       mg/dL      Creatinine 5.58 mg/dL      Sodium 139 mmol/L      Potassium 5.9 mmol/L      Comment: Slight hemolysis detected by analyzer. Results may be affected.        Chloride 108 mmol/L      CO2 10.0 mmol/L      Calcium 8.5 mg/dL      eGFR   Amer --     Comment: <15 Indicative of kidney failure.        eGFR Non African Amer 8 mL/min/1.73      Comment: <15 Indicative of kidney failure.        BUN/Creatinine Ratio 18.3     Anion Gap 21.0 mmol/L     Narrative:      GFR Normal >60  Chronic Kidney Disease <60  Kidney Failure <15      POC Glucose Once [[id number]]  (Abnormal) Collected: 07/10/21 2358    Specimen:  Blood Updated: 07/10/21 2359     Glucose 149 mg/dL      Comment: Serial Number: [id number]:  [id number]       Hepatitis B Surface Antigen [[id number]]  (Normal) Collected: 07/11/21 0142    Specimen: Blood Updated: 07/11/21 0212     Hepatitis B Surface Ag Non-Reactive            MICRO:  Microbiology Results (last 10 days)     Procedure Component Value - Date/Time    Respiratory Panel PCR w/COVID-19(SARS-CoV-2) ERA/SOURAV/MELISSA/PAD/COR/MAD/ALISA In-House, NP Swab in UTM/VTM, 3-4 HR TAT - Swab, Nasopharynx [[id number]]  (Normal) Collected: 07/10/21 1511    Lab Status: Final result Specimen: Swab from Nasopharynx Updated: 07/10/21 1610     ADENOVIRUS, PCR Not Detected     Coronavirus 229E Not Detected     Coronavirus HKU1 Not Detected     Coronavirus NL63 Not Detected     Coronavirus OC43 Not Detected     COVID19 Not Detected     Human Metapneumovirus Not Detected     Human Rhinovirus/Enterovirus Not Detected     Influenza A PCR Not Detected     Influenza B PCR Not Detected     Parainfluenza Virus 1 Not Detected     Parainfluenza Virus 2 Not Detected     Parainfluenza Virus 3 Not Detected     Parainfluenza Virus 4 Not Detected     RSV, PCR Not Detected     Bordetella pertussis pcr Not Detected     Bordetella parapertussis PCR Not Detected     Chlamydophila pneumoniae PCR Not Detected     Mycoplasma pneumo by PCR Not Detected    Narrative:      In the setting of a positive respiratory panel with a viral infection PLUS a negative procalcitonin without other underlying concern for bacterial infection, consider observing off antibiotics or discontinuation of antibiotics and continue supportive care. If the respiratory panel is positive for atypical bacterial infection (Bordetella pertussis, Chlamydophila pneumoniae, or Mycoplasma pneumoniae), consider antibiotic de-escalation to target atypical bacterial infection.            RADIOLOGY STUDIES:  Imaging Results (Last 72 Hours)     Procedure Component Value Units Date/Time     XR Chest 1 View [[id number]] Collected: 07/10/21 2305     Updated: 07/10/21 2308    Narrative:      Frontal chest radiograph    Indication:  Line placement    Comparison:  July 10, 2021    Findings:    Left approach vascular catheter overlies the SVC. There is mild pulmonary vascular prominence. No focal consolidation, pneumothorax, or pleural fluid collection seen. Large cardiac silhouette redemonstrated. No acute focal bony abnormality seen.      Impression:      Impression:    Line placement as above.    Mild pulmonary vascular prominence/congestion.    Redemonstrated large cardiac silhouette.    Electronically signed by:  Sheng Harris M.D.    7/10/2021 9:06 PM    CT Abdomen Pelvis Without Contrast [[id number]] Collected: 07/10/21 1805     Updated: 07/10/21 1846    Narrative:      CT ABDOMEN PELVIS WO CONTRAST-     Date of Exam: 7/10/2021 5:48 PM     Indication: upper abd pain.     Comparison: October 12, 2020     Technique: CT scan of the abdomen and pelvis without IV contrast.  Automated exposure control and iterative reconstruction methods were  used.     FINDINGS:  The lung bases are clear.     The unenhanced liver, right adrenal glands, kidneys, spleen, and  pancreas are unremarkable. The gallbladder is surgically absent. There  is a stable left adrenal nodule, likely an adenoma.     The stomach appears normal. The small bowel appears normal in caliber.  The colon appears normal. The appendix is not well-visualized. There is  no ascites or loculated collection. No abnormally enlarged lymph nodes  are identified. There is a small infraumbilical hernia containing a loop  of small bowel, but no evidence of obstruction.     The rectum and urinary bladder are unremarkable. There is a  calcification within the left uterine fundus, likely a partially  calcified fibroid.     No aggressive osseous lesions are identified.       Impression:      1.No acute process identified within abdomen/pelivs.  2.Stable left  adrenal nodule, likely an adenoma.  3.Small infraumbilical hernia containing a loop of small bowel, but no  evidence of obstruction.  4.Calcification within left uterine fundus, likely a partially calcified  fibroid.           Electronically Signed By-Onofre Nicholas MD On:7/10/2021 6:11 PM  This report was finalized on [id number] by  Onofre Nicholas MD.    XR Chest 1 View [[id number]] Collected: 07/10/21 1544     Updated: 07/10/21 1547    Narrative:      XR CHEST 1 VW-     Date of Exam: 7/10/2021 3:00 PM     Indication: weakness.     Comparison Exams: December 14, 2020     Technique: Single AP chest radiograph     FINDINGS:  The lungs are clear. The heart is enlarged. The pulmonary vasculature  appears normal. The osseous structures appear intact.       Impression:      1.Lungs are clear.  2.Cardiomegaly.     Electronically Signed By-Onofre Nicholas MD On:7/10/2021 3:45 PM  This report was finalized on [id number] by  Onofre Nicholas MD.                  ECHOCARDIOGRAM:  Results for orders placed during the hospital encounter of 10/08/20    Adult Transthoracic Echo Complete W/ Cont if Necessary Per Protocol    Interpretation Summary  · Estimated left ventricular EF = 65% Left ventricular systolic function is normal.  · Trace to mild mitral valve regurgitation is present.  · There is a small (<1cm) pericardial effusion.         I reviewed the patient's new clinical results.    I discussed the patient's findings and my recommendations with patient and nursing staff.  I have discussed plan with on-call attending physician, who agrees with this plan of care.    Appropriate PPE worn during assessment of patient per established guidelines.      Electronically signed by AMELIA Reed, 07/10/21, 7:42 PM EDT.       Electronically signed by Daniel Maldonado MD at 07/12/21 1123          Physician Progress Notes (last 24 hours) (Notes from 07/12/21 1324 through 07/13/21 1324)      Ghulam Glasgow MD at  21 1457          [facility]   Progress Note    Patient Name: Lesa Julio  : 1950  MRN: [id number]  Primary Care Physician:  Manuel Sanders MD  Date of admission: 7/10/2021    Subjective   Subjective     Chief Complaint: hypoglycemia    History of present illness:  Ms. Julio is a 70 y.o. 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  In the emergency room the patient was found to have severe metabolic acidosis and acute renal failure with hyperkalemia as well as evidence of urinary tract infection.  She was admitted to the intensive care unit where temporary dialysis catheter was placed and the patient underwent hemodialysis.  Patient was subsequently felt to be stable enough to transfer out to the medical [facility] and the hospitalist group has been consulted to take over medical management.     The patient has significant hearing loss but is able to hear with her right ear with a hearing aid from close up.  She reports no specific complaints except for mild diffuse abdominal pain persisting.  The 3 sisters at the bedside report that she had had significantly more pain when she first came into the hospital.     ROS   12 point review of systems was  reviewed and was negative except as above.    Review of Systems   Constitutional: Negative for chills and fever.   HENT: Negative for ear pain and hearing loss.    Respiratory: Negative for apnea and shortness of breath.    Cardiovascular: Negative for chest pain and palpitations.   Gastrointestinal: Negative for diarrhea, nausea and vomiting.   Genitourinary: Negative for dysuria.   Musculoskeletal: Negative for back pain and joint swelling.   Neurological: Negative for seizures, light-headedness and headaches.   Psychiatric/Behavioral: Negative for confusion and hallucinations.       Objective   Objective     Vitals:   Temp:  [98 °F (36.7 °C)-99.1 °F (37.3 °C)] 98 °F (36.7 °C)  Heart Rate:  [61-94] 80  Resp:  [16-20] 20  BP: (106-186)/(60-79) 186/60  Flow (L/min):  [2] 2    Physical Exam  Vitals and nursing note reviewed.   Constitutional:       General: She is not in acute distress.     Appearance: She is well-developed. She is obese. She is not ill-appearing.   HENT:      Head: Normocephalic and atraumatic.      Left Ear: Ear canal normal.      Nose: Nose normal.      Mouth/Throat:      Mouth: Mucous membranes are moist.   Eyes:      Extraocular Movements: Extraocular movements intact.      Conjunctiva/sclera: Conjunctivae normal.      Pupils: Pupils are equal, round, and reactive to light.   Neck:      Thyroid: No thyromegaly.      Vascular: No JVD.      Trachea: No tracheal deviation.   Cardiovascular:      Rate and Rhythm: Normal rate and regular rhythm.      Pulses: Normal pulses.      Heart sounds: Normal heart sounds. No murmur heard.   No friction rub. No gallop.    Pulmonary:      Effort: Pulmonary effort is normal. No respiratory distress.      Breath sounds: Normal breath sounds. No stridor.   Abdominal:      General: Bowel sounds are normal. There is no distension.      Palpations: Abdomen is soft. There is no mass.   Musculoskeletal:         General: No swelling or tenderness. Normal range of motion.       Cervical back: Normal range of motion and neck supple. No rigidity. No muscular tenderness.   Skin:     General: Skin is warm and dry.      Coloration: Skin is pale. Skin is not jaundiced.      Findings: No bruising.   Neurological:      General: No focal deficit present.      Mental Status: She is alert and oriented to person, place, and time. Mental status is at baseline.      Cranial Nerves: No cranial nerve deficit.      Sensory: No sensory deficit.      Motor: Weakness present. No abnormal muscle tone.      Coordination: Coordination normal.   Psychiatric:         Mood and Affect: Mood normal.         Thought Content: Thought content normal.          Result Review    Result Review:  I have personally reviewed the results from the time of this admission to 7/12/2021 14:59 EDT and agree with these findings:  [x]  Laboratory  [x]  Microbiology  [x]  Radiology  [x]  EKG/Telemetry   []  Cardiology/Vascular   []  Pathology  []  Old records  []  Other:  Most notable findings include: creat 1.6    Assessment/Plan   Assessment / Plan     Brief Patient Summary:  Lesa Julio is a 70 y.o. female who presented to [facility] on 7/10/2021 with complaint of hypoglycemia. Patient is extremely hard of hearing making HPI difficult to obtain, information obtained from chart review. The patient had a low blood sugar at home. She takes metformin, glipizide, and Glargine insulin at home. The patient states she has had some generalized abdominal pain since 7/9/2021. She denies emesis, diarrhea, or constipation. Additionally, the patient states she has had some difficulty ambulating due to bilateral leg weakness. She denies exacerbating or relieving factors      Acute renal failure with severe metabolic acidosis and hyperkalemia due to Metformin toxicity  -Nephrology consulting and patient underwent hemodialysis and received a bicarb drip  --UA reviewed  -Renal ultrasound reviewed, unremarkable with no evidence of  hydronephrosis  -Monitor and trend labs  -Avoid nephrotoxic medications, hypotension, and NSAIDS  -Renally dose medications  -Monitor urine output     Chronic kidney disease stage IIIa with secondary hyperparathyroidism  -Baseline creatinine 0.95  -Creatinine on admission 5.99 and improved to 2.08 after dialysis     Hyperkalemia  -Patient received calcium gluconate, D50 W, regular insulin IV, Kayexalate, sodium bicarbonate and magnesium sulfate on 7/10/2021 with improvement of potassium from 6.9 on admission to 3.1 and potassium was replaced per nephrology     Insulin-dependent type 2 diabetes mellitus  -Discontinue Metformin  -Hold glipizide and glargine insulin  -Hemoglobin A1c 9.6  -SSI     Essential hypertension, chronic and controlled with relative hypotension  -Hold metoprolol, Benicar and hydralazine     Hyperlipidemia  -Hold Crestor and Lopid     Paroxysmal atrial fibrillation  -Metoprolol currently on hold due to hypotension  -No preadmission anticoagulants secondary to prior GI bleed, November 2020  -Currently rate controlled   -Monitor     Peripheral artery disease  -aspirin currently on hold     Elevated liver tests  -Hepatic steatosis noted on ultrasound     COPD not in exacerbation  -Hold theophylline  -Chart lists allergy to duo nebs     Obstructive sleep apnea  -Encourage CPAP compliance or follow-up with sleep medicine if she does not have onr     Rheumatoid arthritis  -No current home medications     Vitamin D deficiency  -Hold high-dose vitamin D while inpatient     GERD     History of cholecystectomy     Obesity  -Lifestyle modification counseling        Active Hospital Problems:  Active Hospital Problems    Diagnosis    • **ANAMARIA (acute kidney injury) (CMS/HCC)    • Elevated lipase    • Abdominal pain    • GERD (gastroesophageal reflux disease)    • Paroxysmal atrial fibrillation (CMS/HCC)    • Mixed hyperlipidemia    • Nonalcoholic fatty liver disease    • Allergic rhinitis    • Generalized  weakness    • Rheumatoid arthritis (CMS/[facility])    • Obesity    • Vitamin D deficiency    • Obstructive sleep apnea syndrome    • Chronic obstructive pulmonary disease (CMS/[facility])    • Secondary hyperparathyroidism of renal origin (CMS/[facility])    • Chronic renal insufficiency, stage III (moderate) (CMS/[facility])    • Tobacco abuse    • Cardiac murmur    • Diabetes mellitus, type 2 (CMS/[facility])    • Essential hypertension    • Hearing loss      Plan:       DVT prophylaxis:  Medical and mechanical DVT prophylaxis orders are present.    CODE STATUS:    Code Status: CPR  Medical Interventions (Level of Support Prior to Arrest): Full    Disposition:  I expect patient to be discharged per clinical course.    Electronically signed by Ghulam Glasgow MD, 21, 2:59 PM EDT.             Electronically signed by Ghulam Glasgow MD at 21 1505          Discharge Summary      Ghulam Glasgow MD at 21 1055                         [facility]         DISCHARGE SUMMARY    Patient Name: Lesa Julio  : 1950  MRN: [id number]    Date of Admission: 7/10/2021  Date of Discharge: 21  Primary Care Physician: Manuel Sanders MD    Consults     Date and Time Order Name Status Description    2021 12:06 PM Inpatient Hospitalist Consult Completed     7/10/2021  6:47 PM Hospitalist (on-call MD unless specified) Completed           Presenting Problem:   Hyperkalemia [E87.5]  Metabolic acidosis [E87.2]  General weakness [R53.1]  ANAMARIA (acute kidney injury) (CMS/[facility]) [N17.9]  Uncontrolled other specified diabetes mellitus with hyperglycemia (CMS/[facility]) [E13.65]  Acute pancreatitis, unspecified complication status, unspecified pancreatitis type [K85.90]    Active and Resolved Hospital Problems:  Active Hospital Problems    Diagnosis POA   • **ANAMARIA (acute kidney injury) (CMS/[facility]) [N17.9] Yes     Priority: High   • Elevated lipase [R74.8] Yes   • Abdominal pain [R10.9] Yes   • GERD (gastroesophageal reflux  disease) [K21.9] Yes   • Paroxysmal atrial fibrillation (CMS/HCC) [I48.0] Yes   • Mixed hyperlipidemia [E78.2] Yes   • Nonalcoholic fatty liver disease [K76.0] Yes   • Allergic rhinitis [J30.9] Yes   • Generalized weakness [R53.1] Yes   • Rheumatoid arthritis (CMS/HCC) [M06.9] Yes   • Obesity [E66.9] Yes   • Vitamin D deficiency [E55.9] Yes   • Obstructive sleep apnea syndrome [G47.33] Yes   • Chronic obstructive pulmonary disease (CMS/HCC) [J44.9] Yes   • Secondary hyperparathyroidism of renal origin (CMS/HCC) [N25.81] Yes   • Chronic renal insufficiency, stage III (moderate) (CMS/[facility]) [N18.30] Yes   • Tobacco abuse [Z72.0] Yes   • Cardiac murmur [R01.1] Yes   • Diabetes mellitus, type 2 (CMS/[facility]) [E11.9] Yes   • Essential hypertension [I10] Yes   • Hearing loss [H91.90] Yes      Resolved Hospital Problems   No resolved problems to display.     Acute renal failure with severe metabolic acidosis and hyperkalemia due to Metformin toxicity  -Nephrology consulting and patient underwent hemodialysis and received a bicarb drip  --UA reviewed  -Renal ultrasound reviewed, unremarkable with no evidence of hydronephrosis  -Monitor and trend labs  -Avoid nephrotoxic medications, hypotension, and NSAIDS  -Renally dose medications  -Monitor urine output     Chronic kidney disease stage IIIa with secondary hyperparathyroidism  -Baseline creatinine 0.95  -Creatinine on admission 5.99 and improved to 2.08 after dialysis>1.2     Hyperkalemia-resolved  -Patient received calcium gluconate, D50 W, regular insulin IV, Kayexalate, sodium bicarbonate and magnesium sulfate on 7/10/2021 with improvement of potassium from 6.9 on admission to 3.1 and potassium was replaced per nephrology     Insulin-dependent type 2 diabetes mellitus  -Discontinue Metformin/ glipizide   -continue glargine insulin  -Hemoglobin A1c 9.6  -SSI     Essential hypertension, chronic and controlled with relative hypotension  -Hold metoprolol, Benicar and  hydralazine     Hyperlipidemia  -Crestor and Lopid     Paroxysmal atrial fibrillation  -Metoprolol currently on hold due to hypotension  -No preadmission anticoagulants secondary to prior GI bleed, November 2020  -Currently rate controlled      Peripheral artery disease  -aspirin currently on hold     Elevated liver tests  -Hepatic steatosis noted on ultrasound     COPD not in exacerbation  -Hold theophylline  -Chart lists allergy to duo nebs     Obstructive sleep apnea  -Encourage CPAP compliance or follow-up with sleep medicine if she does not have onr     Rheumatoid arthritis  -No current home medications     Vitamin D deficiency  -Hold high-dose vitamin D while inpatient     GERD     History of cholecystectomy     Obesity  -Lifestyle modification counseling          Hospital Course     Hospital Course:  Lesa Julio is a 70 y.o. female with a past medical history of Arthritis, CKD (chronic kidney disease) stage 3, GFR 30-59 ml/min (CMS/[facility]), Diabetes mellitus, type 2 (CMS/[facility]), Essential hypertension (11/6/2015), Grade 4 out of 6 intensity murmur (6/22/2020), Hearing loss, Hyperlipidemia, Hypersensitivity angiitis (CMS/HCC) (6/22/2020), Hypertension, Leukocytoclastic vasculitis (CMS/[facility]), Obesity, Osteoarthritis, Ovarian cyst, Peripheral arterial disease (CMS/[facility]), Sleep apnea, Tubular adenoma of colon (10/30/2018), and Vascular insufficiency (6/22/2020), presented to [facility] on 7/10/2021 with complaint of hypoglycemia the day of admission, generalized weakness and generalized abdominal pain that began the day prior to admission with no complaints of nausea or vomiting, diarrhea or constipation.  In the emergency room the patient was found to have severe metabolic acidosis and acute renal failure with hyperkalemia as well as evidence of urinary tract infection.  She was admitted to the intensive care unit where temporary dialysis catheter was placed and the patient underwent hemodialysis.   Patient was subsequently felt to be stable enough to transfer out to the medical [facility] and the hospitalist group has been consulted to take over medical management.     The patient has significant hearing loss but is able to hear with her right ear with a hearing aid from close up.  She reports no specific complaints except for mild diffuse abdominal pain persisting.  The 3 sisters at the bedside report that she had had significantly more pain when she first came into the hospital.     7/13/21 doing  Better today will dc home dw rn    DISCHARGE Follow Up Recommendations for labs and diagnostics: bmp  Day of Discharge     Vital Signs:  Temp:  [98 °F (36.7 °C)-98.1 °F (36.7 °C)] 98.1 °F (36.7 °C)  Heart Rate:  [54-80] 54  Resp:  [18-20] 18  BP: (153-186)/(50-72) 159/65  Flow (L/min):  [2] 2  Physical Exam:      Pertinent  and/or Most Recent Results     LAB RESULTS:      Lab 07/13/21 0205 07/12/21  0541 07/11/21  0537 07/10/21  1511   WBC 7.10 4.70 7.10 11.10*   HEMOGLOBIN 10.9* 10.7* 10.7* 12.9   HEMATOCRIT 32.7* 31.6* 31.6* 41.2   PLATELETS 171 160 178 318   NEUTROS ABS 4.90 3.00 5.90 8.77*   LYMPHS ABS 1.20 0.90 0.50*  --    MONOS ABS 0.80 0.70 0.60  --    EOS ABS 0.10 0.00 0.00  --    MCV 80.4 81.2 81.0 86.3         Lab 07/13/21  0205 07/12/21  0541 07/11/21 1953 07/11/21  1623 07/11/21  0537 07/10/21  1511   SODIUM 143 147* 140 138 137  --    POTASSIUM 3.9 3.7 4.0 3.9 3.1*  --    CHLORIDE 103 107 100 98 95*  --    CO2 30.0* 31.0* 30.0* 28.0 26.0  --    ANION GAP 10.0 9.0 10.0 12.0 16.0*  --    BUN 34* 37* 39* 37* 39*  --    CREATININE 1.43* 1.66* 2.00* 2.18* 2.08*  --    GLUCOSE 112* 140* 185* 182* 83  --    CALCIUM 9.5 9.2 9.0 8.7 7.9*  --    IONIZED CALCIUM 1.28 1.28  --   --   --   --    MAGNESIUM 1.5* 1.9  --   --  1.4*  --    PHOSPHORUS 2.8 3.2  --   --  2.7  --    HEMOGLOBIN A1C  --   --   --   --   --  6.7*         Lab 07/13/21  0205 07/12/21  0541 07/11/21  0537 07/10/21  1640   TOTAL PROTEIN 5.9* 5.9*  5.9* 6.9   ALBUMIN 3.60 3.50 3.90 4.00   GLOBULIN 2.3 2.4 2.0 2.9   ALT (SGPT) 7 6 5 9   AST (SGOT) 10 9 10 11   BILIRUBIN 0.3 0.2 0.3 0.3   ALK PHOS 88 95 105 132*   AMYLASE  --   --  75  --    LIPASE  --   --  149* 391*                     Lab 07/10/21  1808   FIO2 21     Brief Urine Lab Results  (Last result in the past 365 days)      Color   Clarity   Blood   Leuk Est   Nitrite   Protein   CREAT   Urine HCG        07/10/21 1511 Dark Yellow  Comment:  Result checked  Cloudy  Comment:  Result checked  Negative Negative Negative 100 mg/dL (2+)             Microbiology Results (last 10 days)     Procedure Component Value - Date/Time    Respiratory Panel PCR w/COVID-19(SARS-CoV-2) ERA/SOURAV/MELISSA/PAD/COR/MAD/ALISA In-House, NP Swab in UTM/VTM, 3-4 HR TAT - Swab, Nasopharynx [[id number]]  (Normal) Collected: 07/10/21 1511    Lab Status: Final result Specimen: Swab from Nasopharynx Updated: 07/10/21 1610     ADENOVIRUS, PCR Not Detected     Coronavirus 229E Not Detected     Coronavirus HKU1 Not Detected     Coronavirus NL63 Not Detected     Coronavirus OC43 Not Detected     COVID19 Not Detected     Human Metapneumovirus Not Detected     Human Rhinovirus/Enterovirus Not Detected     Influenza A PCR Not Detected     Influenza B PCR Not Detected     Parainfluenza Virus 1 Not Detected     Parainfluenza Virus 2 Not Detected     Parainfluenza Virus 3 Not Detected     Parainfluenza Virus 4 Not Detected     RSV, PCR Not Detected     Bordetella pertussis pcr Not Detected     Bordetella parapertussis PCR Not Detected     Chlamydophila pneumoniae PCR Not Detected     Mycoplasma pneumo by PCR Not Detected    Narrative:      In the setting of a positive respiratory panel with a viral infection PLUS a negative procalcitonin without other underlying concern for bacterial infection, consider observing off antibiotics or discontinuation of antibiotics and continue supportive care. If the respiratory panel is positive for atypical bacterial  infection (Bordetella pertussis, Chlamydophila pneumoniae, or Mycoplasma pneumoniae), consider antibiotic de-escalation to target atypical bacterial infection.    Urine Culture - Urine, Urine, Catheter [[id number]]  (Normal) Collected: 07/10/21 1511    Lab Status: Final result Specimen: Urine, Catheter Updated: 07/11/21 2102     Urine Culture No growth          CT Abdomen Pelvis Without Contrast    Result Date: 7/10/2021  Impression: 1.No acute process identified within abdomen/pelivs. 2.Stable left adrenal nodule, likely an adenoma. 3.Small infraumbilical hernia containing a loop of small bowel, but no evidence of obstruction. 4.Calcification within left uterine fundus, likely a partially calcified fibroid.    Electronically Signed By-Onofre Nicholas MD On:7/10/2021 6:11 PM This report was finalized on [id number] by  Onofre Nicholas MD.    US Renal Limited    Result Date: 7/11/2021  Impression: Unremarkable renal ultrasound with no evidence of hydronephrosis.  Electronically Signed By-Day James MD On:7/11/2021 9:22 AM This report was finalized on [id number] by  Day James MD.    XR Chest 1 View    Result Date: 7/10/2021  Impression: Impression: Line placement as above. Mild pulmonary vascular prominence/congestion. Redemonstrated large cardiac silhouette. Electronically signed by:  Sheng Harris M.D.  7/10/2021 9:06 PM    XR Chest 1 View    Result Date: 7/10/2021  Impression: 1.Lungs are clear. 2.Cardiomegaly.  Electronically Signed By-Onofre Nicholas MD On:7/10/2021 3:45 PM This report was finalized on [id number] by  Onofre Nicholas MD.    US Abdomen Limited    Result Date: 7/11/2021  Impression:  1. The pancreas is not well visualized. 2. Heterogeneous liver parenchyma likely related to steatosis. 3. Status post cholecystectomy. No evidence of biliary ductal dilatation.  Electronically Signed By-Day James MD On:7/11/2021 9:17 AM This report was finalized on [id number] by  Day  MD Jacob.              Results for orders placed during the hospital encounter of 10/08/20    Adult Transthoracic Echo Complete W/ Cont if Necessary Per Protocol    Interpretation Summary  · Estimated left ventricular EF = 65% Left ventricular systolic function is normal.  · Trace to mild mitral valve regurgitation is present.  · There is a small (<1cm) pericardial effusion.      Labs Pending at Discharge:        Discharge Details        Discharge Medications      New Medications      Instructions Start Date   polyethylene glycol 17 g packet  Commonly known as: MIRALAX   17 g, Oral, Daily PRN      sennosides-docusate 8.6-50 MG per tablet  Commonly known as: PERICOLACE   2 tablets, Oral, 2 Times Daily         Continue These Medications      Instructions Start Date   Adult Aspirin EC Low Strength 81 MG EC tablet  Generic drug: aspirin   81 mg, Oral, Daily      albuterol sulfate  (90 Base) MCG/ACT inhaler  Commonly known as: PROVENTIL HFA;VENTOLIN HFA;PROAIR HFA   2 puffs, Inhalation, Every 4 Hours PRN      BASAGLAR KWIKPEN 100 UNIT/ML injection pen   20 Units, Subcutaneous, 2 Times Daily      hydrALAZINE 50 MG tablet  Commonly known as: APRESOLINE   50 mg, Oral, 3 Times Daily      metoprolol tartrate 50 MG tablet  Commonly known as: LOPRESSOR   50 mg, Oral, 2 Times Daily      olmesartan 20 MG tablet  Commonly known as: BENICAR   40 mg, Oral, Daily      rosuvastatin 10 MG tablet  Commonly known as: CRESTOR   10 mg, Oral, Daily      vitamin D 1.25 MG (09857 UT) capsule capsule  Commonly known as: ERGOCALCIFEROL   50,000 Units, Oral, 2 Times Weekly, Monday and Thursday         Stop These Medications    gemfibrozil 600 MG tablet  Commonly known as: LOPID     glipizide 10 MG 24 hr tablet  Commonly known as: GLUCOTROL XL     metFORMIN 1000 MG tablet  Commonly known as: GLUCOPHAGE     metFORMIN 500 MG tablet  Commonly known as: GLUCOPHAGE     theophylline 300 MG 24 hr capsule  Commonly known as: MEGHAN-24             Allergies   Allergen Reactions   • Lisinopril Hives   • Ipratropium-Albuterol Itching and Rash         Discharge Disposition:  Home-Health Care Svc    Diet:  Hospital:  Diet Order   Procedures   • Diet Diabetic/Consistent Carbs; Diabetic - Consistent Carb         Discharge Activity:   Activity Instructions     Gradually Increase Activity Until at Pre-Hospitalization Level          renal  Severe metabolic acidosis  Hyperkalemia  Acute kidney injury  Hypoglycemia  History of diabetes  Obesity  Elevated liver function test  Diabetes  Obstructive sleep apnea  Vitamin D deficiency           Cr down to 1.6  Reduce IVfs  [facility]                CODE STATUS:  Code Status and Medical Interventions:   Ordered at: 07/10/21 2055     Code Status:    CPR     Medical Interventions (Level of Support Prior to Arrest):    Full         No future appointments.    Additional Instructions for the Follow-ups that You Need to Schedule     Discharge Follow-up with PCP   As directed       Currently Documented PCP:    Manuel Sanders MD    PCP Phone Number:    [phone]     Follow Up Details: 1 week         Discharge Follow-up with Specified Provider: nephrology; 1 Week   As directed      To: nephrology    Follow Up: 1 Week               Time spent on Discharge including face to face service:  35 minutes    Electronically signed by Ghulam Glasgow MD, 07/13/21, 10:55 AM EDT.      Electronically signed by Ghulam Glasgow MD at 07/13/21 7464

## 2021-07-13 NOTE — DISCHARGE SUMMARY
[facility]         DISCHARGE SUMMARY    Patient Name: Lesa Julio  : 1950  MRN: [id number]    Date of Admission: 7/10/2021  Date of Discharge: 21  Primary Care Physician: Manuel Sanders MD    Consults     Date and Time Order Name Status Description    2021 12:06 PM Inpatient Hospitalist Consult Completed     7/10/2021  6:47 PM Hospitalist (on-call MD unless specified) Completed           Presenting Problem:   Hyperkalemia [E87.5]  Metabolic acidosis [E87.2]  General weakness [R53.1]  ANAMARIA (acute kidney injury) (CMS/HCC) [N17.9]  Uncontrolled other specified diabetes mellitus with hyperglycemia (CMS/HCC) [E13.65]  Acute pancreatitis, unspecified complication status, unspecified pancreatitis type [K85.90]    Active and Resolved Hospital Problems:  Active Hospital Problems    Diagnosis POA   • **ANAMARIA (acute kidney injury) (CMS/HCC) [N17.9] Yes     Priority: High   • Elevated lipase [R74.8] Yes   • Abdominal pain [R10.9] Yes   • GERD (gastroesophageal reflux disease) [K21.9] Yes   • Paroxysmal atrial fibrillation (CMS/HCC) [I48.0] Yes   • Mixed hyperlipidemia [E78.2] Yes   • Nonalcoholic fatty liver disease [K76.0] Yes   • Allergic rhinitis [J30.9] Yes   • Generalized weakness [R53.1] Yes   • Rheumatoid arthritis (CMS/HCC) [M06.9] Yes   • Obesity [E66.9] Yes   • Vitamin D deficiency [E55.9] Yes   • Obstructive sleep apnea syndrome [G47.33] Yes   • Chronic obstructive pulmonary disease (CMS/HCC) [J44.9] Yes   • Secondary hyperparathyroidism of renal origin (CMS/HCC) [N25.81] Yes   • Chronic renal insufficiency, stage III (moderate) (CMS/HCC) [N18.30] Yes   • Tobacco abuse [Z72.0] Yes   • Cardiac murmur [R01.1] Yes   • Diabetes mellitus, type 2 (CMS/HCC) [E11.9] Yes   • Essential hypertension [I10] Yes   • Hearing loss [H91.90] Yes      Resolved Hospital Problems   No resolved problems to display.     Acute renal failure with severe metabolic acidosis and hyperkalemia due to  Metformin toxicity  -Nephrology consulting and patient underwent hemodialysis and received a bicarb drip  --UA reviewed  -Renal ultrasound reviewed, unremarkable with no evidence of hydronephrosis  -Monitor and trend labs  -Avoid nephrotoxic medications, hypotension, and NSAIDS  -Renally dose medications  -Monitor urine output     Chronic kidney disease stage IIIa with secondary hyperparathyroidism  -Baseline creatinine 0.95  -Creatinine on admission 5.99 and improved to 2.08 after dialysis>1.2     Hyperkalemia-resolved  -Patient received calcium gluconate, D50 W, regular insulin IV, Kayexalate, sodium bicarbonate and magnesium sulfate on 7/10/2021 with improvement of potassium from 6.9 on admission to 3.1 and potassium was replaced per nephrology     Insulin-dependent type 2 diabetes mellitus  -Discontinue Metformin/ glipizide   -continue glargine insulin  -Hemoglobin A1c 9.6  -SSI     Essential hypertension, chronic and controlled with relative hypotension  -Hold metoprolol, Benicar and hydralazine     Hyperlipidemia  -Crestor and Lopid     Paroxysmal atrial fibrillation  -Metoprolol currently on hold due to hypotension  -No preadmission anticoagulants secondary to prior GI bleed, November 2020  -Currently rate controlled      Peripheral artery disease  -aspirin currently on hold     Elevated liver tests  -Hepatic steatosis noted on ultrasound     COPD not in exacerbation  -Hold theophylline  -Chart lists allergy to duo nebs     Obstructive sleep apnea  -Encourage CPAP compliance or follow-up with sleep medicine if she does not have onr     Rheumatoid arthritis  -No current home medications     Vitamin D deficiency  -Hold high-dose vitamin D while inpatient     GERD     History of cholecystectomy     Obesity  -Lifestyle modification counseling          Hospital Course     Hospital Course:  Lesa Julio is a 70 y.o. 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  In the emergency room the patient was found to have severe metabolic acidosis and acute renal failure with hyperkalemia as well as evidence of urinary tract infection.  She was admitted to the intensive care unit where temporary dialysis catheter was placed and the patient underwent hemodialysis.  Patient was subsequently felt to be stable enough to transfer out to the medical [facility] and the hospitalist group has been consulted to take over medical management.     The patient has significant hearing loss but is able to hear with her right ear with a hearing aid from close up.  She reports no specific complaints except for mild diffuse abdominal pain persisting.  The 3 sisters at the bedside report that she had had significantly more pain when she first came into the hospital.     7/13/21 doing  Better today, will dc home minda rn.  Condition on discharge stable    DISCHARGE Follow Up Recommendations for labs and diagnostics: bmp  Day of Discharge     Vital Signs:  Temp:  [98 °F (36.7 °C)-98.1 °F (36.7 °C)] 98.1 °F (36.7 °C)  Heart Rate:  [54-80] 54  Resp:  [18-20] 18  BP: (153-186)/(50-72) 159/65  Flow (L/min):  [2] 2  Physical Exam:  General Appearance: 70-year-old obese white female   alert, cooperative, in no acute distress  Head:     Normocephalic, without obvious abnormality, atraumatic  Eyes:            Lids and lashes normal, conjunctivae and sclerae normal, no   icterus, no pallor, corneas clear, PERRLA  Neck:   No adenopathy, supple, trachea midline, no thyromegaly, no   carotid bruit, no JVD  Back:     No kyphosis present, no scoliosis present, no skin lesions  Lungs:   decreased breath sounds bilaterally clear to auscultation,respirations regular, even and                  unlabored   Heart:    Regular rhythm and normal rate, normal S1 and S2, no           Chest Wall:    No abnormalities observed  Abdomen:     Normal bowel sounds, no masses, no organomegaly, soft        non-tender, non-distended, no guarding, no rebound                tenderness  Extremities:   Moves all extremities well, no edema, no cyanosis, no             redness  Skin:   No bleeding, bruising or rash  Neurologic:   Cranial nerves 2 - 12 grossly intact, sensation intact, DTR       present and equal bilaterally      Pertinent  and/or Most Recent Results     LAB RESULTS:      Lab 07/13/21  0205 07/12/21  0541 07/11/21  0537 07/10/21  1511   WBC 7.10 4.70 7.10 11.10*   HEMOGLOBIN 10.9* 10.7* 10.7* 12.9   HEMATOCRIT 32.7* 31.6* 31.6* 41.2   PLATELETS 171 160 178 318   NEUTROS ABS 4.90 3.00 5.90 8.77*   LYMPHS ABS 1.20 0.90 0.50*  --    MONOS ABS 0.80 0.70 0.60  --    EOS ABS 0.10 0.00 0.00  --    MCV 80.4 81.2 81.0 86.3         Lab 07/13/21  0205 07/12/21  0541 07/11/21 1953 07/11/21  1623 07/11/21  0537 07/10/21  1511   SODIUM 143 147* 140 138 137  --    POTASSIUM 3.9 3.7 4.0 3.9 3.1*  --    CHLORIDE 103 107 100 98 95*  --    CO2 30.0* 31.0* 30.0* 28.0 26.0  --    ANION GAP 10.0 9.0 10.0 12.0 16.0*  --    BUN 34* 37* 39* 37* 39*  --    CREATININE 1.43* 1.66* 2.00* 2.18* 2.08*  --    GLUCOSE 112* 140* 185* 182* 83  --    CALCIUM 9.5 9.2 9.0 8.7 7.9*  --    IONIZED CALCIUM 1.28 1.28  --   --   --   --    MAGNESIUM 1.5* 1.9  --   --  1.4*  --    PHOSPHORUS 2.8 3.2  --    --  2.7  --    HEMOGLOBIN A1C  --   --   --   --   --  6.7*         Lab 07/13/21  0205 07/12/21  0541 07/11/21  0537 07/10/21  1640   TOTAL PROTEIN 5.9* 5.9* 5.9* 6.9   ALBUMIN 3.60 3.50 3.90 4.00   GLOBULIN 2.3 2.4 2.0 2.9   ALT (SGPT) 7 6 5 9   AST (SGOT) 10 9 10 11   BILIRUBIN 0.3 0.2 0.3 0.3   ALK PHOS 88 95 105 132*   AMYLASE  --   --  75  --    LIPASE  --   --  149* 391*                     Lab 07/10/21  1808   FIO2 21     Brief Urine Lab Results  (Last result in the past 365 days)      Color   Clarity   Blood   Leuk Est   Nitrite   Protein   CREAT   Urine HCG        07/10/21 1511 Dark Yellow  Comment:  Result checked  Cloudy  Comment:  Result checked  Negative Negative Negative 100 mg/dL (2+)             Microbiology Results (last 10 days)     Procedure Component Value - Date/Time    Respiratory Panel PCR w/COVID-19(SARS-CoV-2) ERA/SOURAV/MELISSA/PAD/COR/MAD/ALISA In-House, NP Swab in UTM/VTM, 3-4 HR TAT - Swab, Nasopharynx [[id number]]  (Normal) Collected: 07/10/21 1511    Lab Status: Final result Specimen: Swab from Nasopharynx Updated: 07/10/21 1610     ADENOVIRUS, PCR Not Detected     Coronavirus 229E Not Detected     Coronavirus HKU1 Not Detected     Coronavirus NL63 Not Detected     Coronavirus OC43 Not Detected     COVID19 Not Detected     Human Metapneumovirus Not Detected     Human Rhinovirus/Enterovirus Not Detected     Influenza A PCR Not Detected     Influenza B PCR Not Detected     Parainfluenza Virus 1 Not Detected     Parainfluenza Virus 2 Not Detected     Parainfluenza Virus 3 Not Detected     Parainfluenza Virus 4 Not Detected     RSV, PCR Not Detected     Bordetella pertussis pcr Not Detected     Bordetella parapertussis PCR Not Detected     Chlamydophila pneumoniae PCR Not Detected     Mycoplasma pneumo by PCR Not Detected    Narrative:      In the setting of a positive respiratory panel with a viral infection PLUS a negative procalcitonin without other underlying concern for bacterial infection,  consider observing off antibiotics or discontinuation of antibiotics and continue supportive care. If the respiratory panel is positive for atypical bacterial infection (Bordetella pertussis, Chlamydophila pneumoniae, or Mycoplasma pneumoniae), consider antibiotic de-escalation to target atypical bacterial infection.    Urine Culture - Urine, Urine, Catheter [[id number]]  (Normal) Collected: 07/10/21 1511    Lab Status: Final result Specimen: Urine, Catheter Updated: 07/11/21 2102     Urine Culture No growth          CT Abdomen Pelvis Without Contrast    Result Date: 7/10/2021  Impression: 1.No acute process identified within abdomen/pelivs. 2.Stable left adrenal nodule, likely an adenoma. 3.Small infraumbilical hernia containing a loop of small bowel, but no evidence of obstruction. 4.Calcification within left uterine fundus, likely a partially calcified fibroid.    Electronically Signed By-Onofre Nicholas MD On:7/10/2021 6:11 PM This report was finalized on [id number] by  Onofre Nicholas MD.    US Renal Limited    Result Date: 7/11/2021  Impression: Unremarkable renal ultrasound with no evidence of hydronephrosis.  Electronically Signed By-Day James MD On:7/11/2021 9:22 AM This report was finalized on [id number] by  Day James MD.    XR Chest 1 View    Result Date: 7/10/2021  Impression: Impression: Line placement as above. Mild pulmonary vascular prominence/congestion. Redemonstrated large cardiac silhouette. Electronically signed by:  Sheng Harris M.D.  7/10/2021 9:06 PM    XR Chest 1 View    Result Date: 7/10/2021  Impression: 1.Lungs are clear. 2.Cardiomegaly.  Electronically Signed By-Onofre Nicholas MD On:7/10/2021 3:45 PM This report was finalized on [id number] by  Onofre Nicholas MD.    US Abdomen Limited    Result Date: 7/11/2021  Impression:  1. The pancreas is not well visualized. 2. Heterogeneous liver parenchyma likely related to steatosis. 3. Status post cholecystectomy. No  evidence of biliary ductal dilatation.  Electronically Signed By-Day James MD On:7/11/2021 9:17 AM This report was finalized on [id number] by  Day James MD.              Results for orders placed during the hospital encounter of 10/08/20    Adult Transthoracic Echo Complete W/ Cont if Necessary Per Protocol    Interpretation Summary  · Estimated left ventricular EF = 65% Left ventricular systolic function is normal.  · Trace to mild mitral valve regurgitation is present.  · There is a small (<1cm) pericardial effusion.      Labs Pending at Discharge:        Discharge Details        Discharge Medications      New Medications      Instructions Start Date   polyethylene glycol 17 g packet  Commonly known as: MIRALAX   17 g, Oral, Daily PRN      sennosides-docusate 8.6-50 MG per tablet  Commonly known as: PERICOLACE   2 tablets, Oral, 2 Times Daily         Continue These Medications      Instructions Start Date   Adult Aspirin EC Low Strength 81 MG EC tablet  Generic drug: aspirin   81 mg, Oral, Daily      albuterol sulfate  (90 Base) MCG/ACT inhaler  Commonly known as: PROVENTIL HFA;VENTOLIN HFA;PROAIR HFA   2 puffs, Inhalation, Every 4 Hours PRN      BASAGLAR KWIKPEN 100 UNIT/ML injection pen   20 Units, Subcutaneous, 2 Times Daily      hydrALAZINE 50 MG tablet  Commonly known as: APRESOLINE   50 mg, Oral, 3 Times Daily      metoprolol tartrate 50 MG tablet  Commonly known as: LOPRESSOR   50 mg, Oral, 2 Times Daily      olmesartan 20 MG tablet  Commonly known as: BENICAR   40 mg, Oral, Daily      rosuvastatin 10 MG tablet  Commonly known as: CRESTOR   10 mg, Oral, Daily      vitamin D 1.25 MG (99419 UT) capsule capsule  Commonly known as: ERGOCALCIFEROL   50,000 Units, Oral, 2 Times Weekly, Monday and Thursday         Stop These Medications    gemfibrozil 600 MG tablet  Commonly known as: LOPID     glipizide 10 MG 24 hr tablet  Commonly known as: GLUCOTROL XL     metFORMIN 1000 MG tablet  Commonly  known as: GLUCOPHAGE     metFORMIN 500 MG tablet  Commonly known as: GLUCOPHAGE     theophylline 300 MG 24 hr capsule  Commonly known as: MEGHAN-24            Allergies   Allergen Reactions   • Lisinopril Hives   • Ipratropium-Albuterol Itching and Rash         Discharge Disposition:  [place] [facility] [place]    Diet:  Hospital:  Diet Order   Procedures   • Diet Diabetic/Consistent Carbs; Diabetic - Consistent Carb         Discharge Activity:   Activity Instructions     Gradually Increase Activity Until at Pre-Hospitalization Level          renal  Severe metabolic acidosis  Hyperkalemia  Acute kidney injury  Hypoglycemia  History of diabetes  Obesity  Elevated liver function test  Diabetes  Obstructive sleep apnea  Vitamin D deficiency           Cr down to 1.6  Reduce IVfs  [facility]                CODE STATUS:  Code Status and Medical Interventions:   Ordered at: 07/10/21 2055     Code Status:    CPR     Medical Interventions (Level of Support Prior to Arrest):    Full         No future appointments.    Additional Instructions for the Follow-ups that You Need to Schedule     Discharge Follow-up with PCP   As directed       Currently Documented PCP:    Manuel Sanders MD    PCP Phone Number:    [phone]     Follow Up Details: 1 week         Discharge Follow-up with Specified Provider: nephrology; 1 Week   As directed      To: nephrology    Follow Up: 1 Week               Time spent on Discharge including face to face service:  35 minutes    Electronically signed by Ghulam Glasgow MD, 07/13/21, 10:55 AM EDT.

## 2021-07-13 NOTE — PROGRESS NOTES
"                                                                                                                                      Nephrology  Progress Note                                        [facility]    Patient Identification    Name: Lesa Julio  Age: 70 y.o.  Sex: female  :  1950  MRN: [id number]      DATE OF SERVICE:  2021        Subective    Patient resting  No distress       Objective   Scheduled Meds:aspirin, 81 mg, Oral, Daily  insulin lispro, 0-7 Units, Subcutaneous, Q6H  lansoprazole, 15 mg, Oral, Q12H  losartan, 50 mg, Oral, Q24H  metoprolol tartrate, 50 mg, Oral, Q12H  senna-docusate sodium, 2 tablet, Oral, BID  sodium chloride, 10 mL, Intravenous, Q12H  sodium chloride, 10 mL, Intravenous, Q12H  sodium chloride, 10 mL, Intravenous, Q12H  sodium chloride, 10 mL, Intravenous, Q12H          Continuous Infusions:     PRN Meds:•  acetaminophen **OR** acetaminophen  •  albuterol  •  aluminum-magnesium hydroxide-simethicone  •  senna-docusate sodium **AND** polyethylene glycol **AND** bisacodyl **AND** bisacodyl  •  dextrose  •  dextrose  •  fentanyl  •  glucagon (human recombinant)  •  heparin (porcine)  •  insulin lispro **AND** insulin lispro  •  ondansetron **OR** ondansetron  •  [COMPLETED] Insert peripheral IV **AND** sodium chloride  •  sodium chloride  •  sodium chloride  •  sodium chloride     Exam:  /65 (BP Location: Left arm, Patient Position: Lying)   Pulse 54   Temp 98.1 °F (36.7 °C) (Oral)   Resp 18   Ht 160 cm (63\")   Wt 83.4 kg (183 lb 13.8 oz)   SpO2 93%   BMI 32.57 kg/m²     Intake/Output last 3 shifts:  I/O last 3 completed shifts:  In: 1720 [P.O.:720; I.V.:1000]  Out: 3600 [Urine:3600]    Intake/Output this shift:  No intake/output data recorded.    Physical exam:  General Appearance: Resting  Head:  Normocephalic, without obvious abnormality, atraumatic  Eyes:  PERRL, conjunctiva/corneas clear     Neck:  Supple,  no adenopathy;    "   Lungs:  Decreased BS occasion ronchi  Heart:  Regular rate and rhythm, S1 and S2 normal  Abdomen:  Soft, non-tender, bowel sounds active   Extremities: trace edema  Pulses: 2+ and symmetric all extremities  Skin:  No rashes or lesions       Data Review:  All labs (24hrs):   Recent Results (from the past 24 hour(s))   POC Glucose Once    Collection Time: 07/12/21 11:30 AM    Specimen: Blood   Result Value Ref Range    Glucose 161 (H) 70 - 105 mg/dL   POC Glucose Once    Collection Time: 07/12/21  4:07 PM    Specimen: Blood   Result Value Ref Range    Glucose 214 (H) 70 - 105 mg/dL   POC Glucose Once    Collection Time: 07/12/21  7:18 PM    Specimen: Blood   Result Value Ref Range    Glucose 158 (H) 70 - 105 mg/dL   POC Glucose Once    Collection Time: 07/12/21 11:29 PM    Specimen: Blood   Result Value Ref Range    Glucose 118 (H) 70 - 105 mg/dL   Magnesium    Collection Time: 07/13/21  2:05 AM    Specimen: Blood   Result Value Ref Range    Magnesium 1.5 (L) 1.6 - 2.4 mg/dL   Phosphorus    Collection Time: 07/13/21  2:05 AM    Specimen: Blood   Result Value Ref Range    Phosphorus 2.8 2.5 - 4.5 mg/dL   Comprehensive Metabolic Panel    Collection Time: 07/13/21  2:05 AM    Specimen: Blood   Result Value Ref Range    Glucose 112 (H) 65 - 99 mg/dL    BUN 34 (H) 8 - 23 mg/dL    Creatinine 1.43 (H) 0.57 - 1.00 mg/dL    Sodium 143 136 - 145 mmol/L    Potassium 3.9 3.5 - 5.2 mmol/L    Chloride 103 98 - 107 mmol/L    CO2 30.0 (H) 22.0 - 29.0 mmol/L    Calcium 9.5 8.6 - 10.5 mg/dL    Total Protein 5.9 (L) 6.0 - 8.5 g/dL    Albumin 3.60 3.50 - 5.20 g/dL    ALT (SGPT) 7 1 - 33 U/L    AST (SGOT) 10 1 - 32 U/L    Alkaline Phosphatase 88 39 - 117 U/L    Total Bilirubin 0.3 0.0 - 1.2 mg/dL    eGFR Non African Amer 36 (L) >60 mL/min/1.73    Globulin 2.3 gm/dL    A/G Ratio 1.6 g/dL    BUN/Creatinine Ratio 23.8 7.0 - 25.0    Anion Gap 10.0 5.0 - 15.0 mmol/L   Calcium, Ionized    Collection Time: 07/13/21  2:05 AM    Specimen: Blood    Result Value Ref Range    Ionized Calcium 1.28 1.20 - 1.30 mmol/L   CBC Auto Differential    Collection Time: 07/13/21  2:05 AM    Specimen: Blood   Result Value Ref Range    WBC 7.10 3.40 - 10.80 10*3/mm3    RBC 4.07 3.77 - 5.28 10*6/mm3    Hemoglobin 10.9 (L) 12.0 - 15.9 g/dL    Hematocrit 32.7 (L) 34.0 - 46.6 %    MCV 80.4 79.0 - 97.0 fL    MCH 26.8 26.6 - 33.0 pg    MCHC 33.3 31.5 - 35.7 g/dL    RDW 17.0 (H) 12.3 - 15.4 %    RDW-SD 48.6 37.0 - 54.0 fl    MPV 8.2 6.0 - 12.0 fL    Platelets 171 140 - 450 10*3/mm3    Neutrophil % 69.5 42.7 - 76.0 %    Lymphocyte % 16.9 (L) 19.6 - 45.3 %    Monocyte % 11.0 5.0 - 12.0 %    Eosinophil % 1.8 0.3 - 6.2 %    Basophil % 0.8 0.0 - 1.5 %    Neutrophils, Absolute 4.90 1.70 - 7.00 10*3/mm3    Lymphocytes, Absolute 1.20 0.70 - 3.10 10*3/mm3    Monocytes, Absolute 0.80 0.10 - 0.90 10*3/mm3    Eosinophils, Absolute 0.10 0.00 - 0.40 10*3/mm3    Basophils, Absolute 0.10 0.00 - 0.20 10*3/mm3    nRBC 0.2 0.0 - 0.2 /100 WBC   Scan Slide    Collection Time: 07/13/21  2:05 AM    Specimen: Blood   Result Value Ref Range    Anisocytosis Slight/1+ None Seen    Dacrocytes Slight/1+ None Seen    Microcytes Slight/1+ None Seen    Poikilocytes Slight/1+ None Seen    WBC Morphology Normal Normal    Platelet Estimate Adequate Normal    Large Platelets Slight/1+ None Seen    Giant Platelets Slight/1+ None Seen   POC Glucose Once    Collection Time: 07/13/21  5:09 AM    Specimen: Blood   Result Value Ref Range    Glucose 116 (H) 70 - 105 mg/dL   POC Glucose Once    Collection Time: 07/13/21  6:56 AM    Specimen: Blood   Result Value Ref Range    Glucose 102 70 - 105 mg/dL          Imaging:  [unfilled]    Assessment/Plan:     ANAMARIA (acute kidney injury) (CMS/HCC)    Cardiac murmur    Chronic obstructive pulmonary disease (CMS/HCC)    Chronic renal insufficiency, stage III (moderate) (CMS/HCC)    Diabetes mellitus, type 2 (CMS/HCC)    Essential hypertension    Rheumatoid arthritis (CMS/HCC)     Tobacco abuse    Allergic rhinitis    Generalized weakness    Hearing loss    Mixed hyperlipidemia    Nonalcoholic fatty liver disease    Obesity    Obstructive sleep apnea syndrome    Vitamin D deficiency    Secondary hyperparathyroidism of renal origin (CMS/HCC)    Paroxysmal atrial fibrillation (CMS/HCC)    GERD (gastroesophageal reflux disease)    Elevated lipase    Abdominal pain       Severe metabolic acidosis  Hyperkalemia  Acute kidney injury  Hypoglycemia  History of diabetes  Obesity  Elevated liver function test  Diabetes  Obstructive sleep apnea  Vitamin D deficiency        Cr down to 1.6  Reduce IVfs  Lukas kahn

## 2021-07-13 NOTE — DISCHARGE PLACEMENT REQUEST
"Emily Mcclelland (70 y.o. Female)     Date of Birth Social Security Number Address Home Phone MRN    1950  [address] [address] [phone] [id number]    Congregation Marital Status          None        Admission Date Admission Type Admitting Provider Attending Provider Department, Room/Bed    7/10/21 Emergency  Ghulam Glasgow MD [facility] 3A MEDICAL INPATIENT, 341/1    Discharge Date Discharge Disposition Discharge Destination         Home-Health Care [facility]              Attending Provider: Ghulam Glasgow MD    Allergies: Lisinopril, Ipratropium-albuterol    Isolation: None   Infection: None   Code Status: CPR    Ht: 160 cm (63\")   Wt: 83.4 kg (183 lb 13.8 oz)    Admission Cmt: None   Principal Problem: ANAMARIA (acute kidney injury) (CMS/[facility]) [N17.9]                 Active Insurance as of 7/10/2021     Primary Coverage     Payor Plan Insurance Group Employer/Plan Group    ANTHEM MEDICARE REPLACEMENT ANTHEM MEDICARE ADVANTAGE [id number]     Payor Plan Address Payor Plan Phone Number Payor Plan Fax Number Effective Dates    [address] [phone]  1/1/2021 - None Entered    [facility] [id number]       Subscriber Name Subscriber Birth Date Member ID       EMILY MCCLELLAND 1950 [id number]                 Emergency Contacts      (Rel.) Home Phone Work Phone Mobile Phone    BRI MCCLELLAND (Son) [phone] -- [phone]              "

## 2021-07-14 ENCOUNTER — READMISSION MANAGEMENT (OUTPATIENT)
Dept: CALL CENTER | Facility: HOSPITAL | Age: 71
End: 2021-07-14

## 2021-07-14 NOTE — OUTREACH NOTE
Prep Survey      Responses   [facility]   Is LACE score < 7 ?  No   Emergency Room discharge w/ pulse ox?  No   Eligibility  Readm Mgmt   Discharge diagnosis  **ANAMARIA (acute kidney injury   Does the patient have one of the following disease processes/diagnoses(primary or secondary)?  Other   Does the patient have Home health ordered?  Yes   What is the Home health agency?   [facility]     Is there a DME ordered?  No   General alerts for this patient  Hx: COPD    Prep survey completed?  Yes          Melanie Garner RN         meal provided/plan of care explained

## 2021-07-19 ENCOUNTER — READMISSION MANAGEMENT (OUTPATIENT)
Dept: CALL CENTER | Facility: HOSPITAL | Age: 71
End: 2021-07-19

## 2021-07-19 NOTE — OUTREACH NOTE
Medical Week 1 Survey      Responses   [facility] patient discharged from?  [facility]   Does the patient have one of the following disease processes/diagnoses(primary or secondary)?  Other   Week 1 attempt successful?  No   Unsuccessful attempts  Attempt 1          Neli Hassan RN

## 2021-07-20 ENCOUNTER — READMISSION MANAGEMENT (OUTPATIENT)
Dept: CALL CENTER | Facility: HOSPITAL | Age: 71
End: 2021-07-20

## 2021-07-20 NOTE — OUTREACH NOTE
Medical Week 1 Survey      Responses   [facility] patient discharged from?  [facility]   Does the patient have one of the following disease processes/diagnoses(primary or secondary)?  Other   Week 1 attempt successful?  Yes   Call start time  1530   Call end time  1532   General alerts for this patient  Hx: COPD    Discharge diagnosis  **ANAMARIA (acute kidney injury   Meds reviewed with patient/caregiver?  Yes   Is the patient having any side effects they believe may be caused by any medication additions or changes?  No   Does the patient have all medications ordered at discharge?  Yes   Is the patient taking all medications as directed (includes completed medication regime)?  Yes   Does the patient have a primary care provider?   Yes   Has the patient kept scheduled appointments due by today?  Yes   What is the Home health agency?   [facility]    Has home health visited the patient within 72 hours of discharge?  Yes   Psychosocial issues?  No   Did the patient receive a copy of their discharge instructions?  Yes   Nursing interventions  Reviewed instructions with patient   What is the patient's perception of their health status since discharge?  Improving   Is the patient/caregiver able to teach back signs and symptoms related to disease process for when to call PCP?  Yes   Is the patient/caregiver able to teach back signs and symptoms related to disease process for when to call 911?  Yes   Is the patient/caregiver able to teach back the hierarchy of who to call/visit for symptoms/problems? PCP, Specialist, Home health nurse, Urgent Care, ED, 911  Yes   If the patient is a current smoker, are they able to teach back resources for cessation?  Not a smoker   Week 1 call completed?  Yes          Barbie Davis LPN

## 2021-07-28 ENCOUNTER — READMISSION MANAGEMENT (OUTPATIENT)
Dept: CALL CENTER | Facility: HOSPITAL | Age: 71
End: 2021-07-28

## 2021-07-28 NOTE — OUTREACH NOTE
Medical Week 2 Survey      Responses   [facility] patient discharged from?  [facility]   Does the patient have one of the following disease processes/diagnoses(primary or secondary)?  Other   Week 2 attempt successful?  No   Unsuccessful attempts  Attempt 1          Barbie Davis LPN

## 2021-08-03 ENCOUNTER — READMISSION MANAGEMENT (OUTPATIENT)
Dept: CALL CENTER | Facility: HOSPITAL | Age: 71
End: 2021-08-03

## 2021-08-03 NOTE — OUTREACH NOTE
Medical Week 2 Survey      Responses   [facility] patient discharged from?  [facility]   Does the patient have one of the following disease processes/diagnoses(primary or secondary)?  Other   Week 2 attempt successful?  Yes   Call start time  1226   General alerts for this patient  Hx: COPD    Call end time  1228   Meds reviewed with patient/caregiver?  Yes   Is the patient having any side effects they believe may be caused by any medication additions or changes?  No   Does the patient have all medications ordered at discharge?  Yes   Is the patient taking all medications as directed (includes completed medication regime)?  Yes   Does the patient have a primary care provider?   Yes   Comments regarding PCP  PATIENT STATES SHE HAS SEEN HER PCP   Has the patient kept scheduled appointments due by today?  Yes   What is the Home health agency?   [facility]     Has home health visited the patient within 72 hours of discharge?  Yes   Psychosocial issues?  No   Did the patient receive a copy of their discharge instructions?  Yes   Nursing interventions  Reviewed instructions with patient   What is the patient's perception of their health status since discharge?  Improving   Is the patient/caregiver able to teach back signs and symptoms related to disease process for when to call PCP?  Yes   Is the patient/caregiver able to teach back signs and symptoms related to disease process for when to call 911?  Yes   Is the patient/caregiver able to teach back the hierarchy of who to call/visit for symptoms/problems? PCP, Specialist, Home health nurse, Urgent Care, ED, 911  Yes   If the patient is a current smoker, are they able to teach back resources for cessation?  Not a smoker   Week 2 Call Completed?  Yes          Barbie Davis LPN

## 2021-08-10 ENCOUNTER — READMISSION MANAGEMENT (OUTPATIENT)
Dept: CALL CENTER | Facility: HOSPITAL | Age: 71
End: 2021-08-10

## 2021-08-10 NOTE — OUTREACH NOTE
Medical Week 3 Survey      Responses   [facility] patient discharged from?  [facility]   Does the patient have one of the following disease processes/diagnoses(primary or secondary)?  Other   Week 3 attempt successful?  Yes   Call start time  1649   Call end time  1651   Discharge diagnosis  **ANAMARIA (acute kidney injury   Meds reviewed with patient/caregiver?  Yes   Is the patient taking all medications as directed (includes completed medication regime)?  Yes   Has the patient kept scheduled appointments due by today?  Yes   What is the patient's perception of their health status since discharge?  Improving   Week 3 Call Completed?  Yes   Graduated  Yes   Is the patient interested in additional calls from an ambulatory ?  NOTE:  applies to high risk patients requiring additional follow-up.  No   Did the patient feel the follow up calls were helpful during their recovery period?  Yes   Was the number of calls appropriate?  Yes   Graduated/Revoked comments  No problems or questions at this time.          Zoey Varma RN

## 2023-12-19 ENCOUNTER — ANESTHESIA EVENT (OUTPATIENT)
Dept: PERIOP | Facility: HOSPITAL | Age: 73
End: 2023-12-19
Payer: MEDICARE

## 2023-12-19 ENCOUNTER — APPOINTMENT (OUTPATIENT)
Dept: OTHER | Facility: HOSPITAL | Age: 73
End: 2023-12-19
Payer: MEDICARE

## 2023-12-19 ENCOUNTER — ANESTHESIA (OUTPATIENT)
Dept: PERIOP | Facility: HOSPITAL | Age: 73
End: 2023-12-19
Payer: MEDICARE

## 2023-12-19 ENCOUNTER — HOSPITAL ENCOUNTER (INPATIENT)
Facility: HOSPITAL | Age: 73
LOS: 3 days | Discharge: HOME | End: 2023-12-22
Attending: INTERNAL MEDICINE | Admitting: INTERNAL MEDICINE
Payer: MEDICARE

## 2023-12-19 PROBLEM — K56.609 SMALL BOWEL OBSTRUCTION: Status: ACTIVE | Noted: 2023-12-19

## 2023-12-19 LAB
GLUCOSE BLDC GLUCOMTR-MCNC: 132 MG/DL (ref 70–105)
GLUCOSE BLDC GLUCOMTR-MCNC: 155 MG/DL (ref 70–105)
GLUCOSE BLDC GLUCOMTR-MCNC: 169 MG/DL (ref 70–105)

## 2023-12-19 PROCEDURE — 25010000002 ONDANSETRON PER 1 MG: Performed by: NURSE ANESTHETIST, CERTIFIED REGISTERED

## 2023-12-19 PROCEDURE — 25010000002 DEXAMETHASONE PER 1 MG: Performed by: NURSE ANESTHETIST, CERTIFIED REGISTERED

## 2023-12-19 PROCEDURE — 49594 RPR AA HRN 1ST 3-10 NCR/STRN: CPT | Performed by: SURGERY

## 2023-12-19 PROCEDURE — 25010000002 SUGAMMADEX 200 MG/2ML SOLUTION: Performed by: NURSE ANESTHETIST, CERTIFIED REGISTERED

## 2023-12-19 PROCEDURE — 25010000002 CEFAZOLIN PER 500 MG: Performed by: NURSE ANESTHETIST, CERTIFIED REGISTERED

## 2023-12-19 PROCEDURE — 99223 1ST HOSP IP/OBS HIGH 75: CPT | Performed by: SURGERY

## 2023-12-19 PROCEDURE — 0WQF0ZZ REPAIR ABDOMINAL WALL, OPEN APPROACH: ICD-10-PCS | Performed by: SURGERY

## 2023-12-19 PROCEDURE — 25810000003 SODIUM CHLORIDE 0.9 % SOLUTION: Performed by: NURSE ANESTHETIST, CERTIFIED REGISTERED

## 2023-12-19 PROCEDURE — 49594 RPR AA HRN 1ST 3-10 NCR/STRN: CPT

## 2023-12-19 PROCEDURE — 82948 REAGENT STRIP/BLOOD GLUCOSE: CPT

## 2023-12-19 PROCEDURE — 25010000002 CALCIUM GLUCONATE PER 10 ML: Performed by: NURSE ANESTHETIST, CERTIFIED REGISTERED

## 2023-12-19 PROCEDURE — 25010000002 PROPOFOL 200 MG/20ML EMULSION: Performed by: NURSE ANESTHETIST, CERTIFIED REGISTERED

## 2023-12-19 RX ORDER — ACETAMINOPHEN 650 MG/1
650 SUPPOSITORY RECTAL EVERY 4 HOURS PRN
Status: DISCONTINUED | OUTPATIENT
Start: 2023-12-19 | End: 2023-12-19 | Stop reason: HOSPADM

## 2023-12-19 RX ORDER — INSULIN GLARGINE 100 [IU]/ML
40 INJECTION, SOLUTION SUBCUTANEOUS 2 TIMES DAILY
COMMUNITY

## 2023-12-19 RX ORDER — DEXAMETHASONE SODIUM PHOSPHATE 4 MG/ML
INJECTION, SOLUTION INTRA-ARTICULAR; INTRALESIONAL; INTRAMUSCULAR; INTRAVENOUS; SOFT TISSUE AS NEEDED
Status: DISCONTINUED | OUTPATIENT
Start: 2023-12-19 | End: 2023-12-19 | Stop reason: SURG

## 2023-12-19 RX ORDER — ACETAMINOPHEN 325 MG/1
650 TABLET ORAL ONCE AS NEEDED
Status: DISCONTINUED | OUTPATIENT
Start: 2023-12-19 | End: 2023-12-19 | Stop reason: HOSPADM

## 2023-12-19 RX ORDER — SODIUM CHLORIDE 9 MG/ML
40 INJECTION, SOLUTION INTRAVENOUS AS NEEDED
Status: DISCONTINUED | OUTPATIENT
Start: 2023-12-19 | End: 2023-12-22 | Stop reason: HOSPADM

## 2023-12-19 RX ORDER — DROPERIDOL 2.5 MG/ML
0.62 INJECTION, SOLUTION INTRAMUSCULAR; INTRAVENOUS ONCE AS NEEDED
Status: DISCONTINUED | OUTPATIENT
Start: 2023-12-19 | End: 2023-12-19 | Stop reason: HOSPADM

## 2023-12-19 RX ORDER — LABETALOL HYDROCHLORIDE 5 MG/ML
5 INJECTION, SOLUTION INTRAVENOUS
Status: DISCONTINUED | OUTPATIENT
Start: 2023-12-19 | End: 2023-12-19 | Stop reason: HOSPADM

## 2023-12-19 RX ORDER — GLIMEPIRIDE 1 MG/1
1 TABLET ORAL
Status: ON HOLD | COMMUNITY
End: 2023-12-20

## 2023-12-19 RX ORDER — CALCIUM GLUCONATE 94 MG/ML
INJECTION, SOLUTION INTRAVENOUS AS NEEDED
Status: DISCONTINUED | OUTPATIENT
Start: 2023-12-19 | End: 2023-12-19 | Stop reason: SURG

## 2023-12-19 RX ORDER — OXYCODONE HYDROCHLORIDE 5 MG/1
10 TABLET ORAL ONCE AS NEEDED
Status: DISCONTINUED | OUTPATIENT
Start: 2023-12-19 | End: 2023-12-19 | Stop reason: HOSPADM

## 2023-12-19 RX ORDER — PROPOFOL 10 MG/ML
INJECTION, EMULSION INTRAVENOUS AS NEEDED
Status: DISCONTINUED | OUTPATIENT
Start: 2023-12-19 | End: 2023-12-19 | Stop reason: SURG

## 2023-12-19 RX ORDER — ROCURONIUM BROMIDE 10 MG/ML
INJECTION, SOLUTION INTRAVENOUS AS NEEDED
Status: DISCONTINUED | OUTPATIENT
Start: 2023-12-19 | End: 2023-12-19 | Stop reason: SURG

## 2023-12-19 RX ORDER — CEFAZOLIN 2 G/1
INJECTION, POWDER, FOR SOLUTION INTRAMUSCULAR; INTRAVENOUS AS NEEDED
Status: DISCONTINUED | OUTPATIENT
Start: 2023-12-19 | End: 2023-12-19 | Stop reason: SURG

## 2023-12-19 RX ORDER — PROMETHAZINE HYDROCHLORIDE 25 MG/1
25 SUPPOSITORY RECTAL ONCE AS NEEDED
Status: DISCONTINUED | OUTPATIENT
Start: 2023-12-19 | End: 2023-12-19 | Stop reason: HOSPADM

## 2023-12-19 RX ORDER — AMOXICILLIN 250 MG
2 CAPSULE ORAL 2 TIMES DAILY
Status: DISCONTINUED | OUTPATIENT
Start: 2023-12-19 | End: 2023-12-22 | Stop reason: HOSPADM

## 2023-12-19 RX ORDER — BISACODYL 10 MG
10 SUPPOSITORY, RECTAL RECTAL DAILY PRN
Status: DISCONTINUED | OUTPATIENT
Start: 2023-12-19 | End: 2023-12-22 | Stop reason: HOSPADM

## 2023-12-19 RX ORDER — FENTANYL CITRATE 50 UG/ML
50 INJECTION, SOLUTION INTRAMUSCULAR; INTRAVENOUS
Status: DISCONTINUED | OUTPATIENT
Start: 2023-12-19 | End: 2023-12-19 | Stop reason: HOSPADM

## 2023-12-19 RX ORDER — SODIUM CHLORIDE 0.9 % (FLUSH) 0.9 %
10 SYRINGE (ML) INJECTION EVERY 12 HOURS SCHEDULED
Status: DISCONTINUED | OUTPATIENT
Start: 2023-12-19 | End: 2023-12-22 | Stop reason: HOSPADM

## 2023-12-19 RX ORDER — DAPAGLIFLOZIN 5 MG/1
5 TABLET, FILM COATED ORAL DAILY
COMMUNITY

## 2023-12-19 RX ORDER — PHENYLEPHRINE HCL IN 0.9% NACL 1 MG/10 ML
SYRINGE (ML) INTRAVENOUS AS NEEDED
Status: DISCONTINUED | OUTPATIENT
Start: 2023-12-19 | End: 2023-12-19 | Stop reason: SURG

## 2023-12-19 RX ORDER — HYDROCODONE BITARTRATE AND ACETAMINOPHEN 5; 325 MG/1; MG/1
1 TABLET ORAL EVERY 4 HOURS PRN
Status: DISCONTINUED | OUTPATIENT
Start: 2023-12-19 | End: 2023-12-22 | Stop reason: HOSPADM

## 2023-12-19 RX ORDER — HYDRALAZINE HYDROCHLORIDE 20 MG/ML
10 INJECTION INTRAMUSCULAR; INTRAVENOUS EVERY 6 HOURS PRN
Status: DISCONTINUED | OUTPATIENT
Start: 2023-12-19 | End: 2023-12-22 | Stop reason: HOSPADM

## 2023-12-19 RX ORDER — ONDANSETRON 2 MG/ML
INJECTION INTRAMUSCULAR; INTRAVENOUS AS NEEDED
Status: DISCONTINUED | OUTPATIENT
Start: 2023-12-19 | End: 2023-12-19 | Stop reason: SURG

## 2023-12-19 RX ORDER — ONDANSETRON 2 MG/ML
4 INJECTION INTRAMUSCULAR; INTRAVENOUS EVERY 6 HOURS PRN
Status: DISCONTINUED | OUTPATIENT
Start: 2023-12-19 | End: 2023-12-22 | Stop reason: HOSPADM

## 2023-12-19 RX ORDER — POLYETHYLENE GLYCOL 3350 17 G/17G
17 POWDER, FOR SOLUTION ORAL DAILY PRN
Status: DISCONTINUED | OUTPATIENT
Start: 2023-12-19 | End: 2023-12-22 | Stop reason: HOSPADM

## 2023-12-19 RX ORDER — MORPHINE SULFATE 2 MG/ML
2 INJECTION, SOLUTION INTRAMUSCULAR; INTRAVENOUS EVERY 4 HOURS PRN
Status: DISCONTINUED | OUTPATIENT
Start: 2023-12-19 | End: 2023-12-21

## 2023-12-19 RX ORDER — PROMETHAZINE HYDROCHLORIDE 25 MG/1
25 TABLET ORAL ONCE AS NEEDED
Status: DISCONTINUED | OUTPATIENT
Start: 2023-12-19 | End: 2023-12-19 | Stop reason: HOSPADM

## 2023-12-19 RX ORDER — SODIUM CHLORIDE 0.9 % (FLUSH) 0.9 %
10 SYRINGE (ML) INJECTION AS NEEDED
Status: DISCONTINUED | OUTPATIENT
Start: 2023-12-19 | End: 2023-12-22 | Stop reason: HOSPADM

## 2023-12-19 RX ORDER — HYDRALAZINE HYDROCHLORIDE 20 MG/ML
5 INJECTION INTRAMUSCULAR; INTRAVENOUS
Status: DISCONTINUED | OUTPATIENT
Start: 2023-12-19 | End: 2023-12-19 | Stop reason: HOSPADM

## 2023-12-19 RX ORDER — BISACODYL 5 MG/1
5 TABLET, DELAYED RELEASE ORAL DAILY PRN
Status: DISCONTINUED | OUTPATIENT
Start: 2023-12-19 | End: 2023-12-22 | Stop reason: HOSPADM

## 2023-12-19 RX ORDER — ENOXAPARIN SODIUM 100 MG/ML
40 INJECTION SUBCUTANEOUS DAILY
Status: DISCONTINUED | OUTPATIENT
Start: 2023-12-19 | End: 2023-12-22 | Stop reason: HOSPADM

## 2023-12-19 RX ORDER — SODIUM CHLORIDE 9 MG/ML
INJECTION, SOLUTION INTRAVENOUS CONTINUOUS PRN
Status: DISCONTINUED | OUTPATIENT
Start: 2023-12-19 | End: 2023-12-19 | Stop reason: SURG

## 2023-12-19 RX ORDER — DEXTROSE AND SODIUM CHLORIDE 5; .45 G/100ML; G/100ML
100 INJECTION, SOLUTION INTRAVENOUS CONTINUOUS
Status: DISCONTINUED | OUTPATIENT
Start: 2023-12-19 | End: 2023-12-22 | Stop reason: HOSPADM

## 2023-12-19 RX ADMIN — SUGAMMADEX 300 MG: 100 INJECTION, SOLUTION INTRAVENOUS at 18:30

## 2023-12-19 RX ADMIN — DEXAMETHASONE SODIUM PHOSPHATE 4 MG: 4 INJECTION, SOLUTION INTRAMUSCULAR; INTRAVENOUS at 18:22

## 2023-12-19 RX ADMIN — ONDANSETRON 4 MG: 2 INJECTION INTRAMUSCULAR; INTRAVENOUS at 18:30

## 2023-12-19 RX ADMIN — ROCURONIUM BROMIDE 50 MG: 10 INJECTION, SOLUTION INTRAVENOUS at 17:56

## 2023-12-19 RX ADMIN — SODIUM CHLORIDE: 9 INJECTION, SOLUTION INTRAVENOUS at 17:59

## 2023-12-19 RX ADMIN — Medication 10 ML: at 21:21

## 2023-12-19 RX ADMIN — Medication 100 MCG: at 18:06

## 2023-12-19 RX ADMIN — PROPOFOL 100 MG: 10 INJECTION, EMULSION INTRAVENOUS at 17:56

## 2023-12-19 RX ADMIN — CALCIUM GLUCONATE 1 G: 98 INJECTION, SOLUTION INTRAVENOUS at 18:02

## 2023-12-19 RX ADMIN — CEFAZOLIN 2 G: 2 INJECTION, POWDER, FOR SOLUTION INTRAMUSCULAR; INTRAVENOUS at 17:48

## 2023-12-19 RX ADMIN — Medication 100 MCG: at 18:01

## 2023-12-19 RX ADMIN — DEXTROSE AND SODIUM CHLORIDE 100 ML/HR: 5; 450 INJECTION, SOLUTION INTRAVENOUS at 21:23

## 2023-12-19 RX ADMIN — Medication 200 MCG: at 18:08

## 2023-12-19 NOTE — CONSULTS
Consults    General Surgery Consult Note      Name: Lesa Julio ADMIT: 2023   : 1950  PCP: Manuel Sanders MD    MRN: [id number] LOS: 0 days   AGE/SEX: 73 y.o. female  ROOM: [facility]/[address] [place] [address]      Patient Care Team:  Manuel Sanders MD as PCP - General  No chief complaint on file.  Abdominal pain    Subjective   73-year-old lady presented outside emergency department with a chief complaint of sharp abdominal pain.  Began early this morning causing severe pain around her bellybutton radiating around to her back.  It was associate with nausea and vomiting she has never had this type of discomfort before.  At the outside emergency department she is found to have a leukocytosis to 22,000.  She had tender abdominal exam.  CT scan showed an incarcerated infraumbilical hernia containing the terminal ileum with transition point this location and some inflammation.  The outside hospital said that they did not have any anesthesiologists to put her to sleep and the surgeon recommended transfer as she likely needed the surgery.  She was then transferred down and I was notified on her arrival.    Past Medical History:   Diagnosis Date    Arthritis     CKD (chronic kidney disease) stage 3, GFR 30-59 ml/min     per [facility]    Diabetes mellitus, type 2     Essential hypertension 2015    Grade 4 out of 6 intensity murmur 2020    Hearing loss     hearing aid right side    Hyperlipidemia     Hypersensitivity angiitis 2020    Hypertension     Leukocytoclastic vasculitis     Obesity     Osteoarthritis     Ovarian cyst     removed; benign    Peripheral arterial disease     Sleep apnea     Tubular adenoma of colon 10/30/2018    Vascular insufficiency 2020     Past Surgical History:   Procedure Laterality Date    BRONCHOSCOPY Bilateral 10/13/2020    Procedure: BRONCHOSCOPY AT BEDSIDE with right lung washing and elective intubation;  Surgeon: Cristhian Hodge MD;  Location:  " [facility];  Service: Pulmonary;  Laterality: Bilateral;  Post: pneumonia    BRONCHOSCOPY Bilateral 10/20/2020    Procedure: BRONCHOSCOPY WITH BRONCHOALVEOLAR LAVAGE  AT BEDSIDE;  Surgeon: Cristhian Hodge MD;  Location: [facility] ENDOSCOPY;  Service: Pulmonary;  Laterality: Bilateral;  POST- PNEUMONIA    BRONCHOSCOPY N/A 10/27/2020    Procedure: BRONCHOSCOPY with bronchial washing and intubation;  Surgeon: Cristhian Hodge MD;  Location: [facility] ENDOSCOPY;  Service: Pulmonary;  Laterality: N/A;  post op:pneumonia    BRONCHOSCOPY N/A 12/14/2020    Procedure: BRONCHOSCOPY AT BEDSIDE WITH BRONCHOALVEOLAR LAVAGE;  Surgeon: Cristhian Hodge MD;  Location: [facility] ENDOSCOPY;  Service: Pulmonary;  Laterality: N/A;  PNEUMONIA    CHOLECYSTECTOMY      ENDOSCOPY N/A 10/13/2020    Procedure: ESOPHAGOGASTRODUODENOSCOPY AT BEDSIDE;  Surgeon: Omi Rivera MD;  Location: [facility] ENDOSCOPY;  Service: Gastroenterology;  Laterality: N/A;    LIVER BIOPSY      TUBAL ABDOMINAL LIGATION       Family History   Problem Relation Age of Onset    Heart disease Mother     Cancer Father        Social History     Tobacco Use    Smoking status: Former     Packs/day: .5     Types: Cigarettes    Smokeless tobacco: Never    Tobacco comments:     per pt, \"I quit in October\"   Vaping Use    Vaping Use: Never used    Passive vaping exposure: Yes   Substance Use Topics    Alcohol use: No    Drug use: No     Medications Prior to Admission   Medication Sig Dispense Refill Last Dose    albuterol sulfate  (90 Base) MCG/ACT inhaler Inhale 2 puffs Every 4 (Four) Hours As Needed for Wheezing.       aspirin (ADULT ASPIRIN EC LOW STRENGTH) 81 MG EC tablet Take 81 mg by mouth Daily.       hydrALAZINE (APRESOLINE) 50 MG tablet Take 50 mg by mouth 3 (Three) Times a Day.       Insulin Glargine (BASAGLAR KWIKPEN) 100 UNIT/ML injection pen Inject 20 Units under the skin into the appropriate area as directed 2 (Two) Times a Day.       metoprolol tartrate (LOPRESSOR) 50 MG " tablet Take 50 mg by mouth 2 (Two) Times a Day.       olmesartan (BENICAR) 20 MG tablet Take 40 mg by mouth Daily.       polyethylene glycol (MIRALAX) 17 GM/SCOOP powder Take 17 g by mouth Daily As Needed (Use if senna-docusate is ineffective). 510 g 0     rosuvastatin (CRESTOR) 10 MG tablet Take 10 mg by mouth Daily.       sennosides-docusate (PERICOLACE) 8.6-50 MG per tablet Take 2 tablets by mouth 2 (Two) Times a Day. 30 tablet 0     vitamin D (ERGOCALCIFEROL) 1.25 MG (90047 UT) capsule capsule Take 50,000 Units by mouth 2 (Two) Times a Week. Monday and Thursday        enoxaparin, 40 mg, Subcutaneous, Daily  senna-docusate sodium, 2 tablet, Oral, BID  sodium chloride, 10 mL, Intravenous, Q12H      dextrose 5 % and sodium chloride 0.45 %, 100 mL/hr        senna-docusate sodium **AND** polyethylene glycol **AND** bisacodyl **AND** bisacodyl    Calcium Replacement - Follow Nurse / BPA Driven Protocol    Magnesium Standard Dose Replacement - Follow Nurse / BPA Driven Protocol    Morphine    ondansetron    Phosphorus Replacement - Follow Nurse / BPA Driven Protocol    Potassium Replacement - Follow Nurse / BPA Driven Protocol    sodium chloride    sodium chloride  Lisinopril and Ipratropium-albuterol    Review of Systems   Constitutional:  Negative for chills and fever.   HENT:  Negative for sore throat and trouble swallowing.    Respiratory:  Negative for cough and shortness of breath.    Cardiovascular:  Negative for chest pain and leg swelling.   Gastrointestinal:  Positive for abdominal distention, abdominal pain, nausea and vomiting. Negative for blood in stool, constipation and diarrhea.   Genitourinary:  Negative for dysuria and hematuria.   Neurological:  Positive for dizziness and weakness. Negative for confusion.        Objective     Vital Signs and Labs:  Vital Signs No data found.    Physical Exam:  Physical Exam  Constitutional:       Appearance: She is well-developed. She is obese.   HENT:      Head:  Normocephalic and atraumatic.   Eyes:      General: No scleral icterus.     Conjunctiva/sclera: Conjunctivae normal.   Neck:      Trachea: No tracheal deviation.   Pulmonary:      Effort: Pulmonary effort is normal. No respiratory distress.   Abdominal:      Palpations: Abdomen is soft.      Comments: Mild distention with focal tenderness to palpation in the infraumbilical location without any overlying skin changes.  She is a mild diffuse tenderness other than that.   Musculoskeletal:         General: No swelling or tenderness.      Cervical back: No muscular tenderness.   Skin:     General: Skin is warm and dry.   Neurological:      General: No focal deficit present.      Mental Status: She is alert. Mental status is at baseline.   Psychiatric:         Mood and Affect: Mood normal.         Behavior: Behavior normal.         CBC      BMP     Radiology(recent) No radiology results for the last day    Her CBC CMP and CT read has been reviewed from the outside hospital.  White blood cell count was 22 hemoglobin 16 platelets in the 300s.  Creatinine 1.6 potassium 5.6.    I reviewed the patient's new clinical results.  I reviewed the patient's new imaging results and agree with the interpretation.    Assessment & Plan       Small bowel obstruction      73 y.o. female presenting with an incarcerated infraumbilical hernia causing bowel obstruction.    Counseled her about the natural history of this hernia.  We talked about nonoperative management which could resolve spontaneously or progressed to bowel necrosis perforation sepsis and death.  We talked about the steps of the operation the anticipated recovery and possible complications.  Talk specifically about possibility for bowel resection.  After discussion about the risk and benefits this operation she understands the risk and is willing to proceed with the laparotomy and ventral hernia repair.    Is a new life-threatening condition and counseled by major abdominal  surgery with risk factors for morbidity including her obesity diabetes and renal disease    This note was created using Dragon Voice Recognition software.    Gilmar Yen MD  12/19/23  16:54 EST

## 2023-12-19 NOTE — ANESTHESIA PREPROCEDURE EVALUATION
Anesthesia Evaluation     Patient summary reviewed and Nursing notes reviewed   NPO Solid Status: Waived due to emergency             Airway   Mallampati: II  TM distance: >3 FB  Neck ROM: full  No difficulty expected  Comment: NGT in place  Dental - normal exam   (+) edentulous    Pulmonary - normal exam   (+) a smoker Former, COPD,sleep apnea on CPAP  Cardiovascular - normal exam    ECG reviewed    (+) hypertension, dysrhythmias Atrial Fib, hyperlipidemia  (-) angina, YANEZ    ROS comment: Estimated left ventricular EF = 65% Left ventricular systolic function is normal.    Neuro/Psych- negative ROS  GI/Hepatic/Renal/Endo    (+) obesity, GERD, liver disease fatty liver disease, renal disease- CRI, diabetes mellitus    ROS Comment: K+ is 5.6, Surgeon declared an emergency    Will give Ca Gluc    Musculoskeletal (-) negative ROS    Abdominal  - normal exam    Bowel sounds: normal.   Substance History - negative use     OB/GYN negative ob/gyn ROS         Other                    Anesthesia Plan    ASA 3 - emergent     general   Rapid sequence    Anesthetic plan, risks, benefits, and alternatives have been provided, discussed and informed consent has been obtained with: patient.    CODE STATUS:    Level Of Support Discussed With: Patient  Code Status (Patient has no pulse and is not breathing): CPR (Attempt to Resuscitate)  Medical Interventions (Patient has pulse or is breathing): Full Support

## 2023-12-19 NOTE — OP NOTE
Operative Report:    Patient Name:  Lesa Julio  YOB: 1950    Date of Surgery:  12/19/2023     Indications: 73-year-old lady transferred down from an outside hospital with an incarcerated ventral hernia causing small bowel obstruction I counseled her about the risk and benefits of exploratory laparotomy with reduction of the hernia and closure she understood the risk and was willing to proceed.    Pre-op Diagnosis:   incarcerated hernia       Post-Op Diagnosis Codes:     * Incarcerated hernia [K46.0]    Procedure/CPT® Codes:      Procedure(s):  Initial repair of a 3-1/2 cm initial incarcerated hernia    Staff:  Surgeon(s):  Gilmar Yen MD    Circulator: Velia Clinton RN; Bell Hernandez RN  Scrub Person: Tomeka Arroyo  Assistant: Lorne Harp CSFA  was responsible for performing the following activities: Retraction and Closing and their skilled assistance was necessary for the success of this case.        Anesthesia: General    Estimated Blood Loss: minimal    Implants:    Nothing was implanted during the procedure    Specimen:          None        Findings: Approximate 3-1/2 cm circular infraumbilical hernia defect 2 loops of small bowel that were densely adherent to the hernia sac.     Complications: None    Description of Procedure: Patient was taken to the operating placed on the operative table in supine Cixi under general anesthesia.  Her abdomen was prepped and draped normal sterile fashion.  Timeout was performed and find the patient received and site of operation.  I began the operation by making a midline incision over the top of the location of the hernia.  Dissection down to the hernia sac was performed with the Bovie.  I went just inferior to this admitted fascial incision and into the abdominal cavity.  I was then able to work the hernia sac circumferentially away from the subcutaneous surrounding tissue.  The hernia sac was amputated off of the fascia and  the contents inspected.  There were 2 loops of small bowel that were densely adherent to this which were dissected free the hernia sac was discarded.  The loops of bowel were inspected.  One of the loops had a partial-thickness serosal injury which is inherent to this dissection process which was imbricated using interrupted 3-0 Vicryl suture there was no evidence of a full-thickness injury in this area.  The small pieces of bowel that were protruding through were reduced.  The hernia defect was about 3-1/2 cm in greatest dimension.  Once the bowel was reduced to seem to be in a normal position but was difficult to tell through this small incision.  I went ahead and closed the fascia using 0 Prolene in a running fashion.  The wound was irrigated with Irrisept.  Skin was closed interrupted 3-0 Vicryl sutures and staples.  She tolerated procedure well's been transferred to recovery in satisfactory condition.      Gilmar Yen MD     Date: 12/19/2023  Time: 18:34 EST    This note was created using Dragon Voice Recognition software.

## 2023-12-19 NOTE — ANESTHESIA PROCEDURE NOTES
Airway  Urgency: elective    Date/Time: 12/19/2023 5:56 PM  Airway not difficult    General Information and Staff    Patient location during procedure: OR  CRNA/CAA: Aubrie Sewell CRNA    Indications and Patient Condition  Indications for airway management: airway protection    Preoxygenated: yes  Mask difficulty assessment: 0 - not attempted    Final Airway Details  Final airway type: endotracheal airway      Successful airway: ETT  Cuffed: yes   Successful intubation technique: RSI and video laryngoscopy  Facilitating devices/methods: intubating stylet and cricoid pressure  Endotracheal tube insertion site: oral  Blade: Steiner  Blade size: 3  ETT size (mm): 7.5  Cormack-Lehane Classification: grade I - full view of glottis  Placement verified by: chest auscultation and capnometry   Cuff volume (mL): 7  Measured from: lips  ETT/EBT  to lips (cm): 21  Number of attempts at approach: 1  Assessment: lips, teeth, and gum same as pre-op and atraumatic intubation

## 2023-12-19 NOTE — H&P
[facility]   HISTORY AND PHYSICAL    Patient Name: Lesa Julio  : 1950  MRN: [id number]  Primary Care Physician:  Manuel Sanders MD  Date of admission: 2023    Subjective   Subjective     Chief Complaint: Abdominal pain started after midnight woke her from sleep    HPI:    Lesa Julio is a 73 y.o. female 73-year-old female  History of A-fib and COPD and obstructive sleep apnea and diabetes is diabetic nephropathy and history of rheumatoid arthritis heavy smoker  Patient woke up around 2 or 3:00 in the morning with abdominal pain  Patient went to the emergency room at an outside facility and was diagnosed with small bowel obstruction  Patient transferred here for surgical evaluation  NG tube in place right now    Review of Systems   Diffuse abdominal pain with some nausea no vomiting no diarrhea no fever no chills rest of 14 system reviewed and negative    Personal History     Past Medical History:   Diagnosis Date    Arthritis     CKD (chronic kidney disease) stage 3, GFR 30-59 ml/min     per [facility]    Diabetes mellitus, type 2     Essential hypertension 2015    Grade 4 out of 6 intensity murmur 2020    Hearing loss     hearing aid right side    Hyperlipidemia     Hypersensitivity angiitis 2020    Hypertension     Leukocytoclastic vasculitis     Obesity     Osteoarthritis     Ovarian cyst     removed; benign    Peripheral arterial disease     Sleep apnea     Tubular adenoma of colon 10/30/2018    Vascular insufficiency 2020       Past Surgical History:   Procedure Laterality Date    BRONCHOSCOPY Bilateral 10/13/2020    Procedure: BRONCHOSCOPY AT BEDSIDE with right lung washing and elective intubation;  Surgeon: Cristhian Hodge MD;  Location: [facility] ENDOSCOPY;  Service: Pulmonary;  Laterality: Bilateral;  Post: pneumonia    BRONCHOSCOPY Bilateral 10/20/2020    Procedure: BRONCHOSCOPY WITH BRONCHOALVEOLAR LAVAGE  AT BEDSIDE;  Surgeon: Draw, Azmi,  MD;  Location: [facility] ENDOSCOPY;  Service: Pulmonary;  Laterality: Bilateral;  POST- PNEUMONIA    BRONCHOSCOPY N/A 10/27/2020    Procedure: BRONCHOSCOPY with bronchial washing and intubation;  Surgeon: Cristhian Hodge MD;  Location: [facility] ENDOSCOPY;  Service: Pulmonary;  Laterality: N/A;  post op:pneumonia    BRONCHOSCOPY N/A 12/14/2020    Procedure: BRONCHOSCOPY AT BEDSIDE WITH BRONCHOALVEOLAR LAVAGE;  Surgeon: Cristhian Hodge MD;  Location: [facility] ENDOSCOPY;  Service: Pulmonary;  Laterality: N/A;  PNEUMONIA    CHOLECYSTECTOMY      ENDOSCOPY N/A 10/13/2020    Procedure: ESOPHAGOGASTRODUODENOSCOPY AT BEDSIDE;  Surgeon: Omi Rivera MD;  Location: [facility] ENDOSCOPY;  Service: Gastroenterology;  Laterality: N/A;    LIVER BIOPSY      TUBAL ABDOMINAL LIGATION         Family History: family history includes Cancer in her father; Heart disease in her mother. Otherwise pertinent FHx was reviewed and not pertinent to current issue.    Social History:  reports that she has quit smoking. Her smoking use included cigarettes. She smoked an average of .5 packs per day. She has never used smokeless tobacco. She reports that she does not drink alcohol and does not use drugs.    Home Medications:  BASAGLAR KWIKPEN, albuterol sulfate HFA, aspirin, hydrALAZINE, metoprolol tartrate, olmesartan, polyethylene glycol, rosuvastatin, sennosides-docusate, and vitamin D    Allergies:  Allergies   Allergen Reactions    Lisinopril Hives    Ipratropium-Albuterol Itching and Rash       Objective   Objective     Vitals:      Physical Exam    Constitutional: Awake, alert   Eyes: PERRLA, sclerae anicteric, no conjunctival injection   HENT: NCAT, mucous membranes moist   Neck: Supple, no thyromegaly, no lymphadenopathy, trachea midline   Respiratory: Clear to auscultation bilaterally, nonlabored respirations    Cardiovascular: RRR, no murmurs, rubs, or gallops, palpable pedal pulses bilaterally   Gastrointestinal: Distended mild diffuse tenderness no  rebound rigidity or guarding   Musculoskeletal: No bilateral ankle edema, no clubbing or cyanosis to extremities   Psychiatric: Appropriate affect, cooperative   Neurologic: Oriented x 3, strength symmetric in all extremities, Cranial Nerves grossly intact to confrontation, speech clear   Skin: No rashes     Result Review    Result Review:  I have personally reviewed the results from the time of this admission to 12/19/2023 16:41 EST and agree with these findings:  [x]  Laboratory list / accordion  []  Microbiology  [x]  Radiology  []  EKG/Telemetry   []  Cardiology/Vascular   []  Pathology  []  Old records  []  Other:  Most notable findings include:       Assessment & Plan   Assessment / Plan     Brief Patient Summary:  Lesa Julio is a 73 y.o. female who is admitted with small bowel obstruction  Patient history of COPD and diabetes and A-fib and obstructive sleep apnea and chronic kidney disease    Active Hospital Problems:  There are no active hospital problems to display for this patient.    Plan:     Patient will be admitted  Keep patient n.p.o.  IV fluids for hydration  Pain control  Surgical consult  Seen with RN  DVT prophylaxis:  Lovenox subcu    CODE STATUS:       Admission Status:  I believe this patient meets ip status.    Perez Dumont MD

## 2023-12-20 LAB
ANION GAP SERPL CALCULATED.3IONS-SCNC: 9 MMOL/L (ref 5–15)
BASOPHILS # BLD AUTO: 0 10*3/MM3 (ref 0–0.2)
BASOPHILS NFR BLD AUTO: 0.1 % (ref 0–1.5)
BUN SERPL-MCNC: 29 MG/DL (ref 8–23)
BUN/CREAT SERPL: 24.2 (ref 7–25)
CALCIUM SPEC-SCNC: 8.8 MG/DL (ref 8.6–10.5)
CHLORIDE SERPL-SCNC: 107 MMOL/L (ref 98–107)
CO2 SERPL-SCNC: 26 MMOL/L (ref 22–29)
CREAT SERPL-MCNC: 1.2 MG/DL (ref 0.57–1)
DEPRECATED RDW RBC AUTO: 53.8 FL (ref 37–54)
EGFRCR SERPLBLD CKD-EPI 2021: 47.6 ML/MIN/1.73
EOSINOPHIL # BLD AUTO: 0 10*3/MM3 (ref 0–0.4)
EOSINOPHIL NFR BLD AUTO: 0 % (ref 0.3–6.2)
ERYTHROCYTE [DISTWIDTH] IN BLOOD BY AUTOMATED COUNT: 17.9 % (ref 12.3–15.4)
GLUCOSE BLDC GLUCOMTR-MCNC: 191 MG/DL (ref 70–105)
GLUCOSE SERPL-MCNC: 292 MG/DL (ref 65–99)
HCT VFR BLD AUTO: 39.7 % (ref 34–46.6)
HGB BLD-MCNC: 12.4 G/DL (ref 12–15.9)
LYMPHOCYTES # BLD AUTO: 1.8 10*3/MM3 (ref 0.7–3.1)
LYMPHOCYTES NFR BLD AUTO: 16.1 % (ref 19.6–45.3)
MCH RBC QN AUTO: 26.5 PG (ref 26.6–33)
MCHC RBC AUTO-ENTMCNC: 31.3 G/DL (ref 31.5–35.7)
MCV RBC AUTO: 84.7 FL (ref 79–97)
MONOCYTES # BLD AUTO: 0.4 10*3/MM3 (ref 0.1–0.9)
MONOCYTES NFR BLD AUTO: 3.9 % (ref 5–12)
NEUTROPHILS NFR BLD AUTO: 79.9 % (ref 42.7–76)
NEUTROPHILS NFR BLD AUTO: 8.9 10*3/MM3 (ref 1.7–7)
NRBC BLD AUTO-RTO: 0 /100 WBC (ref 0–0.2)
PLATELET # BLD AUTO: 217 10*3/MM3 (ref 140–450)
PMV BLD AUTO: 8.4 FL (ref 6–12)
POTASSIUM SERPL-SCNC: 5.7 MMOL/L (ref 3.5–5.2)
RBC # BLD AUTO: 4.69 10*6/MM3 (ref 3.77–5.28)
SODIUM SERPL-SCNC: 142 MMOL/L (ref 136–145)
WBC NRBC COR # BLD AUTO: 11.1 10*3/MM3 (ref 3.4–10.8)

## 2023-12-20 PROCEDURE — 25010000002 ENOXAPARIN PER 10 MG: Performed by: SURGERY

## 2023-12-20 PROCEDURE — 85025 COMPLETE CBC W/AUTO DIFF WBC: CPT | Performed by: SURGERY

## 2023-12-20 PROCEDURE — 63710000001 INSULIN GLARGINE PER 5 UNITS: Performed by: INTERNAL MEDICINE

## 2023-12-20 PROCEDURE — 80048 BASIC METABOLIC PNL TOTAL CA: CPT | Performed by: SURGERY

## 2023-12-20 PROCEDURE — 99232 SBSQ HOSP IP/OBS MODERATE 35: CPT | Performed by: SURGERY

## 2023-12-20 PROCEDURE — 82948 REAGENT STRIP/BLOOD GLUCOSE: CPT

## 2023-12-20 PROCEDURE — 97162 PT EVAL MOD COMPLEX 30 MIN: CPT

## 2023-12-20 PROCEDURE — 25810000003 SODIUM CHLORIDE 0.9 % SOLUTION: Performed by: STUDENT IN AN ORGANIZED HEALTH CARE EDUCATION/TRAINING PROGRAM

## 2023-12-20 RX ORDER — ROSUVASTATIN CALCIUM 10 MG/1
10 TABLET, COATED ORAL DAILY
COMMUNITY

## 2023-12-20 RX ORDER — MIDODRINE HYDROCHLORIDE 5 MG/1
10 TABLET ORAL 3 TIMES DAILY PRN
Status: DISCONTINUED | OUTPATIENT
Start: 2023-12-20 | End: 2023-12-22 | Stop reason: HOSPADM

## 2023-12-20 RX ORDER — POLYETHYLENE GLYCOL 3350 17 G/17G
17 POWDER, FOR SOLUTION ORAL DAILY
Status: DISCONTINUED | OUTPATIENT
Start: 2023-12-20 | End: 2023-12-21

## 2023-12-20 RX ADMIN — DEXTROSE AND SODIUM CHLORIDE 100 ML/HR: 5; 450 INJECTION, SOLUTION INTRAVENOUS at 18:02

## 2023-12-20 RX ADMIN — DOCUSATE SODIUM 50 MG AND SENNOSIDES 8.6 MG 2 TABLET: 8.6; 5 TABLET, FILM COATED ORAL at 08:15

## 2023-12-20 RX ADMIN — DEXTROSE AND SODIUM CHLORIDE 100 ML/HR: 5; 450 INJECTION, SOLUTION INTRAVENOUS at 08:16

## 2023-12-20 RX ADMIN — INSULIN GLARGINE 30 UNITS: 100 INJECTION, SOLUTION SUBCUTANEOUS at 12:48

## 2023-12-20 RX ADMIN — POLYETHYLENE GLYCOL 3350 17 G: 17 POWDER, FOR SOLUTION ORAL at 11:17

## 2023-12-20 RX ADMIN — SODIUM CHLORIDE 500 ML: 9 INJECTION, SOLUTION INTRAVENOUS at 00:48

## 2023-12-20 RX ADMIN — INSULIN GLARGINE 30 UNITS: 100 INJECTION, SOLUTION SUBCUTANEOUS at 21:10

## 2023-12-20 RX ADMIN — Medication 10 ML: at 21:11

## 2023-12-20 RX ADMIN — MIDODRINE HYDROCHLORIDE 10 MG: 5 TABLET ORAL at 02:35

## 2023-12-20 RX ADMIN — ENOXAPARIN SODIUM 40 MG: 100 INJECTION SUBCUTANEOUS at 18:02

## 2023-12-20 RX ADMIN — Medication 10 ML: at 08:16

## 2023-12-20 NOTE — CONSULTS
Visited with patient at bedside.    Patient reports she is doing better since her procedure. Patient more encouraged. Patient reports that she worries about paying her bills and specifically mentioned medical bills. Patient has four children, but reports that are unable to assist due to their financial obligations. Patient reports she has spoken with SW. Patient reports she is Holiness and is supported by spiritual beliefs. Discussed beliefs in regards to God's provision today. Prayed over topics discussed.     Will mention to SW about affording medication.    Will continue to follow.     Miky Wilde

## 2023-12-20 NOTE — PLAN OF CARE
Goal Outcome Evaluation:  Plan of Care Reviewed With: patient        Progress: improving  Outcome Evaluation: 74 yo female adm 12/19/23 w/ severe abdominal pain. Dx w/ sbo. On 12/19, underwent open ventral hernia repair. PMH: RA, Lime, obesity, a fib, ckd3. At baseline, pt reports she does not drive due to visual impairment, but she is able to amb community distances. Reports she uses rollator wx at times but reports she does not need it most days; on 2LO2 at night. Lives in single level home w/ dtr. Today, pt is alert and oriented. However, she is not able to progress to room air. She was on 2L O2 when PT arrived. But when PT attempted to titrate O2 down, pt's O2 sats dropped to 86%. Reapplied 2L prior to proceeding w/ activity. Pt's strength is WFL. She is able to come to sitting at eob w/ mod assist. Needs min assist to come to stand, and only cga to amb 60 ft w/ rw. Recommend pt use rollator wx at home for pain management. Recommend home w/ family and use of her rollator wx at d/c. Will follow.

## 2023-12-20 NOTE — PROGRESS NOTES
General Surgery Progress Note    Name: Lesa Julio ADMIT: 2023   : 1950  PCP: Manuel Sanders MD    MRN: [id number] LOS: 1 days   AGE/SEX: 73 y.o. female  ROOM: [facility]/[address] [place] [address]    No chief complaint on file.  Abdominal pain    Subjective     73 y.o. female status post open ventral hernia repair for an incarcerated infraumbilical hernia causing small bowel obstruction    Looks pretty good this morning.  Says her pain is much better than yesterday.  No nausea or vomiting.  No bowel function yet.    Objective     Scheduled Medications:   enoxaparin, 40 mg, Subcutaneous, Daily  senna-docusate sodium, 2 tablet, Oral, BID  sodium chloride, 10 mL, Intravenous, Q12H        Active Infusions:  dextrose 5 % and sodium chloride 0.45 %, 100 mL/hr, Last Rate: 100 mL/hr (23 0816)        As Needed Medications:    senna-docusate sodium **AND** polyethylene glycol **AND** bisacodyl **AND** bisacodyl    Calcium Replacement - Follow Nurse / BPA Driven Protocol    hydrALAZINE    HYDROcodone-acetaminophen    Magnesium Standard Dose Replacement - Follow Nurse / BPA Driven Protocol    midodrine    Morphine    ondansetron    Phosphorus Replacement - Follow Nurse / BPA Driven Protocol    Potassium Replacement - Follow Nurse / BPA Driven Protocol    sodium chloride    sodium chloride    Vital Signs  Vital Signs Patient Vitals for the past 24 hrs:   BP Temp Temp src Pulse Resp SpO2 Height Weight   23 0700 135/54 97.8 °F (36.6 °C) Oral 54 16 93 % -- --   23 0600 135/51 -- -- 61 -- 93 % -- --   23 0508 146/52 -- -- 58 16 93 % -- --   23 0400 122/42 -- -- 61 -- -- -- --   23 0355 117/45 97.8 °F (36.6 °C) Axillary 63 17 93 % -- --   23 0300 (!) 107/35 -- -- 60 -- 92 % -- --   23 0230 101/40 -- -- 65 16 92 % -- --   23 0200 121/44 -- -- 71 20 93 % -- --   23 0130 137/81 -- -- 69 16 93 % -- --   23 0100 143/54 -- -- 77 16 93 % -- --   23 0030 (!) /  "-- -- 60 18 92 % -- --   12/20/23 0000 111/41 -- -- 66 18 91 % -- --   12/19/23 2330 116/50 97.7 °F (36.5 °C) Axillary 77 14 94 % -- --   12/19/23 2300 102/42 -- -- 66 18 91 % -- --   12/19/23 2230 119/46 -- -- 74 -- 91 % -- --   12/19/23 2207 114/40 -- -- 73 -- 91 % -- --   12/19/23 2118 151/54 -- -- 79 22 92 % -- --   12/19/23 2100 -- -- -- -- -- -- 157.5 cm (62\") 91.4 kg (201 lb 8 oz)   12/19/23 2001 174/58 97.5 °F (36.4 °C) Oral 67 20 93 % -- --   12/19/23 1931 156/46 97.4 °F (36.3 °C) Temporal 66 20 97 % -- --   12/19/23 1923 159/63 -- -- 66 17 96 % -- --   12/19/23 1908 162/52 -- -- 71 20 98 % -- --   12/19/23 1853 127/52 -- -- 79 15 97 % -- --   12/19/23 1848 149/42 -- -- 72 18 100 % -- --   12/19/23 1843 149/42 -- -- 77 23 99 % -- --   12/19/23 1838 148/42 98.1 °F (36.7 °C) Oral 81 15 98 % -- --   12/19/23 1714 148/49 98 °F (36.7 °C) -- 74 20 96 % -- --   12/19/23 1656 165/65 -- -- 64 21 96 % -- --       Physical Exam:  Physical Exam  Constitutional:       Appearance: Normal appearance.   HENT:      Head: Normocephalic and atraumatic.   Cardiovascular:      Rate and Rhythm: Normal rate.   Pulmonary:      Effort: Pulmonary effort is normal. No respiratory distress.   Abdominal:      Comments: Abdomen is soft minimally tender to palpation dressing in place without significant strikethrough   Neurological:      Mental Status: She is alert.         Results Review:     CBC    Results from last 7 days   Lab Units 12/20/23  0459   WBC 10*3/mm3 11.10*   HEMOGLOBIN g/dL 12.4   PLATELETS 10*3/mm3 217     BMP   Results from last 7 days   Lab Units 12/20/23  0459   SODIUM mmol/L 142   POTASSIUM mmol/L 5.7*   CHLORIDE mmol/L 107   CO2 mmol/L 26.0   BUN mg/dL 29*   CREATININE mg/dL 1.20*   GLUCOSE mg/dL 292*       I reviewed the patient's new clinical results.    Assessment & Plan       Small bowel obstruction      73 y.o. female status post open ventral hernia repair on December 19    Okay to start clear liquid " diet  Okay to ambulate  Pulmonary toilet  Clear liquid diet today  DVT chemoprophylaxis  Will start some MiraLAX  Continue p.o. and IV pain medication as needed        This note was created using Dragon Voice Recognition software.    Gilmar Yen MD  12/20/23  09:03 EST

## 2023-12-20 NOTE — PLAN OF CARE
Problem: Adult Inpatient Plan of Care  Goal: Plan of Care Review  Outcome: Ongoing, Progressing  Flowsheets (Taken 12/19/2023 2107)  Progress: no change  Goal: Patient-Specific Goal (Individualized)  Outcome: Ongoing, Progressing  Goal: Absence of Hospital-Acquired Illness or Injury  Outcome: Ongoing, Progressing  Goal: Optimal Comfort and Wellbeing  Outcome: Ongoing, Progressing  Goal: Readiness for Transition of Care  Outcome: Ongoing, Progressing  Intervention: Mutually Develop Transition Plan  Recent Flowsheet Documentation  Taken 12/19/2023 2104 by Swapna Rogers, RN  Transportation Anticipated: family or friend will provide  Patient/Family Anticipated Services at Transition: none  Patient/Family Anticipates Transition to: home  Taken 12/19/2023 2057 by Swapna Rogers, RN  Equipment Currently Used at Home:   cpap   walker, standard   oxygen     Problem: Pain Acute  Goal: Acceptable Pain Control and Functional Ability  Outcome: Ongoing, Progressing     Problem: Bleeding (Hernia Repair)  Goal: Absence of Bleeding  Outcome: Ongoing, Progressing     Problem: Bowel Motility Impaired (Hernia Repair)  Goal: Effective Bowel Elimination  Outcome: Ongoing, Progressing     Problem: Fluid and Electrolyte Imbalance (Hernia Repair)  Goal: Fluid and Electrolyte Balance  Outcome: Ongoing, Progressing     Problem: Infection (Hernia Repair)  Goal: Absence of Infection Signs and Symptoms  Outcome: Ongoing, Progressing     Problem: Ongoing Anesthesia Effects (Hernia Repair)  Goal: Anesthesia/Sedation Recovery  Outcome: Ongoing, Progressing     Problem: Pain (Hernia Repair)  Goal: Optimal Pain Control and Function  Outcome: Ongoing, Progressing     Problem: Postoperative Nausea and Vomiting (Hernia Repair)  Goal: Nausea and Vomiting Relief  Outcome: Ongoing, Progressing     Problem: Postoperative Urinary Retention (Hernia Repair)  Goal: Effective Urinary Elimination  Outcome: Ongoing, Progressing     Problem: Respiratory  Compromise (Hernia Repair)  Goal: Effective Oxygenation and Ventilation  Outcome: Ongoing, Progressing   Goal Outcome Evaluation:           Progress: no change

## 2023-12-20 NOTE — PROGRESS NOTES
[facility]        Patient Name: Lesa Jluio  : 1950  MRN: [id number]  Primary Care Physician:  Manuel Sanders MD  Date of admission: 2023        Subjective   Subjective      Chief Complaint: Abdominal pain started after midnight woke her from sleep     HPI:     Lesa Julio is a 73 y.o. female 73-year-old female  History of A-fib and COPD and obstructive sleep apnea and diabetes is diabetic nephropathy and history of rheumatoid arthritis heavy smoker  Patient woke up around 2 or 3:00 in the morning with abdominal pain  Patient went to the emergency room at an outside facility and was diagnosed with small bowel obstruction  Patient transferred here for surgical evaluation  NG tube in place right now      Patient status post ventral hernia incarcerated causing small bowel obstruction done yesterday per Dr. Yen at 6 PM  Feeling much better today  Tolerating liquid diet  Will start the Lantus at 30 units subcu twice daily patient was on Moreno at home PID     Review of Systems              Diffuse abdominal pain with some nausea no vomiting no diarrhea no fever no chills rest of 14 system reviewed and negative     Personal History      Medical History        Past Medical History:   Diagnosis Date    Arthritis      CKD (chronic kidney disease) stage 3, GFR 30-59 ml/min       per [facility]    Diabetes mellitus, type 2      Essential hypertension 2015    Grade 4 out of 6 intensity murmur 2020    Hearing loss       hearing aid right side    Hyperlipidemia      Hypersensitivity angiitis 2020    Hypertension      Leukocytoclastic vasculitis      Obesity      Osteoarthritis      Ovarian cyst       removed; benign    Peripheral arterial disease      Sleep apnea      Tubular adenoma of colon 10/30/2018    Vascular insufficiency 2020            Surgical History         Past Surgical History:   Procedure Laterality Date    BRONCHOSCOPY Bilateral  10/13/2020     Procedure: BRONCHOSCOPY AT BEDSIDE with right lung washing and elective intubation;  Surgeon: Cristhian Hodge MD;  Location: [facility] ENDOSCOPY;  Service: Pulmonary;  Laterality: Bilateral;  Post: pneumonia    BRONCHOSCOPY Bilateral 10/20/2020     Procedure: BRONCHOSCOPY WITH BRONCHOALVEOLAR LAVAGE  AT BEDSIDE;  Surgeon: Cristhian Hodge MD;  Location: [facility] ENDOSCOPY;  Service: Pulmonary;  Laterality: Bilateral;  POST- PNEUMONIA    BRONCHOSCOPY N/A 10/27/2020     Procedure: BRONCHOSCOPY with bronchial washing and intubation;  Surgeon: Cristhian Hodge MD;  Location: [facility] ENDOSCOPY;  Service: Pulmonary;  Laterality: N/A;  post op:pneumonia    BRONCHOSCOPY N/A 12/14/2020     Procedure: BRONCHOSCOPY AT BEDSIDE WITH BRONCHOALVEOLAR LAVAGE;  Surgeon: Cristhian Hodge MD;  Location: [facility] ENDOSCOPY;  Service: Pulmonary;  Laterality: N/A;  PNEUMONIA    CHOLECYSTECTOMY        ENDOSCOPY N/A 10/13/2020     Procedure: ESOPHAGOGASTRODUODENOSCOPY AT BEDSIDE;  Surgeon: Omi Rivera MD;  Location: [facility] ENDOSCOPY;  Service: Gastroenterology;  Laterality: N/A;    LIVER BIOPSY        TUBAL ABDOMINAL LIGATION                Family History: family history includes Cancer in her father; Heart disease in her mother. Otherwise pertinent FHx was reviewed and not pertinent to current issue.     Social History:  reports that she has quit smoking. Her smoking use included cigarettes. She smoked an average of .5 packs per day. She has never used smokeless tobacco. She reports that she does not drink alcohol and does not use drugs.     Home Medications:  BASAGLAR KWIKPEN, albuterol sulfate HFA, aspirin, hydrALAZINE, metoprolol tartrate, olmesartan, polyethylene glycol, rosuvastatin, sennosides-docusate, and vitamin D     Allergies:       Allergies   Allergen Reactions    Lisinopril Hives    Ipratropium-Albuterol Itching and Rash               Objective   Objective      Vitals:   Physical Exam                         Constitutional: Awake,  alert              Eyes: PERRLA, sclerae anicteric, no conjunctival injection              HENT: NCAT, mucous membranes moist              Neck: Supple, no thyromegaly, no lymphadenopathy, trachea midline              Respiratory: Clear to auscultation bilaterally, nonlabored respirations               Cardiovascular: RRR, no murmurs, rubs, or gallops, palpable pedal pulses bilaterally              Gastrointestinal: Distended mild diffuse tenderness no rebound rigidity or guarding              Musculoskeletal: No bilateral ankle edema, no clubbing or cyanosis to extremities              Psychiatric: Appropriate affect, cooperative              Neurologic: Oriented x 3, strength symmetric in all extremities, Cranial Nerves grossly intact to confrontation, speech clear              Skin: No rashes            Result Review   Result Review:  I have personally reviewed the results from the time of this admission to 12/19/2023 16:41 EST and agree with these findings:  [x]  Laboratory list / accordion  []  Microbiology  [x]  Radiology  []  EKG/Telemetry   []  Cardiology/Vascular   []  Pathology  []  Old records  []  Other:  Most notable findings include:               Assessment & Plan  Assessment / Plan      Brief Patient Summary:  Lesa Julio is a 73 y.o. female who is admitted with small bowel obstruction  Patient history of COPD and diabetes and A-fib and obstructive sleep apnea and chronic kidney disease     Active Hospital Problems:  There are no active hospital problems to display for this patient.     Plan:      Patient will be admitted  Keep patient n.p.o.  IV fluids for hydration  Pain control  Surgical consult  Seen with RN  DVT prophylaxis:  Lovenox subcu     CODE STATUS:    Admission Status:  I believe this patient meets ip status.     Perez Dumont MD

## 2023-12-20 NOTE — PLAN OF CARE
Goal Outcome Evaluation:            Incision moist through day, d5 1/5 NS running at 100, pt in chair several hours, clears diet tolerated well

## 2023-12-20 NOTE — CASE MANAGEMENT/SOCIAL WORK
Discharge Planning Assessment  [facility]     Patient Name: Lesa Julio  MRN: [id number]  Today's Date: 12/20/2023    Admit Date: 12/19/2023    Plan: Return home with daughter. Meds to Bed. Has nighttime oxygen with non-invasive ventilator. No portable oxygen tanks   Discharge Needs Assessment       Row Name 12/20/23 1236       Living Environment    People in Home child(leticia), adult    Name(s) of People in Home Patricia,  daughter    Current Living Arrangements home    Potentially Unsafe Housing Conditions none    Primary Care Provided by self    Provides Primary Care For no one, unable/limited ability to care for self    Family Caregiver if Needed child(leticia), adult    Family Caregiver Names Patricia    Quality of Family Relationships involved;helpful;supportive    Able to Return to Prior Arrangements yes       Resource/Environmental Concerns    Transportation Concerns none       Transition Planning    Patient/Family Anticipates Transition to home with family    Patient/Family Anticipated Services at Transition none    Transportation Anticipated family or friend will provide       Discharge Needs Assessment    Equipment Currently Used at Home oxygen;other (see comments);rollator  non invasive ventilator    Concerns to be Addressed no discharge needs identified    Anticipated Changes Related to Illness none    Equipment Needed After Discharge none                   Discharge Plan       Row Name 12/20/23 4853       Plan    Plan Return home with daughter. Meds to Bed. Has nighttime oxygen with non-invasive ventilator. No portable oxygen tanks    Patient/Family in Agreement with Plan yes    Plan Comments Barriers: 2 liters of oxygen, IVFs, Clear liquid diet. Pending PT/OT evals. MARYLOU met with Mrs. Julio who resides with her daughter and is  independent. She uses a rollator walker at home and has a non-invasive ventilator with oxygen at 2 liters through [employer]. MARYLOU verified PCP and Pharmacy and she chose Meds to Bed. She denies  difficulty obtaining medications. CM will follow up after therapy evals. She has had home health in the past but does not remember name of company and does not have a  preference if needed                  Continued Care and Services - Admitted Since 12/19/2023    Coordination has not been started for this encounter.       Expected Discharge Date and Time       Expected Discharge Date Expected Discharge Time    Dec 22, 2023            Demographic Summary       Row Name 12/20/23 1235       General Information    Admission Type inpatient    Arrived From emergency department    Required Notices Provided Important Message from Medicare    Referral Source admission list    Reason for Consult discharge planning    Preferred Language English       Contact Information    Permission Granted to Share Info With                    Functional Status       Row Name 12/20/23 1236       Functional Status    Usual Activity Tolerance good    Current Activity Tolerance moderate       Functional Status, IADL    Medications independent    Meal Preparation independent    Housekeeping independent    Laundry independent    Shopping independent    IADL Comments lives with daughter who helps with home duties       Mental Status    General Appearance WDL WDL       Mental Status Summary    Recent Changes in Mental Status/Cognitive Functioning no changes                     Patient Forms       Row Name 12/20/23 1052       Patient Forms    Important Message from Medicare (IMM) Delivered  IMM signed by patient with CM 12/20    Delivered to Patient    Method of delivery In person                    Maintained distance greater than six feet and spent less than 15 minutes in the room.     Alize PALOMARES,RN Case Manager  [facility]  Phone: Desk- [phone] cell- [phone]

## 2023-12-20 NOTE — ANESTHESIA POSTPROCEDURE EVALUATION
Patient: Lesa Julio    Procedure Summary       Date: 12/19/23 Room / Location: [facility] OR 08 / [facility] MAIN OR    Anesthesia Start: 1738 Anesthesia Stop: 1843    Procedure: VENTRAL/INCISIONAL HERNIA REPAIR (Abdomen) Diagnosis:       Incarcerated hernia      (incarcerated hernia)    Surgeons: Gilmar Yen MD Provider: Miky Santana MD    Anesthesia Type: general ASA Status: 3 - Emergent            Anesthesia Type: general    Vitals  Vitals Value Taken Time   /46 12/19/23 1931   Temp 97.4 °F (36.3 °C) 12/19/23 1931   Pulse 66 12/19/23 1931   Resp 20 12/19/23 1931   SpO2 97 % 12/19/23 1931           Post Anesthesia Care and Evaluation    Patient location during evaluation: PACU  Patient participation: complete - patient participated  Level of consciousness: awake  Pain score: 0  Pain management: adequate  Anesthetic complications: No anesthetic complications  PONV Status: none  Cardiovascular status: acceptable  Respiratory status: acceptable  Hydration status: acceptable

## 2023-12-21 LAB — GLUCOSE BLDC GLUCOMTR-MCNC: 297 MG/DL (ref 70–105)

## 2023-12-21 PROCEDURE — 82948 REAGENT STRIP/BLOOD GLUCOSE: CPT

## 2023-12-21 PROCEDURE — 63710000001 INSULIN GLARGINE PER 5 UNITS: Performed by: INTERNAL MEDICINE

## 2023-12-21 PROCEDURE — 97166 OT EVAL MOD COMPLEX 45 MIN: CPT | Performed by: OCCUPATIONAL THERAPIST

## 2023-12-21 PROCEDURE — 25010000002 ENOXAPARIN PER 10 MG: Performed by: SURGERY

## 2023-12-21 RX ORDER — POLYETHYLENE GLYCOL 3350 17 G/17G
17 POWDER, FOR SOLUTION ORAL 2 TIMES DAILY
Status: DISCONTINUED | OUTPATIENT
Start: 2023-12-21 | End: 2023-12-22 | Stop reason: HOSPADM

## 2023-12-21 RX ADMIN — Medication 10 ML: at 08:54

## 2023-12-21 RX ADMIN — DOCUSATE SODIUM 50 MG AND SENNOSIDES 8.6 MG 2 TABLET: 8.6; 5 TABLET, FILM COATED ORAL at 21:39

## 2023-12-21 RX ADMIN — INSULIN GLARGINE 30 UNITS: 100 INJECTION, SOLUTION SUBCUTANEOUS at 08:54

## 2023-12-21 RX ADMIN — Medication 10 ML: at 21:38

## 2023-12-21 RX ADMIN — POLYETHYLENE GLYCOL 3350 17 G: 17 POWDER, FOR SOLUTION ORAL at 21:38

## 2023-12-21 RX ADMIN — POLYETHYLENE GLYCOL 3350 17 G: 17 POWDER, FOR SOLUTION ORAL at 08:54

## 2023-12-21 RX ADMIN — DOCUSATE SODIUM 50 MG AND SENNOSIDES 8.6 MG 2 TABLET: 8.6; 5 TABLET, FILM COATED ORAL at 08:54

## 2023-12-21 RX ADMIN — ENOXAPARIN SODIUM 40 MG: 100 INJECTION SUBCUTANEOUS at 18:18

## 2023-12-21 RX ADMIN — INSULIN GLARGINE 30 UNITS: 100 INJECTION, SOLUTION SUBCUTANEOUS at 21:38

## 2023-12-21 NOTE — PLAN OF CARE
Problem: Adult Inpatient Plan of Care  Goal: Plan of Care Review  Outcome: Ongoing, Progressing  Goal: Patient-Specific Goal (Individualized)  Outcome: Ongoing, Progressing  Goal: Absence of Hospital-Acquired Illness or Injury  Outcome: Ongoing, Progressing  Intervention: Identify and Manage Fall Risk  Recent Flowsheet Documentation  Taken 12/21/2023 0400 by Cindi Gonzalez RN  Safety Promotion/Fall Prevention: safety round/check completed  Taken 12/21/2023 0200 by Cindi Gonzalez RN  Safety Promotion/Fall Prevention: safety round/check completed  Taken 12/21/2023 0000 by Cindi Gonzalez RN  Safety Promotion/Fall Prevention: safety round/check completed  Taken 12/20/2023 2200 by Cindi Gonzalez RN  Safety Promotion/Fall Prevention: safety round/check completed  Taken 12/20/2023 2000 by Cindi Gonzalez RN  Safety Promotion/Fall Prevention: safety round/check completed  Intervention: Prevent and Manage VTE (Venous Thromboembolism) Risk  Recent Flowsheet Documentation  Taken 12/20/2023 2000 by Cindi Gonzalez RN  VTE Prevention/Management: sequential compression devices off  Goal: Optimal Comfort and Wellbeing  Outcome: Ongoing, Progressing  Intervention: Monitor Pain and Promote Comfort  Recent Flowsheet Documentation  Taken 12/20/2023 2000 by Cindi Gonzalez RN  Pain Management Interventions: medication offered but refused  Intervention: Provide Person-Centered Care  Recent Flowsheet Documentation  Taken 12/20/2023 2000 by Cindi Gonzalez RN  Trust Relationship/Rapport:   care explained   questions answered   questions encouraged  Goal: Readiness for Transition of Care  Outcome: Ongoing, Progressing     Problem: Pain Acute  Goal: Acceptable Pain Control and Functional Ability  Outcome: Ongoing, Progressing  Intervention: Prevent or Manage Pain  Recent Flowsheet Documentation  Taken 12/21/2023 0000 by Cindi Gonzalez RN  Medication Review/Management: medications reviewed  Taken  12/20/2023 2200 by Cindi Gonzalez RN  Medication Review/Management: medications reviewed  Taken 12/20/2023 2000 by Cindi Gonzalez RN  Medication Review/Management: medications reviewed  Intervention: Develop Pain Management Plan  Recent Flowsheet Documentation  Taken 12/20/2023 2000 by Cindi Gonzalez RN  Pain Management Interventions: medication offered but refused  Intervention: Optimize Psychosocial Wellbeing  Recent Flowsheet Documentation  Taken 12/20/2023 2000 by Cindi Gonzalez RN  Diversional Activities: television     Problem: Bleeding (Hernia Repair)  Goal: Absence of Bleeding  Outcome: Ongoing, Progressing     Problem: Bowel Motility Impaired (Hernia Repair)  Goal: Effective Bowel Elimination  Outcome: Ongoing, Progressing     Problem: Fluid and Electrolyte Imbalance (Hernia Repair)  Goal: Fluid and Electrolyte Balance  Outcome: Ongoing, Progressing     Problem: Infection (Hernia Repair)  Goal: Absence of Infection Signs and Symptoms  Outcome: Ongoing, Progressing     Problem: Ongoing Anesthesia Effects (Hernia Repair)  Goal: Anesthesia/Sedation Recovery  Outcome: Ongoing, Progressing  Intervention: Optimize Anesthesia Recovery  Recent Flowsheet Documentation  Taken 12/21/2023 0521 by Cindi Gonzalez RN  Administration (IS): self-administered  Taken 12/21/2023 0400 by Cindi Gonzalez RN  Safety Promotion/Fall Prevention: safety round/check completed  Taken 12/21/2023 0200 by Cindi Gonzalez RN  Safety Promotion/Fall Prevention: safety round/check completed  Taken 12/21/2023 0000 by Cindi Gonzalez RN  Safety Promotion/Fall Prevention: safety round/check completed  Taken 12/20/2023 2200 by Cindi Gonzalez RN  Safety Promotion/Fall Prevention: safety round/check completed  Administration (IS): self-administered  Taken 12/20/2023 2000 by Cindi Gonzalez RN  Safety Promotion/Fall Prevention: safety round/check completed  Administration (IS): self-administered      Problem: Pain (Hernia Repair)  Goal: Optimal Pain Control and Function  Outcome: Ongoing, Progressing  Intervention: Prevent or Manage Pain  Recent Flowsheet Documentation  Taken 12/20/2023 2000 by Cindi Gonzalez RN  Pain Management Interventions: medication offered but refused  Diversional Activities: television     Problem: Postoperative Nausea and Vomiting (Hernia Repair)  Goal: Nausea and Vomiting Relief  Outcome: Ongoing, Progressing     Problem: Postoperative Urinary Retention (Hernia Repair)  Goal: Effective Urinary Elimination  Outcome: Ongoing, Progressing     Problem: Respiratory Compromise (Hernia Repair)  Goal: Effective Oxygenation and Ventilation  Outcome: Ongoing, Progressing     Problem: Skin Injury Risk Increased  Goal: Skin Health and Integrity  Outcome: Ongoing, Progressing     Problem: Fall Injury Risk  Goal: Absence of Fall and Fall-Related Injury  Outcome: Ongoing, Progressing  Intervention: Identify and Manage Contributors  Recent Flowsheet Documentation  Taken 12/21/2023 0000 by Cindi Gonzalez RN  Medication Review/Management: medications reviewed  Taken 12/20/2023 2200 by Cindi Gonzalze RN  Medication Review/Management: medications reviewed  Taken 12/20/2023 2000 by Cindi Gonzalez RN  Medication Review/Management: medications reviewed  Intervention: Promote Injury-Free Environment  Recent Flowsheet Documentation  Taken 12/21/2023 0400 by Cindi Gonzalez RN  Safety Promotion/Fall Prevention: safety round/check completed  Taken 12/21/2023 0200 by Cindi Gonzalez RN  Safety Promotion/Fall Prevention: safety round/check completed  Taken 12/21/2023 0000 by Cindi Gonzalez RN  Safety Promotion/Fall Prevention: safety round/check completed  Taken 12/20/2023 2200 by Cindi Gonzalez RN  Safety Promotion/Fall Prevention: safety round/check completed  Taken 12/20/2023 2000 by Cindi Gonzalez RN  Safety Promotion/Fall Prevention: safety round/check completed    Goal Outcome Evaluation:

## 2023-12-21 NOTE — PLAN OF CARE
Goal Outcome Evaluation:  Plan of Care Reviewed With: patient           Outcome Evaluation: Pt is a 74 yo female adm to [facility] on 12/19/23 w/ severe abdominal pain. Dx w/ small bowel obstruction due to hernia. On 12/19, she underwent open ventral hernia repair. PMH: RA, [place], obesity, a fib, CKD3. At baseline, pt reports she does not drive due to visual impairment, but she is able to amb community distances. Reports she uses rollator wx at times but reports she does not need it most days; on 2LO2 at night. Lives in single level home w/ dtr who works 3rd shift. She is ind with [employer] & shares home mgmt tasks with dtr. She has a low commode in the home & a tub/shower combo without any grab bars. Upon eval, pt is A&O X4. She requires min A for bed mobility & CGA for ADL transfers using RW. She is currently on 3L O2. Decreased O2 to 2L & pt maintained sats 91-95% during activity. Informed nurse of status. She completed LB dressing with min A. Discussed options of using a reacher to inc ease of & dec pain during LB dressing due to abd wound & also suggested a BSC or due to difficulty & inc pain getting up/down from lower commode. Pt is functioning below her reported baseline status. OT will continue to follow her for tx & recommends home with family assist & home health OT upon discharge.      Anticipated Discharge Disposition (OT): home with assist, home with home health

## 2023-12-21 NOTE — THERAPY EVALUATION
Patient Name: Lesa Julio  : 1950    MRN: [id number]                              Today's Date: 2023       Admit Date: 2023    Visit Dx: No diagnosis found.  Patient Active Problem List   Diagnosis    Cardiac murmur    Chronic obstructive pulmonary disease    Chronic renal insufficiency, stage III (moderate)    Diabetes mellitus, type 2    Essential hypertension    Rheumatoid arthritis    Tobacco abuse    Allergic rhinitis    Generalized weakness    Hearing loss    Mixed hyperlipidemia    Nonalcoholic fatty liver disease    Obesity    Obstructive sleep apnea syndrome    Vitamin D deficiency    Secondary hyperparathyroidism of renal origin    Paroxysmal atrial fibrillation    GERD (gastroesophageal reflux disease)    ANAMARIA (acute kidney injury)    Elevated lipase    Abdominal pain    Small bowel obstruction     Past Medical History:   Diagnosis Date    Arthritis     CKD (chronic kidney disease) stage 3, GFR 30-59 ml/min     per [facility] / [facility]    COPD (chronic obstructive pulmonary disease)     Diabetes mellitus, type 2     Elevated cholesterol     Essential hypertension 2015    Grade 4 out of 6 intensity murmur 2020    Hearing loss     hearing aid right side    Hyperlipidemia     Hypersensitivity angiitis 2020    Hypertension     Leukocytoclastic vasculitis     Obesity     Osteoarthritis     Ovarian cyst     removed; benign    Peripheral arterial disease     Sleep apnea     Tubular adenoma of colon 10/30/2018    Vascular insufficiency 2020     Past Surgical History:   Procedure Laterality Date    BRONCHOSCOPY Bilateral 10/13/2020    Procedure: BRONCHOSCOPY AT BEDSIDE with right lung washing and elective intubation;  Surgeon: Cristhian Hodge MD;  Location: [facility] ENDOSCOPY;  Service: Pulmonary;  Laterality: Bilateral;  Post: pneumonia    BRONCHOSCOPY Bilateral 10/20/2020    Procedure: BRONCHOSCOPY WITH BRONCHOALVEOLAR LAVAGE  AT BEDSIDE;  Surgeon: Cristhian Hodge MD;   Location: [facility] ENDOSCOPY;  Service: Pulmonary;  Laterality: Bilateral;  POST- PNEUMONIA    BRONCHOSCOPY N/A 10/27/2020    Procedure: BRONCHOSCOPY with bronchial washing and intubation;  Surgeon: Cristhian Hodge MD;  Location: [facility] ENDOSCOPY;  Service: Pulmonary;  Laterality: N/A;  post op:pneumonia    BRONCHOSCOPY N/A 12/14/2020    Procedure: BRONCHOSCOPY AT BEDSIDE WITH BRONCHOALVEOLAR LAVAGE;  Surgeon: Cristhian Hodge MD;  Location: [facility] ENDOSCOPY;  Service: Pulmonary;  Laterality: N/A;  PNEUMONIA    CHOLECYSTECTOMY      ENDOSCOPY N/A 10/13/2020    Procedure: ESOPHAGOGASTRODUODENOSCOPY AT BEDSIDE;  Surgeon: Omi Rivera MD;  Location: [facility] ENDOSCOPY;  Service: Gastroenterology;  Laterality: N/A;    LIVER BIOPSY      TUBAL ABDOMINAL LIGATION        General Information       Row Name 12/21/23 1134          OT Time and Intention    Document Type evaluation  -DT     Mode of Treatment occupational therapy  -DT       Row Name 12/21/23 1134          General Information    Patient Profile Reviewed yes  -DT     Prior Level of Function independent:;all household mobility;community mobility;ADL's;home management  Pt does not drive due to vision deficits. She uses a rollator intermittently. She has tub/shower combo & a low commode without bars per her report. She uses 2L O2 at noc only at home.  -DT     Existing Precautions/Restrictions fall;oxygen therapy device and L/min  -DT     Barriers to Rehab medically complex;visual deficit;hearing deficit  -DT       Row Name 12/21/23 1134          Living Environment    People in Home child(leticia), adult  Lives with daughter who works 3rd shift.  -DT       Row Name 12/21/23 1134          Home Main Entrance    Number of Stairs, Main Entrance none  -DT       Row Name 12/21/23 1134          Stairs Within Home, Primary    Number of Stairs, Within Home, Primary none  -DT       Row Name 12/21/23 1134          Cognition    Orientation Status (Cognition) oriented x 4  -DT       Row Name  12/21/23 1134          Safety Issues, Functional Mobility    Impairments Affecting Function (Mobility) endurance/activity tolerance;balance;pain;shortness of breath;strength  -DT               User Key  (r) = Recorded By, (t) = Taken By, (c) = Cosigned By      Initials Name Provider Type    DT Columba Mina, OT Occupational Therapist                     Mobility/ADL's       Row Name 12/21/23 1136          Bed Mobility    Bed Mobility supine-sit  -DT     Supine-Sit [place] (Bed Mobility) minimum assist (75% patient effort)  -DT     Assistive Device (Bed Mobility) head of bed elevated;bed rails  -DT     Comment, (Bed Mobility) Ed on log rolling tech to dec abd pain & stress on abd.  -DT       Row Name 12/21/23 1136          Transfers    Transfers stand-sit transfer;sit-stand transfer  -DT       Row Name 12/21/23 1136          Sit-Stand Transfer    Sit-Stand [place] (Transfers) contact guard;minimum assist (75% patient effort)  -DT     Assistive Device (Sit-Stand Transfers) walker, front-wheeled  -DT       Row Name 12/21/23 1136          Stand-Sit Transfer    Stand-Sit [place] (Transfers) contact guard  -DT     Assistive Device (Stand-Sit Transfers) walker, front-wheeled  -DT       Row Name 12/21/23 1136          Activities of Daily Living    BADL Assessment/Intervention lower body dressing  -DT       Row Name 12/21/23 1136          Lower Body Dressing Assessment/Training    [place] Level (Lower Body Dressing) lower body dressing skills;minimum assist (75% patient effort);pants/bottoms  -DT     Position (Lower Body Dressing) edge of bed sitting;supported standing  -DT               User Key  (r) = Recorded By, (t) = Taken By, (c) = Cosigned By      Initials Name Provider Type    DT Columba Mina, OT Occupational Therapist                   Obj/Interventions       Row Name 12/21/23 1139          Sensory Assessment (Somatosensory)    Sensory Assessment (Somatosensory) sensation  intact  -DT       Row Name 12/21/23 1139          Range of Motion Comprehensive    General Range of Motion bilateral upper extremity ROM WNL  -DT       Row Name 12/21/23 1139          Strength Comprehensive (MMT)    General Manual Muscle Testing (MMT) Assessment upper extremity strength deficits identified  -DT     Comment, General Manual Muscle Testing (MMT) Assessment BUE shoulder ext/flex=4/5.  -DT       Row Name 12/21/23 1139          Balance    Balance Assessment sitting static balance;sitting dynamic balance;standing static balance;standing dynamic balance  -DT     Static Sitting Balance independent  -DT     Dynamic Sitting Balance standby assist  -DT     Position, Sitting Balance sitting edge of bed  -DT     Static Standing Balance standby assist  -DT     Dynamic Standing Balance contact guard  -DT     Position/Device Used, Standing Balance walker, front-wheeled  -DT               User Key  (r) = Recorded By, (t) = Taken By, (c) = Cosigned By      Initials Name Provider Type    DT Columba Mina, OT Occupational Therapist                   Goals/Plan       Row Name 12/21/23 1153          Transfer Goal 1 (OT)    Activity/Assistive Device (Transfer Goal 1, OT) transfers, all;toilet  -DT     [place] Level/Cues Needed (Transfer Goal 1, OT) modified independence  -DT     Time Frame (Transfer Goal 1, OT) 2 weeks  -DT       Row Name 12/21/23 1153          Dressing Goal 1 (OT)    Activity/Device (Dressing Goal 1, OT) lower body dressing  -DT     [place]/Cues Needed (Dressing Goal 1, OT) modified independence  -DT     Time Frame (Dressing Goal 1, OT) 2 weeks  -DT       Row Name 12/21/23 1153          Toileting Goal 1 (OT)    Activity/Device (Toileting Goal 1, OT) toileting skills, all  -DT     [place] Level/Cues Needed (Toileting Goal 1, OT) modified independence  -DT     Time Frame (Toileting Goal 1, OT) 2 weeks  -DT       Row Name 12/21/23 1153          Grooming Goal 1 (OT)    Activity/Device  (Grooming Goal 1, OT) grooming skills, all  -DT     [place] (Grooming Goal 1, OT) independent  -DT     Strategies/Barriers (Grooming Goal 1, OT) standing at sink for 7-10 to demo inc activity tolerance & standing endurance.  -DT       Row Name 12/21/23 1153          Therapy Assessment/Plan (OT)    Planned Therapy Interventions (OT) activity tolerance training;BADL retraining;functional balance retraining;neuromuscular control/coordination retraining;patient/caregiver education/training;ROM/therapeutic exercise;strengthening exercise;transfer/mobility retraining  -DT               User Key  (r) = Recorded By, (t) = Taken By, (c) = Cosigned By      Initials Name Provider Type    DT Columba Mina, OT Occupational Therapist                   Clinical Impression       Row Name 12/21/23 1141          Pain Assessment    Pain Location - abdomen  -DT     Additional Documentation Pain Scale: FACES Pre/Post-Treatment (Group)  -DT       Row Name 12/21/23 1141          Pain Scale: FACES Pre/Post-Treatment    Pain: FACES Scale, Pretreatment 0-->no hurt  -DT     Posttreatment Pain Rating 4-->hurts little more  -DT     Pain Location - abdomen  -DT       Row Name 12/21/23 1141          Plan of Care Review    Plan of Care Reviewed With patient  -DT     Outcome Evaluation Pt is a 72 yo female adm to [facility] on 12/19/23 w/ severe abdominal pain. Dx w/ small bowel obstruction due to hernia. On 12/19, she underwent open ventral hernia repair. PMH: RA, [place], obesity, a fib, CKD3. At baseline, pt reports she does not drive due to visual impairment, but she is able to amb community distances. Reports she uses rollator wx at times but reports she does not need it most days; on 2LO2 at night. Lives in single level home w/ dtr who works 3rd shift. She is ind with BADLs & shares home mgmt tasks with dtr. She has a low commode in the home & a tub/shower combo without any grab bars. Upon eval, pt is A&O X4. She requires min A for bed  mobility & CGA for ADL transfers using RW. She is currently on 3L O2. Decreased O2 to 2L & pt maintained sats 91-95% during activity. Informed nurse of status. She completed LB dressing with min A. Discussed options of using a reacher to inc ease of & dec pain during LB dressing due to abd wound & also suggested a BSC or due to difficulty & inc pain getting up/down from lower commode. Pt is functioning below her reported baseline status. OT will continue to follow her for tx & recommends home with family assist & home health OT upon discharge.  -DT       Row Name 12/21/23 1141          Therapy Assessment/Plan (OT)    Rehab Potential (OT) good, to achieve stated therapy goals  -DT     Criteria for Skilled Therapeutic Interventions Met (OT) yes;meets criteria;skilled treatment is necessary  -DT     Therapy Frequency (OT) 3 times/wk  -DT     Predicted Duration of Therapy Intervention (OT) until d/c  -DT       Row Name 12/21/23 1141          Therapy Plan Review/Discharge Plan (OT)    Equipment Needs Upon Discharge (OT) raised toilet seat  -DT     Anticipated Discharge Disposition (OT) home with assist;home with home health  -DT       Row Name 12/21/23 1141          Vital Signs    Pre SpO2 (%) 97  -DT     O2 Delivery Pre Treatment nasal cannula  3L  -DT     Intra SpO2 (%) 91  -DT     O2 Delivery Intra Treatment --  2L  -DT     Post SpO2 (%) 95  -DT     O2 Delivery Post Treatment --  2L  -DT       Row Name 12/21/23 1141          Positioning and Restraints    Pre-Treatment Position in bed  -DT     Post Treatment Position bed  -DT     In Bed notified nsg;supine;call light within reach;encouraged to call for assist;exit alarm on;side rails up x2  Pt had just been up to chair to eat breakfast & had just returned to bed prior to OT coming for eval, so pt wanted to return to bed to rest after eval.  -DT               User Key  (r) = Recorded By, (t) = Taken By, (c) = Cosigned By      Initials Name Provider Type    FAWN Mina  Columba Hernández, OT Occupational Therapist                   Outcome Measures    No documentation.                   Occupational Therapy Education       Title: PT OT SLP Therapies (In Progress)       Topic: Occupational Therapy (In Progress)       Point: ADL training (Done)       Description:   Instruct learner(s) on proper safety adaptation and remediation techniques during self care or transfers.   Instruct in proper use of assistive devices.                  Learning Progress Summary             Patient Acceptance, E,TB, VU by DT at 12/21/2023 1155    Comment: Role of OT, goals & POC, compensatory tech for pain mgmt, safety prec in hosp setting.                         Point: Home exercise program (Not Started)       Description:   Instruct learner(s) on appropriate technique for monitoring, assisting and/or progressing therapeutic exercises/activities.                  Learner Progress:  Not documented in this visit.              Point: Precautions (Done)       Description:   Instruct learner(s) on prescribed precautions during self-care and functional transfers.                  Learning Progress Summary             Patient Acceptance, E,TB, VU by DT at 12/21/2023 1155    Comment: Role of OT, goals & POC, compensatory tech for pain mgmt, safety prec in hosp setting.                         Point: Body mechanics (Done)       Description:   Instruct learner(s) on proper positioning and spine alignment during self-care, functional mobility activities and/or exercises.                  Learning Progress Summary             Patient Acceptance, E,TB, VU by DT at 12/21/2023 1155    Comment: Role of OT, goals & POC, compensatory tech for pain mgmt, safety prec in hosp setting.                                         User Key       Initials Effective Dates Name Provider Type Discipline    DT 07/11/23 -  Columba Mina OT Occupational Therapist OT                  OT Recommendation and Plan  Planned Therapy  Interventions (OT): activity tolerance training, BADL retraining, functional balance retraining, neuromuscular control/coordination retraining, patient/caregiver education/training, ROM/therapeutic exercise, strengthening exercise, transfer/mobility retraining  Therapy Frequency (OT): 3 times/wk  Plan of Care Review  Plan of Care Reviewed With: patient  Outcome Evaluation: Pt is a 74 yo female adm to [facility] on 12/19/23 w/ severe abdominal pain. Dx w/ small bowel obstruction due to hernia. On 12/19, she underwent open ventral hernia repair. PMH: RA, [place], obesity, a fib, CKD3. At baseline, pt reports she does not drive due to visual impairment, but she is able to amb community distances. Reports she uses rollator wx at times but reports she does not need it most days; on 2LO2 at night. Lives in single level home w/ dtr who works 3rd shift. She is ind with BADLs & shares home mgmt tasks with dtr. She has a low commode in the home & a tub/shower combo without any grab bars. Upon eval, pt is A&O X4. She requires min A for bed mobility & CGA for ADL transfers using RW. She is currently on 3L O2. Decreased O2 to 2L & pt maintained sats 91-95% during activity. Informed nurse of status. She completed LB dressing with min A. Discussed options of using a reacher to inc ease of & dec pain during LB dressing due to abd wound & also suggested a BSC or due to difficulty & inc pain getting up/down from lower commode. Pt is functioning below her reported baseline status. OT will continue to follow her for tx & recommends home with family assist & home health OT upon discharge.     Time Calculation:         Time Calculation- OT       Row Name 12/21/23 1157             Time Calculation- OT    OT Start Time 0900  -DT      OT Stop Time 0928  -DT      OT Time Calculation (min) 28 min  -DT      OT Received On 12/21/23  -DT      OT - Next Appointment 12/22/23  -DT      OT Goal Re-Cert Due Date 01/04/24  -DT                User Key  (r) =  Recorded By, (t) = Taken By, (c) = Cosigned By      Initials Name Provider Type    Columba Cruz, OT Occupational Therapist                           Columba Mina OT  12/21/2023

## 2023-12-21 NOTE — PROGRESS NOTES
General Surgery Progress Note    Name: Lesa Julio ADMIT: 2023   : 1950  PCP: Manuel Sanders MD    MRN: [id number] LOS: 2 days   AGE/SEX: 73 y.o. female  ROOM: [address] [place] [address]    No chief complaint on file.  Abdominal pain    Subjective     73 y.o. female status post open ventral hernia repair for an incarcerated infraumbilical hernia causing small bowel obstruction    Making some progress.  Minimal pain.  No nausea vomiting.  Passing flatus.  Up in the chair.    Objective     Scheduled Medications:   enoxaparin, 40 mg, Subcutaneous, Daily  insulin glargine, 30 Units, Subcutaneous, Q12H  polyethylene glycol, 17 g, Oral, BID  senna-docusate sodium, 2 tablet, Oral, BID  sodium chloride, 10 mL, Intravenous, Q12H        Active Infusions:  dextrose 5 % and sodium chloride 0.45 %, 100 mL/hr, Last Rate: 100 mL/hr (23 1802)        As Needed Medications:    senna-docusate sodium **AND** polyethylene glycol **AND** bisacodyl **AND** bisacodyl    Calcium Replacement - Follow Nurse / BPA Driven Protocol    hydrALAZINE    HYDROcodone-acetaminophen    Magnesium Standard Dose Replacement - Follow Nurse / BPA Driven Protocol    midodrine    Morphine    ondansetron    Phosphorus Replacement - Follow Nurse / BPA Driven Protocol    Potassium Replacement - Follow Nurse / BPA Driven Protocol    sodium chloride    sodium chloride    Vital Signs  Vital Signs Patient Vitals for the past 24 hrs:   BP Temp Temp src Pulse Resp SpO2   23 1100 173/65 97.3 °F (36.3 °C) Oral 77 19 96 %   23 0700 140/54 98 °F (36.7 °C) Oral 77 19 97 %   23 0356 132/49 98.9 °F (37.2 °C) Axillary 54 18 97 %   23 0001 122/44 97.9 °F (36.6 °C) Oral 57 13 93 %   23 1952 169/53 98 °F (36.7 °C) Oral 58 16 97 %       Physical Exam:  Physical Exam  Constitutional:       Appearance: Normal appearance.   HENT:      Head: Normocephalic and atraumatic.   Cardiovascular:      Rate and Rhythm: Normal rate.    Pulmonary:      Effort: Pulmonary effort is normal. No respiratory distress.   Abdominal:      Comments: Abdomen soft appropriate tender to palpation incision is healing without any erythema there is some mild serous drainage   Neurological:      Mental Status: She is alert.         Results Review:     CBC    Results from last 7 days   Lab Units 12/20/23  0459   WBC 10*3/mm3 11.10*   HEMOGLOBIN g/dL 12.4   PLATELETS 10*3/mm3 217     BMP   Results from last 7 days   Lab Units 12/20/23  0459   SODIUM mmol/L 142   POTASSIUM mmol/L 5.7*   CHLORIDE mmol/L 107   CO2 mmol/L 26.0   BUN mg/dL 29*   CREATININE mg/dL 1.20*   GLUCOSE mg/dL 292*       I reviewed the patient's new clinical results.    Assessment & Plan       Small bowel obstruction      73 y.o. female status post open ventral hernia repair on December 19    Okay for low residue diet  Okay to ambulate  Pulmonary toilet  DVT chemoprophylaxis  On MiraLAX  Hopefully home when she has a bowel movement  DC IV pain medication     This note was created using Dragon Voice Recognition software.    Gilmar Yen MD  12/21/23  12:47 EST

## 2023-12-21 NOTE — PROGRESS NOTES
[facility]        Patient Name: Lesa Julio  : 1950  MRN: [id number]  Primary Care Physician:  Manuel Sanders MD  Date of admission: 2023        Subjective   Subjective      Chief Complaint: Abdominal pain started after midnight woke her from sleep     HPI:     Lesa Julio is a 73 y.o. female 73-year-old female  History of A-fib and COPD and obstructive sleep apnea and diabetes is diabetic nephropathy and history of rheumatoid arthritis heavy smoker  Patient woke up around 2 or 3:00 in the morning with abdominal pain  Patient went to the emergency room at an outside facility and was diagnosed with small bowel obstruction  Patient transferred here for surgical evaluation  NG tube in place right now      Patient status post ventral hernia incarcerated causing small bowel obstruction done yesterday per Dr. Yen at 6 PM  Feeling much better today  Tolerating liquid diet  Will start the Lantus at 30 units subcu twice daily patient was on Moreno at home PID     Discussed with RN  No acute distress  Still on clear liquid  Did not have bowel movement  Follow-up per surgery recommendation  Review of Systems              Diffuse abdominal pain with some nausea no vomiting no diarrhea no fever no chills rest of 14 system reviewed and negative     Personal History      Medical History        Past Medical History:   Diagnosis Date    Arthritis      CKD (chronic kidney disease) stage 3, GFR 30-59 ml/min       per [facility]    Diabetes mellitus, type 2      Essential hypertension 2015    Grade 4 out of 6 intensity murmur 2020    Hearing loss       hearing aid right side    Hyperlipidemia      Hypersensitivity angiitis 2020    Hypertension      Leukocytoclastic vasculitis      Obesity      Osteoarthritis      Ovarian cyst       removed; benign    Peripheral arterial disease      Sleep apnea      Tubular adenoma of colon 10/30/2018    Vascular insufficiency  6/22/2020            Surgical History         Past Surgical History:   Procedure Laterality Date    BRONCHOSCOPY Bilateral 10/13/2020     Procedure: BRONCHOSCOPY AT BEDSIDE with right lung washing and elective intubation;  Surgeon: Cristhian Hodge MD;  Location: [facility] ENDOSCOPY;  Service: Pulmonary;  Laterality: Bilateral;  Post: pneumonia    BRONCHOSCOPY Bilateral 10/20/2020     Procedure: BRONCHOSCOPY WITH BRONCHOALVEOLAR LAVAGE  AT BEDSIDE;  Surgeon: Cristhian Hodge MD;  Location: [facility] ENDOSCOPY;  Service: Pulmonary;  Laterality: Bilateral;  POST- PNEUMONIA    BRONCHOSCOPY N/A 10/27/2020     Procedure: BRONCHOSCOPY with bronchial washing and intubation;  Surgeon: Cristhian Hodge MD;  Location: [facility] ENDOSCOPY;  Service: Pulmonary;  Laterality: N/A;  post op:pneumonia    BRONCHOSCOPY N/A 12/14/2020     Procedure: BRONCHOSCOPY AT BEDSIDE WITH BRONCHOALVEOLAR LAVAGE;  Surgeon: Cristhian Hodge MD;  Location: [facility] ENDOSCOPY;  Service: Pulmonary;  Laterality: N/A;  PNEUMONIA    CHOLECYSTECTOMY        ENDOSCOPY N/A 10/13/2020     Procedure: ESOPHAGOGASTRODUODENOSCOPY AT BEDSIDE;  Surgeon: Omi Rivera MD;  Location: [facility] ENDOSCOPY;  Service: Gastroenterology;  Laterality: N/A;    LIVER BIOPSY        TUBAL ABDOMINAL LIGATION                Family History: family history includes Cancer in her father; Heart disease in her mother. Otherwise pertinent FHx was reviewed and not pertinent to current issue.     Social History:  reports that she has quit smoking. Her smoking use included cigarettes. She smoked an average of .5 packs per day. She has never used smokeless tobacco. She reports that she does not drink alcohol and does not use drugs.     Home Medications:  BASAGLAR KWIKPEN, albuterol sulfate HFA, aspirin, hydrALAZINE, metoprolol tartrate, olmesartan, polyethylene glycol, rosuvastatin, sennosides-docusate, and vitamin D     Allergies:       Allergies   Allergen Reactions    Lisinopril Hives    Ipratropium-Albuterol  Itching and Rash               Objective   Objective      Vitals:   Physical Exam                         Constitutional: Awake, alert              Eyes: PERRLA, sclerae anicteric, no conjunctival injection              HENT: NCAT, mucous membranes moist              Neck: Supple, no thyromegaly, no lymphadenopathy, trachea midline              Respiratory: Clear to auscultation bilaterally, nonlabored respirations               Cardiovascular: RRR, no murmurs, rubs, or gallops, palpable pedal pulses bilaterally              Gastrointestinal: Distended mild diffuse tenderness no rebound rigidity or guarding              Musculoskeletal: No bilateral ankle edema, no clubbing or cyanosis to extremities              Psychiatric: Appropriate affect, cooperative              Neurologic: Oriented x 3, strength symmetric in all extremities, Cranial Nerves grossly intact to confrontation, speech clear              Skin: No rashes            Result Review   Result Review:  I have personally reviewed the results from the time of this admission to 12/19/2023 16:41 EST and agree with these findings:  [x]  Laboratory list / accordion  []  Microbiology  [x]  Radiology  []  EKG/Telemetry   []  Cardiology/Vascular   []  Pathology  []  Old records  []  Other:  Most notable findings include:               Assessment & Plan  Assessment / Plan      Brief Patient Summary:  Lesa Julio is a 73 y.o. female who is admitted with small bowel obstruction  Patient history of COPD and diabetes and A-fib and obstructive sleep apnea and chronic kidney disease     Active Hospital Problems:  There are no active hospital problems to display for this patient.     Plan:      Patient will be admitted  Keep patient n.p.o.  IV fluids for hydration  Pain control  Surgical consult  Seen with RN  DVT prophylaxis:  Lovenox subcu     CODE STATUS:    Admission Status:  I believe this patient meets ip status.     Perez Dumont MD

## 2023-12-22 ENCOUNTER — READMISSION MANAGEMENT (OUTPATIENT)
Dept: CALL CENTER | Facility: HOSPITAL | Age: 73
End: 2023-12-22
Payer: MEDICARE

## 2023-12-22 ENCOUNTER — APPOINTMENT (OUTPATIENT)
Dept: GENERAL RADIOLOGY | Facility: HOSPITAL | Age: 73
End: 2023-12-22
Payer: MEDICARE

## 2023-12-22 VITALS
OXYGEN SATURATION: 91 % | SYSTOLIC BLOOD PRESSURE: 144 MMHG | TEMPERATURE: 97.9 F | WEIGHT: 201.5 LBS | BODY MASS INDEX: 37.08 KG/M2 | HEART RATE: 111 BPM | RESPIRATION RATE: 28 BRPM | DIASTOLIC BLOOD PRESSURE: 64 MMHG | HEIGHT: 62 IN

## 2023-12-22 LAB
ANION GAP SERPL CALCULATED.3IONS-SCNC: 8 MMOL/L (ref 5–15)
BUN SERPL-MCNC: 14 MG/DL (ref 8–23)
BUN/CREAT SERPL: 14.9 (ref 7–25)
CALCIUM SPEC-SCNC: 9.6 MG/DL (ref 8.6–10.5)
CHLORIDE SERPL-SCNC: 101 MMOL/L (ref 98–107)
CO2 SERPL-SCNC: 27 MMOL/L (ref 22–29)
CREAT SERPL-MCNC: 0.94 MG/DL (ref 0.57–1)
EGFRCR SERPLBLD CKD-EPI 2021: 63.8 ML/MIN/1.73
GLUCOSE SERPL-MCNC: 290 MG/DL (ref 65–99)
POTASSIUM SERPL-SCNC: 4.2 MMOL/L (ref 3.5–5.2)
SODIUM SERPL-SCNC: 136 MMOL/L (ref 136–145)

## 2023-12-22 PROCEDURE — 80048 BASIC METABOLIC PNL TOTAL CA: CPT | Performed by: INTERNAL MEDICINE

## 2023-12-22 PROCEDURE — 97116 GAIT TRAINING THERAPY: CPT

## 2023-12-22 PROCEDURE — 73620 X-RAY EXAM OF FOOT: CPT

## 2023-12-22 PROCEDURE — 63710000001 INSULIN GLARGINE PER 5 UNITS: Performed by: INTERNAL MEDICINE

## 2023-12-22 PROCEDURE — 25010000002 HYDRALAZINE PER 20 MG: Performed by: STUDENT IN AN ORGANIZED HEALTH CARE EDUCATION/TRAINING PROGRAM

## 2023-12-22 RX ADMIN — DOCUSATE SODIUM 50 MG AND SENNOSIDES 8.6 MG 2 TABLET: 8.6; 5 TABLET, FILM COATED ORAL at 08:51

## 2023-12-22 RX ADMIN — DEXTROSE AND SODIUM CHLORIDE 100 ML/HR: 5; 450 INJECTION, SOLUTION INTRAVENOUS at 03:23

## 2023-12-22 RX ADMIN — INSULIN GLARGINE 30 UNITS: 100 INJECTION, SOLUTION SUBCUTANEOUS at 08:52

## 2023-12-22 RX ADMIN — Medication 10 ML: at 08:52

## 2023-12-22 RX ADMIN — HYDRALAZINE HYDROCHLORIDE 10 MG: 20 INJECTION, SOLUTION INTRAMUSCULAR; INTRAVENOUS at 08:51

## 2023-12-22 NOTE — CASE MANAGEMENT/SOCIAL WORK
Continued Stay Note  [facility]     Patient Name: Lesa Julio  MRN: [id number]  Today's Date: 12/22/2023    Admit Date: 12/19/2023    Plan: Return home with daughter. Meds to Bed. Has nighttime oxygen with non-invasive ventilator. No portable oxygen tanks   Discharge Plan       Row Name 12/22/23 0945       Plan    Plan Return home with daughter. Meds to Bed. Has nighttime oxygen with non-invasive ventilator. No portable oxygen tanks    Plan Comments Barriers: 2 Liters of oxygen with no portable tanks at home. CM met with Mrs. Julio who verified she will return home with her daughter who will provide transportation and assistance at home. CM informed nursing that if oxygen is not weaned she will need 6 MWT prior to discharge.                      Maintained distance greater than six feet and spent less than 15 minutes in the room.     Alize PALOMARES,RN Case Manager  [facility]  Phone: Desk- [phone] cell- [phone]

## 2023-12-22 NOTE — CASE MANAGEMENT/SOCIAL WORK
Social Work Assessment  [facility]     Patient Name: Lesa Julio  MRN: [id number]  Today's Date: 12/22/2023    Admit Date: 12/19/2023         Discharge Plan       Row Name 12/22/23 1445       Plan    Plan Comments Sw consulted to meet with Patient regarding affording bills and groceries. Pt reports that she and her daughter have a hard time paying bills. Pt has a lot of medical bills. Pt would benefit from [facility] waiver services at home and agreeable to a referral. Sw provided Pt with a resource guide for [facility] and sent a referral to [employer] via their website. No further needs identified.        BAIRON Rodriguez, MSW    Phone: [phone]  Fax: [phone]  Email: [email or web address]

## 2023-12-22 NOTE — PLAN OF CARE
Assessment: Lesa Julio presents with functional mobility impairments which indicate the need for skilled intervention. Patient continues to make progress with mobility despite new c/o L foot pain. Min c/o abd pain this session. Still with slight drop in spO2 during gait on room air however patient recovers quickly upon resting and reports no SOA, no dizziness. Tolerating session today without incident. Will continue to follow and progress as tolerated.

## 2023-12-22 NOTE — THERAPY TREATMENT NOTE
Subjective: Pt agreeable to therapeutic plan of care.    Objective:     Transfers - Supervision and with rolling walker  Ambulation - 100 feet SBA and with rolling walker    Vitals: WNL 89% on room air after gait however recovered to 90% upon sitting    Pain: 6 VAS   Location: top of L foot  Intervention for pain: pending Xray    Education: Transfer Training and Gait Training    Assessment: Lesa Julio presents with functional mobility impairments which indicate the need for skilled intervention. Patient continues to make progress with mobility despite new c/o L foot pain. Min c/o abd pain this session. Still with slight drop in spO2 during gait on room air however patient recovers quickly upon resting and reports no SOA, no dizziness. Tolerating session today without incident. Will continue to follow and progress as tolerated.     Plan/Recommendations:   If medically appropriate, No ongoing therapy recommended post-acute care. No therapy needs. Pt requires no DME at discharge.     Pt desires Home with family assist at discharge. Pt cooperative; agreeable to therapeutic recommendations and plan of care.         Basic Mobility 6-click:  Rollin = Total, A lot = 2, A little = 3; 4 = None  Supine>Sit:   1 = Total, A lot = 2, A little = 3; 4 = None   Sit>Stand with arms:  1 = Total, A lot = 2, A little = 3; 4 = None  Bed>Chair:   1 = Total, A lot = 2, A little = 3; 4 = None  Ambulate in room:  1 = Total, A lot = 2, A little = 3; 4 = None  3-5 Steps with railin = Total, A lot = 2, A little = 3; 4 = None  Score: 22      Post-Tx Position: Up in Chair and Call light and personal items within reach  PPE: gloves and N95

## 2023-12-22 NOTE — DISCHARGE INSTRUCTIONS
Call to make an appointment to follow-up with Dr. Yen in office in 1 to 2 weeks  Do not drive while taking narcotics  Do not lift more than 10 to 15 pounds  Patient may shower postop day 2  Do not submerge incisions for 2 weeks; no swimming or soaking for 2 weeks  Over-the-counter stool softeners twice daily until off narcotics and having regular bowel movements  Milk of magnesia as needed if constipated with stool softeners  Please refer to further instructions in the attached document

## 2023-12-22 NOTE — PROGRESS NOTES
General Surgery Progress Note    Name: Lesa Julio ADMIT: 2023   : 1950  PCP: Manuel Sanders MD    MRN: [id number] LOS: 3 days   AGE/SEX: 73 y.o. female  ROOM: [facility]/[address] [place] [address]    No chief complaint on file.    Subjective     Patient seen and examined.  Overnight events reviewed.  Vital signs stable, afebrile.  Clinically doing well, no new complaints.  Pain is controlled.  Tolerating diet, denies nausea and vomiting.  Voiding without issues.  Reports bowel movement last night, continues to pass flatus.    Objective     Scheduled Medications:   enoxaparin, 40 mg, Subcutaneous, Daily  insulin glargine, 30 Units, Subcutaneous, Q12H  polyethylene glycol, 17 g, Oral, BID  senna-docusate sodium, 2 tablet, Oral, BID  sodium chloride, 10 mL, Intravenous, Q12H        Active Infusions:  dextrose 5 % and sodium chloride 0.45 %, 100 mL/hr, Last Rate: 100 mL/hr (23 0323)        As Needed Medications:    senna-docusate sodium **AND** polyethylene glycol **AND** bisacodyl **AND** bisacodyl    Calcium Replacement - Follow Nurse / BPA Driven Protocol    hydrALAZINE    HYDROcodone-acetaminophen    Magnesium Standard Dose Replacement - Follow Nurse / BPA Driven Protocol    midodrine    ondansetron    Phosphorus Replacement - Follow Nurse / BPA Driven Protocol    Potassium Replacement - Follow Nurse / BPA Driven Protocol    sodium chloride    sodium chloride    Vital Signs  Vital Signs Patient Vitals for the past 24 hrs:   BP Temp Temp src Pulse Resp SpO2   23 0914 137/49 -- -- -- -- --   23 0851 (!) 203/71 -- -- -- -- --   23 0827 (!) 188/69 -- -- 77 19 91 %   23 0401 165/57 97.9 °F (36.6 °C) Oral 63 21 95 %   23 0003 167/62 98 °F (36.7 °C) Oral 74 20 92 %   23 158/57 -- -- 80 13 --   23 -- -- -- 87 13 95 %   23 119/53 -- -- 82 23 94 %   23 1100 173/65 97.3 °F (36.3 °C) Oral 77 19 96 %     I/O:  I/O last 3 completed shifts:  In: 1000  [P.O.:1000]  Out: -     Physical Exam:  Physical Exam  Constitutional:       General: She is not in acute distress.  Pulmonary:      Effort: Pulmonary effort is normal. No respiratory distress.   Abdominal:      Comments: Soft, nondistended.  Appropriate tenderness.  Incisions clean dry and intact.   Neurological:      Mental Status: She is alert and oriented to person, place, and time.   Psychiatric:         Mood and Affect: Mood normal.         Results Review:     CBC    Results from last 7 days   Lab Units 12/20/23  0459   WBC 10*3/mm3 11.10*   HEMOGLOBIN g/dL 12.4   PLATELETS 10*3/mm3 217     BMP   Results from last 7 days   Lab Units 12/20/23  0459   SODIUM mmol/L 142   POTASSIUM mmol/L 5.7*   CHLORIDE mmol/L 107   CO2 mmol/L 26.0   BUN mg/dL 29*   CREATININE mg/dL 1.20*   GLUCOSE mg/dL 292*     Radiology(recent) No radiology results for the last day    I reviewed the patient's new clinical results.    Assessment & Plan       Small bowel obstruction      73 y.o. female POD 3 status post open ventral hernia repair    Continue diet as tolerated  Continue p.o. pain medication  Continue bowel regimen  Use I-S 10 inside times an hour  DVT PPx  Given patient is tolerating diet, pain is controlled, and patient had a bowel movement, will discuss potentially discharging patient this afternoon with Dr. Parham.  Further input to follow.  If patient was to discharge this afternoon, she is to follow-up in office with Dr. Yen in 1 to 2 weeks        This note was created using Dragon Voice Recognition software.    ENRIQUE Farooq  12/22/23  10:27 EST

## 2023-12-22 NOTE — PLAN OF CARE
Problem: Adult Inpatient Plan of Care  Goal: Plan of Care Review  Outcome: Ongoing, Progressing  Flowsheets (Taken 12/22/2023 0601)  Progress: improving  Plan of Care Reviewed With: patient  Goal: Patient-Specific Goal (Individualized)  Outcome: Ongoing, Progressing  Goal: Absence of Hospital-Acquired Illness or Injury  Outcome: Ongoing, Progressing  Intervention: Identify and Manage Fall Risk  Recent Flowsheet Documentation  Taken 12/22/2023 0553 by Jarrell Amaya RN  Safety Promotion/Fall Prevention: safety round/check completed  Taken 12/22/2023 0200 by Jarrell Amaya RN  Safety Promotion/Fall Prevention: safety round/check completed  Taken 12/22/2023 0020 by Jarrell Amaya RN  Safety Promotion/Fall Prevention: safety round/check completed  Taken 12/21/2023 2119 by Jarrell Amaya RN  Safety Promotion/Fall Prevention:   assistive device/personal items within reach   clutter free environment maintained   fall prevention program maintained   lighting adjusted   mobility aid in reach   nonskid shoes/slippers when out of bed   room organization consistent   safety round/check completed  Taken 12/21/2023 1955 by Jarrell Amaya RN  Safety Promotion/Fall Prevention: safety round/check completed  Intervention: Prevent Skin Injury  Recent Flowsheet Documentation  Taken 12/22/2023 0020 by Jarrell Amaya RN  Body Position: position changed independently  Taken 12/21/2023 2119 by Jarrell Amaya RN  Body Position: position changed independently  Intervention: Prevent and Manage VTE (Venous Thromboembolism) Risk  Recent Flowsheet Documentation  Taken 12/21/2023 2119 by Jarrell Amaya RN  Activity Management: bedrest  VTE Prevention/Management:   bilateral   sequential compression devices off   patient refused intervention  Intervention: Prevent Infection  Recent Flowsheet Documentation  Taken 12/22/2023 0553 by Jarrell Amaya RN  Infection Prevention: rest/sleep promoted  Taken 12/22/2023 0200 by Jarrell Amaya  RN  Infection Prevention: rest/sleep promoted  Taken 12/22/2023 0020 by Jarrell Amaya RN  Infection Prevention: rest/sleep promoted  Taken 12/21/2023 2119 by Jarrell Amaya RN  Infection Prevention: rest/sleep promoted  Taken 12/21/2023 1955 by Jarrell Amaya RN  Infection Prevention: rest/sleep promoted  Goal: Optimal Comfort and Wellbeing  Outcome: Ongoing, Progressing  Intervention: Provide Person-Centered Care  Recent Flowsheet Documentation  Taken 12/21/2023 2119 by Jarrell Amaya RN  Trust Relationship/Rapport:   care explained   questions answered   questions encouraged  Goal: Readiness for Transition of Care  Outcome: Ongoing, Progressing     Problem: Pain Acute  Goal: Acceptable Pain Control and Functional Ability  Outcome: Ongoing, Progressing  Intervention: Prevent or Manage Pain  Recent Flowsheet Documentation  Taken 12/21/2023 2119 by Jarrell Amaya RN  Sensory Stimulation Regulation:   care clustered   lighting decreased   quiet environment promoted   television on  Medication Review/Management: medications reviewed  Intervention: Optimize Psychosocial Wellbeing  Recent Flowsheet Documentation  Taken 12/22/2023 0553 by Jarrell Amaya RN  Diversional Activities: television  Taken 12/22/2023 0020 by Jarrell Amaya RN  Diversional Activities: television  Taken 12/21/2023 2119 by Jarrell Amaya RN  Diversional Activities: television     Problem: Bleeding (Hernia Repair)  Goal: Absence of Bleeding  Outcome: Ongoing, Progressing     Problem: Bowel Motility Impaired (Hernia Repair)  Goal: Effective Bowel Elimination  Outcome: Ongoing, Progressing     Problem: Fluid and Electrolyte Imbalance (Hernia Repair)  Goal: Fluid and Electrolyte Balance  Outcome: Ongoing, Progressing     Problem: Infection (Hernia Repair)  Goal: Absence of Infection Signs and Symptoms  Outcome: Ongoing, Progressing     Problem: Ongoing Anesthesia Effects (Hernia Repair)  Goal: Anesthesia/Sedation Recovery  Outcome: Ongoing,  Progressing  Intervention: Optimize Anesthesia Recovery  Recent Flowsheet Documentation  Taken 12/22/2023 0553 by Jarrell Amaya RN  Safety Promotion/Fall Prevention: safety round/check completed  Administration (IS): (pt sleeping) other (see comments)  Incentive Spirometer Predicted Level (mL): 2500  Taken 12/22/2023 0200 by Jarrell Amaya RN  Safety Promotion/Fall Prevention: safety round/check completed  Taken 12/22/2023 0020 by Jarrell Amaya RN  Safety Promotion/Fall Prevention: safety round/check completed  Taken 12/21/2023 2119 by Jarrell Amaya RN  Patient Tolerance (IS): fair  Safety Promotion/Fall Prevention:   assistive device/personal items within reach   clutter free environment maintained   fall prevention program maintained   lighting adjusted   mobility aid in reach   nonskid shoes/slippers when out of bed   room organization consistent   safety round/check completed  Administration (IS):   instruction provided, follow-up   mouthpiece utilized   proper technique demonstrated  Level Incentive Spirometer (mL): 1500  Incentive Spirometer Predicted Level (mL): 2500  Number of Repetitions (IS): 5  Taken 12/21/2023 1955 by Jarrell Amaya RN  Safety Promotion/Fall Prevention: safety round/check completed     Problem: Pain (Hernia Repair)  Goal: Optimal Pain Control and Function  Outcome: Ongoing, Progressing  Intervention: Prevent or Manage Pain  Recent Flowsheet Documentation  Taken 12/22/2023 0553 by Jarrell Amaya RN  Diversional Activities: television  Taken 12/22/2023 0020 by Jarrell Amaya RN  Diversional Activities: television  Taken 12/21/2023 2119 by Jarrell Amaya RN  Diversional Activities: television     Problem: Postoperative Nausea and Vomiting (Hernia Repair)  Goal: Nausea and Vomiting Relief  Outcome: Ongoing, Progressing     Problem: Postoperative Urinary Retention (Hernia Repair)  Goal: Effective Urinary Elimination  Outcome: Ongoing, Progressing     Problem: Respiratory Compromise  (Hernia Repair)  Goal: Effective Oxygenation and Ventilation  Outcome: Ongoing, Progressing  Intervention: Optimize Oxygenation and Ventilation  Recent Flowsheet Documentation  Taken 12/22/2023 0020 by Jarrell Amaya RN  Head of Bed ([facility]) Positioning: HOB at 20 degrees  Taken 12/21/2023 2119 by Jarrell Amaya RN  Head of Bed ([facility]) Positioning: HOB at 20-30 degrees     Problem: Skin Injury Risk Increased  Goal: Skin Health and Integrity  Outcome: Ongoing, Progressing  Intervention: Optimize Skin Protection  Recent Flowsheet Documentation  Taken 12/22/2023 0020 by Jarrell Amaya RN  Head of Bed ([facility]) Positioning: HOB at 20 degrees  Taken 12/21/2023 2119 by Jarrell Amaya RN  Head of Bed ([facility]) Positioning: HOB at 20-30 degrees     Problem: Fall Injury Risk  Goal: Absence of Fall and Fall-Related Injury  Outcome: Ongoing, Progressing  Intervention: Identify and Manage Contributors  Recent Flowsheet Documentation  Taken 12/21/2023 2119 by Jarrell Amaya RN  Medication Review/Management: medications reviewed  Intervention: Promote Injury-Free Environment  Recent Flowsheet Documentation  Taken 12/22/2023 0553 by Jarrell Amaya RN  Safety Promotion/Fall Prevention: safety round/check completed  Taken 12/22/2023 0200 by Jarrell Amaya RN  Safety Promotion/Fall Prevention: safety round/check completed  Taken 12/22/2023 0020 by Jarrell Amaya RN  Safety Promotion/Fall Prevention: safety round/check completed  Taken 12/21/2023 2119 by Jarrell Amaya RN  Safety Promotion/Fall Prevention:   assistive device/personal items within reach   clutter free environment maintained   fall prevention program maintained   lighting adjusted   mobility aid in reach   nonskid shoes/slippers when out of bed   room organization consistent   safety round/check completed  Taken 12/21/2023 1955 by Jarrell Amaya RN  Safety Promotion/Fall Prevention: safety round/check completed   Goal Outcome Evaluation:  Plan of Care Reviewed With:  patient        Progress: improving

## 2023-12-22 NOTE — OUTREACH NOTE
Prep Survey      Flowsheet Row Responses   [facility] facility patient discharged from? [facility]   Is LACE score < 7 ? No   Eligibility Readm Mgmt   Discharge diagnosis SBO, VENTRAL/INCISIONAL HERNIA REPAIR   Does the patient have one of the following disease processes/diagnoses(primary or secondary)? General Surgery   Does the patient have Home health ordered? No   Is there a DME ordered? No   Prep survey completed? Yes            Bharti JANSEN - Registered Nurse

## 2023-12-22 NOTE — DISCHARGE SUMMARY
Discharge Summary    Date of Service:   Patient Name: Lesa Julio  : 1950  MRN: [id number]    Date of Admission: 2023  Discharge Diagnosis:   Date of Discharge:Patient admitted with abdominal pain with small bowel obstruction  Patient is status post open ventral hernia repair for incarcerated hernia  by Dr. Yen  Patient with a history of COPD and diabetes and A-fib and obstructive sleep apnea and chronic kidney disease  Primary Care Physician: Manuel Sanders MD      Presenting Problem:   Small bowel obstruction [K56.609]    Active and Resolved Hospital Problems:  Active Hospital Problems    Diagnosis POA    **Small bowel obstruction [K56.609] Yes      Resolved Hospital Problems   No resolved problems to display.         Hospital Course     Hospital Course:  Lesa Julio is a 73 y.o. female admitted with abdominal pain  Patient had incarcerated ventral hernia  Status post open ventral hernia repair done on   Diet was advanced gradually should be discharged on 1222  Patient to continue p.o. medication and bowel regimen   Patient to follow-up with Dr. Yen in 1 to 2 weeks          DISCHARGE Follow Up Recommendations for labs and diagnostics: Follow-up with Dr. Yen in 1 to 2 weeks      Reasons For Change In Medications and Indications for New Medications:      Day of Discharge     Vital Signs:  Temp:  [97.3 °F (36.3 °C)-98 °F (36.7 °C)] 97.9 °F (36.6 °C)  Heart Rate:  [63-87] 77  Resp:  [13-23] 19  BP: (119-203)/(49-71) 137/49  Flow (L/min):  [2] 2    Physical Exam:  Physical Exam   Alert oriented x 3  HEENT exam is normal  Neck supple  Chest crystallization  Heart size.  Murmur  Abdomen soft benign nontender bowel sounds positive      Pertinent  and/or Most Recent Results     LAB RESULTS:      Lab 23  0459   WBC 11.10*   HEMOGLOBIN 12.4   HEMATOCRIT 39.7   PLATELETS 217   NEUTROS ABS 8.90*   LYMPHS ABS 1.80   MONOS ABS 0.40   EOS ABS 0.00   MCV  84.7         Lab 12/20/23  0459   SODIUM 142   POTASSIUM 5.7*   CHLORIDE 107   CO2 26.0   ANION GAP 9.0   BUN 29*   CREATININE 1.20*   EGFR 47.6*   GLUCOSE 292*   CALCIUM 8.8                         Brief Urine Lab Results       None          Microbiology Results (last 10 days)       ** No results found for the last 240 hours. **                         Results for orders placed during the hospital encounter of 10/08/20    Adult Transthoracic Echo Complete W/ Cont if Necessary Per Protocol    Interpretation Summary  · Estimated left ventricular EF = 65% Left ventricular systolic function is normal.  · Trace to mild mitral valve regurgitation is present.  · There is a small (<1cm) pericardial effusion.      Labs Pending at Discharge:      Procedures Performed  Procedure(s):  VENTRAL/INCISIONAL HERNIA REPAIR         Consults:   Consults       No orders found from 11/20/2023 to 12/20/2023.              Discharge Details        Discharge Medications        ASK your doctor about these medications        Instructions Start Date   Adult Aspirin EC Low Strength 81 MG EC tablet  Generic drug: aspirin   81 mg, Oral, Daily      albuterol sulfate  (90 Base) MCG/ACT inhaler  Commonly known as: PROVENTIL HFA;VENTOLIN HFA;PROAIR HFA   2 puffs, Inhalation, Every 4 Hours PRN      Farxiga 5 MG tablet tablet  Generic drug: dapagliflozin   5 mg, Oral, Daily      hydrALAZINE 50 MG tablet  Commonly known as: APRESOLINE   50 mg, Oral, 3 Times Daily      insulin glargine 100 UNIT/ML injection  Commonly known as: LANEDDIE MEDELGLEE  Ask about: Which instructions should I use?   40 Units, Subcutaneous, 2 Times Daily      metFORMIN 500 MG tablet  Commonly known as: GLUCOPHAGE   500 mg, Oral, 2 Times Daily With Meals      metoprolol tartrate 50 MG tablet  Commonly known as: LOPRESSOR   50 mg, Oral, 2 Times Daily      rosuvastatin 10 MG tablet  Commonly known as: CRESTOR  Ask about: Which instructions should I use?   10 mg, Oral, Daily       vitamin D 1.25 MG (60916 UT) capsule capsule  Commonly known as: ERGOCALCIFEROL   50,000 Units, Oral, 2 Times Weekly, Monday and Friday               Allergies   Allergen Reactions    Lisinopril Hives    Ipratropium-Albuterol Itching and Rash         Discharge Disposition: Home      Diet:  Hospital:  Diet Order   Procedures    Diet: Gastrointestinal Diets; Fiber-Restricted; Texture: Regular Texture (IDDSI 7); Fluid Consistency: Thin (IDDSI 0)         Discharge Activity:         CODE STATUS:  Code Status and Medical Interventions:   Ordered at: 12/19/23 1645     Level Of Support Discussed With:    Patient     Code Status (Patient has no pulse and is not breathing):    CPR (Attempt to Resuscitate)     Medical Interventions (Patient has pulse or is breathing):    Full Support         No future appointments.        Time spent on Discharge including face to face service:  35 minutes    T      Signature: Electronically signed by Perez Dumont MD, 12/22/23, 10:52 EST.  [facility] Hospitalist Team

## 2023-12-26 ENCOUNTER — READMISSION MANAGEMENT (OUTPATIENT)
Dept: CALL CENTER | Facility: HOSPITAL | Age: 73
End: 2023-12-26
Payer: MEDICARE

## 2023-12-26 NOTE — OUTREACH NOTE
General Surgery Week 1 Survey      Flowsheet Row Responses   [facility] facility patient discharged from? [facility]   Does the patient have one of the following disease processes/diagnoses(primary or secondary)? General Surgery   Week 1 attempt successful? No   Unsuccessful attempts Attempt 1            JACQUELINE KEITH - Registered Nurse

## 2024-01-03 ENCOUNTER — READMISSION MANAGEMENT (OUTPATIENT)
Dept: CALL CENTER | Facility: HOSPITAL | Age: 74
End: 2024-01-03
Payer: MEDICARE

## 2024-01-03 NOTE — OUTREACH NOTE
General Surgery Week 2 Survey      Flowsheet Row Responses   [facility] facility patient discharged from? [facility]   Does the patient have one of the following disease processes/diagnoses(primary or secondary)? General Surgery   Week 2 attempt successful? No   Unsuccessful attempts Attempt 1            Melo LOVE - Registered Nurse

## 2024-01-04 ENCOUNTER — OFFICE VISIT (OUTPATIENT)
Dept: SURGERY | Facility: CLINIC | Age: 74
End: 2024-01-04
Payer: MEDICARE

## 2024-01-04 VITALS
DIASTOLIC BLOOD PRESSURE: 45 MMHG | HEIGHT: 62 IN | RESPIRATION RATE: 18 BRPM | SYSTOLIC BLOOD PRESSURE: 181 MMHG | TEMPERATURE: 98.2 F | OXYGEN SATURATION: 92 % | HEART RATE: 59 BPM | BODY MASS INDEX: 35.74 KG/M2 | WEIGHT: 194.2 LBS

## 2024-01-04 DIAGNOSIS — K43.6 VENTRAL HERNIA WITH OBSTRUCTION AND WITHOUT GANGRENE: Primary | ICD-10-CM

## 2024-01-04 PROCEDURE — 1160F RVW MEDS BY RX/DR IN RCRD: CPT | Performed by: SURGERY

## 2024-01-04 PROCEDURE — 99213 OFFICE O/P EST LOW 20 MIN: CPT | Performed by: SURGERY

## 2024-01-04 PROCEDURE — 3078F DIAST BP <80 MM HG: CPT | Performed by: SURGERY

## 2024-01-04 PROCEDURE — 1159F MED LIST DOCD IN RCRD: CPT | Performed by: SURGERY

## 2024-01-04 PROCEDURE — 3077F SYST BP >= 140 MM HG: CPT | Performed by: SURGERY

## 2024-01-04 NOTE — PROGRESS NOTES
"Subjective   Lesa Julio is a 73 y.o. female.   Weeks out from a low midline laparotomy for incarcerated ventral hernia causing bowel obstruction.  General she is done pretty well.  She says that the bowel issues have been leading up to the operation have greatly improved she is tolerating diet she is having regular bowel function no incisional concerns.    Objective   BP (!) 181/45 (BP Location: Right arm, Patient Position: Sitting, Cuff Size: Large Adult)   Pulse 59   Temp 98.2 °F (36.8 °C) (Infrared)   Resp 18   Ht 157.5 cm (62\")   Wt 88.1 kg (194 lb 3.2 oz)   SpO2 92%   BMI 35.52 kg/m²   Physical Exam  Constitutional:       Appearance: Normal appearance.   Pulmonary:      Effort: Pulmonary effort is normal. No respiratory distress.   Abdominal:      Comments: Abdomen soft nondistended nontender. Incision without erythema. Staples in place   Skin:     General: Skin is warm and dry.   Neurological:      General: No focal deficit present.      Mental Status: She is alert. Mental status is at baseline.         Assessment & Plan   Diagnoses and all orders for this visit:    1. Ventral hernia with obstruction and without gangrene (Primary)    Okay to advance diet as tolerated.  Continue lifting restriction no more than 10 to 15 pounds for another 4 weeks.  Follow-up in about 1 month.  Staples removed in the office.    Gilmar Yen MD  1/4/2024  1:35 PM EST    This note was created using Dragon Voice Recognition software.  "
05-Jun-2022 16:08

## 2024-01-09 ENCOUNTER — READMISSION MANAGEMENT (OUTPATIENT)
Dept: CALL CENTER | Facility: HOSPITAL | Age: 74
End: 2024-01-09
Payer: MEDICARE

## 2024-01-09 NOTE — OUTREACH NOTE
General Surgery Week 3 Survey      Flowsheet Row Responses   [facility] facility patient discharged from? [facility]   Does the patient have one of the following disease processes/diagnoses(primary or secondary)? General Surgery   Week 3 attempt successful? No   Unsuccessful attempts Attempt 1            Robyn DE LA TORRE - Registered Nurse

## 2024-02-06 ENCOUNTER — OFFICE VISIT (OUTPATIENT)
Dept: SURGERY | Facility: CLINIC | Age: 74
End: 2024-02-06
Payer: MEDICARE

## 2024-02-06 VITALS
TEMPERATURE: 98.2 F | HEART RATE: 52 BPM | WEIGHT: 191.4 LBS | RESPIRATION RATE: 18 BRPM | DIASTOLIC BLOOD PRESSURE: 54 MMHG | BODY MASS INDEX: 35.22 KG/M2 | OXYGEN SATURATION: 93 % | HEIGHT: 62 IN | SYSTOLIC BLOOD PRESSURE: 168 MMHG

## 2024-02-06 DIAGNOSIS — K43.6 VENTRAL HERNIA WITH OBSTRUCTION AND WITHOUT GANGRENE: Primary | ICD-10-CM

## 2024-02-06 PROCEDURE — 3078F DIAST BP <80 MM HG: CPT | Performed by: SURGERY

## 2024-02-06 PROCEDURE — 99213 OFFICE O/P EST LOW 20 MIN: CPT | Performed by: SURGERY

## 2024-02-06 PROCEDURE — 1159F MED LIST DOCD IN RCRD: CPT | Performed by: SURGERY

## 2024-02-06 PROCEDURE — 3077F SYST BP >= 140 MM HG: CPT | Performed by: SURGERY

## 2024-02-06 PROCEDURE — 1160F RVW MEDS BY RX/DR IN RCRD: CPT | Performed by: SURGERY

## 2024-02-06 NOTE — PROGRESS NOTES
"Subjective   Lesa Julio is a 73 y.o. female.   Second postop visit after emergent ventral hernia repair for incarcerated strangulated loop of intestine.  General she says she is feeling pretty good.  She is tolerating a diet she is having regular bowel function.  Minimal pain at her incision.  Only hurts whenever she bends over to tie her shoes.    Objective   /54 (BP Location: Right arm, Patient Position: Sitting, Cuff Size: Adult)   Pulse 52   Temp 98.2 °F (36.8 °C) (Infrared)   Resp 18   Ht 157.5 cm (62\")   Wt 86.8 kg (191 lb 6.4 oz)   SpO2 93%   BMI 35.01 kg/m²   Physical Exam  On exam abdomen is soft nondistended has some mild discomfort at her incision but in general no erythema or drainage  Assessment & Plan   Diagnoses and all orders for this visit:    1. Ventral hernia with obstruction and without gangrene (Primary)    Status post emergent ventral hernia repair.  Seems to be doing well.  Okay to increase activity as tolerated.  Diet as tolerated.  Follow-up as needed.    Gilmar Yen MD  2/6/2024  9:46 AM EST    This note was created using Dragon Voice Recognition software.  "

## 2024-07-18 ENCOUNTER — TRANSCRIBE ORDERS (OUTPATIENT)
Dept: ADMINISTRATIVE | Facility: HOSPITAL | Age: 74
End: 2024-07-18
Payer: MEDICARE

## 2024-07-18 DIAGNOSIS — Z12.31 SCREENING MAMMOGRAM FOR BREAST CANCER: Primary | ICD-10-CM

## 2024-07-26 ENCOUNTER — HOSPITAL ENCOUNTER (OUTPATIENT)
Dept: MAMMOGRAPHY | Facility: HOSPITAL | Age: 74
Discharge: HOME | End: 2024-07-26
Admitting: FAMILY MEDICINE
Payer: MEDICARE

## 2024-07-26 DIAGNOSIS — Z12.31 SCREENING MAMMOGRAM FOR BREAST CANCER: ICD-10-CM

## 2024-07-26 PROCEDURE — 77067 SCR MAMMO BI INCL CAD: CPT

## 2024-07-26 PROCEDURE — 77063 BREAST TOMOSYNTHESIS BI: CPT

## (undated) DEVICE — BAPTIST FLOYD BRONCHOSCOPY: Brand: MEDLINE INDUSTRIES, INC.

## (undated) DEVICE — DRSNG SURESITE WNDW 4X4.5

## (undated) DEVICE — UNDERGLV SURG BIOGEL INDICATOR LTX PF 7

## (undated) DEVICE — PK ENDO GI 50

## (undated) DEVICE — SUT PROLN 0 CT1 30IN 8424H

## (undated) DEVICE — PAD, GROUNDING, UNIVERSAL, SPLIT, 9': Brand: MEDLINE

## (undated) DEVICE — GAUZE,SPONGE,2"X2",8PLY,STERILE,LF,2'S: Brand: MEDLINE

## (undated) DEVICE — PK MAJ LAPAROTOMY 50

## (undated) DEVICE — TBG PENCL TELESCP MEGADYNE SMOKE EVAC 15FT

## (undated) DEVICE — CVR HNDL LT SURG ACCSSRY BLU STRL

## (undated) DEVICE — ANTIBACTERIAL UNDYED BRAIDED (POLYGLACTIN 910), SYNTHETIC ABSORBABLE SUTURE: Brand: COATED VICRYL

## (undated) DEVICE — PAPR PRNT PK SONY W RIBN UPC55

## (undated) DEVICE — 3M™ IOBAN™ 2 ANTIMICROBIAL INCISE DRAPE 6650EZ: Brand: IOBAN™ 2

## (undated) DEVICE — GLV SURG BIOGEL LTX PF 7

## (undated) DEVICE — BITEBLOCK ENDO W/STRAP 60F A/ LF DISP

## (undated) DEVICE — KT SURG TURNOVER 050

## (undated) DEVICE — SLV SCD CALF HEMOFORCE DVT THERP REPROC MD

## (undated) DEVICE — DRSNG WND GZ PAD BORDERED 4X8IN STRL

## (undated) DEVICE — SUT PERMAHAND SILK 3/0 SH1 18IN

## (undated) DEVICE — SOLUTION,WATER,IRRIGATION,1000ML,STERILE: Brand: MEDLINE